# Patient Record
Sex: FEMALE | Race: WHITE | Employment: OTHER | URBAN - NONMETROPOLITAN AREA
[De-identification: names, ages, dates, MRNs, and addresses within clinical notes are randomized per-mention and may not be internally consistent; named-entity substitution may affect disease eponyms.]

---

## 2021-09-24 ENCOUNTER — OFFICE VISIT (OUTPATIENT)
Dept: FAMILY MEDICINE CLINIC | Age: 86
End: 2021-09-24
Payer: COMMERCIAL

## 2021-09-24 VITALS
HEIGHT: 63 IN | WEIGHT: 162 LBS | TEMPERATURE: 97.4 F | OXYGEN SATURATION: 97 % | HEART RATE: 83 BPM | RESPIRATION RATE: 18 BRPM | DIASTOLIC BLOOD PRESSURE: 68 MMHG | SYSTOLIC BLOOD PRESSURE: 138 MMHG | BODY MASS INDEX: 28.7 KG/M2

## 2021-09-24 DIAGNOSIS — H35.30 MACULAR DEGENERATION OF BOTH EYES, UNSPECIFIED TYPE: Primary | ICD-10-CM

## 2021-09-24 DIAGNOSIS — I25.10 CORONARY ARTERY DISEASE INVOLVING NATIVE CORONARY ARTERY OF NATIVE HEART WITHOUT ANGINA PECTORIS: ICD-10-CM

## 2021-09-24 DIAGNOSIS — Z76.89 ENCOUNTER TO ESTABLISH CARE: ICD-10-CM

## 2021-09-24 DIAGNOSIS — M81.0 AGE-RELATED OSTEOPOROSIS WITHOUT CURRENT PATHOLOGICAL FRACTURE: ICD-10-CM

## 2021-09-24 DIAGNOSIS — Z01.818 PREOPERATIVE CLEARANCE: ICD-10-CM

## 2021-09-24 DIAGNOSIS — H35.63: ICD-10-CM

## 2021-09-24 DIAGNOSIS — Z13.1 SCREENING FOR DIABETES MELLITUS: ICD-10-CM

## 2021-09-24 DIAGNOSIS — I10 ESSENTIAL HYPERTENSION: ICD-10-CM

## 2021-09-24 DIAGNOSIS — M54.50 CHRONIC BILATERAL LOW BACK PAIN WITHOUT SCIATICA: ICD-10-CM

## 2021-09-24 DIAGNOSIS — G89.29 CHRONIC BILATERAL LOW BACK PAIN WITHOUT SCIATICA: ICD-10-CM

## 2021-09-24 DIAGNOSIS — C44.41 BASAL CELL CARCINOMA (BCC) OF SCALP: ICD-10-CM

## 2021-09-24 DIAGNOSIS — M51.36 DDD (DEGENERATIVE DISC DISEASE), LUMBAR: ICD-10-CM

## 2021-09-24 DIAGNOSIS — E78.2 MIXED HYPERLIPIDEMIA: ICD-10-CM

## 2021-09-24 LAB
APPEARANCE FLUID: CLEAR
BILIRUBIN, POC: NORMAL
BLOOD URINE, POC: NORMAL
CLARITY, POC: CLEAR
COLOR, POC: YELLOW
GLUCOSE URINE, POC: NORMAL
KETONES, POC: NORMAL
LEUKOCYTE EST, POC: NORMAL
NITRITE, POC: NORMAL
PH, POC: 7.5
PROTEIN, POC: NORMAL
SPECIFIC GRAVITY, POC: 1.02
UROBILINOGEN, POC: 0.2

## 2021-09-24 PROCEDURE — 90471 IMMUNIZATION ADMIN: CPT | Performed by: NURSE PRACTITIONER

## 2021-09-24 PROCEDURE — 99204 OFFICE O/P NEW MOD 45 MIN: CPT | Performed by: NURSE PRACTITIONER

## 2021-09-24 PROCEDURE — 81002 URINALYSIS NONAUTO W/O SCOPE: CPT | Performed by: NURSE PRACTITIONER

## 2021-09-24 PROCEDURE — 90694 VACC AIIV4 NO PRSRV 0.5ML IM: CPT | Performed by: NURSE PRACTITIONER

## 2021-09-24 RX ORDER — HYDROCHLOROTHIAZIDE 25 MG/1
25 TABLET ORAL DAILY
COMMUNITY
End: 2021-10-05 | Stop reason: SDUPTHER

## 2021-09-24 RX ORDER — ATORVASTATIN CALCIUM 20 MG/1
20 TABLET, FILM COATED ORAL DAILY
COMMUNITY
End: 2021-11-22 | Stop reason: SDUPTHER

## 2021-09-24 RX ORDER — NITROGLYCERIN 0.4 MG/1
0.4 TABLET SUBLINGUAL EVERY 5 MIN PRN
Qty: 25 TABLET | Refills: 3 | Status: SHIPPED | OUTPATIENT
Start: 2021-09-24

## 2021-09-24 RX ORDER — ASPIRIN 81 MG/1
81 TABLET, CHEWABLE ORAL DAILY
Status: ON HOLD | COMMUNITY
End: 2021-10-15 | Stop reason: HOSPADM

## 2021-09-24 RX ORDER — GABAPENTIN 100 MG/1
100 CAPSULE ORAL 2 TIMES DAILY
COMMUNITY
End: 2021-10-26 | Stop reason: SDUPTHER

## 2021-09-24 RX ORDER — PHENOL 1.4 %
1 AEROSOL, SPRAY (ML) MUCOUS MEMBRANE DAILY
COMMUNITY

## 2021-09-24 RX ORDER — AMLODIPINE BESYLATE 10 MG/1
10 TABLET ORAL DAILY
COMMUNITY
End: 2021-11-04 | Stop reason: SDUPTHER

## 2021-09-24 RX ORDER — ISOSORBIDE MONONITRATE 30 MG/1
30 TABLET, EXTENDED RELEASE ORAL DAILY
COMMUNITY
End: 2021-10-28 | Stop reason: SDUPTHER

## 2021-09-24 RX ORDER — DENOSUMAB 60 MG/ML
60 INJECTION SUBCUTANEOUS
COMMUNITY
End: 2021-12-27 | Stop reason: SDUPTHER

## 2021-09-24 RX ORDER — POTASSIUM CHLORIDE 750 MG/1
10 TABLET, FILM COATED, EXTENDED RELEASE ORAL DAILY
COMMUNITY
End: 2021-10-28 | Stop reason: SDUPTHER

## 2021-09-24 ASSESSMENT — ENCOUNTER SYMPTOMS
ABDOMINAL PAIN: 0
NAUSEA: 0
BACK PAIN: 1
SHORTNESS OF BREATH: 0
SORE THROAT: 0
CHEST TIGHTNESS: 0
DIARRHEA: 0
WHEEZING: 0
COUGH: 0

## 2021-09-24 ASSESSMENT — PATIENT HEALTH QUESTIONNAIRE - PHQ9
SUM OF ALL RESPONSES TO PHQ QUESTIONS 1-9: 0
SUM OF ALL RESPONSES TO PHQ QUESTIONS 1-9: 0
2. FEELING DOWN, DEPRESSED OR HOPELESS: 0
SUM OF ALL RESPONSES TO PHQ9 QUESTIONS 1 & 2: 0
SUM OF ALL RESPONSES TO PHQ QUESTIONS 1-9: 0
1. LITTLE INTEREST OR PLEASURE IN DOING THINGS: 0

## 2021-09-24 NOTE — PROGRESS NOTES
Immunizations Administered     Name Date Dose Route    Influenza, Quadv, adjuvanted, 65 yrs +, IM, PF (Fluad) 9/24/2021 0.5 mL Intramuscular    Site: Deltoid- Right    Lot: [id number]    [facility]: 55896-044-09

## 2021-09-24 NOTE — PROGRESS NOTES
pathological fracture     Basal cell carcinoma (BCC) of scalp     Coronary artery disease involving native coronary artery of native heart without angina pectoris     DDD (degenerative disc disease), lumbar     Essential hypertension     Mixed hyperlipidemia        Current Outpatient Medications   Medication Sig Dispense Refill    potassium chloride (KLOR-CON) 10 MEQ extended release tablet Take 10 mEq by mouth daily      gabapentin (NEURONTIN) 100 MG capsule Take 100 mg by mouth 2 times daily.  hydroCHLOROthiazide (HYDRODIURIL) 25 MG tablet Take 25 mg by mouth daily      isosorbide mononitrate (IMDUR) 30 MG extended release tablet Take 30 mg by mouth daily      amLODIPine (NORVASC) 10 MG tablet Take 10 mg by mouth daily      atorvastatin (LIPITOR) 20 MG tablet Take 20 mg by mouth daily      vitamin D (CHOLECALCIFEROL) 25 MCG (1000 UT) TABS tablet Take 1,000 Units by mouth daily      calcium carbonate 600 MG TABS tablet Take 1 tablet by mouth daily      aspirin 81 MG chewable tablet Take 81 mg by mouth daily      Multiple Vitamins-Minerals (OCUVITE PO) Take by mouth      denosumab (PROLIA) 60 MG/ML SOSY SC injection Inject 60 mg into the skin once      nitroGLYCERIN (NITROSTAT) 0.4 MG SL tablet Place 1 tablet under the tongue every 5 minutes as needed for Chest pain up to max of 3 total doses. If no relief after 1 dose, call 911. 25 tablet 3     No current facility-administered medications for this visit.        Allergies   Allergen Reactions    Seasonal        Past Surgical History:   Procedure Laterality Date    CARDIAC CATHETERIZATION      2020    EYE SURGERY      h/o eye bleed    FOOT SURGERY      deep calluses    HYSTERECTOMY      TOTAL HIP ARTHROPLASTY Left 2019    VARICOSE VEIN SURGERY         Social History     Tobacco Use    Smoking status: Former Smoker     Types: Cigarettes     Quit date:      Years since quittin.7    Smokeless tobacco: Never Used   Vaping Use  Vaping Use: Never used   Substance Use Topics    Alcohol use: Never    Drug use: Never       History reviewed. No pertinent family history. /68   Pulse 83   Temp 97.4 °F (36.3 °C) (Temporal)   Resp 18   Ht 5' 3\" (1.6 m)   Wt 162 lb (73.5 kg)   SpO2 97%   BMI 28.70 kg/m²     Physical Exam  Vitals reviewed. Constitutional:       General: She is not in acute distress. Appearance: Normal appearance. She is well-developed. HENT:      Head: Normocephalic. Mouth/Throat:      Pharynx: No posterior oropharyngeal erythema. Eyes:      Conjunctiva/sclera: Conjunctivae normal.      Pupils: Pupils are equal, round, and reactive to light. Neck:      Thyroid: No thyromegaly. Vascular: No carotid bruit or JVD. Trachea: No tracheal deviation. Cardiovascular:      Rate and Rhythm: Normal rate and regular rhythm. Heart sounds: Normal heart sounds. No murmur heard. Pulmonary:      Effort: Pulmonary effort is normal. No respiratory distress. Breath sounds: Normal breath sounds. No wheezing or rhonchi. Musculoskeletal:         General: Normal range of motion. Cervical back: Normal range of motion and neck supple. Lymphadenopathy:      Cervical: No cervical adenopathy. Skin:     General: Skin is warm and dry. Findings: No rash. Neurological:      Mental Status: She is alert. Psychiatric:         Mood and Affect: Mood normal.         Behavior: Behavior normal.         Thought Content: Thought content normal.         ASSESSMENT/PLAN:  1. Essential hypertension  -Stable, controlled. Continue current medication.  - CBC Auto Differential; Future    2. Mixed hyperlipidemia  -Check fasting CMP, lipid  -Continue statin at current dose  - Comprehensive Metabolic Panel; Future  - Lipid Panel; Future    3. Coronary artery disease involving native coronary artery of native heart without angina pectoris  -Referred to cardiology to establish care.   No recent cardiac symptoms. She has completed Plavix for her last stent in 2020. - Stephanie Stevenson MD, Cardiology, [place]    4. Age-related osteoporosis without current pathological fracture  -We will request records from previous PCP to see when last Prolia injection was given. -Needs DEXA  - DEXA BONE DENSITY AXIAL SKELETON; Future    5. DDD (degenerative disc disease), lumbar  -Refer to pain management  -Continue Tylenol, gabapentin  - [address], [facility] Griffin APRN, Pain Medicine, [place]    6. Chronic bilateral low back pain without sciatica  -As above  - [address], [facility] Griffin APRN, Pain Medicine, [place]    7. Macular degeneration of both eyes, unspecified type  -Refer to ophthalmology  - External Referral To Ophthalmology    8. Deep retinal hemorrhage of both eyes  -Refer to ophthalmology  - External Referral To Ophthalmology    9. Basal cell carcinoma (BCC) of scalp  -Refer to dermatology  - External Referral To Dermatology    10. Preoperative clearance    - XR CHEST (2 VW); Future  - POCT Urinalysis no Micro    11. Screening for diabetes mellitus    - Hemoglobin A1C; Future    12. Encounter to establish care           Return in about 3 months (around 12/24/2021) for follow up, 30 minute visit. Kourtney No was seen today for new patient. Diagnoses and all orders for this visit:    Macular degeneration of both eyes, unspecified type  -     External Referral To Ophthalmology    Essential hypertension  -     CBC Auto Differential; Future    Mixed hyperlipidemia  -     Comprehensive Metabolic Panel; Future  -     Lipid Panel;  Future    Coronary artery disease involving native coronary artery of native heart without angina pectoris  -     Stephanie Stevenson MD, Cardiology, [place]    Age-related osteoporosis without current pathological fracture  -     DEXA BONE DENSITY AXIAL SKELETON; Future    DDD (degenerative disc disease), lumbar  -     Simi - BRAYDON Fay, Pain Medicine, [place]    Chronic bilateral low back pain without sciatica  -     BRAYDON Moran, Pain Medicine, [place]    Deep retinal hemorrhage of both eyes  -     External Referral To Ophthalmology    Basal cell carcinoma (BCC) of scalp  -     External Referral To Dermatology    Preoperative clearance  -     Cancel: Urinalysis Reflex to Culture; Future  -     XR CHEST (2 VW); Future  -     POCT Urinalysis no Micro    Screening for diabetes mellitus  -     Hemoglobin A1C; Future    Encounter to establish care    Other orders  -     INFLUENZA, QUADV, ADJUVANTED, 65 YRS =, IM, PF, PREFILL SYR, 0.5ML (FLUAD)  -     nitroGLYCERIN (NITROSTAT) 0.4 MG SL tablet; Place 1 tablet under the tongue every 5 minutes as needed for Chest pain up to max of 3 total doses. If no relief after 1 dose, call 911. There are no discontinued medications. There are no Patient Instructions on file for this visit. Patient voicesunderstanding and agrees to plans along with risks and benefits of plan. Counseling:  Maira Camacho case, medications and options were discussed in detail. Patient was instructed to call the office if she questionsregarding her treatment. Should her conditions worsen, she should return to office to be reassessed by BRAYDON Nuñez. she Should to go the closest Emergency Department for any emergency. They verbalizedunderstanding the above instructions. Return in about 3 months (around 12/24/2021) for follow up, 30 minute visit.

## 2021-09-27 RX ORDER — NITROFURANTOIN 25; 75 MG/1; MG/1
100 CAPSULE ORAL 2 TIMES DAILY
Qty: 14 CAPSULE | Refills: 0 | Status: SHIPPED | OUTPATIENT
Start: 2021-09-27 | End: 2021-10-04

## 2021-09-29 ENCOUNTER — TELEPHONE (OUTPATIENT)
Dept: CARDIOLOGY CLINIC | Age: 86
End: 2021-09-29

## 2021-09-29 NOTE — TELEPHONE ENCOUNTER
Called patient to inquire about previous cardiologist and records. I have lvm with [facility] in [address] [facility] records office to return my call regarding patient so that I can get all her cardiac records faxed to us. Patients previous cardiologist is dr. Chuck Louise.

## 2021-09-30 ENCOUNTER — HOSPITAL ENCOUNTER (OUTPATIENT)
Dept: PREADMISSION TESTING | Age: 86
Discharge: HOME | End: 2021-10-04
Payer: MEDICARE

## 2021-09-30 VITALS — HEIGHT: 63 IN | BODY MASS INDEX: 28.35 KG/M2 | WEIGHT: 160 LBS

## 2021-09-30 LAB
APTT: 31.1 SEC (ref 26–36.2)
INR BLD: 0.99 (ref 0.88–1.18)
PROTHROMBIN TIME: 13.3 SEC (ref 12–14.6)

## 2021-09-30 PROCEDURE — 85730 THROMBOPLASTIN TIME PARTIAL: CPT

## 2021-09-30 PROCEDURE — 93005 ELECTROCARDIOGRAM TRACING: CPT | Performed by: ORTHOPAEDIC SURGERY

## 2021-09-30 PROCEDURE — 87081 CULTURE SCREEN ONLY: CPT

## 2021-09-30 PROCEDURE — 85610 PROTHROMBIN TIME: CPT

## 2021-10-01 LAB
EKG P AXIS: 64 DEGREES
EKG P-R INTERVAL: 186 MS
EKG Q-T INTERVAL: 452 MS
EKG QRS DURATION: 152 MS
EKG QTC CALCULATION (BAZETT): 451 MS
EKG T AXIS: -2 DEGREES
MRSA CULTURE ONLY: NORMAL

## 2021-10-01 PROCEDURE — 93010 ELECTROCARDIOGRAM REPORT: CPT | Performed by: INTERNAL MEDICINE

## 2021-10-05 ENCOUNTER — OFFICE VISIT (OUTPATIENT)
Dept: CARDIOLOGY CLINIC | Age: 86
End: 2021-10-05
Payer: MEDICARE

## 2021-10-05 VITALS
OXYGEN SATURATION: 95 % | HEIGHT: 63 IN | WEIGHT: 162 LBS | BODY MASS INDEX: 28.7 KG/M2 | DIASTOLIC BLOOD PRESSURE: 64 MMHG | HEART RATE: 72 BPM | SYSTOLIC BLOOD PRESSURE: 128 MMHG

## 2021-10-05 DIAGNOSIS — I25.2 HISTORY OF MI (MYOCARDIAL INFARCTION): ICD-10-CM

## 2021-10-05 DIAGNOSIS — Z01.810 PREOP CARDIOVASCULAR EXAM: Primary | ICD-10-CM

## 2021-10-05 DIAGNOSIS — I35.0 NONRHEUMATIC AORTIC VALVE STENOSIS: ICD-10-CM

## 2021-10-05 DIAGNOSIS — I10 ESSENTIAL HYPERTENSION: ICD-10-CM

## 2021-10-05 DIAGNOSIS — E78.2 MIXED HYPERLIPIDEMIA: ICD-10-CM

## 2021-10-05 DIAGNOSIS — I25.10 CORONARY ARTERY DISEASE INVOLVING NATIVE CORONARY ARTERY OF NATIVE HEART WITHOUT ANGINA PECTORIS: ICD-10-CM

## 2021-10-05 PROCEDURE — 1123F ACP DISCUSS/DSCN MKR DOCD: CPT | Performed by: CLINICAL NURSE SPECIALIST

## 2021-10-05 PROCEDURE — 4040F PNEUMOC VAC/ADMIN/RCVD: CPT | Performed by: CLINICAL NURSE SPECIALIST

## 2021-10-05 PROCEDURE — G8417 CALC BMI ABV UP PARAM F/U: HCPCS | Performed by: CLINICAL NURSE SPECIALIST

## 2021-10-05 PROCEDURE — 1090F PRES/ABSN URINE INCON ASSESS: CPT | Performed by: CLINICAL NURSE SPECIALIST

## 2021-10-05 PROCEDURE — G8400 PT W/DXA NO RESULTS DOC: HCPCS | Performed by: CLINICAL NURSE SPECIALIST

## 2021-10-05 PROCEDURE — 99204 OFFICE O/P NEW MOD 45 MIN: CPT | Performed by: CLINICAL NURSE SPECIALIST

## 2021-10-05 PROCEDURE — G8427 DOCREV CUR MEDS BY ELIG CLIN: HCPCS | Performed by: CLINICAL NURSE SPECIALIST

## 2021-10-05 PROCEDURE — 1036F TOBACCO NON-USER: CPT | Performed by: CLINICAL NURSE SPECIALIST

## 2021-10-05 PROCEDURE — G8484 FLU IMMUNIZE NO ADMIN: HCPCS | Performed by: CLINICAL NURSE SPECIALIST

## 2021-10-05 RX ORDER — HYDROCHLOROTHIAZIDE 25 MG/1
25 TABLET ORAL DAILY
Qty: 90 TABLET | Refills: 3 | Status: SHIPPED | OUTPATIENT
Start: 2021-10-05 | End: 2022-07-27

## 2021-10-05 ASSESSMENT — ENCOUNTER SYMPTOMS
VOMITING: 0
EYE REDNESS: 0
NAUSEA: 0
COUGH: 0
CHEST TIGHTNESS: 0
SHORTNESS OF BREATH: 0
ABDOMINAL PAIN: 0
WHEEZING: 0
FACIAL SWELLING: 0

## 2021-10-05 NOTE — PATIENT INSTRUCTIONS
[place] at the [facility] and [address] [facility] located on the first floor of [place] [address] through hospital main entrance and turn immediately to your left. Date/Time:     Patient's contact number:  [phone] (home)     Echocardiogram -  No prep. Takes approximately 30 min. An echocardiogram uses sound waves to produce images of your heart. This commonly used test allows your doctor to see how your heart is beating and pumping blood. Your doctor can use the images from an echocardiogram to identify various abnormalities in the heart muscle and valves. This test has 2 parts:   Ø You will be asked to disrobe from the waist up and given a gown to wear. The technologist will then hook up an EKG monitor to you for the entire exam.   Ø You will then have an ultrasound of your heart (echocardiogram) to assess the heart muscle, heart valves and heart function. You may eat and take any medicines before the exam.     If you need to change your appointment, please call outpatient scheduling at [phone].

## 2021-10-05 NOTE — PROGRESS NOTES
of native heart without angina pectoris     DDD (degenerative disc disease), lumbar     Basal cell carcinoma (BCC) of scalp      Past Medical History:   Diagnosis Date    Age-related osteoporosis without current pathological fracture     Basal cell carcinoma (BCC) of scalp     skin    Coronary artery disease involving native coronary artery of native heart without angina pectoris     [place]; due to see [facility] cardiology    DDD (degenerative disc disease), lumbar     Essential hypertension     Knee pain     Mixed hyperlipidemia      Past Surgical History:   Procedure Laterality Date    CARDIAC CATHETERIZATION      stents 2020    EYE SURGERY      h/o eye bleed    FOOT SURGERY      deep calluses    HYSTERECTOMY      JOINT REPLACEMENT      TOTAL HIP ARTHROPLASTY Left 2019    VARICOSE VEIN SURGERY       Family History   Problem Relation Age of Onset    Heart Disease Mother      Social History     Tobacco Use    Smoking status: Former Smoker     Types: Cigarettes     Quit date:      Years since quittin.7    Smokeless tobacco: Never Used   Substance Use Topics    Alcohol use: Never      Current Outpatient Medications   Medication Sig Dispense Refill    hydroCHLOROthiazide (HYDRODIURIL) 25 MG tablet Take 1 tablet by mouth daily 90 tablet 3    potassium chloride (KLOR-CON) 10 MEQ extended release tablet Take 10 mEq by mouth daily      gabapentin (NEURONTIN) 100 MG capsule Take 100 mg by mouth 2 times daily.       isosorbide mononitrate (IMDUR) 30 MG extended release tablet Take 30 mg by mouth daily      amLODIPine (NORVASC) 10 MG tablet Take 10 mg by mouth daily      atorvastatin (LIPITOR) 20 MG tablet Take 20 mg by mouth daily      vitamin D (CHOLECALCIFEROL) 25 MCG (1000 UT) TABS tablet Take 1,000 Units by mouth daily      calcium carbonate 600 MG TABS tablet Take 1 tablet by mouth daily      aspirin 81 MG chewable tablet Take 81 mg by mouth daily      Multiple discharge. Left eye: No discharge. Pupils: Pupils are equal, round, and reactive to light. Neck:      Thyroid: No thyromegaly. Vascular: No carotid bruit or JVD. Trachea: No tracheal deviation. Cardiovascular:      Rate and Rhythm: Normal rate and regular rhythm. Heart sounds: Murmur (2/6 systolic) heard. No friction rub. No gallop. Pulmonary:      Effort: Pulmonary effort is normal. No respiratory distress. Breath sounds: Normal breath sounds. No wheezing or rales. Abdominal:      Palpations: Abdomen is soft. Tenderness: There is no abdominal tenderness. Musculoskeletal:         General: No swelling or deformity. Cervical back: Neck supple. No muscular tenderness. Right lower leg: No edema. Left lower leg: No edema. Skin:     General: Skin is warm and dry. Findings: No rash. Neurological:      General: No focal deficit present. Mental Status: She is alert and oriented to person, place, and time. Cranial Nerves: No cranial nerve deficit. Psychiatric:         Mood and Affect: Mood normal.         Behavior: Behavior normal.         Judgment: Judgment normal.         Data:  Lab Results   Component Value Date    WBC 6.6 09/27/2021    RBC 4.76 09/27/2021    HGB 13.7 09/27/2021    HCT 42.9 09/27/2021     09/27/2021      Lab Results   Component Value Date    CHOL 149 09/27/2021    TRIG 125 09/27/2021    HDL 54 09/27/2021    LDLCALC 70 09/27/2021     Lab Results   Component Value Date     09/27/2021    K 4.5 09/27/2021    CL 96 09/27/2021    CO2 28 09/27/2021    GLUCOSE 114 09/27/2021    BUN 13 09/27/2021    CREATININE 0.6 09/27/2021    CALCIUM 10.1 09/27/2021    ALT 26 09/27/2021    AST 25 09/27/2021       Echo 10/20/2020-normal LVEF, mild LV H, moderate aortic stenosis    Reviewed EKG dated 9/30/2021 with sinus bradycardia and right bundle branch block    Assessment:     Diagnosis Orders   1.  Preop cardiovascular exam 2. Nonrheumatic aortic valve stenosis  ECHO Complete 2D W Doppler W Color   3. Coronary artery disease involving native coronary artery of native heart without angina pectoris     4. History of MI (myocardial infarction)     5. Essential hypertension     6. Mixed hyperlipidemia         Preoperative cardiovascular exam for upcoming knee surgery  Patient is able to do 4 METS of activity. Currently no angina symptoms and recent heart cath in March 2020 in [place]  Repeat 2D echocardiogram to reassess aortic stenosis before surgical clearance    CAD-with history of MI and stents in 2019 and most recently in March 2020. I do not have her heart cath reports, but will request.  She denies any symptoms of angina. She is well controlled with aspirin, amlodipine, isosorbide    Hypertension-stable on current regimen with amlodipine, HCTZ    Hyperlipidemia-on statin therapy with atorvastatin. Reviewed recent lipids from 9/27/2021 that showed LDL of 70. Continue present treatment      Stable cardiovascular status. No evidence of overt heart failure,angina or dysrhythmia. Plan    Orders Placed This Encounter   Procedures    ECHO Complete 2D W Doppler W Color     Standing Status:   Future     Standing Expiration Date:   10/5/2022     Order Specific Question:   Reason for exam:     Answer:   aortic stenosis     Return in about 6 months (around 4/5/2022) for Dr Jason Capone. Echocardiogram-letter to Dr. Selvin Messina once results received    Call with any questionsor concerns  Follow up with BRAYDON Nina for non cardiac problems  Report any new problems  Cardiovascular Fitness-Exercise as tolerated. Strive for 15 minutes of exercise most days of the week. Cardiac / HealthyDiet  Continue current medications as directed  Continue plan of treatment  It is always recommended that you bring your medicationsbottles with you to each visit - this is for your safety!        BRAYDON Young

## 2021-10-08 ENCOUNTER — HOSPITAL ENCOUNTER (OUTPATIENT)
Dept: NON INVASIVE DIAGNOSTICS | Age: 86
Discharge: HOME | End: 2021-10-08
Payer: MEDICARE

## 2021-10-08 DIAGNOSIS — I35.0 NONRHEUMATIC AORTIC VALVE STENOSIS: ICD-10-CM

## 2021-10-08 LAB
LV EF: 63 %
LVEF MODALITY: NORMAL

## 2021-10-08 PROCEDURE — C8929 TTE W OR WO FOL WCON,DOPPLER: HCPCS

## 2021-10-08 PROCEDURE — 6360000004 HC RX CONTRAST MEDICATION: Performed by: INTERNAL MEDICINE

## 2021-10-08 PROCEDURE — 93356 MYOCRD STRAIN IMG SPCKL TRCK: CPT

## 2021-10-08 RX ADMIN — PERFLUTREN 1.65 MG: 6.52 INJECTION, SUSPENSION INTRAVENOUS at 10:37

## 2021-10-11 ENCOUNTER — TELEPHONE (OUTPATIENT)
Dept: CARDIOLOGY CLINIC | Age: 86
End: 2021-10-11

## 2021-10-11 NOTE — TELEPHONE ENCOUNTER
Date: 10-14-21    Cardiologist: Dr. Sean Ríos    Procedure: Right total knee    Surgeon: Dr. Jan Mc Office Visit: 10-5-21    Reason for office visit and medical concerns addressed at this office visit: CAD, non-stemi 2019, HTN, hyperlipidemia    Testing Performed and Date of Service:  [facility] 10-8-21 normal    RCRI = 1 pt, low, 0.9%   METs 4    Current Medications: hydrodiuril, K+, neurontin, norvasc, lipitor, vit D, calcium carbonate, ASA, prolia, nitro    Is the patient currently taking an anticoagulant? If so, what is the diagnosis the patient has been given to warrant the need for the anticoagulant?  ASA    Additional Notes: Cardiac Risk Request   You said ok on echo report can you just put in on this one too pls

## 2021-10-11 NOTE — TELEPHONE ENCOUNTER
Yes I believe I just sent you a message on this one.   Okay to send letter to Dr. Keaton Avelar for cardiac risk stratification

## 2021-10-12 ENCOUNTER — HOSPITAL ENCOUNTER (OUTPATIENT)
Dept: PREADMISSION TESTING | Age: 86
Discharge: HOME | DRG: 470 | End: 2021-10-16
Payer: MEDICARE

## 2021-10-12 LAB — SARS-COV-2, PCR: NOT DETECTED

## 2021-10-12 PROCEDURE — U0005 INFEC AGEN DETEC AMPLI PROBE: HCPCS

## 2021-10-12 PROCEDURE — U0003 INFECTIOUS AGENT DETECTION BY NUCLEIC ACID (DNA OR RNA); SEVERE ACUTE RESPIRATORY SYNDROME CORONAVIRUS 2 (SARS-COV-2) (CORONAVIRUS DISEASE [COVID-19]), AMPLIFIED PROBE TECHNIQUE, MAKING USE OF HIGH THROUGHPUT TECHNOLOGIES AS DESCRIBED BY CMS-2020-01-R: HCPCS

## 2021-10-13 ENCOUNTER — ANESTHESIA EVENT (OUTPATIENT)
Dept: OPERATING ROOM | Age: 86
DRG: 470 | End: 2021-10-13
Payer: MEDICARE

## 2021-10-14 ENCOUNTER — ANESTHESIA (OUTPATIENT)
Dept: OPERATING ROOM | Age: 86
DRG: 470 | End: 2021-10-14
Payer: MEDICARE

## 2021-10-14 ENCOUNTER — HOSPITAL ENCOUNTER (INPATIENT)
Age: 86
LOS: 1 days | Discharge: HOME HEALTH | DRG: 470 | End: 2021-10-15
Attending: ORTHOPAEDIC SURGERY | Admitting: ORTHOPAEDIC SURGERY
Payer: MEDICARE

## 2021-10-14 ENCOUNTER — APPOINTMENT (OUTPATIENT)
Dept: GENERAL RADIOLOGY | Age: 86
DRG: 470 | End: 2021-10-14
Attending: ORTHOPAEDIC SURGERY
Payer: MEDICARE

## 2021-10-14 VITALS — SYSTOLIC BLOOD PRESSURE: 135 MMHG | DIASTOLIC BLOOD PRESSURE: 67 MMHG | TEMPERATURE: 96.8 F | OXYGEN SATURATION: 97 %

## 2021-10-14 DIAGNOSIS — M17.11 PRIMARY OSTEOARTHRITIS OF RIGHT KNEE: Primary | ICD-10-CM

## 2021-10-14 PROCEDURE — 3600000005 HC SURGERY LEVEL 5 BASE: Performed by: ORTHOPAEDIC SURGERY

## 2021-10-14 PROCEDURE — 2500000003 HC RX 250 WO HCPCS: Performed by: NURSE ANESTHETIST, CERTIFIED REGISTERED

## 2021-10-14 PROCEDURE — 6360000002 HC RX W HCPCS: Performed by: ORTHOPAEDIC SURGERY

## 2021-10-14 PROCEDURE — 7100000000 HC PACU RECOVERY - FIRST 15 MIN: Performed by: ORTHOPAEDIC SURGERY

## 2021-10-14 PROCEDURE — C1776 JOINT DEVICE (IMPLANTABLE): HCPCS | Performed by: ORTHOPAEDIC SURGERY

## 2021-10-14 PROCEDURE — 2709999900 HC NON-CHARGEABLE SUPPLY: Performed by: ORTHOPAEDIC SURGERY

## 2021-10-14 PROCEDURE — 6370000000 HC RX 637 (ALT 250 FOR IP): Performed by: ORTHOPAEDIC SURGERY

## 2021-10-14 PROCEDURE — 6360000002 HC RX W HCPCS: Performed by: ANESTHESIOLOGY

## 2021-10-14 PROCEDURE — 3700000001 HC ADD 15 MINUTES (ANESTHESIA): Performed by: ORTHOPAEDIC SURGERY

## 2021-10-14 PROCEDURE — 2500000003 HC RX 250 WO HCPCS: Performed by: ORTHOPAEDIC SURGERY

## 2021-10-14 PROCEDURE — C1713 ANCHOR/SCREW BN/BN,TIS/BN: HCPCS | Performed by: ORTHOPAEDIC SURGERY

## 2021-10-14 PROCEDURE — 6360000002 HC RX W HCPCS: Performed by: NURSE ANESTHETIST, CERTIFIED REGISTERED

## 2021-10-14 PROCEDURE — 64447 NJX AA&/STRD FEMORAL NRV IMG: CPT | Performed by: NURSE ANESTHETIST, CERTIFIED REGISTERED

## 2021-10-14 PROCEDURE — 1210000000 HC MED SURG R&B

## 2021-10-14 PROCEDURE — 73560 X-RAY EXAM OF KNEE 1 OR 2: CPT

## 2021-10-14 PROCEDURE — 3E0T3BZ INTRODUCTION OF ANESTHETIC AGENT INTO PERIPHERAL NERVES AND PLEXI, PERCUTANEOUS APPROACH: ICD-10-PCS | Performed by: ANESTHESIOLOGY

## 2021-10-14 PROCEDURE — 2580000003 HC RX 258: Performed by: ORTHOPAEDIC SURGERY

## 2021-10-14 PROCEDURE — 3600000015 HC SURGERY LEVEL 5 ADDTL 15MIN: Performed by: ORTHOPAEDIC SURGERY

## 2021-10-14 PROCEDURE — 7100000001 HC PACU RECOVERY - ADDTL 15 MIN: Performed by: ORTHOPAEDIC SURGERY

## 2021-10-14 PROCEDURE — 2500000003 HC RX 250 WO HCPCS: Performed by: ANESTHESIOLOGY

## 2021-10-14 PROCEDURE — 0SRC0J9 REPLACEMENT OF RIGHT KNEE JOINT WITH SYNTHETIC SUBSTITUTE, CEMENTED, OPEN APPROACH: ICD-10-PCS | Performed by: ORTHOPAEDIC SURGERY

## 2021-10-14 PROCEDURE — 3700000000 HC ANESTHESIA ATTENDED CARE: Performed by: ORTHOPAEDIC SURGERY

## 2021-10-14 DEVICE — COMPONENT FEM CEM 5 STD RT KNEE REV COCR PERSONA: Type: IMPLANTABLE DEVICE | Site: FEMUR | Status: FUNCTIONAL

## 2021-10-14 DEVICE — PSN REV STRAIGHT SPLINE STEM EXT 14X135MM: Type: IMPLANTABLE DEVICE | Site: KNEE | Status: FUNCTIONAL

## 2021-10-14 DEVICE — PSN TIB STM 5 DEG SZ D R: Type: IMPLANTABLE DEVICE | Site: TIBIA | Status: FUNCTIONAL

## 2021-10-14 DEVICE — CEMENT BNE 40GM HI VISC RADPQ FOR REV SURG: Type: IMPLANTABLE DEVICE | Site: KNEE | Status: FUNCTIONAL

## 2021-10-14 DEVICE — COMPONENT PAT DIA29MM THK8MM KNEE POLY CEM CONVENTIONAL: Type: IMPLANTABLE DEVICE | Site: PATELLA | Status: FUNCTIONAL

## 2021-10-14 DEVICE — EXTENSION STEM L30MM DIA14MM KNEE TAPR CEM PERSONA: Type: IMPLANTABLE DEVICE | Site: KNEE | Status: FUNCTIONAL

## 2021-10-14 DEVICE — IMPLANTABLE DEVICE: Type: IMPLANTABLE DEVICE | Site: KNEE | Status: FUNCTIONAL

## 2021-10-14 RX ORDER — OXYCODONE HCL 10 MG/1
10 TABLET, FILM COATED, EXTENDED RELEASE ORAL
Status: COMPLETED | OUTPATIENT
Start: 2021-10-14 | End: 2021-10-14

## 2021-10-14 RX ORDER — LIDOCAINE HYDROCHLORIDE 10 MG/ML
INJECTION, SOLUTION INFILTRATION; PERINEURAL PRN
Status: DISCONTINUED | OUTPATIENT
Start: 2021-10-14 | End: 2021-10-14 | Stop reason: SDUPTHER

## 2021-10-14 RX ORDER — SODIUM CHLORIDE 0.9 % (FLUSH) 0.9 %
5-40 SYRINGE (ML) INJECTION PRN
Status: DISCONTINUED | OUTPATIENT
Start: 2021-10-14 | End: 2021-10-14 | Stop reason: HOSPADM

## 2021-10-14 RX ORDER — SODIUM CHLORIDE 0.9 % (FLUSH) 0.9 %
10 SYRINGE (ML) INJECTION EVERY 12 HOURS SCHEDULED
Status: DISCONTINUED | OUTPATIENT
Start: 2021-10-14 | End: 2021-10-15 | Stop reason: HOSPADM

## 2021-10-14 RX ORDER — HYDROMORPHONE HYDROCHLORIDE 1 MG/ML
0.25 INJECTION, SOLUTION INTRAMUSCULAR; INTRAVENOUS; SUBCUTANEOUS EVERY 5 MIN PRN
Status: DISCONTINUED | OUTPATIENT
Start: 2021-10-14 | End: 2021-10-14 | Stop reason: HOSPADM

## 2021-10-14 RX ORDER — MORPHINE SULFATE 2 MG/ML
2 INJECTION, SOLUTION INTRAMUSCULAR; INTRAVENOUS
Status: DISCONTINUED | OUTPATIENT
Start: 2021-10-14 | End: 2021-10-15 | Stop reason: HOSPADM

## 2021-10-14 RX ORDER — MORPHINE SULFATE 2 MG/ML
2 INJECTION, SOLUTION INTRAMUSCULAR; INTRAVENOUS EVERY 5 MIN PRN
Status: DISCONTINUED | OUTPATIENT
Start: 2021-10-14 | End: 2021-10-14 | Stop reason: HOSPADM

## 2021-10-14 RX ORDER — LABETALOL HYDROCHLORIDE 5 MG/ML
5 INJECTION, SOLUTION INTRAVENOUS EVERY 10 MIN PRN
Status: DISCONTINUED | OUTPATIENT
Start: 2021-10-14 | End: 2021-10-14 | Stop reason: HOSPADM

## 2021-10-14 RX ORDER — HYDROCHLOROTHIAZIDE 25 MG/1
25 TABLET ORAL DAILY
Status: DISCONTINUED | OUTPATIENT
Start: 2021-10-14 | End: 2021-10-15 | Stop reason: HOSPADM

## 2021-10-14 RX ORDER — ATORVASTATIN CALCIUM 20 MG/1
20 TABLET, FILM COATED ORAL NIGHTLY
Status: DISCONTINUED | OUTPATIENT
Start: 2021-10-14 | End: 2021-10-15 | Stop reason: HOSPADM

## 2021-10-14 RX ORDER — HYDROMORPHONE HYDROCHLORIDE 1 MG/ML
0.5 INJECTION, SOLUTION INTRAMUSCULAR; INTRAVENOUS; SUBCUTANEOUS EVERY 5 MIN PRN
Status: DISCONTINUED | OUTPATIENT
Start: 2021-10-14 | End: 2021-10-14 | Stop reason: HOSPADM

## 2021-10-14 RX ORDER — MORPHINE SULFATE 4 MG/ML
4 INJECTION, SOLUTION INTRAMUSCULAR; INTRAVENOUS
Status: DISCONTINUED | OUTPATIENT
Start: 2021-10-14 | End: 2021-10-15 | Stop reason: HOSPADM

## 2021-10-14 RX ORDER — SODIUM CHLORIDE, SODIUM LACTATE, POTASSIUM CHLORIDE, CALCIUM CHLORIDE 600; 310; 30; 20 MG/100ML; MG/100ML; MG/100ML; MG/100ML
INJECTION, SOLUTION INTRAVENOUS CONTINUOUS
Status: DISCONTINUED | OUTPATIENT
Start: 2021-10-14 | End: 2021-10-14

## 2021-10-14 RX ORDER — VITAMIN B COMPLEX
1000 TABLET ORAL DAILY
Status: DISCONTINUED | OUTPATIENT
Start: 2021-10-14 | End: 2021-10-15 | Stop reason: HOSPADM

## 2021-10-14 RX ORDER — DIPHENHYDRAMINE HYDROCHLORIDE 50 MG/ML
12.5 INJECTION INTRAMUSCULAR; INTRAVENOUS
Status: DISCONTINUED | OUTPATIENT
Start: 2021-10-14 | End: 2021-10-14 | Stop reason: HOSPADM

## 2021-10-14 RX ORDER — POTASSIUM CHLORIDE 750 MG/1
10 TABLET, EXTENDED RELEASE ORAL DAILY
Status: DISCONTINUED | OUTPATIENT
Start: 2021-10-14 | End: 2021-10-15 | Stop reason: HOSPADM

## 2021-10-14 RX ORDER — SODIUM CHLORIDE 0.9 % (FLUSH) 0.9 %
10 SYRINGE (ML) INJECTION PRN
Status: DISCONTINUED | OUTPATIENT
Start: 2021-10-14 | End: 2021-10-15 | Stop reason: HOSPADM

## 2021-10-14 RX ORDER — MORPHINE SULFATE 4 MG/ML
4 INJECTION, SOLUTION INTRAMUSCULAR; INTRAVENOUS EVERY 5 MIN PRN
Status: DISCONTINUED | OUTPATIENT
Start: 2021-10-14 | End: 2021-10-14 | Stop reason: HOSPADM

## 2021-10-14 RX ORDER — ACETAMINOPHEN 325 MG/1
650 TABLET ORAL EVERY 6 HOURS
Status: DISCONTINUED | OUTPATIENT
Start: 2021-10-14 | End: 2021-10-15 | Stop reason: HOSPADM

## 2021-10-14 RX ORDER — DEXAMETHASONE SODIUM PHOSPHATE 10 MG/ML
INJECTION, SOLUTION INTRAMUSCULAR; INTRAVENOUS PRN
Status: DISCONTINUED | OUTPATIENT
Start: 2021-10-14 | End: 2021-10-14 | Stop reason: SDUPTHER

## 2021-10-14 RX ORDER — SODIUM CHLORIDE 0.9 % (FLUSH) 0.9 %
5-40 SYRINGE (ML) INJECTION EVERY 12 HOURS SCHEDULED
Status: DISCONTINUED | OUTPATIENT
Start: 2021-10-14 | End: 2021-10-14 | Stop reason: HOSPADM

## 2021-10-14 RX ORDER — MORPHINE SULFATE 4 MG/ML
4 INJECTION, SOLUTION INTRAMUSCULAR; INTRAVENOUS
Status: DISCONTINUED | OUTPATIENT
Start: 2021-10-14 | End: 2021-10-14 | Stop reason: HOSPADM

## 2021-10-14 RX ORDER — PROPOFOL 10 MG/ML
INJECTION, EMULSION INTRAVENOUS PRN
Status: DISCONTINUED | OUTPATIENT
Start: 2021-10-14 | End: 2021-10-14 | Stop reason: SDUPTHER

## 2021-10-14 RX ORDER — TRANEXAMIC ACID 650 1/1
1950 TABLET ORAL
Status: COMPLETED | OUTPATIENT
Start: 2021-10-14 | End: 2021-10-14

## 2021-10-14 RX ORDER — OXYCODONE HYDROCHLORIDE 5 MG/1
10 TABLET ORAL EVERY 4 HOURS PRN
Status: DISCONTINUED | OUTPATIENT
Start: 2021-10-14 | End: 2021-10-15 | Stop reason: HOSPADM

## 2021-10-14 RX ORDER — METOCLOPRAMIDE HYDROCHLORIDE 5 MG/ML
10 INJECTION INTRAMUSCULAR; INTRAVENOUS
Status: DISCONTINUED | OUTPATIENT
Start: 2021-10-14 | End: 2021-10-14 | Stop reason: HOSPADM

## 2021-10-14 RX ORDER — FENTANYL CITRATE 50 UG/ML
INJECTION, SOLUTION INTRAMUSCULAR; INTRAVENOUS PRN
Status: DISCONTINUED | OUTPATIENT
Start: 2021-10-14 | End: 2021-10-14 | Stop reason: SDUPTHER

## 2021-10-14 RX ORDER — MEPERIDINE HYDROCHLORIDE 25 MG/ML
12.5 INJECTION INTRAMUSCULAR; INTRAVENOUS; SUBCUTANEOUS EVERY 5 MIN PRN
Status: DISCONTINUED | OUTPATIENT
Start: 2021-10-14 | End: 2021-10-14 | Stop reason: HOSPADM

## 2021-10-14 RX ORDER — ROPIVACAINE HYDROCHLORIDE 5 MG/ML
INJECTION, SOLUTION EPIDURAL; INFILTRATION; PERINEURAL
Status: COMPLETED
Start: 2021-10-14 | End: 2021-10-14

## 2021-10-14 RX ORDER — SODIUM CHLORIDE 9 MG/ML
25 INJECTION, SOLUTION INTRAVENOUS PRN
Status: DISCONTINUED | OUTPATIENT
Start: 2021-10-14 | End: 2021-10-14 | Stop reason: HOSPADM

## 2021-10-14 RX ORDER — CELECOXIB 100 MG/1
100 CAPSULE ORAL ONCE
Status: COMPLETED | OUTPATIENT
Start: 2021-10-14 | End: 2021-10-14

## 2021-10-14 RX ORDER — ACETAMINOPHEN 500 MG
1000 TABLET ORAL ONCE
Status: COMPLETED | OUTPATIENT
Start: 2021-10-14 | End: 2021-10-14

## 2021-10-14 RX ORDER — NITROGLYCERIN 0.4 MG/1
0.4 TABLET SUBLINGUAL EVERY 5 MIN PRN
Status: DISCONTINUED | OUTPATIENT
Start: 2021-10-14 | End: 2021-10-15 | Stop reason: HOSPADM

## 2021-10-14 RX ORDER — MIDAZOLAM HYDROCHLORIDE 1 MG/ML
2 INJECTION INTRAMUSCULAR; INTRAVENOUS
Status: COMPLETED | OUTPATIENT
Start: 2021-10-14 | End: 2021-10-14

## 2021-10-14 RX ORDER — LIDOCAINE HYDROCHLORIDE 10 MG/ML
1 INJECTION, SOLUTION EPIDURAL; INFILTRATION; INTRACAUDAL; PERINEURAL
Status: DISCONTINUED | OUTPATIENT
Start: 2021-10-14 | End: 2021-10-14 | Stop reason: HOSPADM

## 2021-10-14 RX ORDER — AMLODIPINE BESYLATE 10 MG/1
10 TABLET ORAL DAILY
Status: DISCONTINUED | OUTPATIENT
Start: 2021-10-15 | End: 2021-10-15 | Stop reason: HOSPADM

## 2021-10-14 RX ORDER — FENTANYL CITRATE 50 UG/ML
50 INJECTION, SOLUTION INTRAMUSCULAR; INTRAVENOUS
Status: DISCONTINUED | OUTPATIENT
Start: 2021-10-14 | End: 2021-10-14 | Stop reason: HOSPADM

## 2021-10-14 RX ORDER — PROMETHAZINE HYDROCHLORIDE 25 MG/ML
6.25 INJECTION, SOLUTION INTRAMUSCULAR; INTRAVENOUS
Status: DISCONTINUED | OUTPATIENT
Start: 2021-10-14 | End: 2021-10-14 | Stop reason: HOSPADM

## 2021-10-14 RX ORDER — ENALAPRILAT 2.5 MG/2ML
1.25 INJECTION INTRAVENOUS
Status: DISCONTINUED | OUTPATIENT
Start: 2021-10-14 | End: 2021-10-14 | Stop reason: HOSPADM

## 2021-10-14 RX ORDER — SODIUM CHLORIDE 9 MG/ML
25 INJECTION, SOLUTION INTRAVENOUS PRN
Status: DISCONTINUED | OUTPATIENT
Start: 2021-10-14 | End: 2021-10-15 | Stop reason: HOSPADM

## 2021-10-14 RX ORDER — ROPIVACAINE HYDROCHLORIDE 2 MG/ML
INJECTION, SOLUTION EPIDURAL; INFILTRATION; PERINEURAL PRN
Status: DISCONTINUED | OUTPATIENT
Start: 2021-10-14 | End: 2021-10-14 | Stop reason: ALTCHOICE

## 2021-10-14 RX ORDER — HYDRALAZINE HYDROCHLORIDE 20 MG/ML
5 INJECTION INTRAMUSCULAR; INTRAVENOUS EVERY 10 MIN PRN
Status: DISCONTINUED | OUTPATIENT
Start: 2021-10-14 | End: 2021-10-14 | Stop reason: HOSPADM

## 2021-10-14 RX ORDER — LIDOCAINE HYDROCHLORIDE 10 MG/ML
INJECTION, SOLUTION INFILTRATION; PERINEURAL
Status: COMPLETED | OUTPATIENT
Start: 2021-10-14 | End: 2021-10-14

## 2021-10-14 RX ORDER — ONDANSETRON 2 MG/ML
INJECTION INTRAMUSCULAR; INTRAVENOUS PRN
Status: DISCONTINUED | OUTPATIENT
Start: 2021-10-14 | End: 2021-10-14 | Stop reason: SDUPTHER

## 2021-10-14 RX ORDER — SENNA AND DOCUSATE SODIUM 50; 8.6 MG/1; MG/1
1 TABLET, FILM COATED ORAL 2 TIMES DAILY
Status: DISCONTINUED | OUTPATIENT
Start: 2021-10-14 | End: 2021-10-15 | Stop reason: HOSPADM

## 2021-10-14 RX ORDER — SCOLOPAMINE TRANSDERMAL SYSTEM 1 MG/1
1 PATCH, EXTENDED RELEASE TRANSDERMAL ONCE
Status: DISCONTINUED | OUTPATIENT
Start: 2021-10-14 | End: 2021-10-14 | Stop reason: HOSPADM

## 2021-10-14 RX ORDER — OXYCODONE HYDROCHLORIDE 5 MG/1
5 TABLET ORAL EVERY 4 HOURS PRN
Status: DISCONTINUED | OUTPATIENT
Start: 2021-10-14 | End: 2021-10-15 | Stop reason: HOSPADM

## 2021-10-14 RX ORDER — ROPIVACAINE HYDROCHLORIDE 5 MG/ML
INJECTION, SOLUTION EPIDURAL; INFILTRATION; PERINEURAL
Status: COMPLETED | OUTPATIENT
Start: 2021-10-14 | End: 2021-10-14

## 2021-10-14 RX ORDER — ROCURONIUM BROMIDE 10 MG/ML
INJECTION, SOLUTION INTRAVENOUS PRN
Status: DISCONTINUED | OUTPATIENT
Start: 2021-10-14 | End: 2021-10-14 | Stop reason: SDUPTHER

## 2021-10-14 RX ORDER — CALCIUM CARBONATE 500(1250)
1 TABLET ORAL DAILY
Status: DISCONTINUED | OUTPATIENT
Start: 2021-10-14 | End: 2021-10-15 | Stop reason: HOSPADM

## 2021-10-14 RX ORDER — GABAPENTIN 100 MG/1
100 CAPSULE ORAL 2 TIMES DAILY
Status: DISCONTINUED | OUTPATIENT
Start: 2021-10-14 | End: 2021-10-15 | Stop reason: HOSPADM

## 2021-10-14 RX ADMIN — PHENYLEPHRINE HYDROCHLORIDE 50 MCG: 10 INJECTION INTRAVENOUS at 12:15

## 2021-10-14 RX ADMIN — Medication 2000 MG: at 20:52

## 2021-10-14 RX ADMIN — HYDROMORPHONE HYDROCHLORIDE 0.5 MG: 1 INJECTION, SOLUTION INTRAMUSCULAR; INTRAVENOUS; SUBCUTANEOUS at 14:26

## 2021-10-14 RX ADMIN — ROPIVACAINE HYDROCHLORIDE 20 ML: 5 INJECTION, SOLUTION EPIDURAL; INFILTRATION; PERINEURAL at 11:48

## 2021-10-14 RX ADMIN — Medication 1000 UNITS: at 17:09

## 2021-10-14 RX ADMIN — ROCURONIUM BROMIDE 40 MG: 10 INJECTION, SOLUTION INTRAVENOUS at 12:17

## 2021-10-14 RX ADMIN — ASPIRIN 325 MG: 325 TABLET, DELAYED RELEASE ORAL at 20:51

## 2021-10-14 RX ADMIN — DEXAMETHASONE SODIUM PHOSPHATE 4 MG: 10 INJECTION, SOLUTION INTRAMUSCULAR; INTRAVENOUS at 12:30

## 2021-10-14 RX ADMIN — FENTANYL CITRATE 25 MCG: 50 INJECTION, SOLUTION INTRAMUSCULAR; INTRAVENOUS at 12:51

## 2021-10-14 RX ADMIN — FENTANYL CITRATE 25 MCG: 50 INJECTION, SOLUTION INTRAMUSCULAR; INTRAVENOUS at 13:59

## 2021-10-14 RX ADMIN — ACETAMINOPHEN 650 MG: 325 TABLET ORAL at 17:08

## 2021-10-14 RX ADMIN — FENTANYL CITRATE 50 MCG: 50 INJECTION, SOLUTION INTRAMUSCULAR; INTRAVENOUS at 12:15

## 2021-10-14 RX ADMIN — POTASSIUM CHLORIDE 10 MEQ: 10 TABLET, EXTENDED RELEASE ORAL at 17:09

## 2021-10-14 RX ADMIN — TRANEXAMIC ACID 1950 MG: 650 TABLET ORAL at 10:45

## 2021-10-14 RX ADMIN — HYDROMORPHONE HYDROCHLORIDE 0.5 MG: 1 INJECTION, SOLUTION INTRAMUSCULAR; INTRAVENOUS; SUBCUTANEOUS at 14:35

## 2021-10-14 RX ADMIN — PROPOFOL 120 MG: 10 INJECTION, EMULSION INTRAVENOUS at 12:16

## 2021-10-14 RX ADMIN — SODIUM CHLORIDE, SODIUM LACTATE, POTASSIUM CHLORIDE, AND CALCIUM CHLORIDE: 600; 310; 30; 20 INJECTION, SOLUTION INTRAVENOUS at 13:23

## 2021-10-14 RX ADMIN — CELECOXIB 100 MG: 100 CAPSULE ORAL at 10:46

## 2021-10-14 RX ADMIN — Medication 2000 MG: at 12:22

## 2021-10-14 RX ADMIN — HYDROMORPHONE HYDROCHLORIDE 0.5 MG: 1 INJECTION, SOLUTION INTRAMUSCULAR; INTRAVENOUS; SUBCUTANEOUS at 14:47

## 2021-10-14 RX ADMIN — DOCUSATE SODIUM AND SENNOSIDES 1 TABLET: 8.6; 5 TABLET, FILM COATED ORAL at 20:51

## 2021-10-14 RX ADMIN — FENTANYL CITRATE 50 MCG: 50 INJECTION, SOLUTION INTRAMUSCULAR; INTRAVENOUS at 12:47

## 2021-10-14 RX ADMIN — MIDAZOLAM 1 MG: 1 INJECTION INTRAMUSCULAR; INTRAVENOUS at 11:38

## 2021-10-14 RX ADMIN — LIDOCAINE HYDROCHLORIDE 1 ML: 10 INJECTION, SOLUTION INFILTRATION; PERINEURAL at 11:48

## 2021-10-14 RX ADMIN — OXYCODONE HYDROCHLORIDE 10 MG: 10 TABLET, FILM COATED, EXTENDED RELEASE ORAL at 10:45

## 2021-10-14 RX ADMIN — GABAPENTIN 100 MG: 100 CAPSULE ORAL at 20:00

## 2021-10-14 RX ADMIN — SUGAMMADEX 150 MG: 100 INJECTION, SOLUTION INTRAVENOUS at 13:58

## 2021-10-14 RX ADMIN — ONDANSETRON HYDROCHLORIDE 4 MG: 2 INJECTION, SOLUTION INTRAMUSCULAR; INTRAVENOUS at 13:37

## 2021-10-14 RX ADMIN — ACETAMINOPHEN 1000 MG: 500 TABLET ORAL at 10:46

## 2021-10-14 RX ADMIN — OXYCODONE 10 MG: 5 TABLET ORAL at 17:09

## 2021-10-14 RX ADMIN — LIDOCAINE HYDROCHLORIDE 50 MG: 10 INJECTION, SOLUTION INFILTRATION; PERINEURAL at 12:16

## 2021-10-14 RX ADMIN — ATORVASTATIN CALCIUM 20 MG: 20 TABLET, FILM COATED ORAL at 20:52

## 2021-10-14 RX ADMIN — SODIUM CHLORIDE, SODIUM LACTATE, POTASSIUM CHLORIDE, AND CALCIUM CHLORIDE: 600; 310; 30; 20 INJECTION, SOLUTION INTRAVENOUS at 12:11

## 2021-10-14 RX ADMIN — ACETAMINOPHEN 650 MG: 325 TABLET ORAL at 20:51

## 2021-10-14 ASSESSMENT — PAIN DESCRIPTION - ORIENTATION
ORIENTATION: RIGHT

## 2021-10-14 ASSESSMENT — PAIN DESCRIPTION - LOCATION
LOCATION: KNEE

## 2021-10-14 ASSESSMENT — PAIN DESCRIPTION - DESCRIPTORS
DESCRIPTORS: ACHING

## 2021-10-14 ASSESSMENT — PAIN DESCRIPTION - PAIN TYPE
TYPE: SURGICAL PAIN
TYPE: SURGICAL PAIN

## 2021-10-14 ASSESSMENT — PAIN SCALES - GENERAL
PAINLEVEL_OUTOF10: 10
PAINLEVEL_OUTOF10: 4
PAINLEVEL_OUTOF10: 8
PAINLEVEL_OUTOF10: 0
PAINLEVEL_OUTOF10: 9
PAINLEVEL_OUTOF10: 7

## 2021-10-14 ASSESSMENT — PAIN DESCRIPTION - PROGRESSION
CLINICAL_PROGRESSION: GRADUALLY IMPROVING

## 2021-10-14 ASSESSMENT — LIFESTYLE VARIABLES: SMOKING_STATUS: 0

## 2021-10-14 NOTE — ANESTHESIA PRE PROCEDURE
oxyCODONE (OXYCONTIN) extended release tablet 10 mg  10 mg Oral 60 Min Pre-Op Gabby Morgan MD        acetaminophen (TYLENOL) tablet 1,000 mg  1,000 mg Oral Once Gabby Morgan MD        celecoxib (CELEBREX) capsule 100 mg  100 mg Oral Once Gabby Morgan MD        tranexamic acid (LYSTEDA) tablet 1,950 mg  1,950 mg Oral On Call to OR Gabby Morgan MD        lactated ringers infusion   IntraVENous Continuous Gabby Morgan MD        ceFAZolin (ANCEF) 2000 mg in 0.9% sodium chloride 50 mL IVPB  2,000 mg IntraVENous On Call to [address], [place]           Allergies:  No Known Allergies    Problem List:    Patient Active Problem List   Diagnosis Code    Essential hypertension I10    Mixed hyperlipidemia E78.2    Age-related osteoporosis without current pathological fracture M81.0    Coronary artery disease involving native coronary artery of native heart without angina pectoris I25.10    DDD (degenerative disc disease), lumbar M51.36    Basal cell carcinoma (BCC) of scalp C44.41    Chronic bilateral low back pain without sciatica M54.50, G89.29    Macular degeneration of both eyes H35.30       Past Medical History:        Diagnosis Date    Age-related osteoporosis without current pathological fracture     Basal cell carcinoma (BCC) of scalp     skin    Coronary artery disease involving native coronary artery of native heart without angina pectoris     stents done in [place]; due to see [facility] cardiology    DDD (degenerative disc disease), lumbar     Essential hypertension     Knee pain     Mixed hyperlipidemia        Past Surgical History:        Procedure Laterality Date    CARDIAC CATHETERIZATION      stents 2019, 2020    EYE SURGERY      h/o eye bleed    FOOT SURGERY      deep calluses    HYSTERECTOMY      JOINT REPLACEMENT      TOTAL HIP ARTHROPLASTY Left 2019    VARICOSE VEIN SURGERY         Social History:    Social History Tobacco Use    Smoking status: Former Smoker     Types: Cigarettes     Quit date:      Years since quittin.7    Smokeless tobacco: Never Used   Substance Use Topics    Alcohol use: Never                                Counseling given: Not Answered      Vital Signs (Current):   Vitals:    10/14/21 1039   BP: (!) 149/67   Pulse: 80   Resp: 14   Temp: 98.5 °F (36.9 °C)   TempSrc: Tympanic   SpO2: 96%   Weight: 160 lb (72.6 kg)   Height: 5' 3\" (1.6 m)                                              BP Readings from Last 3 Encounters:   10/14/21 (!) 149/67   10/05/21 128/64   21 138/68       NPO Status:                                                                                 BMI:   Wt Readings from Last 3 Encounters:   10/14/21 160 lb (72.6 kg)   10/05/21 162 lb (73.5 kg)   21 160 lb (72.6 kg)     Body mass index is 28.34 kg/m². CBC:   Lab Results   Component Value Date    WBC 6.6 2021    RBC 4.76 2021    HGB 13.7 2021    HCT 42.9 2021    MCV 90.1 2021    RDW 13.5 2021     2021       CMP:   Lab Results   Component Value Date     2021    K 4.5 2021    CL 96 2021    CO2 28 2021    BUN 13 2021    CREATININE 0.6 2021    GFRAA >59 2021    LABGLOM >60 2021    GLUCOSE 114 2021    PROT 7.3 2021    CALCIUM 10.1 2021    BILITOT 0.5 2021    ALKPHOS 51 2021    AST 25 2021    ALT 26 2021       POC Tests: No results for input(s): POCGLU, POCNA, POCK, POCCL, POCBUN, POCHEMO, POCHCT in the last 72 hours.     Coags:   Lab Results   Component Value Date    PROTIME 13.3 2021    INR 0.99 2021    APTT 31.1 2021       HCG (If Applicable): No results found for: PREGTESTUR, PREGSERUM, HCG, HCGQUANT     ABGs: No results found for: PHART, PO2ART, MUD5DRE, VQJ1PZO, BEART, N4BNEFPI     Type & Screen (If Applicable):  No results found for: LABABO, [address]    Drug/Infectious Status (If Applicable):  No results found for: HIV, HEPCAB    COVID-19 Screening (If Applicable):   Lab Results   Component Value Date    COVID19 Not Detected 10/12/2021           Anesthesia Evaluation  Patient summary reviewed and Nursing notes reviewed no history of anesthetic complications:   Airway: Mallampati: II  TM distance: >3 FB     Mouth opening: > = 3 FB Dental:    (+) upper dentures and lower dentures      Pulmonary:Negative Pulmonary ROS and normal exam        (-) not a current smoker                           Cardiovascular:    (+) hypertension:, valvular problems/murmurs: AS, CAD:, CABG/stent:, murmur, hyperlipidemia      ECG reviewed               Beta Blocker:  Not on Beta Blocker         Neuro/Psych:      (-) seizures and CVA           GI/Hepatic/Renal:        (-) GERD, liver disease and no renal disease       Endo/Other:    (+) : arthritis:., .    (-) diabetes mellitus               Abdominal:             Vascular: negative vascular ROS. Other Findings:             Anesthesia Plan      general and regional     ASA 3     (Adductor canal block)  Induction: intravenous. MIPS: Postoperative opioids intended and Prophylactic antiemetics administered. Anesthetic plan and risks discussed with patient. Plan discussed with CRNA.                   Kathy Borja MD   10/14/2021

## 2021-10-14 NOTE — ANESTHESIA PROCEDURE NOTES
Peripheral Block    Patient location during procedure: holding area  Staffing  Performed: anesthesiologist   Anesthesiologist: Nessa Almaguer MD  Preanesthetic Checklist  Completed: patient identified, IV checked, site marked, risks and benefits discussed, surgical consent, monitors and equipment checked, pre-op evaluation, timeout performed, anesthesia consent given, oxygen available and patient being monitored  Peripheral Block  Patient position: supine  Prep: ChloraPrep  Patient monitoring: cardiac monitor, continuous pulse ox, frequent blood pressure checks and IV access  Block type: Femoral  Laterality: right  Injection technique: single-shot  Guidance: ultrasound guided  Local infiltration: ropivacaine  Infiltration strength: 0.5 %  Dose: 20 mL  Adductor canal  Provider prep: mask and sterile gloves  Local infiltration: ropivacaine  Needle  Needle type: combined needle/nerve stimulator   Needle gauge: 22 G  Needle length: 10 cm  Needle localization: ultrasound guidance  Assessment  Injection assessment: negative aspiration for heme, no paresthesia on injection and local visualized surrounding nerve on ultrasound  Paresthesia pain: none  Slow fractionated injection: yes  Hemodynamics: stable  Additional Notes  Under ultrasound (\"US\") guidance, a 22 gauge needle was inserted and placed in close proximity to the femoral nerve. Ultrasound was also used to visualize the spread of the anesthetic in close proximity to the nerve being blocked. The nerve appeared anatomically normal, and there were no abnormal pathological findings. A permanent image was recorded.      20 mL 0.5% Ropivacaine injected  Medications Administered  Lidocaine injection 1%, 1 mL  ropivacaine (NAROPIN) injection 0.5%, 20 mL  Reason for block: post-op pain management

## 2021-10-14 NOTE — OP NOTE
TOTAL KNEE ARTHROPLASTY OPERATIVE NOTE    NAME OF SURGEON / : Paulino Braxton MD  PATIENT:   Rosina Bach  Date: 10/14/2021        Time: 1:55 PM   Referring Physician: ________________________    PREOP DIAGNOSIS:  right Knee  Primary osteoarthritis   POSTOP DIAGNOSIS:  Same     PROCEDURE:    Right  Cemented Posterior Stabilized Knee arthroplasty    IMPLANTS:   Implant Name Type Inv. Item Serial No.  Lot No. LRB No. Used Action   CEMENT BNE 40GM HI VISC RADPQ FOR REV SURG  CEMENT BNE 40GM HI VISC RADPQ FOR REV SURG  [employer]- [id number] Right 1 Implanted   CEMENT BNE 40GM HI VISC RADPQ FOR REV SURG  CEMENT BNE 40GM HI VISC RADPQ FOR REV SURG  [employer]- [id number] Right 1 Implanted   COMPONENT FEM GAVINO 5 STD RT KNEE REV COCR PERSONA  COMPONENT FEM GAVINO 5 STD RT KNEE REV COCR PERSONA  [employer]- [id number] Right 1 Implanted   PSN REV STRAIGHT SPLINE STEM EXT [id number] Knee PSN REV STRAIGHT SPLINE STEM EXT [id number]  [employer]- [id number] Right 1 Implanted   PSN TIB STM 5 DEG SZ D R  PSN TIB STM 5 DEG SZ D R  [employer]- [id number] Right 1 Implanted   EXTENSION STEM L30MM [id number] KNEE TAPR GAVINO PERSONA  EXTENSION STEM L30MM [id number] KNEE TAPR GAVINO PERSONA  [employer] [id number] Right 1 Implanted   COMPONENT PAT [id number] THK8MM KNEE POLY GAVINO CONVENTIONAL  COMPONENT PAT ZFM28ME THK8MM KNEE POLY GAVINO CONVENTIONAL  [employer]- [id number] Right 1 Implanted   COMPONENT ARTC SURF UCONG 3-5 CD 10 MM RT TIB POLYETH  COMPONENT ARTC SURF UCONG 3-5 CD 10 MM RT TIB POLYETH  [employer] [id number] Right 1 Implanted       FINDINGS:  Preop ROM:  Full except for   - 5 degrees  extension  Alignment:    valgus  Bone quality:  very soft  Cartilage wear:  Medial - mild  Lateral - severe  Pat-fem -severe  ACL -Absent    ASSISTANT: Cristina Brambila, certified first assistant.   Helped with draping, exposure, retraction, essential steps of the procedure, and with wound closure. ANESTHESIA:  General  EBL:  500 mL  TOURNIQUET:  none  FLUIDS: See anesthesia record  BLOOD PRODUCTS:  None  COMPLICATIONS:  None  SPECIMEN:  None            INDICATIONS:  Patient presents for the above procedure having failed conservative treatment. Patient consents to the procedure above understanding the risks of bleeding, infection, anesthesia, nerve injury, stiffness, and blood clots. PROCEDURE:  The patient was brought to the operating room and placed in a supine position on the operating table. Anesthesia as specified above was placed. An antiobiotic was given IV. The unoperated extremities were well padded. A tourniquet was placed around the proximal thigh. The lower extremity was prepped with chlorhexidene/alcohol and draped sterilely using ioband barriers. A time out was performed. An anterior knee incision was made and fasciocutaneous flaps were developed. A mini midvastus approach was made and the patella subluxed. The prepatellar fat pad, ACL, and the anterior horns of the menisci were excised. A starter drill was placed down the femoral shaft 1 cm anterior to the PCL origin. An alignment alondra was placed down the femoral shaft. The distal femoral cutting guide, set at 5 degrees of valgus was then placed over the alignment alondra, and pinned perpendicular to the AP axis. The cutting block was then set to remove an extra 1 mm of distal femur and the distal cut made. The medial bone cut thickness was 11 mm. Hohmans and a PCL retractor were placed around the tibia. The external alignment guide set to cut 10 mm off the intact side at 7° degrees posterior slope was pinned in place. I stepped back and verified that the guide followed the anterior cortex of the tibia to the ankle. The tibial cut was made. Its thickness was 10 mm. The tibial fragment and osteophytes were removed. Posterior meniscal horns were resected. The extension gap was satisfactory.      The epicondylar and AP femoral axes were marked with electrocautery. Femoral trials were laid on the distal femur to estimate the appropriate size. The femoral sizer, with posterior paddles set at 3 degrees of external rotation, and an anterior stylus was placed. The sizer reading matched the size predicted by the trial.   The pinholes were drilled for the 4 in 1 femoral cutting block. This block was impacted on the distal femur and sat flush with the proper rotation relative to the AP and epicondylar axes. A drill was advanced through the anterior slot to check for notching. The femoral block was fixed with medial and lateral screws and the remaining femoral cuts were made. Femoral osteophytes were removed with a rongeur. The flexion gap was satisfactory. Posterior femoral osteophytes were removed with a 3/4 inch curved osteotome and rongeur. The appropriate tibial post punch guide covered the tibia without overhang and sat flush. It was lined up with the medial third of the tibial tubercle. The tibia was reamed, then the keel punched. The femoral trial was impacted onto the femur. The box cut was made first with a recip saw and finished with regular saw. The insert for the box was placed. A 10 mm thick, posterior stabilized tibial liner was snapped in place and ROM and stability were checked. When I push the knee gently into extension there was a pop more than usual.  Remove the trials and saw that we had an incomplete distal femur fracture involving the medial femoral condyle was still pretty stable fracture. Went ahead and prepared for a revision on the femur placed the 5 femoral trial on the femur and reamed up to 14 mm to get a good chatter at 135 mm. Then reamed for the boss to the appropriate depth. Assembled the trial impacted on the distal femur had to take a little wider box cut for the Carol persona revision femur. Place the trial poly and checked stability.      The knee had full ROM and symmetric varus/valgus stability in flexion and extension after    *release of  NO RELEASES  * no extra resection was needed   *the appropriate size tibial liner was placed (see above)    Note: Stability to varus/valgus stress(mm):  Extension ( 2  ,  1 ) Mid Flexion (  1  ,  1  ) Flexion (  1  ,  2  )      The maximum patella thickness was 21 mm. The patella as resected with an oscillating saw and consistent thickness verified with caliper. The trial patella was placed and the overall thickness was restored to 22 mm. A femoral  bone plug placed, the femoral lug holes drilled and the trials were removed. The surfaces were rinsed with pulse lavage and dried. Two batches of cement  were mixed. Appropriate sized implants were cemented in place, a trial tibial liner placed, and the knee held in full extension until cement hardened. The capsule was injected with Ropivacaine. Excess cement was removed, and the actual tibial liner was snapped in place. No thumbs patella tracking was checked. No release was needed. Hemostasis was achieved with electrocautery. The wound was copiously irrigated with antibiotic irrigation. The capsule was closed with interrupted #2 vicryl. Subcutaneous tissue was closed with running 2-0 vicryl. Skin was closed with 3-0 vicryl and Prineo. A sterile dressing was applied. The patient was awakened, extubated and transferred to the recovery room in stable condition. Although he did have a distal femur fracture is a pretty stable pattern and the implant provided great stability. She is a smaller lady I think should be okay to weight-bear as tolerated.           PLAN:  Full weight bearing, ROM, dvt prophylaxis      Electronically signed by Kady Limon MD on 10/14/2021 at 1:55 PM

## 2021-10-14 NOTE — ANESTHESIA POSTPROCEDURE EVALUATION
Department of Anesthesiology  Postprocedure Note    Patient: Shelly Higginbotham  MRN: [id number]  YOB: 1935  Date of evaluation: 10/14/2021  Time:  2:13 PM     Procedure Summary     Date: 10/14/21 Room / Location: [address]    Anesthesia Start: 1211 Anesthesia Stop:     Procedure: RIGHT KNEE TOTAL ARTHROPLASTY (Right Knee) Diagnosis: (m17.11)    Surgeons: Sherita Palacios MD Responsible Provider: BRAYDON Broussard CRNA    Anesthesia Type: general, regional ASA Status: 3          Anesthesia Type: No value filed. Gay Phase I: Gay Score: 10    Gay Phase II:      Last vitals: Reviewed and per EMR flowsheets. Anesthesia Post Evaluation    Patient location during evaluation: PACU  Patient participation: waiting for patient participation  Level of consciousness: awake and lethargic  Pain score: 0  Airway patency: patent  Nausea & Vomiting: no nausea and no vomiting  Complications: no  Cardiovascular status: blood pressure returned to baseline  Respiratory status: acceptable  Hydration status: euvolemic  Comments: Pt transported with oxygen.   Report to RN

## 2021-10-15 VITALS
TEMPERATURE: 97.9 F | WEIGHT: 160 LBS | SYSTOLIC BLOOD PRESSURE: 147 MMHG | BODY MASS INDEX: 28.35 KG/M2 | HEART RATE: 77 BPM | RESPIRATION RATE: 18 BRPM | DIASTOLIC BLOOD PRESSURE: 59 MMHG | HEIGHT: 63 IN | OXYGEN SATURATION: 96 %

## 2021-10-15 LAB
ANION GAP SERPL CALCULATED.3IONS-SCNC: 12 MMOL/L (ref 7–19)
BUN BLDV-MCNC: 12 MG/DL (ref 8–23)
CALCIUM SERPL-MCNC: 8.9 MG/DL (ref 8.8–10.2)
CHLORIDE BLD-SCNC: 99 MMOL/L (ref 98–111)
CO2: 23 MMOL/L (ref 22–29)
CREAT SERPL-MCNC: 0.7 MG/DL (ref 0.5–0.9)
GFR AFRICAN AMERICAN: >59
GFR NON-AFRICAN AMERICAN: >60
GLUCOSE BLD-MCNC: 193 MG/DL (ref 74–109)
HCT VFR BLD CALC: 33.5 % (ref 37–47)
HEMOGLOBIN: 10.7 G/DL (ref 12–16)
POTASSIUM REFLEX MAGNESIUM: 4.2 MMOL/L (ref 3.5–5)
SODIUM BLD-SCNC: 134 MMOL/L (ref 136–145)

## 2021-10-15 PROCEDURE — 6360000002 HC RX W HCPCS: Performed by: ORTHOPAEDIC SURGERY

## 2021-10-15 PROCEDURE — 85014 HEMATOCRIT: CPT

## 2021-10-15 PROCEDURE — 97530 THERAPEUTIC ACTIVITIES: CPT

## 2021-10-15 PROCEDURE — 97161 PT EVAL LOW COMPLEX 20 MIN: CPT

## 2021-10-15 PROCEDURE — 80048 BASIC METABOLIC PNL TOTAL CA: CPT

## 2021-10-15 PROCEDURE — 2580000003 HC RX 258: Performed by: ORTHOPAEDIC SURGERY

## 2021-10-15 PROCEDURE — 85018 HEMOGLOBIN: CPT

## 2021-10-15 PROCEDURE — 97116 GAIT TRAINING THERAPY: CPT

## 2021-10-15 PROCEDURE — 6370000000 HC RX 637 (ALT 250 FOR IP): Performed by: ORTHOPAEDIC SURGERY

## 2021-10-15 RX ORDER — OXYCODONE HYDROCHLORIDE 5 MG/1
5 TABLET ORAL EVERY 4 HOURS PRN
Qty: 30 TABLET | Refills: 0 | Status: SHIPPED | OUTPATIENT
Start: 2021-10-15 | End: 2021-10-18

## 2021-10-15 RX ORDER — ASPIRIN 81 MG/1
81 TABLET ORAL 2 TIMES DAILY
Qty: 60 TABLET | Refills: 0 | Status: SHIPPED | OUTPATIENT
Start: 2021-10-15

## 2021-10-15 RX ADMIN — ACETAMINOPHEN 650 MG: 325 TABLET ORAL at 11:51

## 2021-10-15 RX ADMIN — OXYCODONE 5 MG: 5 TABLET ORAL at 02:28

## 2021-10-15 RX ADMIN — Medication 2000 MG: at 04:40

## 2021-10-15 RX ADMIN — GABAPENTIN 100 MG: 100 CAPSULE ORAL at 09:50

## 2021-10-15 RX ADMIN — POTASSIUM CHLORIDE 10 MEQ: 10 TABLET, EXTENDED RELEASE ORAL at 09:51

## 2021-10-15 RX ADMIN — CALCIUM 500 MG: 500 TABLET ORAL at 09:50

## 2021-10-15 RX ADMIN — Medication 1000 UNITS: at 09:51

## 2021-10-15 RX ADMIN — DOCUSATE SODIUM AND SENNOSIDES 1 TABLET: 8.6; 5 TABLET, FILM COATED ORAL at 09:51

## 2021-10-15 RX ADMIN — HYDROCHLOROTHIAZIDE 25 MG: 25 TABLET ORAL at 09:51

## 2021-10-15 RX ADMIN — OXYCODONE 5 MG: 5 TABLET ORAL at 06:21

## 2021-10-15 RX ADMIN — OXYCODONE 10 MG: 5 TABLET ORAL at 09:45

## 2021-10-15 RX ADMIN — Medication 10 ML: at 09:51

## 2021-10-15 RX ADMIN — ASPIRIN 325 MG: 325 TABLET, DELAYED RELEASE ORAL at 09:50

## 2021-10-15 RX ADMIN — AMLODIPINE BESYLATE 10 MG: 10 TABLET ORAL at 09:50

## 2021-10-15 ASSESSMENT — PAIN DESCRIPTION - ORIENTATION
ORIENTATION: RIGHT
ORIENTATION: RIGHT

## 2021-10-15 ASSESSMENT — PAIN SCALES - GENERAL
PAINLEVEL_OUTOF10: 4
PAINLEVEL_OUTOF10: 0
PAINLEVEL_OUTOF10: 9
PAINLEVEL_OUTOF10: 5
PAINLEVEL_OUTOF10: 6
PAINLEVEL_OUTOF10: 4
PAINLEVEL_OUTOF10: 10
PAINLEVEL_OUTOF10: 2

## 2021-10-15 ASSESSMENT — PAIN DESCRIPTION - PAIN TYPE
TYPE: ACUTE PAIN;SURGICAL PAIN
TYPE: ACUTE PAIN;SURGICAL PAIN

## 2021-10-15 ASSESSMENT — PAIN DESCRIPTION - DESCRIPTORS
DESCRIPTORS: ACHING
DESCRIPTORS: ACHING

## 2021-10-15 ASSESSMENT — PAIN DESCRIPTION - LOCATION
LOCATION: KNEE
LOCATION: KNEE

## 2021-10-15 ASSESSMENT — PAIN - FUNCTIONAL ASSESSMENT
PAIN_FUNCTIONAL_ASSESSMENT: ACTIVITIES ARE NOT PREVENTED
PAIN_FUNCTIONAL_ASSESSMENT: ACTIVITIES ARE NOT PREVENTED

## 2021-10-15 ASSESSMENT — PAIN DESCRIPTION - ONSET
ONSET: ON-GOING
ONSET: ON-GOING

## 2021-10-15 ASSESSMENT — PAIN DESCRIPTION - PROGRESSION: CLINICAL_PROGRESSION: GRADUALLY IMPROVING

## 2021-10-15 NOTE — CARE COORDINATION
referral received. Per Julia Houston Navigator, patient agreeable and has chosen [facility]. Referral Faxed. [address] [place] [address] [phone]. [phone]. Please notify [facility] when patient discharges and fax DC Summary,  DC med list and any new [place] orders. The Patient and/or patient representative was provided with a choice of provider and agrees   with the discharge plan. [x] Yes [] No    Freedom of choice list was provided with basic dialogue that supports the patient's individualized plan of care/goals, treatment preferences and shares the quality data associated with the providers.  [x] Yes [] No  Electronically signed by Chandu Pierce on 10/15/2021 at 9:01 AM

## 2021-10-15 NOTE — PROGRESS NOTES
Patient ready to discharge, dc instructions gone over with patient and written copies given as well, dc appointments  Went over, iv removed from hand no complications catheter intact. Patient discharging in stable condition. Transport to  patient. Patient's  with patient at discharge.

## 2021-10-15 NOTE — DISCHARGE SUMMARY
intact pulses distally. Patients calf remained soft and showed no evidence of DVT. The patient was able to move their leg and ankle/foot without any problems post-operatively. Physical therapy and occupational therapy were consulted and began working with the patient post-operatively. The patient progressed with PT/OT as would be expected and continued to improve through their stay. The patients pain was initially controlled with IV medications but we were able to transition to oral pain medications soon after arrival to the floor and their pain remained under good control through their hospital stay. From a medical standpoint the patient remained stable and continued to have the medicine team follow throughout their stay. Acute postoperative blood loss anemia after joint replacement being monitored with daily hemoglobin/hematocrit. The patients dressing was changed/incison was checked on day of d/c. The patient will be discharged at this time to  Home   with their current diet restrictions and will continue to follow the total knee precautions outlined to them by us and PT/OT. Condition on Discharge: Stable    Plan  Followup at scheduled appointment time (1 month post-op). Patient was instructed on the use of pain medications, the signs and symptoms of infection, and was given our number to call should they have any questions or concerns following discharge.

## 2021-10-15 NOTE — PROGRESS NOTES
Physical Therapy    Facility/Department: [facility] SURG SERVICES  Initial Assessment    NAME: Shelly Higginbotham  : 1935  MRN: [id number]    Date of Service: 10/15/2021    Discharge Recommendations:  Continue to assess pending progress, Home with Home health PT, 24 hour supervision or assist        Assessment   Body structures, Functions, Activity limitations: Decreased functional mobility ; Decreased ADL status; Decreased ROM; Decreased strength;Decreased balance; Increased pain  Assessment: Pt ABLE TO STAND AND STEP TO RECLINER USING RW. WILL BENEFIT FROM FURTHER THERAPY AT HOME WITH FAMILY. PT Education: PT Role;Plan of Care  REQUIRES PT FOLLOW UP: Yes  Activity Tolerance  Activity Tolerance: Patient Tolerated treatment well       Patient Diagnosis(es): The encounter diagnosis was Primary osteoarthritis of right knee. has a past medical history of Age-related osteoporosis without current pathological fracture, Basal cell carcinoma (BCC) of scalp, Coronary artery disease involving native coronary artery of native heart without angina pectoris, DDD (degenerative disc disease), lumbar, Essential hypertension, Knee pain, and Mixed hyperlipidemia. has a past surgical history that includes Eye surgery; Hysterectomy; Varicose vein surgery; Foot surgery; Total hip arthroplasty (Left, 2019); joint replacement; and Cardiac catheterization.     Restrictions  Restrictions/Precautions  Restrictions/Precautions: Weight Bearing, Fall Risk  Lower Extremity Weight Bearing Restrictions  Right Lower Extremity Weight Bearing: Weight Bearing As Tolerated  Vision/Hearing  Vision: Within Functional Limits  Hearing: Within functional limits     Subjective  General  Diagnosis: R TKR  Subjective  Subjective: Pt WILLING TO GET OOB  Pain Screening  Patient Currently in Pain: Yes  Pain Assessment  Pain Assessment: 0-10  Pain Level: 9  Pain Type: Acute pain;Surgical pain  Pain Location: Knee  Pain Orientation: Right  Pain Descriptors: SUPERVISION       Therapy Time   Individual Concurrent Group Co-treatment   Time In           Time Out           Minutes                   Karla Presley, PT

## 2021-10-15 NOTE — PROGRESS NOTES
Physical Therapy  Mai Tamayo  [id number]     10/15/21 7931   Subjective   Subjective Agrees to work with therapy. Transfers   Sit to Stand Stand by assistance   Stand to sit Stand by assistance   Ambulation   Ambulation? Yes   Ambulation 1   Surface level tile   Device Rolling Walker   Assistance Stand by assistance   Quality of Gait slow, steady, no LOB noted   Distance 48'   Other Activities   Comment Assisted patient with dressing for home and ambulation. Patient returned to sitting EOB after treatment and ready to go home. All needs in reach and family present.    Short term goals   Time Frame for Short term goals 14 DAYS   Short term goal 1 BED MOB MOD IND   Short term goal 2 TRANSFERS SUPERVISION   Short term goal 3 ' RW SUPERVISION   Activity Tolerance   Activity Tolerance Patient Tolerated treatment well   Safety Devices   Type of devices Call light within reach   Electronically signed by Eliana Fraga PTA on 10/15/2021 at 2:00 PM

## 2021-10-15 NOTE — CARE COORDINATION
Date / Time of Evaluation:   10/15/2021    12:28 PM  Assessment Completed by:   Steven Mast RN, BSN      Patient Name:   Gerald Sanford  MRN:   [id number]  [place]:   1935    Patient Admission Status:   Inpatient [101]    Patient Contact Information:    [address]  [phone] (home)   Telephone Information:   Mobile [phone]     Above information verified? [x]   Yes  []   No    (Best Practice:   Have patient/caregiver verify above address and phone number by stating out loud their current address and reachable phone number. Initial Assessment Completed at bedside with:      [x]   Patient  []   Family/Caregiver/Guardian   []   Other:      Current PCP:    BRAYDON Mcgregor    PCP verified? [x]   Yes  []   No    Emergency Contacts:    Extended Emergency Contact Information  Primary Emergency Contact: Kemar Franco  [place] Phone: [phone]  Relation: Child  Preferred language: English  Secondary Emergency Contact: Jaky Connolly  Phone: [phone]  Relation: Child    Advance Directives:    Does Ms. Benjamin Leslie have an advance directive in her electronic medical record? []   Yes  [x]   No    Code Status:   Full Code      Have you been vaccinated for COVID-19 (SARS-CoV-2)?     [x]   Yes  []   No                   If so, when?   02/09/2021, 03/03/2021  Which :         [x]   Pf[employer]  []   Libertad Divine  []   Belford Rota  []   Other:       Do you have any of the following unmet social needs that would keep you from returning home safely:    []   Yes  [x]   No                    Unmet Social Needs:           []   Living Situation/Housing  []   Food  []   Stroke Education   []   Utilities  []   Personal Safety  []   Financial Strain  []   Employment  []   Mental Health  []   Substance Abuse  []   Transportation Barriers    Additional Unmet Social Needs Notes:    NA    Financial:    Payor: Vinny Aleman / Plan: Jason Massey / Product Type: *No Product type* /     Pre-Cert required for SNF:     [x]   Yes  []   No    Have Long Term Care Insurance:      []   Yes  [x]   No      Pharmacy:    [facility] [address] [place] [id number]  Phone: [phone] Fax: [phone]    Potential assistance purchasing medications? []   Yes  [x]   No      ADLS:       Support System:   Family/friends    Current Home Environment:       Steps:       []   Yes  [x]   No    If yes, how many? Plans to RETURN to current housing:     []   Yes  [x]   No    Barriers to RETURNING to current housing:   NA      Currently ACTIVE with Home Health CARE:      []   Yes  [x]   No    Home Health Care Agency:  Will be utilizing [facility]    DME Provider:   VILLA    Transition Plan:  Home    Transportation PLAN for Discharge:  family    Patient Deficits:    []   Yes   [x]   No    If yes:    []   Confusion/Memory  []   Visual  []   Motor/Sensory         []   Right arm         []   Right leg         []   Left arm         []   Left leg  []   Language/Speech         []   Aphasia         []   Dysarthria         []   Swallow    Shaina Coma Scale  Eye Opening: Spontaneous  Best Verbal Response: Oriented  Best Motor Response: Obeys commands  Shaina Coma Scale Score: 15    Patient Deficit Notes:     NA    Additional CM/SW Notes:   I spoke with patient. She states that she will be going home and will have [facility]. She states that she has all the DME at home. No needs identified. Will continue to follow.     Basim Sims and/or her family were provided with choice of provider:    [x]   Yes   []   No      Felipa Iqbal RN, BSN  [address]  Phone:   [phone]    Fax:  [phone]    Electronically signed by Felipa Iqbal RN, BSN on 10/15/2021 at 12:34 PM

## 2021-10-15 NOTE — CONSULTS
Referring Provider: Dr. LOUISS [facility]  Reason for Consultation: Medical management    Patient Care Team:  BRAYDON Rodriguez as PCP - General (Family Nurse Practitioner)  BRAYDON Rodriguez as PCP - [facility] Empaneled Provider    Chief complaint right knee pain    Subjective . History of present illness: The patient presents to the orthopedic service for TKA. They have failed outpatient conservative treatment of NSAIDS, muscle relaxer, physical therapy and opioid pain meds. The pain is affecting their activities of daily living and they have chosen to undergo surgical correction. We have been asked to provide medical consultation by the attending physician since their primary care provider does not attend here at [address] [place] [address] in their healthcare during the perioperative phase was discussed with the patient. All questions were encouraged and answered to the best of our ability. The postoperative pain is as expected. There are no other participating or relieving factors noted.       REVIEW OF SYSTEMS:    CONSTITUTIONAL:  Negative for anorexia, chills, fevers, night sweats and weight loss  EYES:  negative for eye dryness, icterus and redness  HEENT:   negative for dental problems, epistaxis, facial trauma and thrush  RESPIRATORY:  negative for chest tightness, cough, dyspnea on exertion, pneumonia and sputum  CARDIOVASCULAR: negative for chest pain, dyspnea, exertional chest pressure/discomfort, irregular heart beat, palpitations, paroxysmal nocturnal dyspnea and syncope  GASTROINTESTINAL:  negative for abdominal pain, hematemesis, jaundice, melena and rectal bleeding  MUSCULOSKELETAL:  negative for muscle weakness, myalgias and neck pain, chronic joint pain noted  NEUROLOGICAL:   negative for dizziness, headaches, seizures, speech problems, tremors and vertigo  INTEGUMENT: negative for pruritus, rash, skin color change and skin lesion(s)   A Full 14 point review of systems is negative outside those listed above and in the HPI      History    Past Medical History:   Diagnosis Date    Age-related osteoporosis without current pathological fracture     Basal cell carcinoma (BCC) of scalp     skin    Coronary artery disease involving native coronary artery of native heart without angina pectoris     stents done in [place]; due to see [facility] cardiology    DDD (degenerative disc disease), lumbar     Essential hypertension     Knee pain     Mixed hyperlipidemia      Past Surgical History:   Procedure Laterality Date    CARDIAC CATHETERIZATION      stents ,     EYE SURGERY      h/o eye bleed    FOOT SURGERY      deep calluses    HYSTERECTOMY      JOINT REPLACEMENT      TOTAL HIP ARTHROPLASTY Left 2019    VARICOSE VEIN SURGERY       Family History   Problem Relation Age of Onset    Heart Disease Mother      Social History     Tobacco Use    Smoking status: Former Smoker     Types: Cigarettes     Quit date:      Years since quittin.7    Smokeless tobacco: Never Used   Vaping Use    Vaping Use: Never used   Substance Use Topics    Alcohol use: Never    Drug use: Never     Medications Prior to Admission: hydroCHLOROthiazide (HYDRODIURIL) 25 MG tablet, Take 1 tablet by mouth daily  potassium chloride (KLOR-CON) 10 MEQ extended release tablet, Take 10 mEq by mouth daily  gabapentin (NEURONTIN) 100 MG capsule, Take 100 mg by mouth 2 times daily.   isosorbide mononitrate (IMDUR) 30 MG extended release tablet, Take 30 mg by mouth daily  amLODIPine (NORVASC) 10 MG tablet, Take 10 mg by mouth daily  atorvastatin (LIPITOR) 20 MG tablet, Take 20 mg by mouth daily  vitamin D (CHOLECALCIFEROL) 25 MCG (1000 UT) TABS tablet, Take 1,000 Units by mouth daily  calcium carbonate 600 MG TABS tablet, Take 1 tablet by mouth daily  aspirin 81 MG chewable tablet, Take 81 mg by mouth daily  Multiple Vitamins-Minerals (OCUVITE PO), Take 1 tablet by mouth 2 times daily   denosumab (PROLIA) 60 MG/ML sugars noted. Explained to patient the need for better control to optimize the healing process. Reviewed home medication regimen, IV fluids, and dietary intake over the last 24 hours to help determine the etiology of the hyperglycemia. Adjustments made and discussed with staff, see orders for further details. Constipation-start with Miralax 1 capful BID until BM, then decrease to 1 x a day,then can step up to prokinetic to aid in opiod induced constipation,and ultimately end up with Relistor 12 mcg sub Q q 48 hours to block the effect of narcotics on the gut. Explained to the patient the negative effects of anesthesia and narcotic pain medication on the normal peristalsis of the gut.     Medically stable postop day #1  Encourage incentive spirometry every hour while awake  Early mobilization, maximize efforts with therapy  Medically cleared for discharge later today if continues to improve and okay with Ortho  I discussed the patients findings and my recommendations with patient/family, staff and the Orthopaedic team.  Desi Rudolph PA-C  10/15/21  7:41 AM

## 2021-10-15 NOTE — PROGRESS NOTES
Subjective:     Post-Operative Day: 1 No complaints    Objective:     Patient Vitals for the past 24 hrs:   BP Temp Temp src Pulse Resp SpO2 Height Weight   10/15/21 0441 (!) 142/56 97 °F (36.1 °C) Temporal 78 18 97 % -- --   10/14/21 2156 (!) 139/58 96.8 °F (36 °C) -- 72 18 96 % -- --   10/14/21 1821 (!) 130/52 97.2 °F (36.2 °C) Temporal 75 -- 97 % -- --   10/14/21 1733 (!) 141/57 98.1 °F (36.7 °C) Temporal 77 -- 96 % -- --   10/14/21 1707 (!) 139/49 97.2 °F (36.2 °C) Temporal 74 -- 98 % -- --   10/14/21 1647 (!) 131/57 -- Temporal 72 -- 98 % -- --   10/14/21 1628 136/60 96.9 °F (36.1 °C) Temporal 69 18 98 % -- --   10/14/21 1510 -- -- -- -- -- 94 % -- --   10/14/21 1505 (!) 145/64 -- -- 72 17 96 % -- --   10/14/21 1500 (!) 148/60 -- -- 75 14 96 % -- --   10/14/21 1455 (!) 140/63 -- -- 71 17 94 % -- --   10/14/21 1450 (!) 146/62 97.4 °F (36.3 °C) Temporal 71 9 94 % -- --   10/14/21 1445 (!) 137/58 -- -- 70 13 96 % -- --   10/14/21 1440 (!) 136/55 -- -- 68 15 92 % -- --   10/14/21 1435 128/64 -- -- 67 20 95 % -- --   10/14/21 1430 101/82 -- -- 69 12 95 % -- --   10/14/21 1425 (!) 128/56 -- -- 73 12 93 % -- --   10/14/21 1420 (!) 135/56 -- -- 73 11 91 % -- --   10/14/21 1415 -- -- -- 75 10 93 % -- --   10/14/21 1413 (!) 127/58 97.6 °F (36.4 °C) Temporal 80 14 91 % -- --   10/14/21 1039 (!) 149/67 98.5 °F (36.9 °C) Tympanic 80 14 96 % 5' 3\" (1.6 m) 160 lb (72.6 kg)       General: Alert cooperative   Wound: Clean dry intact. Moderate swelling   Neurovascular: Exam normal   DVT Exam: No evidence of DVT         Data Review:  Recent Labs     10/15/21  0333   HGB 10.7*     Recent Labs     10/15/21  0333   *   K 4.2   CREATININE 0.7   GLUCOSE 193*     No results for input(s): POCGLU in the last 72 hours. XR KNEE RIGHT (1-2 VIEWS)   Final Result   Status post right total knee arthroplasty without apparent   complication. Signed by Dr Ziggy Barhaona          Assessment:     Status Post right Total Knee Arthroplasty. Bone very soft with distal femur fracture intraop. Used stem on femoral component to account for this. Doing well postop without complication.    Plan:   wbat  Pain control  Tight blood glucose control  PT/OT  DVT prophylaxis  Ice and elevate  Discharge Home today

## 2021-10-15 NOTE — PROGRESS NOTES
CLINICAL PHARMACY NOTE: MEDS TO BEDS    Total # of Prescriptions Filled: 1   The following medications were delivered to the patient:  · Oxycodone 5mg    Additional Documentation:    Delivered to patient and daughter in room, paid cash

## 2021-10-18 ENCOUNTER — TELEPHONE (OUTPATIENT)
Dept: INPATIENT UNIT | Age: 86
End: 2021-10-18

## 2021-10-18 ENCOUNTER — TELEPHONE (OUTPATIENT)
Dept: INTERNAL MEDICINE | Age: 86
End: 2021-10-18

## 2021-10-18 NOTE — TELEPHONE ENCOUNTER
[facility] Transitions Initial Follow Up Call    Outreach made within 2 business days of discharge: Yes    Patient: Gerald Sanford   Patient : 1935  MRN: [id number]   Reason for Admission:  Admited 10/14/2021 for right knee replacement   Discharge Date: 10/15/21    Discharge Diagnosis:  Right knee replacement      Spoke with: patient     Discharge department/facility: [facility]    TCM Interactive Patient Contact:  Was patient able to fill all prescriptions: Yes  Was patient instructed to bring all medications to the follow-up visit: Yes  Is patient taking all medications as directed in the discharge summary? Yes  Does patient understand their discharge instructions: Yes  Does patient have questions or concerns that need addressed prior to 7-14 day follow up office visit: no    I spoke with Mrs. Benjamin Leslie. She states everything went well the her surgery. She is home and recovering. She states her pain has been well managed with the pain medication provided. She is taking it as directed. Her daughter is there to help her. She states she is ambulating with assist of a walker. Home health was there today for therapy. She states she feels like she is doing very well with her progress. She states she had a bowel movement this morning. Her bladder is moving as usual. Her appetite is good. She denies any needs at this time. I attempted to schedule her a hospital follow up. She states her daughter will have to bring her in and she does not have her work scheduled for next week yet. She states she will call back later in the week to scheduled her hospital follow up.      Follow Up  Future Appointments   Date Time Provider Lior Guthrie   2021 11:00 AM BRAYDON Kong - CNP [facility]   2021 11:00 AM BRAYDON Mcgregor [facility]   2022  8:00 AM Taryn Martinez MD [facility] [address], [place]

## 2021-10-20 NOTE — PROGRESS NOTES
Physician Progress Note      Oscar Bowen  [facility] #:                  [id number]  :                       1935  ADMIT DATE:       10/14/2021 10:22 AM  Peng Munoz DATE:        10/15/2021 1:37 PM  RESPONDING  PROVIDER #:        Yves Francis MD          QUERY TEXT:    Pt admitted for R TKA. Pt noted to have fx femur. If possible, please document   in progress notes and discharge summary if you are evaluating and/or treating   any of the following: The medical record reflects the following:  Risk Factors: Osteoporosis hx  Clinical Indicators: Femur fx while pushing knee into extension, per progress   note on 10/15 says bone soft  Treatment: Vit D, Oscal, Prolia SQ  Thanks BRAULIO Whalen  Options provided:  -- Osteoporotic Femur Fracture  -- Accidental Femur fracture  -- Other - I will add my own diagnosis  -- Disagree - Not applicable / Not valid  -- Disagree - Clinically unable to determine / Unknown  -- Refer to Clinical Documentation Reviewer    PROVIDER RESPONSE TEXT:    This patient has an osteoporotic fracture of Femur.     Query created by: Kelby Painting on 10/19/2021 3:53 PM      Electronically signed by:  Yves Francis MD 10/20/2021 8:29 AM

## 2021-10-26 ENCOUNTER — OFFICE VISIT (OUTPATIENT)
Dept: FAMILY MEDICINE CLINIC | Age: 86
End: 2021-10-26
Payer: MEDICARE

## 2021-10-26 ENCOUNTER — TELEPHONE (OUTPATIENT)
Dept: FAMILY MEDICINE CLINIC | Age: 86
End: 2021-10-26

## 2021-10-26 VITALS
BODY MASS INDEX: 28.53 KG/M2 | DIASTOLIC BLOOD PRESSURE: 72 MMHG | TEMPERATURE: 97.6 F | WEIGHT: 161 LBS | OXYGEN SATURATION: 98 % | SYSTOLIC BLOOD PRESSURE: 136 MMHG | HEART RATE: 78 BPM | HEIGHT: 63 IN | RESPIRATION RATE: 18 BRPM

## 2021-10-26 DIAGNOSIS — I10 ESSENTIAL HYPERTENSION: Primary | ICD-10-CM

## 2021-10-26 DIAGNOSIS — M17.11 PRIMARY OSTEOARTHRITIS OF RIGHT KNEE: ICD-10-CM

## 2021-10-26 DIAGNOSIS — M81.0 AGE-RELATED OSTEOPOROSIS WITHOUT CURRENT PATHOLOGICAL FRACTURE: ICD-10-CM

## 2021-10-26 DIAGNOSIS — M54.50 CHRONIC BILATERAL LOW BACK PAIN WITHOUT SCIATICA: ICD-10-CM

## 2021-10-26 DIAGNOSIS — G89.29 CHRONIC BILATERAL LOW BACK PAIN WITHOUT SCIATICA: ICD-10-CM

## 2021-10-26 DIAGNOSIS — Z96.651 S/P TKR (TOTAL KNEE REPLACEMENT), RIGHT: ICD-10-CM

## 2021-10-26 DIAGNOSIS — D62 POSTOPERATIVE ANEMIA DUE TO ACUTE BLOOD LOSS: ICD-10-CM

## 2021-10-26 PROCEDURE — 1111F DSCHRG MED/CURRENT MED MERGE: CPT | Performed by: NURSE PRACTITIONER

## 2021-10-26 PROCEDURE — 99495 TRANSJ CARE MGMT MOD F2F 14D: CPT | Performed by: NURSE PRACTITIONER

## 2021-10-26 RX ORDER — OXYCODONE HYDROCHLORIDE 5 MG/1
TABLET ORAL
COMMUNITY
Start: 2021-10-20 | End: 2021-11-30 | Stop reason: SDUPTHER

## 2021-10-26 RX ORDER — GABAPENTIN 100 MG/1
100 CAPSULE ORAL 2 TIMES DAILY
Qty: 60 CAPSULE | Refills: 2 | Status: SHIPPED | OUTPATIENT
Start: 2021-10-26 | End: 2021-11-30 | Stop reason: SDUPTHER

## 2021-10-26 ASSESSMENT — ENCOUNTER SYMPTOMS
SORE THROAT: 0
WHEEZING: 0
CHEST TIGHTNESS: 0
NAUSEA: 0
SHORTNESS OF BREATH: 0
COUGH: 0
ABDOMINAL PAIN: 0
DIARRHEA: 0

## 2021-10-26 NOTE — PROGRESS NOTES
Post-Discharge Transitional Care Management Services or Hospital Follow Up      Albin Martinez   YOB: 1935    Date of Office Visit:  10/26/2021  Date of Hospital Admission: 10/14/21  Date of Hospital Discharge: 10/15/21  Readmission Risk Score(high >=14%. Medium >=10%):Readmission Risk Score: 8      Care management risk score Rising risk (score 2-5) and Complex Care (Scores >=6): 0     Non face to face  following discharge, date last encounter closed (first attempt may have been earlier): 10/18/2021 11:07 AM 10/18/2021 11:07 AM    Call initiated 2 business days of discharge: Yes     Patient Active Problem List   Diagnosis    Essential hypertension    Mixed hyperlipidemia    Age-related osteoporosis without current pathological fracture    Coronary artery disease involving native coronary artery of native heart without angina pectoris    DDD (degenerative disc disease), lumbar    Basal cell carcinoma (BCC) of scalp    Chronic bilateral low back pain without sciatica    Macular degeneration of both eyes    Primary osteoarthritis of right knee       No Known Allergies    Medications listed as ordered at the time of discharge from hospital   [facility]   Home Medication Instructions SUZY:    Printed on:10/26/21 0958   Medication Information                      amLODIPine (NORVASC) 10 MG tablet  Take 10 mg by mouth daily             aspirin EC 81 MG EC tablet  Take 1 tablet by mouth 2 times daily             atorvastatin (LIPITOR) 20 MG tablet  Take 20 mg by mouth daily             calcium carbonate 600 MG TABS tablet  Take 1 tablet by mouth daily             denosumab (PROLIA) 60 MG/ML SOSY SC injection  Inject 60 mg into the skin every 6 months              gabapentin (NEURONTIN) 100 MG capsule  Take 100 mg by mouth 2 times daily.              hydroCHLOROthiazide (HYDRODIURIL) 25 MG tablet  Take 1 tablet by mouth daily             isosorbide mononitrate (IMDUR) 30 MG extended release tablet  Take 30 mg by mouth daily             Multiple Vitamins-Minerals (OCUVITE PO)  Take 1 tablet by mouth 2 times daily              nitroGLYCERIN (NITROSTAT) 0.4 MG SL tablet  Place 1 tablet under the tongue every 5 minutes as needed for Chest pain up to max of 3 total doses. If no relief after 1 dose, call 911. oxyCODONE (ROXICODONE) 5 MG immediate release tablet               potassium chloride (KLOR-CON) 10 MEQ extended release tablet  Take 10 mEq by mouth daily             vitamin D (CHOLECALCIFEROL) 25 MCG (1000 UT) TABS tablet  Take 1,000 Units by mouth daily                   Medications marked \"taking\" at this time  Outpatient Medications Marked as Taking for the 10/26/21 encounter (Office Visit) with BRAYDON Briceno   Medication Sig Dispense Refill    oxyCODONE (ROXICODONE) 5 MG immediate release tablet       aspirin EC 81 MG EC tablet Take 1 tablet by mouth 2 times daily 60 tablet 0    hydroCHLOROthiazide (HYDRODIURIL) 25 MG tablet Take 1 tablet by mouth daily 90 tablet 3    potassium chloride (KLOR-CON) 10 MEQ extended release tablet Take 10 mEq by mouth daily      gabapentin (NEURONTIN) 100 MG capsule Take 100 mg by mouth 2 times daily.  isosorbide mononitrate (IMDUR) 30 MG extended release tablet Take 30 mg by mouth daily      amLODIPine (NORVASC) 10 MG tablet Take 10 mg by mouth daily      atorvastatin (LIPITOR) 20 MG tablet Take 20 mg by mouth daily      vitamin D (CHOLECALCIFEROL) 25 MCG (1000 UT) TABS tablet Take 1,000 Units by mouth daily      calcium carbonate 600 MG TABS tablet Take 1 tablet by mouth daily      Multiple Vitamins-Minerals (OCUVITE PO) Take 1 tablet by mouth 2 times daily       denosumab (PROLIA) 60 MG/ML SOSY SC injection Inject 60 mg into the skin every 6 months       nitroGLYCERIN (NITROSTAT) 0.4 MG SL tablet Place 1 tablet under the tongue every 5 minutes as needed for Chest pain up to max of 3 total doses.  If no relief after 1 dose, call 911. 21 tablet 3        Medications patient taking as of now reconciled against medications ordered at time of hospital discharge: Yes    Chief Complaint   Patient presents with   [address] from Hospital       HPI    Inpatient course: Discharge summary reviewed- see chart. Interval history/Current status:     She had R TKA per Dr. Keaton Avelar on 25/33/82 without complication. She is taking percocet every 6 hours for pain which has been effective. She takes tylenol occasionally if needed. She is on ASA 81 mg 2 tabs daily x 1 month for DVT prophylaxis. She has had no leg swelling, redness, or warmth. She has a f/u with ortho on 11/12. [facility] is following, SN, PT. She has postop anemia due to acute blood loss. Hgb at d/c was 10.7. Preop 13.7. BP has been stable, controlled. She is taking prolia for osteoporosis. She thinks her last dose was in March but not certain. Has been taking for < 2 years. She is not sure about date of last DEXA. She is taking gabapentin twice daily chronically for low back pain, lumbar DDD. Tolerates well. Current dose is effective. She has never had a cscope or mammogram.      Review of Systems   Constitutional: Negative for chills and fever. HENT: Negative for congestion, ear pain and sore throat. Respiratory: Negative for cough, chest tightness, shortness of breath and wheezing. Cardiovascular: Negative for chest pain and leg swelling. Gastrointestinal: Negative for abdominal pain, diarrhea and nausea. Musculoskeletal: Positive for arthralgias (R hip, postop pain controlled). Negative for myalgias. Skin: Negative for rash. Neurological: Negative for numbness. Vitals:    10/26/21 0825   BP: 136/72   Pulse: 78   Resp: 18   Temp: 97.6 °F (36.4 °C)   TempSrc: Temporal   SpO2: 98%   Weight: 161 lb (73 kg)   Height: 5' 3\" (1.6 m)     Body mass index is 28.52 kg/m².    Wt Readings from Last 3 Encounters:   10/26/21 161 lb (73 kg) per prior PCP. We will request records.  -Also need to confirm last DEXA    6. Chronic bilateral low back pain without sciatica  -Refill gabapentin. She has taken this chronically and tolerates well.         Medical Decision Making: high complexity

## 2021-10-28 RX ORDER — ISOSORBIDE MONONITRATE 30 MG/1
30 TABLET, EXTENDED RELEASE ORAL DAILY
Qty: 90 TABLET | Refills: 3 | Status: SHIPPED | OUTPATIENT
Start: 2021-10-28 | End: 2022-08-17

## 2021-10-28 RX ORDER — POTASSIUM CHLORIDE 750 MG/1
10 TABLET, FILM COATED, EXTENDED RELEASE ORAL DAILY
Qty: 60 TABLET | Refills: 3 | Status: SHIPPED | OUTPATIENT
Start: 2021-10-28 | End: 2022-03-23

## 2021-11-03 LAB
ALBUMIN SERPL-MCNC: 4.2 G/DL (ref 3.5–5.2)
ALP BLD-CCNC: 69 U/L (ref 35–104)
ALT SERPL-CCNC: 12 U/L (ref 5–33)
ANION GAP SERPL CALCULATED.3IONS-SCNC: 11 MMOL/L (ref 7–19)
AST SERPL-CCNC: 16 U/L (ref 5–32)
BASOPHILS ABSOLUTE: 0 K/UL (ref 0–0.2)
BASOPHILS RELATIVE PERCENT: 0.7 % (ref 0–1)
BILIRUB SERPL-MCNC: 0.5 MG/DL (ref 0.2–1.2)
BUN BLDV-MCNC: 9 MG/DL (ref 8–23)
CALCIUM SERPL-MCNC: 9.5 MG/DL (ref 8.8–10.2)
CHLORIDE BLD-SCNC: 91 MMOL/L (ref 98–111)
CO2: 27 MMOL/L (ref 22–29)
CREAT SERPL-MCNC: 0.5 MG/DL (ref 0.5–0.9)
EOSINOPHILS ABSOLUTE: 0.1 K/UL (ref 0–0.6)
EOSINOPHILS RELATIVE PERCENT: 1.8 % (ref 0–5)
GFR AFRICAN AMERICAN: >59
GFR NON-AFRICAN AMERICAN: >60
GLUCOSE BLD-MCNC: 101 MG/DL (ref 74–109)
HCT VFR BLD CALC: 32.5 % (ref 37–47)
HEMOGLOBIN: 10.2 G/DL (ref 12–16)
IMMATURE GRANULOCYTES #: 0 K/UL
LYMPHOCYTES ABSOLUTE: 1.4 K/UL (ref 1.1–4.5)
LYMPHOCYTES RELATIVE PERCENT: 25.4 % (ref 20–40)
MCH RBC QN AUTO: 28.7 PG (ref 27–31)
MCHC RBC AUTO-ENTMCNC: 31.4 G/DL (ref 33–37)
MCV RBC AUTO: 91.5 FL (ref 81–99)
MONOCYTES ABSOLUTE: 0.5 K/UL (ref 0–0.9)
MONOCYTES RELATIVE PERCENT: 8.5 % (ref 0–10)
NEUTROPHILS ABSOLUTE: 3.6 K/UL (ref 1.5–7.5)
NEUTROPHILS RELATIVE PERCENT: 63.2 % (ref 50–65)
PDW BLD-RTO: 15 % (ref 11.5–14.5)
PLATELET # BLD: 464 K/UL (ref 130–400)
PMV BLD AUTO: 9.4 FL (ref 9.4–12.3)
POTASSIUM SERPL-SCNC: 3.7 MMOL/L (ref 3.5–5)
RBC # BLD: 3.55 M/UL (ref 4.2–5.4)
SODIUM BLD-SCNC: 129 MMOL/L (ref 136–145)
TOTAL PROTEIN: 7.1 G/DL (ref 6.6–8.7)
WBC # BLD: 5.6 K/UL (ref 4.8–10.8)

## 2021-11-04 RX ORDER — AMLODIPINE BESYLATE 10 MG/1
10 TABLET ORAL DAILY
Qty: 90 TABLET | Refills: 3 | Status: SHIPPED | OUTPATIENT
Start: 2021-11-04 | End: 2022-08-24

## 2021-11-22 RX ORDER — ATORVASTATIN CALCIUM 20 MG/1
20 TABLET, FILM COATED ORAL DAILY
Qty: 90 TABLET | Refills: 1 | Status: SHIPPED | OUTPATIENT
Start: 2021-11-22 | End: 2022-04-18

## 2021-11-30 ENCOUNTER — HOSPITAL ENCOUNTER (OUTPATIENT)
Dept: PAIN MANAGEMENT | Age: 86
Discharge: HOME | End: 2021-11-30
Payer: MEDICARE

## 2021-11-30 ENCOUNTER — HOSPITAL ENCOUNTER (OUTPATIENT)
Dept: GENERAL RADIOLOGY | Age: 86
Discharge: HOME | End: 2021-11-30
Payer: MEDICARE

## 2021-11-30 VITALS
TEMPERATURE: 97.5 F | DIASTOLIC BLOOD PRESSURE: 65 MMHG | RESPIRATION RATE: 16 BRPM | SYSTOLIC BLOOD PRESSURE: 147 MMHG | WEIGHT: 155 LBS | HEIGHT: 63 IN | HEART RATE: 77 BPM | OXYGEN SATURATION: 93 % | BODY MASS INDEX: 27.46 KG/M2

## 2021-11-30 DIAGNOSIS — M48.062 SPINAL STENOSIS OF LUMBAR REGION WITH NEUROGENIC CLAUDICATION: ICD-10-CM

## 2021-11-30 DIAGNOSIS — M54.50 CHRONIC BILATERAL LOW BACK PAIN WITHOUT SCIATICA: ICD-10-CM

## 2021-11-30 DIAGNOSIS — M48.062 SPINAL STENOSIS OF LUMBAR REGION WITH NEUROGENIC CLAUDICATION: Primary | ICD-10-CM

## 2021-11-30 DIAGNOSIS — G89.29 CHRONIC BILATERAL LOW BACK PAIN WITHOUT SCIATICA: ICD-10-CM

## 2021-11-30 PROCEDURE — 99215 OFFICE O/P EST HI 40 MIN: CPT

## 2021-11-30 PROCEDURE — 99214 OFFICE O/P EST MOD 30 MIN: CPT | Performed by: NURSE PRACTITIONER

## 2021-11-30 PROCEDURE — 72114 X-RAY EXAM L-S SPINE BENDING: CPT

## 2021-11-30 RX ORDER — GABAPENTIN 100 MG/1
100 CAPSULE ORAL 2 TIMES DAILY
Qty: 60 CAPSULE | Refills: 0 | Status: SHIPPED | OUTPATIENT
Start: 2021-11-30 | End: 2022-01-10 | Stop reason: SDUPTHER

## 2021-11-30 RX ORDER — OXYCODONE HYDROCHLORIDE 5 MG/1
5 TABLET ORAL EVERY 6 HOURS PRN
Qty: 90 TABLET | Refills: 0 | Status: SHIPPED | OUTPATIENT
Start: 2021-11-30 | End: 2021-12-30

## 2021-11-30 ASSESSMENT — ENCOUNTER SYMPTOMS
BACK PAIN: 1
CONSTIPATION: 0

## 2021-11-30 ASSESSMENT — PAIN DESCRIPTION - LOCATION: LOCATION: BACK

## 2021-11-30 ASSESSMENT — PAIN DESCRIPTION - PAIN TYPE: TYPE: CHRONIC PAIN

## 2021-11-30 ASSESSMENT — PAIN SCALES - GENERAL: PAINLEVEL_OUTOF10: 8

## 2021-11-30 ASSESSMENT — PAIN DESCRIPTION - ORIENTATION: ORIENTATION: LOWER

## 2021-11-30 NOTE — H&P
[facility] Physical & Pain Medicine    History and Physical    Patient Name: Muriel Gracia    MR #: [id number]    Account [de-identified]    : 1935    Age: 81 y.o. Sex: female    Date: 2021    PCP: BRAYDON Huffman    Referring Provider: Juancarlos Slater. BRAYDON Regalado    Chief Complaint:   Chief Complaint   Patient presents with    Lower Back Pain       History of Present Illness: The patient is a 81 y.o. female who was referrred with primary complaints of low back pain. Patient was lifting a case of bottle water. Patient put the water in her wagon and put the water in her car. Patient had immediate low back pain. Pain was going across her back. Patient had CT scan and she was given medication and her back stopped hurting. Patient states that when she tries to walk too much her back hurts into her thighs along with her knees. Patient has more pain getting up from a seated position. Patient will lean on shopping cart to help relieve the pain. She does not always do this. Patient's back is very stiff after sitting. Standing in one place to long causes increased pain. Patient has not had PT due to low back pain, patient has not seen neurosurgery for her low back pain, years ago patient had injections and this helped. Patient does take gabapentin and norco. Patient stopped plavix's 6 months ago. Back Pain  This is a chronic problem. The current episode started more than 1 year ago. The problem occurs constantly. The problem has been gradually worsening since onset. The pain is present in the lumbar spine and sacro-iliac. Quality: \"gets tired\" The symptoms are aggravated by standing and bending. Associated symptoms include bladder incontinence (at night ). Pertinent negatives include no numbness, tingling or weakness.        Screening Tools:     PE.3    ORT: 0    PHQ-9: 6    Current Pain Assessment  Pain Assessment  Pain Assessment: 0-10  Pain Level: 8  Pain Type: Chronic pain  Pain Location: Back  Pain Orientation: Lower  POSS Score (Patient Ctrl Analgesia): 1      Past Medical History  Past Medical History:   Diagnosis Date    Age-related osteoporosis without current pathological fracture     Basal cell carcinoma (BCC) of scalp     skin    Coronary artery disease involving native coronary artery of native heart without angina pectoris     stents done in [place]; due to see [facility] cardiology    DDD (degenerative disc disease), lumbar     Essential hypertension     Knee pain     Mixed hyperlipidemia        Allergies  Patient has no known allergies. Current Medications  Current Outpatient Medications   Medication Sig Dispense Refill    gabapentin (NEURONTIN) 100 MG capsule Take 1 capsule by mouth 2 times daily for 90 days. 60 capsule 0    oxyCODONE (ROXICODONE) 5 MG immediate release tablet Take 1 tablet by mouth every 6 hours as needed for Pain for up to 30 days.  90 tablet 0    atorvastatin (LIPITOR) 20 MG tablet Take 1 tablet by mouth daily 90 tablet 1    amLODIPine (NORVASC) 10 MG tablet Take 1 tablet by mouth daily 90 tablet 3    potassium chloride (KLOR-CON) 10 MEQ extended release tablet Take 1 tablet by mouth daily 60 tablet 3    isosorbide mononitrate (IMDUR) 30 MG extended release tablet Take 1 tablet by mouth daily 90 tablet 3    aspirin EC 81 MG EC tablet Take 1 tablet by mouth 2 times daily 60 tablet 0    hydroCHLOROthiazide (HYDRODIURIL) 25 MG tablet Take 1 tablet by mouth daily 90 tablet 3    vitamin D (CHOLECALCIFEROL) 25 MCG (1000 UT) TABS tablet Take 1,000 Units by mouth daily      calcium carbonate 600 MG TABS tablet Take 1 tablet by mouth daily      Multiple Vitamins-Minerals (OCUVITE PO) Take 1 tablet by mouth 2 times daily       denosumab (PROLIA) 60 MG/ML SOSY SC injection Inject 60 mg into the skin every 6 months       nitroGLYCERIN (NITROSTAT) 0.4 MG SL tablet Place 1 tablet under the tongue every 5 minutes as needed for Chest pain up to max of 3 total doses. If no relief after 1 dose, call [phone]. 07 tablet 3     No current facility-administered medications for this encounter. Social History    Social History     Socioeconomic History    Marital status:      Spouse name: None    Number of children: None    Years of education: None    Highest education level: None   Occupational History    None   Tobacco Use    Smoking status: Former Smoker     Types: Cigarettes     Quit date:      Years since quittin.9    Smokeless tobacco: Never Used   Vaping Use    Vaping Use: Never used   Substance and Sexual Activity    Alcohol use: Never    Drug use: Never    Sexual activity: None   Other Topics Concern    None   Social History Narrative    None     Social Determinants of Health     Financial Resource Strain:     Difficulty of Paying Living Expenses: Not on file   Food Insecurity:     Worried About Running Out of Food in the Last Year: Not on file    [place] of Food in the Last Year: Not on file   Transportation Needs:     Lack of Transportation (Medical): Not on file    Lack of Transportation (Non-Medical):  Not on file   Physical Activity:     Days of Exercise per Week: Not on file    Minutes of Exercise per Session: Not on file   Stress:     Feeling of Stress : Not on file   Social Connections:     Frequency of Communication with Friends and Family: Not on file    Frequency of Social Gatherings with Friends and Family: Not on file    Attends Voodoo Services: Not on file    Active Member of Clubs or Organizations: Not on file    Attends Club or Organization Meetings: Not on file    Marital Status: Not on file   Intimate Partner Violence:     Fear of Current or Ex-Partner: Not on file    Emotionally Abused: Not on file    Physically Abused: Not on file    Sexually Abused: Not on file   Housing Stability:     Unable to Pay for Housing in the Last Year: Not on file    Number of [place] in the Last Year: Not on file  Unstable Housing in the Last Year: Not on file         Family History  family history includes Heart Disease in her mother. Review of Systems:  Review of Systems   Constitutional: Positive for activity change. Gastrointestinal: Negative for constipation. Genitourinary: Positive for bladder incontinence (at night ). Musculoskeletal: Positive for arthralgias, back pain and myalgias. Negative for neck pain. Neurological: Negative for tingling, weakness and numbness. Psychiatric/Behavioral: Negative for agitation, self-injury and suicidal ideas. The patient is not nervous/anxious. 14 point ROS negative besides that noted in HPI    Physical exam:     Vitals:    11/30/21 1025   BP: (!) 147/65   Pulse: 77   Resp: 16   Temp: 97.5 °F (36.4 °C)   TempSrc: Temporal   SpO2: 93%   Weight: 155 lb (70.3 kg)   Height: 5' 3\" (1.6 m)       Body mass index is 27.46 kg/m². Physical Exam  Vitals and nursing note reviewed. Constitutional:       General: She is not in acute distress. Appearance: She is well-developed. HENT:      Head: Normocephalic. Right Ear: External ear normal.      Left Ear: External ear normal.      Nose: Nose normal.   Eyes:      Conjunctiva/sclera: Conjunctivae normal.      Pupils: Pupils are equal, round, and reactive to light. Neck:      Vascular: No JVD. Trachea: No tracheal deviation. Cardiovascular:      Rate and Rhythm: Normal rate. Pulmonary:      Effort: Pulmonary effort is normal.   Abdominal:      General: There is no distension. Tenderness: There is no abdominal tenderness. Musculoskeletal:      Lumbar back: Tenderness present. Positive left straight leg raise test. Negative right straight leg raise test.      Comments: Piriformis tender to palpation. SI joint non tender. Troch bursa non-tender   Skin:     General: Skin is warm and dry. Neurological:      Mental Status: She is alert and oriented to person, place, and time.       Cranial Nerves: No cranial nerve deficit. Psychiatric:         Behavior: Behavior normal.         Thought Content: Thought content normal.         Judgment: Judgment normal.         Labs:     Lab Results   Component Value Date     11/03/2021    K 3.7 11/03/2021    K 4.2 10/15/2021    CL 91 11/03/2021    CO2 27 11/03/2021    BUN 9 11/03/2021    CREATININE 0.5 11/03/2021    GLUCOSE 101 11/03/2021    CALCIUM 9.5 11/03/2021        Lab Results   Component Value Date    WBC 5.6 11/03/2021    HGB 10.2 (L) 11/03/2021    HCT 32.5 (L) 11/03/2021    MCV 91.5 11/03/2021     (H) 11/03/2021       Assessment:                                                                                                                                        Active Problems:    Chronic bilateral low back pain without sciatica    Spinal stenosis of lumbar region with neurogenic claudication  Resolved Problems:    * No resolved hospital problems. *      PLAN:    1. Spinal stenosis of lumbar region with neurogenic claudication  - XR LUMBAR SPINE (MIN 6 VIEWS); Future    Obtain image since patient has not had any images on file. Image for proceduralist to review prior to procedure. Continue gabapentin 100 mg twice daily # 60     Schedule LESI of L2/L3 under fluoroscopy with medical director. As per MRI under medial patient has stenosis in this area and follow the dermatone pattern. Obtain clearance from cardiology to hold asa. Continue Roxicodone 5 mg every 6 hours as needed # 90. Patient only takes when she needs medication. Hopefully she can take less after injection. Pain medication agreement     [x] Follow up    [] 4 weeks   [x] 6-8 weeks   [] 10-12 weeks   [] 3 months  [x] Post procedure to evaluate effectiveness of treatment  [] To evaluate medications changes made at office visit. [] To review diagnostics ordered at last visit.    [] To evaluate response to therapy    [] For management of controlled substance  [x] Random UDS as indicated by ORT score or if indicated by abberent behaviors    Discussion: Discussed exam findings and plan of care with patient. Patient agreed with POC and questions were asked and answered. Activity: discussed exercise as beneficial to pain reduction, encouraged stretching exercise with a focus on torso strengthening. Goal:  Pain Management Goals of Therapy:   [] Resolution in pain  [x] Decrease in pain level  [x] Improvement in ADL's  [x] Increase in activities with less pain  [] Decrease in medication     Controlled substance monitoring:    [x] Discussed medication side effects, risk of tolerance and/or dependence, and/or alternative treatment  [] Discussed the detrimental effects of long term narcotic use in younger patients especially women of childbearing years. [x] No signs and symptoms of potential drug abuse or diversion were identified  [] Signs of potential drug abuse or diversion were identified   [] ORT Score   [] UDS non-compliant   [] See Note  [] Random urine drug screen sent today  [x] Random urine drug screen not completed today   [x] Deferred New Patient  [] Compliant   [] Not Compliant see note  [x] Medication agreement with provider signed today  [] Medication agreement with provider on file under media   [] Medication regimen effective with no c/o side effects and continued   [x] New patient continuing current medication while developing POC   [] On going assessment and evaluation of medication regimen  [] Medication regimen not effective see plan for changes  [x] Ernestine Ch reviewed & on file under media     CC:  BRAYDON Slaughter APRN - CNP, 12/5/2021 at 5:45 PM    EMR dragon/transcription disclaimer: Much of this encounter note is electronic transcription/translation of spoken language to printed tach. Electronic translation of spoken language may be erroneous, or at times, nonsensical words or phrases may be inadvertently transcribed.  Although, I have reviewed the note for such errors, some may still exist.

## 2021-11-30 NOTE — PROGRESS NOTES
Clinic Documentation      Education Provided:  [x] Review of Cole Luna  [x] Agreement Review  [x] PEG Score Calculated [x] PHQ Score Calculated [x] ORT Score Calculated    [] Compliance Issues Discussed [] Cognitive Behavior Needs [x] Exercise [] Review of Test [] Financial Issues  [x] Tobacco/Alcohol Use Reviewed [x] Teaching [x] New Patient [] Picture Obtained    Physician Plan:  [] Outgoing Referral  [] Pharmacy Consult  [] Test Ordered [x] Prescription Ordered/Changed   [] Obtained Test Results / Consult Notes        Complete if patient is withholding blood thinner for procedure     Blood Thinner Patient is currently taking:      [] Plavix (Hold for 7 days)  [x] Aspirin (Hold for 5 days)     [] Pletal (Hold for 2 days)  [] Pradaxa (Hold for 3 days)    [] Effient (Hold for 7 days)  [] Xarelto (Hold for 2 days)    [] Eliquis (Hold for 2 days)  [] Brilinta (Hold for 7 days)    [] Coumadin (Hold for 5 days) - (INR needs to be drawn the day prior to procedure- INR < 2.0)    [] Aggrenox (Hold for 7 days)        [] Patient will stop medication on their own. [x] Blood Thinner Form Faxed for approval to hold.    Provider form faxed to: [facility]    Assessment Completed by:  Electronically signed by Jennifer Polk RN on 11/30/2021 at 10:06 AM

## 2021-12-01 ENCOUNTER — TELEPHONE (OUTPATIENT)
Dept: CARDIOLOGY CLINIC | Age: 86
End: 2021-12-01

## 2021-12-01 NOTE — TELEPHONE ENCOUNTER
Called and left a voicemail for patient to reschedule her appointment on 4/5/2022 with Dr. Romero Griffin.  If patient calls back please reschedule her for a later date with Dr. Romero Griffin

## 2021-12-07 ENCOUNTER — TELEPHONE (OUTPATIENT)
Dept: PAIN MANAGEMENT | Age: 86
End: 2021-12-07

## 2021-12-07 NOTE — TELEPHONE ENCOUNTER
Patient called nurse line regarding holding her blood thinner before her procedure. I spoke with her and let her know that we have received cardiac clearance for her to hold her aspirin. I instructed her to take a dose tomorrow, and then she will hold starting five days prior to procedure on the 14th. Patient verbalized understanding. I also let her know the results of her lumbar spine xray and told her that [facility] would like to know if she is aware of any previous compression fracture. Patient stated that she is not aware of any compression fracture. I told her that I will make [facility] aware and see if there is any further instruction.

## 2021-12-14 ENCOUNTER — HOSPITAL ENCOUNTER (OUTPATIENT)
Dept: PAIN MANAGEMENT | Age: 86
Discharge: HOME | End: 2021-12-14
Payer: MEDICARE

## 2021-12-14 ENCOUNTER — TELEPHONE (OUTPATIENT)
Dept: PAIN MANAGEMENT | Age: 86
End: 2021-12-14

## 2021-12-14 VITALS
OXYGEN SATURATION: 97 % | SYSTOLIC BLOOD PRESSURE: 161 MMHG | TEMPERATURE: 97.6 F | RESPIRATION RATE: 18 BRPM | HEART RATE: 80 BPM | DIASTOLIC BLOOD PRESSURE: 72 MMHG

## 2021-12-14 DIAGNOSIS — R52 PAIN MANAGEMENT: ICD-10-CM

## 2021-12-14 PROCEDURE — 62323 NJX INTERLAMINAR LMBR/SAC: CPT

## 2021-12-14 PROCEDURE — 3209999900 FLUORO FOR SURGICAL PROCEDURES

## 2021-12-14 PROCEDURE — 2500000003 HC RX 250 WO HCPCS

## 2021-12-14 PROCEDURE — 2580000003 HC RX 258

## 2021-12-14 PROCEDURE — 6360000002 HC RX W HCPCS

## 2021-12-14 RX ORDER — METHYLPREDNISOLONE ACETATE 80 MG/ML
80 INJECTION, SUSPENSION INTRA-ARTICULAR; INTRALESIONAL; INTRAMUSCULAR; SOFT TISSUE ONCE
Status: DISCONTINUED | OUTPATIENT
Start: 2021-12-14 | End: 2021-12-16 | Stop reason: HOSPADM

## 2021-12-14 RX ORDER — SODIUM CHLORIDE 9 MG/ML
5 INJECTION INTRAVENOUS ONCE
Status: DISCONTINUED | OUTPATIENT
Start: 2021-12-14 | End: 2021-12-16 | Stop reason: HOSPADM

## 2021-12-14 RX ORDER — LIDOCAINE HYDROCHLORIDE 10 MG/ML
5 INJECTION, SOLUTION EPIDURAL; INFILTRATION; INTRACAUDAL; PERINEURAL ONCE
Status: DISCONTINUED | OUTPATIENT
Start: 2021-12-14 | End: 2021-12-16 | Stop reason: HOSPADM

## 2021-12-14 ASSESSMENT — PAIN SCALES - GENERAL: PAINLEVEL_OUTOF10: 5

## 2021-12-14 ASSESSMENT — PAIN DESCRIPTION - LOCATION: LOCATION: BACK

## 2021-12-14 ASSESSMENT — PAIN - FUNCTIONAL ASSESSMENT
PAIN_FUNCTIONAL_ASSESSMENT: PREVENTS OR INTERFERES SOME ACTIVE ACTIVITIES AND ADLS
PAIN_FUNCTIONAL_ASSESSMENT: 0-10

## 2021-12-14 ASSESSMENT — PAIN DESCRIPTION - PAIN TYPE: TYPE: CHRONIC PAIN

## 2021-12-14 ASSESSMENT — PAIN DESCRIPTION - FREQUENCY: FREQUENCY: INTERMITTENT

## 2021-12-14 ASSESSMENT — PAIN DESCRIPTION - ORIENTATION: ORIENTATION: LOWER

## 2021-12-14 NOTE — TELEPHONE ENCOUNTER
Patient called this afternoon following her procedure. She had lumbar epidural today. She stated that she had a slight headache and had been laying down and went to turn over and got a pain in the left side of her head. She is asking if this is normal.  I let her know that occasionally following the epidural she can develop a spinal headache. I referred her back to her aftercare instructions on making sure she takes plenty of fluids, and let her know that sometimes, a beverage with caffeine helps. She said that at this time the symptoms are almost gone. She was just wanting to make sure this was not something to be concerned about.

## 2021-12-14 NOTE — INTERVAL H&P NOTE
Update History & Physical    The patient's History and Physical  was reviewed with the patient and I examined the patient. There was NO CHANGE:[id number]}. The surgical site was confirmed by the patient and me. Plan: The risks, benefits, expected outcome, and alternative to the recommended procedure have been discussed with the patient. Patient understands and wants to proceed with the procedure.      Electronically signed by Gisele Torres MD on 12/14/2021 at 10:58 AM

## 2021-12-27 ENCOUNTER — TELEPHONE (OUTPATIENT)
Dept: FAMILY MEDICINE CLINIC | Age: 86
End: 2021-12-27

## 2021-12-27 ENCOUNTER — TELEPHONE (OUTPATIENT)
Dept: PAIN MANAGEMENT | Age: 86
End: 2021-12-27

## 2021-12-27 ENCOUNTER — OFFICE VISIT (OUTPATIENT)
Dept: FAMILY MEDICINE CLINIC | Age: 86
End: 2021-12-27
Payer: MEDICARE

## 2021-12-27 VITALS
HEART RATE: 76 BPM | TEMPERATURE: 98.4 F | RESPIRATION RATE: 18 BRPM | SYSTOLIC BLOOD PRESSURE: 126 MMHG | HEIGHT: 63 IN | OXYGEN SATURATION: 98 % | WEIGHT: 154 LBS | BODY MASS INDEX: 27.29 KG/M2 | DIASTOLIC BLOOD PRESSURE: 68 MMHG

## 2021-12-27 DIAGNOSIS — R73.9 HYPERGLYCEMIA: ICD-10-CM

## 2021-12-27 DIAGNOSIS — E78.2 MIXED HYPERLIPIDEMIA: ICD-10-CM

## 2021-12-27 DIAGNOSIS — N39.0 RECURRENT UTI: ICD-10-CM

## 2021-12-27 DIAGNOSIS — I10 ESSENTIAL HYPERTENSION: ICD-10-CM

## 2021-12-27 DIAGNOSIS — M81.0 AGE-RELATED OSTEOPOROSIS WITHOUT CURRENT PATHOLOGICAL FRACTURE: ICD-10-CM

## 2021-12-27 DIAGNOSIS — I35.0 AORTIC VALVE STENOSIS, ETIOLOGY OF CARDIAC VALVE DISEASE UNSPECIFIED: ICD-10-CM

## 2021-12-27 DIAGNOSIS — K43.9 VENTRAL HERNIA WITHOUT OBSTRUCTION OR GANGRENE: ICD-10-CM

## 2021-12-27 DIAGNOSIS — M51.36 DDD (DEGENERATIVE DISC DISEASE), LUMBAR: ICD-10-CM

## 2021-12-27 DIAGNOSIS — R35.0 INCREASED URINARY FREQUENCY: ICD-10-CM

## 2021-12-27 DIAGNOSIS — Z00.00 ROUTINE GENERAL MEDICAL EXAMINATION AT A HEALTH CARE FACILITY: Primary | ICD-10-CM

## 2021-12-27 LAB
APPEARANCE FLUID: CLEAR
BILIRUBIN, POC: NORMAL
BLOOD URINE, POC: NORMAL
CLARITY, POC: NORMAL
COLOR, POC: YELLOW
GLUCOSE URINE, POC: NORMAL
KETONES, POC: NORMAL
LEUKOCYTE EST, POC: NORMAL
NITRITE, POC: NORMAL
PH, POC: 7
PROTEIN, POC: NORMAL
SPECIFIC GRAVITY, POC: 1.02
UROBILINOGEN, POC: 0.2

## 2021-12-27 PROCEDURE — G8427 DOCREV CUR MEDS BY ELIG CLIN: HCPCS | Performed by: NURSE PRACTITIONER

## 2021-12-27 PROCEDURE — 1090F PRES/ABSN URINE INCON ASSESS: CPT | Performed by: NURSE PRACTITIONER

## 2021-12-27 PROCEDURE — 1036F TOBACCO NON-USER: CPT | Performed by: NURSE PRACTITIONER

## 2021-12-27 PROCEDURE — 4040F PNEUMOC VAC/ADMIN/RCVD: CPT | Performed by: NURSE PRACTITIONER

## 2021-12-27 PROCEDURE — G0402 INITIAL PREVENTIVE EXAM: HCPCS | Performed by: NURSE PRACTITIONER

## 2021-12-27 PROCEDURE — 99214 OFFICE O/P EST MOD 30 MIN: CPT | Performed by: NURSE PRACTITIONER

## 2021-12-27 PROCEDURE — G8400 PT W/DXA NO RESULTS DOC: HCPCS | Performed by: NURSE PRACTITIONER

## 2021-12-27 PROCEDURE — 81002 URINALYSIS NONAUTO W/O SCOPE: CPT | Performed by: NURSE PRACTITIONER

## 2021-12-27 PROCEDURE — 1123F ACP DISCUSS/DSCN MKR DOCD: CPT | Performed by: NURSE PRACTITIONER

## 2021-12-27 PROCEDURE — G8484 FLU IMMUNIZE NO ADMIN: HCPCS | Performed by: NURSE PRACTITIONER

## 2021-12-27 PROCEDURE — G8417 CALC BMI ABV UP PARAM F/U: HCPCS | Performed by: NURSE PRACTITIONER

## 2021-12-27 RX ORDER — DENOSUMAB 60 MG/ML
60 INJECTION SUBCUTANEOUS
Qty: 1 ML | Refills: 3 | Status: SHIPPED | OUTPATIENT
Start: 2021-12-27

## 2021-12-27 ASSESSMENT — PATIENT HEALTH QUESTIONNAIRE - PHQ9
1. LITTLE INTEREST OR PLEASURE IN DOING THINGS: 0
SUM OF ALL RESPONSES TO PHQ QUESTIONS 1-9: 0
SUM OF ALL RESPONSES TO PHQ9 QUESTIONS 1 & 2: 0
2. FEELING DOWN, DEPRESSED OR HOPELESS: 0
SUM OF ALL RESPONSES TO PHQ QUESTIONS 1-9: 0
SUM OF ALL RESPONSES TO PHQ QUESTIONS 1-9: 0

## 2021-12-27 ASSESSMENT — ENCOUNTER SYMPTOMS
CHEST TIGHTNESS: 0
SHORTNESS OF BREATH: 0
ABDOMINAL PAIN: 0
COUGH: 0
NAUSEA: 0
SORE THROAT: 0
DIARRHEA: 0
WHEEZING: 0

## 2021-12-27 ASSESSMENT — LIFESTYLE VARIABLES: HOW OFTEN DO YOU HAVE A DRINK CONTAINING ALCOHOL: 0

## 2021-12-27 NOTE — PATIENT INSTRUCTIONS
Personalized Preventive Plan for Rubio Mars - 12/27/2021  Medicare offers a range of preventive health benefits. Some of the tests and screenings are paid in full while other may be subject to a deductible, co-insurance, and/or copay. Some of these benefits include a comprehensive review of your medical history including lifestyle, illnesses that may run in your family, and various assessments and screenings as appropriate. After reviewing your medical record and screening and assessments performed today your provider may have ordered immunizations, labs, imaging, and/or referrals for you. A list of these orders (if applicable) as well as your Preventive Care list are included within your After Visit Summary for your review. Other Preventive Recommendations:    · A preventive eye exam performed by an eye specialist is recommended every 1-2 years to screen for glaucoma; cataracts, macular degeneration, and other eye disorders. · A preventive dental visit is recommended every 6 months. · Try to get at least 150 minutes of exercise per week or 10,000 steps per day on a pedometer . · Order or download the FREE \"Exercise & Physical Activity: Your Everyday Guide\" from The [employer]. Call 2-577.479.4560 or search The [employer] online. · You need 1605-9988 mg of calcium and 5157-9311 IU of vitamin D per day. It is possible to meet your calcium requirement with diet alone, but a vitamin D supplement is usually necessary to meet this goal.  · When exposed to the sun, use a sunscreen that protects against both UVA and UVB radiation with an SPF of 30 or greater. Reapply every 2 to 3 hours or after sweating, drying off with a towel, or swimming. · Always wear a seat belt when traveling in a car. Always wear a helmet when riding a bicycle or motorcycle.

## 2021-12-27 NOTE — TELEPHONE ENCOUNTER
Patient called to cancel all future appointments and stated she would not be coming back to the office. No explanation when questioned.  Cancelled appts and wished her luck and that we are here if anything arises or changes

## 2021-12-27 NOTE — PROGRESS NOTES
Medicare Annual Wellness Visit  Name: Tej Caputo Date: 2021   MRN: [id number] Sex: Female   Age: 81 y.o. Ethnicity: Non- / Non    : 1935 Race: White (non-)      Sandeep Victoria is here for Medicare AWV    Screenings for behavioral, psychosocial and functional/safety risks, and cognitive dysfunction are all negative except as indicated below. These results, as well as other patient data from the [address] form, are documented in Flowsheets linked to this Encounter. No Known Allergies    Prior to Visit Medications    Medication Sig Taking? Authorizing Provider   gabapentin (NEURONTIN) 100 MG capsule Take 1 capsule by mouth 2 times daily for 90 days.  Yes BRAYDON Hanson - CNP   atorvastatin (LIPITOR) 20 MG tablet Take 1 tablet by mouth daily Yes BRAYDON Leach   amLODIPine (NORVASC) 10 MG tablet Take 1 tablet by mouth daily Yes BRAYDON Floyd   potassium chloride (KLOR-CON) 10 MEQ extended release tablet Take 1 tablet by mouth daily Yes BRAYDON Jensen   isosorbide mononitrate (IMDUR) 30 MG extended release tablet Take 1 tablet by mouth daily Yes BRAYDON Jensen   aspirin EC 81 MG EC tablet Take 1 tablet by mouth 2 times daily Yes Jake Obrien MD   hydroCHLOROthiazide (HYDRODIURIL) 25 MG tablet Take 1 tablet by mouth daily Yes BRAYDON Tam   vitamin D (CHOLECALCIFEROL) 25 MCG (1000 UT) TABS tablet Take 1,000 Units by mouth daily Yes Historical Provider, MD   calcium carbonate 600 MG TABS tablet Take 1 tablet by mouth daily Yes Historical Provider, MD   Multiple Vitamins-Minerals (OCUVITE PO) Take 1 tablet by mouth 2 times daily  Yes Historical Provider, MD   denosumab (PROLIA) 60 MG/ML SOSY SC injection Inject 60 mg into the skin every 6 months  Yes Historical Provider, MD   nitroGLYCERIN (NITROSTAT) 0.4 MG SL tablet Place 1 tablet under the tongue every 5 minutes as needed for Chest pain up to max of 3 total doses. If no relief after 1 dose, call 911. Yes BRAYDON Boland   oxyCODONE (ROXICODONE) 5 MG immediate release tablet Take 1 tablet by mouth every 6 hours as needed for Pain for up to 30 days. Patient not taking: Reported on 12/27/2021  Claudio Alejo MD       Past Medical History:   Diagnosis Date    Age-related osteoporosis without current pathological fracture     Basal cell carcinoma (BCC) of scalp     skin    Coronary artery disease involving native coronary artery of native heart without angina pectoris     stents done in [place]; due to see [facility] cardiology    DDD (degenerative disc disease), lumbar     Essential hypertension     Knee pain     Mixed hyperlipidemia        Past Surgical History:   Procedure Laterality Date    CARDIAC CATHETERIZATION      stents 2019, 2020    EYE SURGERY      h/o eye bleed    FOOT SURGERY      deep calluses    HYSTERECTOMY      JOINT REPLACEMENT      TOTAL HIP ARTHROPLASTY Left 2019    TOTAL KNEE ARTHROPLASTY Right 10/14/2021    RIGHT KNEE TOTAL ARTHROPLASTY performed by Adelia Siemens, MD at [address] [place] [address]         Family History   Problem Relation Age of Onset    Heart Disease Mother        CareTeam (Including outside providers/suppliers regularly involved in providing care):   Patient Care Team:  BRAYDON Boland as PCP - General (Family Nurse Practitioner)  BRAYDON Boland as PCP - [facility] Empaneled Provider    Wt Readings from Last 3 Encounters:   12/27/21 154 lb (69.9 kg)   11/30/21 155 lb (70.3 kg)   10/26/21 161 lb (73 kg)     Vitals:    12/27/21 1107   BP: 126/68   Pulse: 76   Resp: 18   Temp: 98.4 °F (36.9 °C)   TempSrc: Temporal   SpO2: 98%   Weight: 154 lb (69.9 kg)   Height: 5' 3\" (1.6 m)     Body mass index is 27.28 kg/m². Based upon direct observation of the patient, evaluation of cognition reveals recent and remote memory intact.         Patient's complete Health Risk Assessment and screening values have been reviewed and are found in Flowsheets. The following problems were reviewed today and where indicated follow up appointments were made and/or referrals ordered. Positive Risk Factor Screenings with Interventions:            General Health and ACP:  General  In general, how would you say your health is?: Good  In the past 7 days, have you experienced any of the following? New or Increased Pain, New or Increased Fatigue, Loneliness, Social Isolation, Stress or Anger?: None of These  Do you get the social and emotional support that you need?: Yes  Do you have a Living Will?: Yes  Advance Directives     Power of [address] Will ACP-Advance Directive ACP-Power of     Not on File Not on File Not on File Not on File      General Health Risk Interventions:  · No Living Will: ACP documents already completed- patient asked to provide copy to the office and we will scan to chart. Personalized Preventive Plan   Current Health Maintenance Status  Immunization History   Administered Date(s) Administered    COVID-19, Su Peter, PF, 30mcg/0.3mL 02/09/2021, 03/03/2021    Influenza, Quadv, adjuvanted, 65 yrs +, IM, PF (Fluad) 09/24/2021        Health Maintenance   Topic Date Due    DTaP/Tdap/Td vaccine (1 - Tdap) Never done    Shingles Vaccine (1 of 2) Never done    DEXA (modify frequency per FRAX score)  Never done    Pneumococcal 65+ years Vaccine (1 of 1 - PPSV23) Never done    COVID-19 Vaccine (3 - Booster for Su Peter series) 09/03/2021    Annual Wellness Visit (AWV)  Never done    Lipid screen  09/27/2022    Potassium monitoring  11/03/2022    Creatinine monitoring  11/03/2022    Flu vaccine  Completed    Hepatitis A vaccine  Aged Out    Hepatitis B vaccine  Aged Out    Hib vaccine  Aged Out    Meningococcal (ACWY) vaccine  Aged Out     Recommendations for [employer] Due: see orders and patient instructions/AVS.  .   Recommended screening schedule for the next 5-10 years is provided to the patient in written form: see Patient Instructions/AVS.    There are no diagnoses linked to this encounter.

## 2021-12-27 NOTE — PROGRESS NOTES
Rene Dumont is a 81 y.o. female who presents today for  Chief Complaint   Patient presents with    Medicare AWV       HPI:  MAW completed, see additional note. IUTD - needs covid booster (getting this week). She states she is UTD on pneumococcal but we do not have record of this. DEXA 8/2020, the report. Declines mammogram due to age    Hypertension  Compliant with medications. No adverse effects from medication. No lightheadedness, palpitations, or chest pain. Hyperlipidemia  Tolerating current cholesterol medication without side effects. No body aches. Attempting to reduce processed sugar and cholesterol from diet. Hx of CAD, stable. No recent chest pain. Remains on ASA, statin. Has hx aortic stenosis, stable. Chronic lower back pain, lumbar DDD stable. Currently followed by pain management. Had injection 12/14 but does not feel it was effective. She states she had significant HA after injection when lying down, improved when sitting up. Now better. She is taking gabapentin 100 mg bid. Feels it may need to be increased. She is not taking oxycodone. Takes Tylenol or ibuprofen. Had DEXA 8/2020 showing osteoporosis but we do not have the report. She had her first dose of Prolia 10/2020 per previous PCP, 2nd dose in March or April. Would like UA. Has history of recurrent UTI previously followed by urology before moving here. No dysuria. Has had increased urinary frequency. Has ventral hernia above umbilicus, stable. No pain. Reducible. Does not like that you can see it through her clothes at times. She continues to do well following R TKA in October. Review of Systems   Constitutional: Negative for chills and fever. HENT: Negative for congestion, ear pain and sore throat. Respiratory: Negative for cough, chest tightness, shortness of breath and wheezing. Cardiovascular: Negative for chest pain.    Gastrointestinal: Negative for abdominal pain, diarrhea and nausea. Genitourinary: Positive for frequency. Musculoskeletal: Negative for arthralgias and myalgias. Skin: Negative for rash. Neurological: Negative for dizziness and light-headedness. Past Medical History:   Diagnosis Date    Age-related osteoporosis without current pathological fracture     Basal cell carcinoma (BCC) of scalp     skin    Coronary artery disease involving native coronary artery of native heart without angina pectoris     stents done in [place]; due to see [facility] cardiology    DDD (degenerative disc disease), lumbar     Essential hypertension     Knee pain     Mixed hyperlipidemia        Current Outpatient Medications   Medication Sig Dispense Refill    denosumab (PROLIA) 60 MG/ML SOSY SC injection Inject 1 mL into the skin every 6 months 1 mL 3    gabapentin (NEURONTIN) 100 MG capsule Take 1 capsule by mouth 2 times daily for 90 days. 60 capsule 0    atorvastatin (LIPITOR) 20 MG tablet Take 1 tablet by mouth daily 90 tablet 1    amLODIPine (NORVASC) 10 MG tablet Take 1 tablet by mouth daily 90 tablet 3    potassium chloride (KLOR-CON) 10 MEQ extended release tablet Take 1 tablet by mouth daily 60 tablet 3    isosorbide mononitrate (IMDUR) 30 MG extended release tablet Take 1 tablet by mouth daily 90 tablet 3    aspirin EC 81 MG EC tablet Take 1 tablet by mouth 2 times daily 60 tablet 0    hydroCHLOROthiazide (HYDRODIURIL) 25 MG tablet Take 1 tablet by mouth daily 90 tablet 3    vitamin D (CHOLECALCIFEROL) 25 MCG (1000 UT) TABS tablet Take 1,000 Units by mouth daily      calcium carbonate 600 MG TABS tablet Take 1 tablet by mouth daily      Multiple Vitamins-Minerals (OCUVITE PO) Take 1 tablet by mouth 2 times daily       nitroGLYCERIN (NITROSTAT) 0.4 MG SL tablet Place 1 tablet under the tongue every 5 minutes as needed for Chest pain up to max of 3 total doses.  If no relief after 1 dose, call 911. 25 tablet 3    oxyCODONE (ROXICODONE) 5 MG immediate release tablet Take 1 tablet by mouth every 6 hours as needed for Pain for up to 30 days. (Patient not taking: Reported on 2021) 90 tablet 0     No current facility-administered medications for this visit. No Known Allergies    Past Surgical History:   Procedure Laterality Date    CARDIAC CATHETERIZATION      stents ,     EYE SURGERY      h/o eye bleed    FOOT SURGERY      deep calluses    HYSTERECTOMY      JOINT REPLACEMENT      TOTAL HIP ARTHROPLASTY Left 2019    TOTAL KNEE ARTHROPLASTY Right 10/14/2021    RIGHT KNEE TOTAL ARTHROPLASTY performed by Taran Marie MD at [address] [place] [address]         Social History     Tobacco Use    Smoking status: Former Smoker     Types: Cigarettes     Quit date:      Years since quittin.9    Smokeless tobacco: Never Used   Vaping Use    Vaping Use: Never used   Substance Use Topics    Alcohol use: Never    Drug use: Never       Family History   Problem Relation Age of Onset    Heart Disease Mother        /68   Pulse 76   Temp 98.4 °F (36.9 °C) (Temporal)   Resp 18   Ht 5' 3\" (1.6 m)   Wt 154 lb (69.9 kg)   SpO2 98%   BMI 27.28 kg/m²     Physical Exam  Vitals reviewed. Constitutional:       General: She is not in acute distress. Appearance: Normal appearance. She is well-developed. HENT:      Head: Normocephalic. Eyes:      Conjunctiva/sclera: Conjunctivae normal.      Pupils: Pupils are equal, round, and reactive to light. Neck:      Thyroid: No thyromegaly. Vascular: No carotid bruit or JVD. Trachea: No tracheal deviation. Cardiovascular:      Rate and Rhythm: Normal rate and regular rhythm. Heart sounds: Murmur (2/5 LSB) heard. Pulmonary:      Effort: Pulmonary effort is normal. No respiratory distress. Breath sounds: Normal breath sounds. No wheezing or rhonchi. Abdominal:      General: Abdomen is flat. Bowel sounds are normal. There is no distension.       Palpations: Abdomen is soft. There is no mass. Tenderness: There is no abdominal tenderness. There is no guarding or rebound. Hernia: A hernia is present. Comments: Hernia, reducible   Musculoskeletal:         General: Normal range of motion. Cervical back: Normal range of motion and neck supple. Lymphadenopathy:      Cervical: No cervical adenopathy. Skin:     General: Skin is warm and dry. Findings: No rash. Neurological:      Mental Status: She is alert. Psychiatric:         Mood and Affect: Mood normal.         Behavior: Behavior normal.         Thought Content: Thought content normal.         ASSESSMENT/PLAN:  1. Routine general medical examination at a health care facility  -MAW completed, see additional note  -Screenings reviewed in HPI    2. Essential hypertension  -Stable, controlled. Continue current medication.  - CBC Auto Differential; Future    3. Mixed hyperlipidemia  -Stable, controlled. Continue atorvastatin.  -Fasting CMP, lipid in 3 months  - Comprehensive Metabolic Panel; Future  - Lipid Panel; Future    4. Age-related osteoporosis without current pathological fracture  -Due for next Prolia injection  -DEXA due in 8/2022. We will request last DEXA report from previous PCP. -Continue calcium 1200 mg and vitamin D 1000 units daily  -Vitamin D with next lab  - Vitamin D 25 Hydroxy; Future    5. DDD (degenerative disc disease), lumbar  -Stable, currently followed by pain management. She feels gabapentin needs to be increased. She will discuss further with them. She may not be able to afford continuing to see them. If unable to continue with pain management, I can take over her gabapentin. Discussed potentially increasing to 300 mg twice daily if tolerated. Advised to discuss further with pain management if she continues to see them. 6. Hyperglycemia  -Check A1c, fasting CMP with next lab  -Continue healthy diet, exercise. - Hemoglobin A1C; Future    7.  Increased urinary frequency  -Check UA today  - POCT Urinalysis no Micro    8. Recurrent UTI  -UA as above  -Can refer back to urology if needed due to history of recurrent UTI. 9. Aortic valve stenosis, etiology of cardiac valve disease unspecified  -Stable, asymptomatic. Continue follow-up with cardiology. 10. Ventral hernia without obstruction or gangrene  -Stable, asymptomatic. Discussed abdominal binder since it is showing through in her clothes at times. Briefly discussed general surgery referral if needed. Return in 3 months (on 3/27/2022) for follow up, 30 minute visit. Kandis Judge was seen today for medicare awv. Diagnoses and all orders for this visit:    Routine general medical examination at a health care facility    Essential hypertension  -     CBC Auto Differential; Future    Mixed hyperlipidemia  -     Comprehensive Metabolic Panel; Future  -     Lipid Panel; Future    Age-related osteoporosis without current pathological fracture  -     Vitamin D 25 Hydroxy; Future    DDD (degenerative disc disease), lumbar    Hyperglycemia  -     Hemoglobin A1C; Future    Increased urinary frequency  -     POCT Urinalysis no Micro    Recurrent UTI    Aortic valve stenosis, etiology of cardiac valve disease unspecified    Ventral hernia without obstruction or gangrene    Other orders  -     denosumab (PROLIA) 60 MG/ML SOSY SC injection; Inject 1 mL into the skin every 6 months      Medications Discontinued During This Encounter   Medication Reason    denosumab (PROLIA) 60 MG/ML SOSY SC injection REORDER     Patient Instructions     Personalized Preventive Plan for Sophia Carmen - 12/27/2021  Medicare offers a range of preventive health benefits. Some of the tests and screenings are paid in full while other may be subject to a deductible, co-insurance, and/or copay.     Some of these benefits include a comprehensive review of your medical history including lifestyle, illnesses that may run in your family, and various assessments and screenings as appropriate. After reviewing your medical record and screening and assessments performed today your provider may have ordered immunizations, labs, imaging, and/or referrals for you. A list of these orders (if applicable) as well as your Preventive Care list are included within your After Visit Summary for your review. Other Preventive Recommendations:    · A preventive eye exam performed by an eye specialist is recommended every 1-2 years to screen for glaucoma; cataracts, macular degeneration, and other eye disorders. · A preventive dental visit is recommended every 6 months. · Try to get at least 150 minutes of exercise per week or 10,000 steps per day on a pedometer . · Order or download the FREE \"Exercise & Physical Activity: Your Everyday Guide\" from The [employer]. Call 6-742.511.3324 or search The [employer] online. · You need 7354-9899 mg of calcium and 2120-7089 IU of vitamin D per day. It is possible to meet your calcium requirement with diet alone, but a vitamin D supplement is usually necessary to meet this goal.  · When exposed to the sun, use a sunscreen that protects against both UVA and UVB radiation with an SPF of 30 or greater. Reapply every 2 to 3 hours or after sweating, drying off with a towel, or swimming. · Always wear a seat belt when traveling in a car. Always wear a helmet when riding a bicycle or motorcycle. Patient voicesunderstanding and agrees to plans along with risks and benefits of plan. Counseling:  Aleksandar Love's case, medications and options were discussed in detail. Patient was instructed to call the office if she questionsregarding her treatment. Should her conditions worsen, she should return to office to be reassessed by BRAYDON Bonilla. she Should to go the closest Emergency Department for any emergency. They verbalizedunderstanding the above instructions.    Return in 3 months (on 3/27/2022) for follow up, 30 minute visit.

## 2022-01-06 DIAGNOSIS — G89.29 CHRONIC BILATERAL LOW BACK PAIN WITHOUT SCIATICA: ICD-10-CM

## 2022-01-06 DIAGNOSIS — M54.50 CHRONIC BILATERAL LOW BACK PAIN WITHOUT SCIATICA: ICD-10-CM

## 2022-01-10 RX ORDER — GABAPENTIN 100 MG/1
100 CAPSULE ORAL 2 TIMES DAILY
Qty: 60 CAPSULE | Refills: 0 | Status: SHIPPED | OUTPATIENT
Start: 2022-01-10 | End: 2022-01-24 | Stop reason: SDUPTHER

## 2022-01-14 ENCOUNTER — TELEPHONE (OUTPATIENT)
Dept: FAMILY MEDICINE CLINIC | Age: 87
End: 2022-01-14

## 2022-01-14 NOTE — TELEPHONE ENCOUNTER
Called and spoke with [facility] pharmacy and Prolia will be delivered on 1/18/2022 to the  Office.  Patient aware

## 2022-01-19 ENCOUNTER — NURSE ONLY (OUTPATIENT)
Dept: FAMILY MEDICINE CLINIC | Age: 87
End: 2022-01-19
Payer: MEDICARE

## 2022-01-19 DIAGNOSIS — M81.0 AGE-RELATED OSTEOPOROSIS WITHOUT CURRENT PATHOLOGICAL FRACTURE: Primary | ICD-10-CM

## 2022-01-24 DIAGNOSIS — M54.50 CHRONIC BILATERAL LOW BACK PAIN WITHOUT SCIATICA: ICD-10-CM

## 2022-01-24 DIAGNOSIS — G89.29 CHRONIC BILATERAL LOW BACK PAIN WITHOUT SCIATICA: ICD-10-CM

## 2022-01-26 ENCOUNTER — TELEPHONE (OUTPATIENT)
Dept: PAIN MANAGEMENT | Age: 87
End: 2022-01-26

## 2022-01-26 RX ORDER — GABAPENTIN 100 MG/1
100 CAPSULE ORAL 2 TIMES DAILY
Qty: 60 CAPSULE | Refills: 0 | Status: SHIPPED | OUTPATIENT
Start: 2022-01-26 | End: 2022-01-27 | Stop reason: SDUPTHER

## 2022-01-27 DIAGNOSIS — G89.29 CHRONIC BILATERAL LOW BACK PAIN WITHOUT SCIATICA: ICD-10-CM

## 2022-01-27 DIAGNOSIS — M54.50 CHRONIC BILATERAL LOW BACK PAIN WITHOUT SCIATICA: ICD-10-CM

## 2022-01-27 RX ORDER — GABAPENTIN 100 MG/1
100 CAPSULE ORAL 2 TIMES DAILY
Qty: 60 CAPSULE | Refills: 0 | Status: SHIPPED | OUTPATIENT
Start: 2022-02-15 | End: 2022-03-18 | Stop reason: SDUPTHER

## 2022-01-28 PROCEDURE — 96372 THER/PROPH/DIAG INJ SC/IM: CPT | Performed by: NURSE PRACTITIONER

## 2022-01-28 NOTE — PROGRESS NOTES
Administrations This Visit     denosumab (PROLIA) SC injection 60 mg     Admin Date  01/28/2022  14:45 Action  Given Dose  60 mg Route  SubCUTAneous Site  Arm Left Administered By  Alexis Hernandez LPN    Ordering Provider: BRAYDON Borden    NDC: 66800-001-04    Lot#: [id number]    : AMGEN    Patient Supplied?: No    Comments: Given on 1/19/2022

## 2022-02-14 ENCOUNTER — TELEPHONE (OUTPATIENT)
Dept: PAIN MANAGEMENT | Age: 87
End: 2022-02-14

## 2022-03-10 ENCOUNTER — TELEPHONE (OUTPATIENT)
Dept: PAIN MANAGEMENT | Age: 87
End: 2022-03-10

## 2022-03-10 DIAGNOSIS — I10 ESSENTIAL HYPERTENSION: ICD-10-CM

## 2022-03-10 DIAGNOSIS — R73.9 HYPERGLYCEMIA: ICD-10-CM

## 2022-03-10 DIAGNOSIS — M81.0 AGE-RELATED OSTEOPOROSIS WITHOUT CURRENT PATHOLOGICAL FRACTURE: ICD-10-CM

## 2022-03-10 DIAGNOSIS — E78.2 MIXED HYPERLIPIDEMIA: ICD-10-CM

## 2022-03-10 LAB
ALBUMIN SERPL-MCNC: 4.5 G/DL (ref 3.5–5.2)
ALP BLD-CCNC: 59 U/L (ref 35–104)
ALT SERPL-CCNC: 15 U/L (ref 5–33)
ANION GAP SERPL CALCULATED.3IONS-SCNC: 12 MMOL/L (ref 7–19)
AST SERPL-CCNC: 18 U/L (ref 5–32)
BASOPHILS ABSOLUTE: 0.1 K/UL (ref 0–0.2)
BASOPHILS RELATIVE PERCENT: 0.8 % (ref 0–1)
BILIRUB SERPL-MCNC: 0.5 MG/DL (ref 0.2–1.2)
BUN BLDV-MCNC: 15 MG/DL (ref 8–23)
CALCIUM SERPL-MCNC: 9.8 MG/DL (ref 8.8–10.2)
CHLORIDE BLD-SCNC: 98 MMOL/L (ref 98–111)
CHOLESTEROL, TOTAL: 151 MG/DL (ref 160–199)
CO2: 26 MMOL/L (ref 22–29)
CREAT SERPL-MCNC: 0.7 MG/DL (ref 0.5–0.9)
EOSINOPHILS ABSOLUTE: 0.1 K/UL (ref 0–0.6)
EOSINOPHILS RELATIVE PERCENT: 1.4 % (ref 0–5)
GFR AFRICAN AMERICAN: >59
GFR NON-AFRICAN AMERICAN: >60
GLUCOSE BLD-MCNC: 93 MG/DL (ref 74–109)
HBA1C MFR BLD: 5.8 % (ref 4–6)
HCT VFR BLD CALC: 41.4 % (ref 37–47)
HDLC SERPL-MCNC: 58 MG/DL (ref 65–121)
HEMOGLOBIN: 12.9 G/DL (ref 12–16)
IMMATURE GRANULOCYTES #: 0 K/UL
LDL CHOLESTEROL CALCULATED: 73 MG/DL
LYMPHOCYTES ABSOLUTE: 2.3 K/UL (ref 1.1–4.5)
LYMPHOCYTES RELATIVE PERCENT: 31.6 % (ref 20–40)
MCH RBC QN AUTO: 26.7 PG (ref 27–31)
MCHC RBC AUTO-ENTMCNC: 31.2 G/DL (ref 33–37)
MCV RBC AUTO: 85.5 FL (ref 81–99)
MONOCYTES ABSOLUTE: 0.4 K/UL (ref 0–0.9)
MONOCYTES RELATIVE PERCENT: 5.8 % (ref 0–10)
NEUTROPHILS ABSOLUTE: 4.4 K/UL (ref 1.5–7.5)
NEUTROPHILS RELATIVE PERCENT: 60.1 % (ref 50–65)
PDW BLD-RTO: 16 % (ref 11.5–14.5)
PLATELET # BLD: 344 K/UL (ref 130–400)
PMV BLD AUTO: 9.5 FL (ref 9.4–12.3)
POTASSIUM SERPL-SCNC: 4.4 MMOL/L (ref 3.5–5)
RBC # BLD: 4.84 M/UL (ref 4.2–5.4)
SODIUM BLD-SCNC: 136 MMOL/L (ref 136–145)
TOTAL PROTEIN: 7.6 G/DL (ref 6.6–8.7)
TRIGL SERPL-MCNC: 102 MG/DL (ref 0–149)
VITAMIN D 25-HYDROXY: 34.4 NG/ML
WBC # BLD: 7.3 K/UL (ref 4.8–10.8)

## 2022-03-18 DIAGNOSIS — G89.29 CHRONIC BILATERAL LOW BACK PAIN WITHOUT SCIATICA: ICD-10-CM

## 2022-03-18 DIAGNOSIS — M54.50 CHRONIC BILATERAL LOW BACK PAIN WITHOUT SCIATICA: ICD-10-CM

## 2022-03-18 RX ORDER — GABAPENTIN 100 MG/1
100 CAPSULE ORAL 2 TIMES DAILY
Qty: 60 CAPSULE | Refills: 0 | Status: SHIPPED | OUTPATIENT
Start: 2022-03-18 | End: 2022-05-16 | Stop reason: SDUPTHER

## 2022-03-18 NOTE — TELEPHONE ENCOUNTER
----- Message from Marlene Salcido sent at 3/18/2022  9:19 AM CDT -----  Subject: Refill Request    QUESTIONS  Name of Medication? gabapentin (NEURONTIN) 100 MG capsule  Patient-reported dosage and instructions? 1 capsule 2x a day  How many days do you have left? 1  Preferred Pharmacy? [facility] #[id number]  Pharmacy phone number (if available)? [phone]  Additional Information for Provider? Pt was seeing Jonah Morris at [facility]. But pt decided to no longer go back to [facility] because she has a   bad experience with an injection and doesnt want to see them any longer.   (copay is also very high) She doesnt think she needs to have it anymore. The gabapentin is working for her, she just needs it refilled ASAP  ---------------------------------------------------------------------------  --------------  [address]  What is the best way for the office to contact you? OK to leave message on   voicemail  Preferred Call Back Phone Number?  [phone]

## 2022-03-23 RX ORDER — POTASSIUM CHLORIDE 750 MG/1
TABLET, FILM COATED, EXTENDED RELEASE ORAL
Qty: 90 TABLET | Refills: 3 | Status: SHIPPED | OUTPATIENT
Start: 2022-03-23

## 2022-03-28 ENCOUNTER — OFFICE VISIT (OUTPATIENT)
Dept: FAMILY MEDICINE CLINIC | Age: 87
End: 2022-03-28
Payer: MEDICARE

## 2022-03-28 VITALS
HEIGHT: 63 IN | RESPIRATION RATE: 16 BRPM | WEIGHT: 155 LBS | TEMPERATURE: 96 F | SYSTOLIC BLOOD PRESSURE: 136 MMHG | HEART RATE: 74 BPM | OXYGEN SATURATION: 99 % | BODY MASS INDEX: 27.46 KG/M2 | DIASTOLIC BLOOD PRESSURE: 72 MMHG

## 2022-03-28 DIAGNOSIS — R73.03 PREDIABETES: ICD-10-CM

## 2022-03-28 DIAGNOSIS — M48.062 SPINAL STENOSIS OF LUMBAR REGION WITH NEUROGENIC CLAUDICATION: ICD-10-CM

## 2022-03-28 DIAGNOSIS — I25.10 CORONARY ARTERY DISEASE INVOLVING NATIVE CORONARY ARTERY OF NATIVE HEART WITHOUT ANGINA PECTORIS: ICD-10-CM

## 2022-03-28 DIAGNOSIS — N39.0 ACUTE UTI: Primary | ICD-10-CM

## 2022-03-28 DIAGNOSIS — I10 ESSENTIAL HYPERTENSION: Primary | ICD-10-CM

## 2022-03-28 DIAGNOSIS — M81.0 AGE-RELATED OSTEOPOROSIS WITHOUT CURRENT PATHOLOGICAL FRACTURE: ICD-10-CM

## 2022-03-28 DIAGNOSIS — E78.2 MIXED HYPERLIPIDEMIA: ICD-10-CM

## 2022-03-28 DIAGNOSIS — N39.0 ACUTE UTI: ICD-10-CM

## 2022-03-28 DIAGNOSIS — N39.0 RECURRENT UTI: ICD-10-CM

## 2022-03-28 DIAGNOSIS — I35.0 AORTIC VALVE STENOSIS, ETIOLOGY OF CARDIAC VALVE DISEASE UNSPECIFIED: ICD-10-CM

## 2022-03-28 LAB
APPEARANCE FLUID: NORMAL
BILIRUBIN, POC: NORMAL
BLOOD URINE, POC: NORMAL
CLARITY, POC: NORMAL
COLOR, POC: YELLOW
GLUCOSE URINE, POC: NORMAL
KETONES, POC: NORMAL
LEUKOCYTE EST, POC: NORMAL
NITRITE, POC: NORMAL
PH, POC: 7
PROTEIN, POC: NORMAL
SPECIFIC GRAVITY, POC: 1.02
UROBILINOGEN, POC: 0.2

## 2022-03-28 PROCEDURE — 4040F PNEUMOC VAC/ADMIN/RCVD: CPT | Performed by: NURSE PRACTITIONER

## 2022-03-28 PROCEDURE — G8417 CALC BMI ABV UP PARAM F/U: HCPCS | Performed by: NURSE PRACTITIONER

## 2022-03-28 PROCEDURE — 1036F TOBACCO NON-USER: CPT | Performed by: NURSE PRACTITIONER

## 2022-03-28 PROCEDURE — G8427 DOCREV CUR MEDS BY ELIG CLIN: HCPCS | Performed by: NURSE PRACTITIONER

## 2022-03-28 PROCEDURE — G8484 FLU IMMUNIZE NO ADMIN: HCPCS | Performed by: NURSE PRACTITIONER

## 2022-03-28 PROCEDURE — 1090F PRES/ABSN URINE INCON ASSESS: CPT | Performed by: NURSE PRACTITIONER

## 2022-03-28 PROCEDURE — 81002 URINALYSIS NONAUTO W/O SCOPE: CPT | Performed by: NURSE PRACTITIONER

## 2022-03-28 PROCEDURE — 99214 OFFICE O/P EST MOD 30 MIN: CPT | Performed by: NURSE PRACTITIONER

## 2022-03-28 PROCEDURE — 1123F ACP DISCUSS/DSCN MKR DOCD: CPT | Performed by: NURSE PRACTITIONER

## 2022-03-28 RX ORDER — NITROFURANTOIN 25; 75 MG/1; MG/1
100 CAPSULE ORAL 2 TIMES DAILY
Qty: 14 CAPSULE | Refills: 0 | Status: SHIPPED | OUTPATIENT
Start: 2022-03-28 | End: 2022-04-04

## 2022-03-28 ASSESSMENT — ENCOUNTER SYMPTOMS
SHORTNESS OF BREATH: 0
CHEST TIGHTNESS: 0
SORE THROAT: 0
WHEEZING: 0
BACK PAIN: 1
DIARRHEA: 0
ABDOMINAL PAIN: 0
COUGH: 0
NAUSEA: 0

## 2022-03-28 NOTE — PROGRESS NOTES
Dulce Maria Nation is a 81 y.o. female who presents today for  Chief Complaint   Patient presents with    Follow-up       HPI:    She has had issues with recurrent UTI and would like a UA today. No significant urinary symptoms at this time. No other acute concerns today. Hypertension  Compliant with medications. No adverse effects from medication. No lightheadedness, palpitations, or chest pain. Hyperlipidemia  Tolerating current cholesterol medication without side effects. No body aches. Attempting to reduce processed sugar and cholesterol from diet. CAD is stable. No recent chest pain. Remote hx of stent x 2. Aortic stenosis is stable, no recent shortness of breath or dizziness. Followed by cardiology. Chronic lower back pain, spinal stenosis with neurogenic claudication, stable. She tolerates exercise with Silver sneakers. Pain is only exacerbated by prolonged walking or standing. She is taking gabapentin 100 mg twice daily and tolerates well. Dose is effective. Previously followed by pain management but had reported reaction from injection, severe headache. Has not recurred since injection. Does not wish to return. Not taking any opioids. Prediabetes, improved. A1c down to 5.8. FBG 93. She is working on [employer], continued exercise. Osteoporosis, tolerating Prolia. Last injection was in January. She is taking calcium and vitamin D as well. Labs reviewed with patient. Lipids and blood sugar controlled. Anemia has improved.   Vitamin D normal.  Lab Results   Component Value Date     03/10/2022    K 4.4 03/10/2022    CL 98 03/10/2022    CO2 26 03/10/2022    BUN 15 03/10/2022    CREATININE 0.7 03/10/2022    GLUCOSE 93 03/10/2022    CALCIUM 9.8 03/10/2022    PROT 7.6 03/10/2022    LABALBU 4.5 03/10/2022    BILITOT 0.5 03/10/2022    ALKPHOS 59 03/10/2022    AST 18 03/10/2022    ALT 15 03/10/2022    LABGLOM >60 03/10/2022    GFRAA >59 03/10/2022       Lab Results Component Value Date    CHOL 151 (L) 03/10/2022    CHOL 149 (L) 09/27/2021     Lab Results   Component Value Date    TRIG 102 03/10/2022    TRIG 125 09/27/2021     Lab Results   Component Value Date    HDL 58 (L) 03/10/2022    HDL 54 (L) 09/27/2021     Lab Results   Component Value Date    LDLCALC 73 03/10/2022    [address] 09/27/2021     No results found for: LABVLDL, VLDL  No results found for: CHOLHDLRATIO  Lab Results   Component Value Date    WBC 7.3 03/10/2022    HGB 12.9 03/10/2022    HCT 41.4 03/10/2022    MCV 85.5 03/10/2022     03/10/2022     Lab Results   Component Value Date    LABA1C 5.8 03/10/2022     No results found for: EAG  Lab Results   Component Value Date    VITD25 34.4 03/10/2022         Review of Systems   Constitutional: Negative for chills and fever. HENT: Negative for congestion, ear pain and sore throat. Respiratory: Negative for cough, chest tightness, shortness of breath and wheezing. Cardiovascular: Negative for chest pain. Gastrointestinal: Negative for abdominal pain, diarrhea and nausea. Genitourinary: Negative for dysuria and frequency. Musculoskeletal: Positive for back pain (chronic, stable). Negative for arthralgias and myalgias. Skin: Negative for rash. Neurological: Negative for dizziness and light-headedness.        Past Medical History:   Diagnosis Date    Age-related osteoporosis without current pathological fracture     Basal cell carcinoma (BCC) of scalp     skin    Coronary artery disease involving native coronary artery of native heart without angina pectoris     stents done in [place]; due to see [facility] cardiology    DDD (degenerative disc disease), lumbar     Essential hypertension     Knee pain     Mixed hyperlipidemia        Current Outpatient Medications   Medication Sig Dispense Refill    potassium chloride (KLOR-CON) 10 MEQ extended release tablet TAKE 1 TABLET EVERY DAY 90 tablet 3    gabapentin (NEURONTIN) 100 MG capsule Take 1 capsule by mouth 2 times daily for 30 days. (may fill 2/15/22) 60 capsule 0    denosumab (PROLIA) 60 MG/ML SOSY SC injection Inject 1 mL into the skin every 6 months 1 mL 3    atorvastatin (LIPITOR) 20 MG tablet Take 1 tablet by mouth daily 90 tablet 1    amLODIPine (NORVASC) 10 MG tablet Take 1 tablet by mouth daily 90 tablet 3    isosorbide mononitrate (IMDUR) 30 MG extended release tablet Take 1 tablet by mouth daily 90 tablet 3    aspirin EC 81 MG EC tablet Take 1 tablet by mouth 2 times daily 60 tablet 0    hydroCHLOROthiazide (HYDRODIURIL) 25 MG tablet Take 1 tablet by mouth daily 90 tablet 3    vitamin D (CHOLECALCIFEROL) 25 MCG (1000 UT) TABS tablet Take 1,000 Units by mouth daily      calcium carbonate 600 MG TABS tablet Take 1 tablet by mouth daily      Multiple Vitamins-Minerals (OCUVITE PO) Take 1 tablet by mouth 2 times daily       nitroGLYCERIN (NITROSTAT) 0.4 MG SL tablet Place 1 tablet under the tongue every 5 minutes as needed for Chest pain up to max of 3 total doses. If no relief after 1 dose, call 911. 25 tablet 3     No current facility-administered medications for this visit.        No Known Allergies    Past Surgical History:   Procedure Laterality Date    CARDIAC CATHETERIZATION      stents 2020    EYE SURGERY      h/o eye bleed    FOOT SURGERY      deep calluses    HYSTERECTOMY      JOINT REPLACEMENT      TOTAL HIP ARTHROPLASTY Left 2019    TOTAL KNEE ARTHROPLASTY Right 10/14/2021    RIGHT KNEE TOTAL ARTHROPLASTY performed by Sarahy Nick MD at [address] [place] [address]         Social History     Tobacco Use    Smoking status: Former Smoker     Types: Cigarettes     Quit date:      Years since quittin.2    Smokeless tobacco: Never Used   Vaping Use    Vaping Use: Never used   Substance Use Topics    Alcohol use: Never    Drug use: Never       Family History   Problem Relation Age of Onset    Heart Disease Mother        /73 Blood sugars controlled. Continue healthy diet, exercise. -A1c in 4 months.  - Hemoglobin A1C; Future    6. Spinal stenosis of lumbar region with neurogenic claudication  -Stable, controlled with gabapentin at current dose. Tolerates well. Continue current dose. 7. Recurrent UTI  -No significant urinary symptoms at this time but has had UTI in the past without symptoms and would like UA today. - POCT Urinalysis no Micro    8. Age-related osteoporosis without current pathological fracture  -Stable, continue Prolia every 6 months.  -Continue OTC vitamin D 1000 units, calcium 1200 mg daily. - Vitamin D 25 Hydroxy; Future         Return in about 4 months (around 7/28/2022) for follow up, 30 minute visit. Denisse Wade was seen today for follow-up. Diagnoses and all orders for this visit:    Essential hypertension  -     CBC with Auto Differential; Future    Coronary artery disease involving native coronary artery of native heart without angina pectoris  -     Comprehensive Metabolic Panel; Future  -     Lipid Panel; Future    Mixed hyperlipidemia    Aortic valve stenosis, etiology of cardiac valve disease unspecified    Prediabetes  -     Hemoglobin A1C; Future    Spinal stenosis of lumbar region with neurogenic claudication    Recurrent UTI  -     POCT Urinalysis no Micro    Age-related osteoporosis without current pathological fracture  -     Vitamin D 25 Hydroxy; Future      There are no discontinued medications. There are no Patient Instructions on file for this visit. Patient voicesunderstanding and agrees to plans along with risks and benefits of plan. Counseling:  Pamella Love's case, medications and options were discussed in detail. Patient was instructed to call the office if she questionsregarding her treatment. Should her conditions worsen, she should return to office to be reassessed by BRAYDON Gottlieb. she Should to go the closest Emergency Department for any emergency.  They verbalizedunderstanding the above instructions. Return in about 4 months (around 7/28/2022) for follow up, 30 minute visit.

## 2022-03-31 LAB
ORGANISM: ABNORMAL
URINE CULTURE, ROUTINE: ABNORMAL

## 2022-03-31 RX ORDER — SULFAMETHOXAZOLE AND TRIMETHOPRIM 800; 160 MG/1; MG/1
1 TABLET ORAL 2 TIMES DAILY
Qty: 10 TABLET | Refills: 0 | Status: SHIPPED | OUTPATIENT
Start: 2022-03-31 | End: 2022-04-05

## 2022-04-08 ENCOUNTER — TELEPHONE (OUTPATIENT)
Dept: CARDIOLOGY CLINIC | Age: 87
End: 2022-04-08

## 2022-04-11 ENCOUNTER — OFFICE VISIT (OUTPATIENT)
Dept: CARDIOLOGY CLINIC | Age: 87
End: 2022-04-11
Payer: MEDICARE

## 2022-04-11 VITALS
HEART RATE: 67 BPM | OXYGEN SATURATION: 97 % | HEIGHT: 63 IN | DIASTOLIC BLOOD PRESSURE: 60 MMHG | WEIGHT: 156 LBS | SYSTOLIC BLOOD PRESSURE: 130 MMHG | BODY MASS INDEX: 27.64 KG/M2

## 2022-04-11 DIAGNOSIS — I35.0 NONRHEUMATIC AORTIC VALVE STENOSIS: Primary | ICD-10-CM

## 2022-04-11 PROCEDURE — 4040F PNEUMOC VAC/ADMIN/RCVD: CPT | Performed by: INTERNAL MEDICINE

## 2022-04-11 PROCEDURE — 1123F ACP DISCUSS/DSCN MKR DOCD: CPT | Performed by: INTERNAL MEDICINE

## 2022-04-11 PROCEDURE — 1036F TOBACCO NON-USER: CPT | Performed by: INTERNAL MEDICINE

## 2022-04-11 PROCEDURE — G8417 CALC BMI ABV UP PARAM F/U: HCPCS | Performed by: INTERNAL MEDICINE

## 2022-04-11 PROCEDURE — G8427 DOCREV CUR MEDS BY ELIG CLIN: HCPCS | Performed by: INTERNAL MEDICINE

## 2022-04-11 PROCEDURE — 1090F PRES/ABSN URINE INCON ASSESS: CPT | Performed by: INTERNAL MEDICINE

## 2022-04-11 PROCEDURE — 93000 ELECTROCARDIOGRAM COMPLETE: CPT | Performed by: INTERNAL MEDICINE

## 2022-04-11 PROCEDURE — 99204 OFFICE O/P NEW MOD 45 MIN: CPT | Performed by: INTERNAL MEDICINE

## 2022-04-11 ASSESSMENT — ENCOUNTER SYMPTOMS
EYE DISCHARGE: 0
CHEST TIGHTNESS: 0
FACIAL SWELLING: 0
SORE THROAT: 0
NAUSEA: 0
WHEEZING: 0
CONSTIPATION: 0
EYE PAIN: 0
COUGH: 0
DIARRHEA: 0
SHORTNESS OF BREATH: 0
APNEA: 0
ABDOMINAL PAIN: 0
BLOOD IN STOOL: 0
VOMITING: 0
ABDOMINAL DISTENTION: 0
EYE REDNESS: 0

## 2022-04-11 NOTE — PROGRESS NOTES
Cardiology Office Visit Note  [address]  [id number]  Phone: [phone]  Fax: [phone]                            Date:  4/11/2022  Patient: Lyubov Ng  Age:  81 y. o., 1935    Referral: No ref. provider found    REASON FOR VISIT:  6 Month Follow-Up  Coronary artery disease  Hypertension  Dyslipidemia      PROBLEM LIST:    Patient Active Problem List    Diagnosis Date Noted    Recurrent UTI 12/27/2021     Priority: Low    Aortic stenosis 12/27/2021     Priority: Low    Ventral hernia without obstruction or gangrene 12/27/2021     Priority: Low    Spinal stenosis of lumbar region with neurogenic claudication 11/30/2021     Priority: Low    Primary osteoarthritis of right knee 10/14/2021     Priority: Low    Chronic bilateral low back pain without sciatica 09/24/2021     Priority: Low    Macular degeneration of both eyes 09/24/2021     Priority: Low    Essential hypertension      Priority: Low    Mixed hyperlipidemia      Priority: Low    Age-related osteoporosis without current pathological fracture      Priority: Low    Coronary artery disease involving native coronary artery of native heart without angina pectoris      Priority: Low    DDD (degenerative disc disease), lumbar      Priority: Low    Basal cell carcinoma (BCC) of scalp      Priority: Low         PRESENTATION: Lyubov Ng is a 81y.o. year old female is seen in cardiology consultation today to establish care in our office. She moved to the [facility] area last summer. Her medical history is notable for coronary artery disease with multiple stents, last stent proximately 2 years ago. Her history is otherwise notable for dyslipidemia, hypertension and degenerative joint disease. She is unsure of if she is ever had heart attack. No reports of congestive heart failure. She reports that she is doing well without any chest pain on exertion or otherwise.   No reports of shortness of breath, dyspnea on exertion, lightheadedness, dizziness, palpitations or syncope. She is compliant with her medications. She has not needed to use nitroglycerin sublingually. She is reasonably physically active and has no limiting symptoms with climbing a flight of stairs. REVIEW OF SYSTEMS:  Review of Systems   Constitutional: Negative for chills, fatigue and fever. HENT: Negative for congestion, facial swelling, hearing loss and sore throat. Eyes: Negative for pain, discharge, redness and visual disturbance. Respiratory: Negative for apnea, cough, chest tightness, shortness of breath and wheezing. Cardiovascular: Negative for chest pain, palpitations and leg swelling. Gastrointestinal: Negative for abdominal distention, abdominal pain, blood in stool, constipation, diarrhea, nausea and vomiting. Endocrine: Negative for polydipsia, polyphagia and polyuria. Genitourinary: Negative for dysuria, flank pain, frequency and hematuria. Musculoskeletal: Negative for joint swelling, myalgias and neck pain. Skin: Negative for pallor and rash. Neurological: Negative for dizziness, syncope, speech difficulty, light-headedness, numbness and headaches. Psychiatric/Behavioral: Negative for confusion, hallucinations and sleep disturbance.        Past Medical History:      Diagnosis Date    Age-related osteoporosis without current pathological fracture     Basal cell carcinoma (BCC) of scalp     skin    Coronary artery disease involving native coronary artery of native heart without angina pectoris     stents done in [place]; due to see [facility] cardiology    DDD (degenerative disc disease), lumbar     Essential hypertension     Knee pain     Mixed hyperlipidemia        Past Surgical History:      Procedure Laterality Date    CARDIAC CATHETERIZATION      stents 2019, 2020    EYE SURGERY      h/o eye bleed    FOOT SURGERY      deep calluses    HYSTERECTOMY      JOINT REPLACEMENT      TOTAL HIP ARTHROPLASTY Left 2019    TOTAL KNEE ARTHROPLASTY Right 10/14/2021    RIGHT KNEE TOTAL ARTHROPLASTY performed by Vicky Hernandez MD at [address] [facility]         Medications:  Current Outpatient Medications   Medication Sig Dispense Refill    potassium chloride (KLOR-CON) 10 MEQ extended release tablet TAKE 1 TABLET EVERY DAY 90 tablet 3    gabapentin (NEURONTIN) 100 MG capsule Take 1 capsule by mouth 2 times daily for 30 days. (may fill 2/15/22) 60 capsule 0    denosumab (PROLIA) 60 MG/ML SOSY SC injection Inject 1 mL into the skin every 6 months 1 mL 3    atorvastatin (LIPITOR) 20 MG tablet Take 1 tablet by mouth daily 90 tablet 1    amLODIPine (NORVASC) 10 MG tablet Take 1 tablet by mouth daily 90 tablet 3    isosorbide mononitrate (IMDUR) 30 MG extended release tablet Take 1 tablet by mouth daily 90 tablet 3    aspirin EC 81 MG EC tablet Take 1 tablet by mouth 2 times daily 60 tablet 0    hydroCHLOROthiazide (HYDRODIURIL) 25 MG tablet Take 1 tablet by mouth daily 90 tablet 3    vitamin D (CHOLECALCIFEROL) 25 MCG (1000 UT) TABS tablet Take 1,000 Units by mouth daily      calcium carbonate 600 MG TABS tablet Take 1 tablet by mouth daily      Multiple Vitamins-Minerals (OCUVITE PO) Take 1 tablet by mouth 2 times daily       nitroGLYCERIN (NITROSTAT) 0.4 MG SL tablet Place 1 tablet under the tongue every 5 minutes as needed for Chest pain up to max of 3 total doses. If no relief after 1 dose, call 911. 25 tablet 3     No current facility-administered medications for this visit. Allergies:  Patient has no known allergies.     Social History:  Social History     Occupational History    Not on file   Tobacco Use    Smoking status: Former Smoker     Types: Cigarettes     Quit date:      Years since quittin.2    Smokeless tobacco: Never Used   Vaping Use    Vaping Use: Never used   Substance and Sexual Activity    Alcohol use: Never    Drug use: Never    Sexual activity: Not on file         Family History:       Problem Relation Age of Onset    Heart Disease Mother          Physical Examination:  /60 (Site: Left Upper Arm, Position: Sitting)   Pulse 67   Ht 5' 3\" (1.6 m)   Wt 156 lb (70.8 kg)   SpO2 97%   BMI 27.63 kg/m²   Physical Exam  Vitals reviewed. Constitutional:       General: She is not in acute distress. Appearance: Normal appearance. She is not ill-appearing, toxic-appearing or diaphoretic. HENT:      Head: Normocephalic and atraumatic. Eyes:      General: No scleral icterus. Right eye: No discharge. Left eye: No discharge. Conjunctiva/sclera: Conjunctivae normal.   Neck:      Vascular: No carotid bruit. Cardiovascular:      Rate and Rhythm: Normal rate and regular rhythm. No extrasystoles are present. Chest Wall: PMI is not displaced. No thrill. Heart sounds: S1 normal and S2 normal. No murmur heard. No friction rub. No gallop. Pulmonary:      Effort: Pulmonary effort is normal. No tachypnea or respiratory distress. Breath sounds: Normal breath sounds. No stridor. No wheezing, rhonchi or rales. Chest:      Chest wall: No tenderness. Abdominal:      General: Bowel sounds are normal. There is no distension. Palpations: Abdomen is soft. There is no mass. Tenderness: There is no abdominal tenderness. There is no guarding. Musculoskeletal:         General: No swelling. Cervical back: Normal range of motion and neck supple. No rigidity. Right lower leg: No edema. Left lower leg: No edema. Skin:     General: Skin is warm and dry. Coloration: Skin is not jaundiced. Findings: No erythema or rash. Neurological:      General: No focal deficit present. Mental Status: She is alert and oriented to person, place, and time. Mental status is at baseline.    Psychiatric:         Mood and Affect: Mood normal.         Behavior: Behavior normal.         Thought Content: Thought content normal.           Labs:   CBC: No results found for: CBC   BMP: No results found for: BMP    BNP: No results found for: BNPINT   PT/INR:   Protime   Date Value Ref Range Status   09/30/2021 13.3 12.0 - 14.6 sec Final     INR   Date Value Ref Range Status   09/30/2021 0.99 0.88 - 1.18 Final     Comment:     INR  < or = 1.3  Normal  INR = 2.0 - 3.0  Therapeutic  INR = 2.5 - 3.5  Therapeutic for patients with mechanical  prosthetic heart valve & MI prophylaxis  INR  > or = 3.5  Abnormal/Elevated  INR  > or = 5.0  Critical (requires immediate physician  notification)         APTT:    aPTT   Date Value Ref Range Status   09/30/2021 31.1 26.0 - 36.2 sec Final      CARDIAC ENZYMES: No results found for: CKTOTAL, CKMB, CKMBINDEX, TROPONINI   LIPID PANEL: No results found for: [facility]  LIVER PROFILE:   AST   Date Value Ref Range Status   03/10/2022 18 5 - 32 U/L Final     ALT   Date Value Ref Range Status   03/10/2022 15 5 - 33 U/L Final     Albumin   Date Value Ref Range Status   03/10/2022 4.5 3.5 - 5.2 g/dL Final              Imaging:    ECHO Summary 10/8/2021  Normal left ventricular size with preserved LV function and an estimated ejection fraction of approximately 60-65%. No gross regional  wall motion abnormalities. Moderate septal hypertrophy. Normal diastolic filling pattern for age. Normal right ventricular size with globally reduced RV systolic function (TAPSE 90.7 mm). Moderate aortic stenosis is noted (peak velocity 3.43 m/sec, mean gradient 26 mmHg, DI 0.32, SVI 41.5, PAIGE 1.01 cm2). Aortic root and ascending aorta are within normal limits. No evidence of significant pericardial effusion is noted. The rhythm is sinus with BBB. ABNORMAL study. No previous studies available for comparison. - EKG : EKG from 2020 as well as stress test from 2020 notable for sinus rhythm with right bundle branch block.         ASSESSMENT and PLAN:    Chronic coronary artery disease with history of multiple stents, last stent approximately 2 years ago. Chest pain-free and hemodynamically stable. Ejection fraction 65%. Moderate aortic valve stenosis, asymptomatic  Peak velocity 3.43 m/sec, mean gradient 26 mmHg, DI 0.32, SVI 41.5, PAIGE 1.01 cm2    Essential hypertension, goal blood pressure less than 130/80    Dyslipidemia, goal LDL less than 70    Abnormal EKG--chronic right bundle branch block    Vidya Mack is stable from a cardiovascular standpoint. She is asymptomatic and hemodynamically stable. Will plan to continue conservative medical management including aspirin indefinitely. No changes will be made to her medication regimen. 2D echocardiogram with Doppler for further evaluation of aortic valve stenosis, evaluate speed of progression. Further recommendations will follow. Return in about 6 months (around 10/11/2022). Electronically signed by Bryon Yoon MD on 4/11/2022 at 11:43 AM    Bryon Yoon MD, DARIA, [facility]  Noninvasive Cardiology Consultant    This dictation was generated by voice recognition computer software. Although all attempts are made to edit the dictation for accuracy, there may be errors in the transcription that are not intended.

## 2022-04-18 RX ORDER — ATORVASTATIN CALCIUM 20 MG/1
TABLET, FILM COATED ORAL
Qty: 90 TABLET | Refills: 1 | Status: SHIPPED | OUTPATIENT
Start: 2022-04-18 | End: 2022-09-12 | Stop reason: SDUPTHER

## 2022-05-13 ENCOUNTER — HOSPITAL ENCOUNTER (OUTPATIENT)
Dept: NON INVASIVE DIAGNOSTICS | Age: 87
Discharge: HOME | End: 2022-05-13
Payer: MEDICARE

## 2022-05-13 DIAGNOSIS — I35.0 NONRHEUMATIC AORTIC VALVE STENOSIS: ICD-10-CM

## 2022-05-13 LAB
LV EF: 78 %
LVEF MODALITY: NORMAL

## 2022-05-13 PROCEDURE — 93306 TTE W/DOPPLER COMPLETE: CPT

## 2022-05-16 ENCOUNTER — TELEPHONE (OUTPATIENT)
Dept: FAMILY MEDICINE CLINIC | Age: 87
End: 2022-05-16

## 2022-05-16 DIAGNOSIS — M54.50 CHRONIC BILATERAL LOW BACK PAIN WITHOUT SCIATICA: ICD-10-CM

## 2022-05-16 DIAGNOSIS — G89.29 CHRONIC BILATERAL LOW BACK PAIN WITHOUT SCIATICA: ICD-10-CM

## 2022-05-16 RX ORDER — GABAPENTIN 100 MG/1
100 CAPSULE ORAL 2 TIMES DAILY
Qty: 60 CAPSULE | Refills: 2 | Status: SHIPPED | OUTPATIENT
Start: 2022-05-16 | End: 2022-10-10 | Stop reason: SDUPTHER

## 2022-05-16 NOTE — TELEPHONE ENCOUNTER
Mily Carlos called to request a refill on her medication. Last office visit : 3/28/2022   Next office visit : 7/29/2022     Last UDS: No results found for: Nader Dianna, LABBENZ, BUPRENUR, COCAIMETSCRU, GABAPENTIN, MDMA, METAMPU, OPIATESCREENURINE, OXTCOSU, PHENCYCLIDINESCREENURINE, PROPOXYPHENE, THCSCREENUR, TRICYUR    Last Matti: 4/18/2022  Medication Contract:    Last Fill: 3/18/2022    Requested Prescriptions     Pending Prescriptions Disp Refills    gabapentin (NEURONTIN) 100 MG capsule 60 capsule 0     Sig: Take 1 capsule by mouth 2 times daily for 30 days. (may fill 2/15/22)                 Please approve or refuse this medication.    Angelo Harris MA

## 2022-05-16 NOTE — TELEPHONE ENCOUNTER
----- Message from Elenor Sensor sent at 5/16/2022  9:36 AM CDT -----  Subject: Refill Request    QUESTIONS  Name of Medication? gabapentin (NEURONTIN) 100 MG capsule  Patient-reported dosage and instructions? Take 1 capsule by mouth 2 times   daily for 30 days. How many days do you have left? 1  Preferred Pharmacy? [id number]  Pharmacy phone number (if available)? [phone]  Additional Information for Provider? Salbador Ramos called on 05/16 @ 10:35   am to request a refill. Please send enough to last until her next   appointment with Dr. Michelle Burr on 07/29  ---------------------------------------------------------------------------  --------------  [address]  What is the best way for the office to contact you? OK to leave message on   voicemail  Preferred Call Back Phone Number? [phone]  ---------------------------------------------------------------------------  --------------  SCRIPT ANSWERS  Relationship to Patient?  Self

## 2022-05-19 NOTE — RESULT ENCOUNTER NOTE
Minimal hemodynamic progression of aortic stenosis from last echocardiogram approximately 6 months ago. (Borderline stroke-volume index noted, calculated PAIGE by continuity noted, however, mean gradient and peak velocities are discordant). Patient asymptomatic. Repeat echocardiogram in 6 months or earlier should her clinical condition change. Follow-up planned at that time.

## 2022-05-23 ENCOUNTER — TELEPHONE (OUTPATIENT)
Dept: CARDIOLOGY CLINIC | Age: 87
End: 2022-05-23

## 2022-05-23 DIAGNOSIS — I35.0 NONRHEUMATIC AORTIC (VALVE) STENOSIS: Primary | ICD-10-CM

## 2022-05-23 NOTE — TELEPHONE ENCOUNTER
----- Message from Juli Collier MD sent at 5/19/2022  1:31 PM CDT -----  Minimal hemodynamic progression of aortic stenosis from last echocardiogram approximately 6 months ago. (Borderline stroke-volume index noted, calculated PAIGE by continuity noted, however, mean gradient and peak velocities are discordant). Patient asymptomatic. Repeat echocardiogram in 6 months or earlier should her clinical condition change. Follow-up planned at that time.

## 2022-05-23 NOTE — TELEPHONE ENCOUNTER
Results discussed with patient and scheduled echo for 11/25/22 with an arrival time of 0930 for a 1000 per Dr. Reji Zavala instructions. Patient voiced understanding.

## 2022-07-21 DIAGNOSIS — I25.10 CORONARY ARTERY DISEASE INVOLVING NATIVE CORONARY ARTERY OF NATIVE HEART WITHOUT ANGINA PECTORIS: ICD-10-CM

## 2022-07-21 DIAGNOSIS — I10 ESSENTIAL HYPERTENSION: ICD-10-CM

## 2022-07-21 DIAGNOSIS — R73.03 PREDIABETES: ICD-10-CM

## 2022-07-21 DIAGNOSIS — M81.0 AGE-RELATED OSTEOPOROSIS WITHOUT CURRENT PATHOLOGICAL FRACTURE: ICD-10-CM

## 2022-07-21 LAB
ALBUMIN SERPL-MCNC: 4.5 G/DL (ref 3.5–5.2)
ALP BLD-CCNC: 63 U/L (ref 35–104)
ALT SERPL-CCNC: 21 U/L (ref 5–33)
ANION GAP SERPL CALCULATED.3IONS-SCNC: 9 MMOL/L (ref 7–19)
AST SERPL-CCNC: 22 U/L (ref 5–32)
BASOPHILS ABSOLUTE: 0.1 K/UL (ref 0–0.2)
BASOPHILS RELATIVE PERCENT: 0.9 % (ref 0–1)
BILIRUB SERPL-MCNC: 0.4 MG/DL (ref 0.2–1.2)
BUN BLDV-MCNC: 9 MG/DL (ref 8–23)
CALCIUM SERPL-MCNC: 9.8 MG/DL (ref 8.8–10.2)
CHLORIDE BLD-SCNC: 100 MMOL/L (ref 98–111)
CHOLESTEROL, TOTAL: 144 MG/DL (ref 160–199)
CO2: 30 MMOL/L (ref 22–29)
CREAT SERPL-MCNC: 0.6 MG/DL (ref 0.5–0.9)
EOSINOPHILS ABSOLUTE: 0.1 K/UL (ref 0–0.6)
EOSINOPHILS RELATIVE PERCENT: 1.5 % (ref 0–5)
GFR AFRICAN AMERICAN: >59
GFR NON-AFRICAN AMERICAN: >60
GLUCOSE BLD-MCNC: 105 MG/DL (ref 74–109)
HBA1C MFR BLD: 6 % (ref 4–6)
HCT VFR BLD CALC: 42.9 % (ref 37–47)
HDLC SERPL-MCNC: 48 MG/DL (ref 65–121)
HEMOGLOBIN: 13.8 G/DL (ref 12–16)
IMMATURE GRANULOCYTES #: 0 K/UL
LDL CHOLESTEROL CALCULATED: 69 MG/DL
LYMPHOCYTES ABSOLUTE: 2.4 K/UL (ref 1.1–4.5)
LYMPHOCYTES RELATIVE PERCENT: 35.4 % (ref 20–40)
MCH RBC QN AUTO: 28.3 PG (ref 27–31)
MCHC RBC AUTO-ENTMCNC: 32.2 G/DL (ref 33–37)
MCV RBC AUTO: 88.1 FL (ref 81–99)
MONOCYTES ABSOLUTE: 0.5 K/UL (ref 0–0.9)
MONOCYTES RELATIVE PERCENT: 6.8 % (ref 0–10)
NEUTROPHILS ABSOLUTE: 3.8 K/UL (ref 1.5–7.5)
NEUTROPHILS RELATIVE PERCENT: 55 % (ref 50–65)
PDW BLD-RTO: 13.6 % (ref 11.5–14.5)
PLATELET # BLD: 302 K/UL (ref 130–400)
PMV BLD AUTO: 9.8 FL (ref 9.4–12.3)
POTASSIUM SERPL-SCNC: 4 MMOL/L (ref 3.5–5)
RBC # BLD: 4.87 M/UL (ref 4.2–5.4)
SODIUM BLD-SCNC: 139 MMOL/L (ref 136–145)
TOTAL PROTEIN: 7.3 G/DL (ref 6.6–8.7)
TRIGL SERPL-MCNC: 135 MG/DL (ref 0–149)
VITAMIN D 25-HYDROXY: 47.2 NG/ML
WBC # BLD: 6.8 K/UL (ref 4.8–10.8)

## 2022-07-27 RX ORDER — HYDROCHLOROTHIAZIDE 25 MG/1
TABLET ORAL
Qty: 90 TABLET | Refills: 1 | Status: SHIPPED | OUTPATIENT
Start: 2022-07-27

## 2022-07-29 ENCOUNTER — OFFICE VISIT (OUTPATIENT)
Dept: FAMILY MEDICINE CLINIC | Age: 87
End: 2022-07-29
Payer: MEDICARE

## 2022-07-29 VITALS
SYSTOLIC BLOOD PRESSURE: 128 MMHG | OXYGEN SATURATION: 98 % | DIASTOLIC BLOOD PRESSURE: 78 MMHG | BODY MASS INDEX: 28.35 KG/M2 | HEART RATE: 69 BPM | WEIGHT: 160 LBS | HEIGHT: 63 IN

## 2022-07-29 DIAGNOSIS — N39.0 RECURRENT UTI: ICD-10-CM

## 2022-07-29 DIAGNOSIS — Z51.81 THERAPEUTIC DRUG MONITORING: ICD-10-CM

## 2022-07-29 DIAGNOSIS — I35.0 AORTIC VALVE STENOSIS, ETIOLOGY OF CARDIAC VALVE DISEASE UNSPECIFIED: ICD-10-CM

## 2022-07-29 DIAGNOSIS — I10 ESSENTIAL HYPERTENSION: ICD-10-CM

## 2022-07-29 DIAGNOSIS — I25.10 CORONARY ARTERY DISEASE INVOLVING NATIVE CORONARY ARTERY OF NATIVE HEART WITHOUT ANGINA PECTORIS: ICD-10-CM

## 2022-07-29 DIAGNOSIS — R73.03 PREDIABETES: ICD-10-CM

## 2022-07-29 DIAGNOSIS — M51.36 DDD (DEGENERATIVE DISC DISEASE), LUMBAR: ICD-10-CM

## 2022-07-29 DIAGNOSIS — E78.2 MIXED HYPERLIPIDEMIA: ICD-10-CM

## 2022-07-29 DIAGNOSIS — M48.062 SPINAL STENOSIS OF LUMBAR REGION WITH NEUROGENIC CLAUDICATION: ICD-10-CM

## 2022-07-29 DIAGNOSIS — H35.30 MACULAR DEGENERATION OF BOTH EYES, UNSPECIFIED TYPE: ICD-10-CM

## 2022-07-29 DIAGNOSIS — M81.0 AGE-RELATED OSTEOPOROSIS WITHOUT CURRENT PATHOLOGICAL FRACTURE: ICD-10-CM

## 2022-07-29 DIAGNOSIS — K43.9 VENTRAL HERNIA WITHOUT OBSTRUCTION OR GANGRENE: ICD-10-CM

## 2022-07-29 PROCEDURE — 1123F ACP DISCUSS/DSCN MKR DOCD: CPT | Performed by: NURSE PRACTITIONER

## 2022-07-29 PROCEDURE — 1036F TOBACCO NON-USER: CPT | Performed by: NURSE PRACTITIONER

## 2022-07-29 PROCEDURE — 80305 DRUG TEST PRSMV DIR OPT OBS: CPT | Performed by: NURSE PRACTITIONER

## 2022-07-29 PROCEDURE — 99214 OFFICE O/P EST MOD 30 MIN: CPT | Performed by: NURSE PRACTITIONER

## 2022-07-29 PROCEDURE — 1090F PRES/ABSN URINE INCON ASSESS: CPT | Performed by: NURSE PRACTITIONER

## 2022-07-29 PROCEDURE — G8417 CALC BMI ABV UP PARAM F/U: HCPCS | Performed by: NURSE PRACTITIONER

## 2022-07-29 PROCEDURE — 81001 URINALYSIS AUTO W/SCOPE: CPT | Performed by: NURSE PRACTITIONER

## 2022-07-29 PROCEDURE — G8427 DOCREV CUR MEDS BY ELIG CLIN: HCPCS | Performed by: NURSE PRACTITIONER

## 2022-07-29 PROCEDURE — 96372 THER/PROPH/DIAG INJ SC/IM: CPT | Performed by: NURSE PRACTITIONER

## 2022-07-29 ASSESSMENT — ENCOUNTER SYMPTOMS
SHORTNESS OF BREATH: 0
BACK PAIN: 1
DIARRHEA: 0
COUGH: 0
WHEEZING: 0
CHEST TIGHTNESS: 0
ABDOMINAL PAIN: 0
SORE THROAT: 0
NAUSEA: 0

## 2022-07-29 NOTE — PROGRESS NOTES
El Vasquez is a 81 y.o. female who presents today for  Chief Complaint   Patient presents with    Follow-up    Hernia       HPI:  She was seen 2 months ago by cardiology for continued follow-up of CAD, AS. No recent chest pain or worsening shortness of breath. Echocardiogram showed minimal progression of AS. A follow-up echo scheduled for October. Remote hx of stent x 2. She has macular degeneration, bilateral.  She is followed by ophthalmology in [place] but would like referral to the new retinal specialist     Chronic lower back pain, spinal stenosis with neurogenic claudication, stable. She tolerates exercise with Silver sneakers. Pain is only exacerbated by prolonged walking or standing. She is taking gabapentin 100 mg twice daily and tolerates well. She has taken this for years prior to establishing with me. She feels dose is effective but she would like to try weaning off to minimize medications. She has a ventral hernia, chronic, worsening. She feels it is getting bigger. She has had occasional discomfort. It is reducible. She is interested in surgical repair. Hypertension  Compliant with medications. No adverse effects from medication. No lightheadedness, palpitations, or chest pain. Hyperlipidemia  Tolerating current cholesterol medication without side effects. . Attempting to reduce processed sugar and cholesterol from diet. Osteoporosis, stable on Prolia. Prolia initiated in 10/2020 per previous PCP prior to establishing with me. Medical records reviewed, PCP notes documented DEXA in 8/2020 showing osteoporosis but we do not have an actual DEXA report despite requesting this multiple times. Last injection - January. Due today. Remains on calcium six 1 mg daily, vitamin D 1000u daily. Labs reviewed with pt. Lipids at goal, LDL 69. . A1C 6.0.   Ca and vit D normal.  Lab Results   Component Value Date    WBC 6.8 07/21/2022    HGB 13.8 07/21/2022    HCT 42.9 07/21/2022    MCV 88.1 07/21/2022     07/21/2022     Lab Results   Component Value Date     07/21/2022    K 4.0 07/21/2022     07/21/2022    CO2 30 (H) 07/21/2022    BUN 9 07/21/2022    CREATININE 0.6 07/21/2022    GLUCOSE 105 07/21/2022    CALCIUM 9.8 07/21/2022    PROT 7.3 07/21/2022    LABALBU 4.5 07/21/2022    BILITOT 0.4 07/21/2022    ALKPHOS 63 07/21/2022    AST 22 07/21/2022    ALT 21 07/21/2022    LABGLOM >60 07/21/2022    GFRAA >59 07/21/2022       Lab Results   Component Value Date    CHOL 144 (L) 07/21/2022    CHOL 151 (L) 03/10/2022    CHOL 149 (L) 09/27/2021     Lab Results   Component Value Date    TRIG 135 07/21/2022    TRIG 102 03/10/2022    TRIG 125 09/27/2021     Lab Results   Component Value Date    HDL 48 (L) 07/21/2022    HDL 58 (L) 03/10/2022    HDL 54 (L) 09/27/2021     Lab Results   Component Value Date    LDLCALC 69 07/21/2022    LDLCALC 73 03/10/2022    LDLCALC 70 09/27/2021     No results found for: LABVLDL, VLDL  No results found for: [facility]  Lab Results   Component Value Date    LABA1C 6.0 07/21/2022     No results found for: EAG  Lab Results   Component Value Date    VITD25 47.2 07/21/2022       Review of Systems   Constitutional:  Negative for chills and fever. HENT:  Negative for congestion, ear pain and sore throat. Respiratory:  Negative for cough, chest tightness, shortness of breath and wheezing. Cardiovascular:  Negative for chest pain. Gastrointestinal:  Negative for abdominal pain, diarrhea and nausea. Hernia   Genitourinary:  Positive for dysuria. Musculoskeletal:  Positive for back pain (chronic, stable). Negative for arthralgias and myalgias. Skin:  Negative for rash. Neurological:  Negative for dizziness and light-headedness.      Past Medical History:   Diagnosis Date    Age-related osteoporosis without current pathological fracture     Basal cell carcinoma (BCC) of scalp     skin    Coronary artery disease involving native coronary artery of native heart without angina pectoris     stents done in [place]; due to see [facility] cardiology    DDD (degenerative disc disease), lumbar     Essential hypertension     Knee pain     Mixed hyperlipidemia        Current Outpatient Medications   Medication Sig Dispense Refill    hydroCHLOROthiazide (HYDRODIURIL) 25 MG tablet TAKE 1 TABLET EVERY DAY 90 tablet 1    gabapentin (NEURONTIN) 100 MG capsule Take 1 capsule by mouth 2 times daily for 30 days. (may fill 2/15/22) 60 capsule 2    atorvastatin (LIPITOR) 20 MG tablet TAKE 1 TABLET EVERY DAY 90 tablet 1    potassium chloride (KLOR-CON) 10 MEQ extended release tablet TAKE 1 TABLET EVERY DAY 90 tablet 3    denosumab (PROLIA) 60 MG/ML SOSY SC injection Inject 1 mL into the skin every 6 months 1 mL 3    amLODIPine (NORVASC) 10 MG tablet Take 1 tablet by mouth daily 90 tablet 3    isosorbide mononitrate (IMDUR) 30 MG extended release tablet Take 1 tablet by mouth daily 90 tablet 3    aspirin EC 81 MG EC tablet Take 1 tablet by mouth 2 times daily 60 tablet 0    vitamin D (CHOLECALCIFEROL) 25 MCG (1000 UT) TABS tablet Take 1,000 Units by mouth daily      calcium carbonate 600 MG TABS tablet Take 1 tablet by mouth daily      Multiple Vitamins-Minerals (OCUVITE PO) Take 1 tablet by mouth 2 times daily       nitroGLYCERIN (NITROSTAT) 0.4 MG SL tablet Place 1 tablet under the tongue every 5 minutes as needed for Chest pain up to max of 3 total doses. If no relief after 1 dose, call 911. 25 tablet 3     No current facility-administered medications for this visit.        No Known Allergies    Past Surgical History:   Procedure Laterality Date    CARDIAC CATHETERIZATION      stents 2019, 2020    EYE SURGERY      h/o eye bleed    FOOT SURGERY      deep calluses    HYSTERECTOMY (CERVIX STATUS UNKNOWN)      JOINT REPLACEMENT      TOTAL HIP ARTHROPLASTY Left 2019    TOTAL KNEE ARTHROPLASTY Right 10/14/2021    RIGHT KNEE TOTAL ARTHROPLASTY performed by Johnnie Da Silva Warden Galileo MD at [facility] 70         Social History     Tobacco Use    Smoking status: Former     Types: Cigarettes     Quit date:      Years since quittin.5    Smokeless tobacco: Never   Vaping Use    Vaping Use: Never used   Substance Use Topics    Alcohol use: Never    Drug use: Never       Family History   Problem Relation Age of Onset    Heart Disease Mother        /78 (Site: Left Upper Arm, Position: Sitting, Cuff Size: Large Adult)   Pulse 69   Ht 5' 3\" (1.6 m)   Wt 160 lb (72.6 kg)   SpO2 98%   BMI 28.34 kg/m²     Physical Exam  Vitals reviewed. Constitutional:       General: She is not in acute distress. Appearance: Normal appearance. She is well-developed. HENT:      Head: Normocephalic. Eyes:      Conjunctiva/sclera: Conjunctivae normal.      Pupils: Pupils are equal, round, and reactive to light. Neck:      Thyroid: No thyromegaly. Vascular: No carotid bruit or JVD. Trachea: No tracheal deviation. Cardiovascular:      Rate and Rhythm: Normal rate and regular rhythm. Heart sounds: Normal heart sounds. No murmur heard. Pulmonary:      Effort: Pulmonary effort is normal. No respiratory distress. Breath sounds: Normal breath sounds. No wheezing or rhonchi. Abdominal:      General: Abdomen is flat. Bowel sounds are normal. There is no distension. Palpations: Abdomen is soft. There is no mass. Tenderness: There is no abdominal tenderness. There is no guarding or rebound. Hernia: A hernia is present. Comments: Ventral hernia, reducible, mild discomfort. Musculoskeletal:         General: Normal range of motion. Cervical back: Normal range of motion and neck supple. Lymphadenopathy:      Cervical: No cervical adenopathy. Skin:     General: Skin is warm and dry. Findings: No rash. Neurological:      Mental Status: She is alert. ASSESSMENT/PLAN:  1.  Coronary artery disease involving native coronary artery of native heart without angina pectoris  -Stable, continue atorvastatin, ASA at current doses. Continue management per cardiology. 2. Aortic valve stenosis, etiology of cardiac valve disease unspecified  -Stable, asymptomatic. Cardiology following. 3. Mixed hyperlipidemia  -Stable, controlled. Continue atorvastatin at current dose. -Fasting CMP, lipid in 4 months. - Comprehensive Metabolic Panel; Future  - Lipid Panel; Future    4. Essential hypertension  -Stable, controlled. Continue current medication. 5. Prediabetes  -Stable, continue to work on exercise, dietary changes, limit sugars. -A1c in 4 months.  - Hemoglobin A1C; Future    6. DDD (degenerative disc disease), lumbar  -Chronic, stable. She would like to try to wean off gabapentin. Discussed decreasing to 100 mg daily x7 days then every other day x7 days then stop. If any worsening pain, she can resume taking. We did update UDS, controlled Rx contract, Lajoyce Pickup today if she continues to take. 7. Spinal stenosis of lumbar region with neurogenic claudication  -As above    8. Age-related osteoporosis without current pathological fracture  -Prolia due today  -Vitamin D, CMP in 4 months. -Due for DEXA soon, discuss at next visit. - Vitamin D 25 Hydroxy; Future  - denosumab (PROLIA) SC injection 60 mg    9. Ventral hernia without obstruction or gangrene  -Refer to general surgery for evaluation, recommendations. We did discuss due to age and underlying medical issues, she may not be a surgical candidate. Overall she is in pretty good health for her age and medical conditions. She did have TKA a last year with no complications, tolerated surgery well. - [facility] [address], Robert Dang, , General Surgery, [place]    10. Recurrent UTI  -She has had some dysuria, check UA, urine culture  - POCT Urinalysis Dipstick w/ Micro (Auto)  - Culture, Urine; Future    11.  Macular degeneration of both eyes, unspecified type  -Refer to retinal specialist for continued care. - External Referral To Ophthalmology    12. Therapeutic drug monitoring    - POCT Rapid Drug Screen       Return in about 4 months (around 11/29/2022) for 30 minute visit, medicare annual wellness. Sarah Coppola was seen today for follow-up and hernia. Diagnoses and all orders for this visit:    Coronary artery disease involving native coronary artery of native heart without angina pectoris    Aortic valve stenosis, etiology of cardiac valve disease unspecified    Mixed hyperlipidemia  -     Comprehensive Metabolic Panel; Future  -     Lipid Panel; Future    Essential hypertension    Prediabetes  -     Hemoglobin A1C; Future    DDD (degenerative disc disease), lumbar    Spinal stenosis of lumbar region with neurogenic claudication    Age-related osteoporosis without current pathological fracture  -     Vitamin D 25 Hydroxy; Future  -     denosumab (PROLIA) SC injection 60 mg    Ventral hernia without obstruction or gangrene  -     [facility] - Aspen Santos DO, General Surgery, [place]    Recurrent UTI  -     POCT Urinalysis Dipstick w/ Micro (Auto)  -     Culture, Urine; Future    Macular degeneration of both eyes, unspecified type  -     External Referral To Ophthalmology    Therapeutic drug monitoring  -     POCT Rapid Drug Screen    There are no discontinued medications. There are no Patient Instructions on file for this visit. Patient voicesunderstanding and agrees to plans along with risks and benefits of plan. Counseling:  Mis Jimenezdeborah Ivan's case, medications and options were discussed in detail. Patient was instructed to call the office if she questionsregarding her treatment. Should her conditions worsen, she should return to office to be reassessed by BRAYDON Lr. she Should to go the closest Emergency Department for any emergency. They verbalizedunderstanding the above instructions.    Return in about 4 months (around 11/29/2022) for 30 minute visit,

## 2022-08-01 ENCOUNTER — TELEPHONE (OUTPATIENT)
Dept: SURGERY | Age: 87
End: 2022-08-01

## 2022-08-01 LAB — URINE CULTURE, ROUTINE: NORMAL

## 2022-08-01 NOTE — TELEPHONE ENCOUNTER
Gricelda Alvarez requests a call back please, she has been advised that her Insurance is not accepted by the office and will not cover her visit. Thank you.

## 2022-08-02 NOTE — TELEPHONE ENCOUNTER
8/2/2022 Called and spoke with patient. She is going to check with her insurance.   I explained if she comes to visit and they don't cover that we can do self pay and explained the cost.      elier

## 2022-08-04 ENCOUNTER — OFFICE VISIT (OUTPATIENT)
Dept: SURGERY | Age: 87
End: 2022-08-04
Payer: MEDICARE

## 2022-08-04 VITALS
DIASTOLIC BLOOD PRESSURE: 74 MMHG | BODY MASS INDEX: 28.31 KG/M2 | SYSTOLIC BLOOD PRESSURE: 132 MMHG | TEMPERATURE: 98.6 F | WEIGHT: 159.8 LBS | HEIGHT: 63 IN

## 2022-08-04 DIAGNOSIS — K43.9 VENTRAL HERNIA WITHOUT OBSTRUCTION OR GANGRENE: Primary | ICD-10-CM

## 2022-08-04 PROCEDURE — 1036F TOBACCO NON-USER: CPT | Performed by: SURGERY

## 2022-08-04 PROCEDURE — G8427 DOCREV CUR MEDS BY ELIG CLIN: HCPCS | Performed by: SURGERY

## 2022-08-04 PROCEDURE — 1090F PRES/ABSN URINE INCON ASSESS: CPT | Performed by: SURGERY

## 2022-08-04 PROCEDURE — G8417 CALC BMI ABV UP PARAM F/U: HCPCS | Performed by: SURGERY

## 2022-08-04 PROCEDURE — 99204 OFFICE O/P NEW MOD 45 MIN: CPT | Performed by: SURGERY

## 2022-08-04 PROCEDURE — 1123F ACP DISCUSS/DSCN MKR DOCD: CPT | Performed by: SURGERY

## 2022-08-04 RX ORDER — SODIUM CHLORIDE 0.9 % (FLUSH) 0.9 %
5-40 SYRINGE (ML) INJECTION PRN
Status: CANCELLED | OUTPATIENT
Start: 2022-08-04

## 2022-08-04 RX ORDER — SODIUM CHLORIDE, SODIUM LACTATE, POTASSIUM CHLORIDE, CALCIUM CHLORIDE 600; 310; 30; 20 MG/100ML; MG/100ML; MG/100ML; MG/100ML
INJECTION, SOLUTION INTRAVENOUS CONTINUOUS
Status: CANCELLED | OUTPATIENT
Start: 2022-08-04

## 2022-08-04 RX ORDER — CELECOXIB 100 MG/1
100 CAPSULE ORAL ONCE
Status: CANCELLED | OUTPATIENT
Start: 2022-08-04 | End: 2022-08-04

## 2022-08-04 RX ORDER — SODIUM CHLORIDE 9 MG/ML
INJECTION, SOLUTION INTRAVENOUS PRN
Status: CANCELLED | OUTPATIENT
Start: 2022-08-04

## 2022-08-04 RX ORDER — ACETAMINOPHEN 325 MG/1
1000 TABLET ORAL ONCE
Status: CANCELLED | OUTPATIENT
Start: 2022-08-04 | End: 2022-08-04

## 2022-08-04 RX ORDER — SODIUM CHLORIDE 0.9 % (FLUSH) 0.9 %
5-40 SYRINGE (ML) INJECTION EVERY 12 HOURS SCHEDULED
Status: CANCELLED | OUTPATIENT
Start: 2022-08-04

## 2022-08-04 ASSESSMENT — ENCOUNTER SYMPTOMS
CONSTIPATION: 0
SORE THROAT: 0
EYE PAIN: 0
SHORTNESS OF BREATH: 0
COLOR CHANGE: 0
COUGH: 0
DIARRHEA: 0
ABDOMINAL DISTENTION: 0
VOMITING: 0
NAUSEA: 0
ABDOMINAL PAIN: 0
EYE REDNESS: 0
BACK PAIN: 1
CHEST TIGHTNESS: 0

## 2022-08-04 NOTE — LETTER
SCHEDULING INSTRUCTIONS:     Patient: Azael Le  : 1935    Hospital: Hospital    Admitting Physician:  Dr. Linda Yuan    Diagnosis: Ventral Hernia    Procedure: Robotic assisted Laparoscopic Ventral Hernia Repair with Mesh    ALLOW ADDITIONAL 30 MINS FOR TURNOVER EXCEPT FOR PHYSICIAN'S BOUNCE DAY    Anesthesia: GEN    Admission:Outpatient     Date: 2022                 NOT TO BE USED OUTSIDE OF THE OFFICE

## 2022-08-04 NOTE — PROGRESS NOTES
SUBJECTIVE:  Ms. Bethany Mcmillan is a 81 y.o. female who presents today with a ventral hernia that has been present for several years. She states it is getting bigger and she does not like the way it stuck out from her clothes. She states it makes her self conscious. She doesn't have much pain there but it was sore once a while back. Denies obstructive symptoms. Past Medical History:   Diagnosis Date    Age-related osteoporosis without current pathological fracture     Basal cell carcinoma (BCC) of scalp     skin    Coronary artery disease involving native coronary artery of native heart without angina pectoris     stents done in [place]; due to see [facility] cardiology    DDD (degenerative disc disease), lumbar     Essential hypertension     Knee pain     Mixed hyperlipidemia      Past Surgical History:   Procedure Laterality Date    CARDIAC CATHETERIZATION      stents 2019, 2020    EYE SURGERY      h/o eye bleed    FOOT SURGERY      deep calluses    HYSTERECTOMY (CERVIX STATUS UNKNOWN)      total-abdominal    TONSILLECTOMY      TOTAL HIP ARTHROPLASTY Left 2019    TOTAL KNEE ARTHROPLASTY Right 10/14/2021    RIGHT KNEE TOTAL ARTHROPLASTY performed by Adelia Siemens, MD at [facility]       Current Outpatient Medications   Medication Sig Dispense Refill    hydroCHLOROthiazide (HYDRODIURIL) 25 MG tablet TAKE 1 TABLET EVERY DAY 90 tablet 1    gabapentin (NEURONTIN) 100 MG capsule Take 1 capsule by mouth 2 times daily for 30 days.  (may fill 2/15/22) 60 capsule 2    atorvastatin (LIPITOR) 20 MG tablet TAKE 1 TABLET EVERY DAY 90 tablet 1    potassium chloride (KLOR-CON) 10 MEQ extended release tablet TAKE 1 TABLET EVERY DAY 90 tablet 3    denosumab (PROLIA) 60 MG/ML SOSY SC injection Inject 1 mL into the skin every 6 months 1 mL 3    amLODIPine (NORVASC) 10 MG tablet Take 1 tablet by mouth daily 90 tablet 3    isosorbide mononitrate (IMDUR) 30 MG extended release tablet Take 1 tablet by mouth daily 90 tablet 3    aspirin EC 81 MG EC tablet Take 1 tablet by mouth 2 times daily 60 tablet 0    vitamin D (CHOLECALCIFEROL) 25 MCG (1000 UT) TABS tablet Take 1,000 Units by mouth daily      calcium carbonate 600 MG TABS tablet Take 1 tablet by mouth daily      Multiple Vitamins-Minerals (OCUVITE PO) Take 1 tablet by mouth 2 times daily       nitroGLYCERIN (NITROSTAT) 0.4 MG SL tablet Place 1 tablet under the tongue every 5 minutes as needed for Chest pain up to max of 3 total doses. If no relief after 1 dose, call 911. 25 tablet 3     No current facility-administered medications for this visit. Allergies: Patient has no known allergies. Family History   Problem Relation Age of Onset    Heart Disease Mother     Cancer Sister         lung    Heart Disease Sister     Heart Attack Brother        Social History     Tobacco Use    Smoking status: Former     Types: Cigarettes     Quit date:      Years since quittin.5    Smokeless tobacco: Never   Substance Use Topics    Alcohol use: Never       Review of Systems   Constitutional:  Negative for fatigue, fever and unexpected weight change. HENT:  Negative for hearing loss, nosebleeds and sore throat. Eyes:  Negative for pain, redness and visual disturbance. Respiratory:  Negative for cough, chest tightness and shortness of breath. Cardiovascular:  Negative for chest pain, palpitations and leg swelling. Gastrointestinal:  Negative for abdominal distention, abdominal pain, constipation, diarrhea, nausea and vomiting. Endocrine: Negative for cold intolerance, heat intolerance and polydipsia. Genitourinary:  Negative for difficulty urinating, frequency and urgency. Musculoskeletal:  Positive for back pain. Negative for joint swelling and neck pain. Skin:  Negative for color change, rash and wound. Allergic/Immunologic: Negative for environmental allergies and food allergies. Neurological:  Negative for seizures, light-headedness and headaches. Hematological:  Negative for adenopathy. Does not bruise/bleed easily. Psychiatric/Behavioral:  Negative for confusion, sleep disturbance and suicidal ideas. OBJECTIVE:  /74 (Site: Right Upper Arm, Position: Sitting, Cuff Size: Medium Adult)   Temp 98.6 °F (37 °C) (Temporal)   Ht 5' 3\" (1.6 m)   Wt 159 lb 12.8 oz (72.5 kg)   BMI 28.31 kg/m²   Physical Exam  Vitals reviewed. Constitutional:       Appearance: She is well-developed. HENT:      Head: Normocephalic and atraumatic. Eyes:      Pupils: Pupils are equal, round, and reactive to light. Cardiovascular:      Rate and Rhythm: Normal rate and regular rhythm. Heart sounds: Normal heart sounds. Pulmonary:      Effort: Pulmonary effort is normal.      Breath sounds: Normal breath sounds. No wheezing or rales. Abdominal:      General: Bowel sounds are normal. There is no distension. Palpations: Abdomen is soft. Tenderness: There is no abdominal tenderness. There is no guarding or rebound. Hernia: A hernia is present. Hernia is present in the ventral area. Comments: Reducible ventral hernia, ~ 3 cm defect. Musculoskeletal:         General: Normal range of motion. Cervical back: Normal range of motion. Lymphadenopathy:      Cervical: No cervical adenopathy. Skin:     General: Skin is warm and dry. Neurological:      Mental Status: She is alert and oriented to person, place, and time. Deep Tendon Reflexes: Reflexes are normal and symmetric. Psychiatric:         Behavior: Behavior normal.         Thought Content: Thought content normal.         Judgment: Judgment normal.       ASSESSMENT:   Diagnosis Orders   1. Ventral hernia without obstruction or gangrene            PLAN:  No orders of the defined types were placed in this encounter. No orders of the defined types were placed in this encounter. The risks, benefits, and alternatives were discussed with the pt.  She is willing to accept the risks and proceed with a robotic assisted ventral hernia repair with mesh. The surgical risks included but not limited to bleeding, infection, perforation, recurrence, risk of needing further surgery, etc. The anesthetic risks included heart attacks, strokes, pneumonia, phlebitis, etc.  She is willing to accept all risks and proceed with surgery. No guarantees have been given. Return for Post-operative Visit.     DO Leobardo 5/5/1110 9:38 AM

## 2022-08-04 NOTE — H&P (VIEW-ONLY)
SUBJECTIVE:  Ms. Candace Holman is a 81 y.o. female who presents today with a ventral hernia that has been present for several years. She states it is getting bigger and she does not like the way it stuck out from her clothes. She states it makes her self conscious. She doesn't have much pain there but it was sore once a while back. Denies obstructive symptoms. Past Medical History:   Diagnosis Date    Age-related osteoporosis without current pathological fracture     Basal cell carcinoma (BCC) of scalp     skin    Coronary artery disease involving native coronary artery of native heart without angina pectoris     stents done in [place]; due to see [facility] cardiology    DDD (degenerative disc disease), lumbar     Essential hypertension     Knee pain     Mixed hyperlipidemia      Past Surgical History:   Procedure Laterality Date    CARDIAC CATHETERIZATION      stents 2019, 2020    EYE SURGERY      h/o eye bleed    FOOT SURGERY      deep calluses    HYSTERECTOMY (CERVIX STATUS UNKNOWN)      total-abdominal    TONSILLECTOMY      TOTAL HIP ARTHROPLASTY Left 2019    TOTAL KNEE ARTHROPLASTY Right 10/14/2021    RIGHT KNEE TOTAL ARTHROPLASTY performed by Derek Sherwood MD at [facility]       Current Outpatient Medications   Medication Sig Dispense Refill    hydroCHLOROthiazide (HYDRODIURIL) 25 MG tablet TAKE 1 TABLET EVERY DAY 90 tablet 1    gabapentin (NEURONTIN) 100 MG capsule Take 1 capsule by mouth 2 times daily for 30 days.  (may fill 2/15/22) 60 capsule 2    atorvastatin (LIPITOR) 20 MG tablet TAKE 1 TABLET EVERY DAY 90 tablet 1    potassium chloride (KLOR-CON) 10 MEQ extended release tablet TAKE 1 TABLET EVERY DAY 90 tablet 3    denosumab (PROLIA) 60 MG/ML SOSY SC injection Inject 1 mL into the skin every 6 months 1 mL 3    amLODIPine (NORVASC) 10 MG tablet Take 1 tablet by mouth daily 90 tablet 3    isosorbide mononitrate (IMDUR) 30 MG extended release tablet Take 1 tablet by mouth daily 90 tablet 3    aspirin EC 81 MG EC tablet Take 1 tablet by mouth 2 times daily 60 tablet 0    vitamin D (CHOLECALCIFEROL) 25 MCG (1000 UT) TABS tablet Take 1,000 Units by mouth daily      calcium carbonate 600 MG TABS tablet Take 1 tablet by mouth daily      Multiple Vitamins-Minerals (OCUVITE PO) Take 1 tablet by mouth 2 times daily       nitroGLYCERIN (NITROSTAT) 0.4 MG SL tablet Place 1 tablet under the tongue every 5 minutes as needed for Chest pain up to max of 3 total doses. If no relief after 1 dose, call 911. 25 tablet 3     No current facility-administered medications for this visit. Allergies: Patient has no known allergies. Family History   Problem Relation Age of Onset    Heart Disease Mother     Cancer Sister         lung    Heart Disease Sister     Heart Attack Brother        Social History     Tobacco Use    Smoking status: Former     Types: Cigarettes     Quit date:      Years since quittin.5    Smokeless tobacco: Never   Substance Use Topics    Alcohol use: Never       Review of Systems   Constitutional:  Negative for fatigue, fever and unexpected weight change. HENT:  Negative for hearing loss, nosebleeds and sore throat. Eyes:  Negative for pain, redness and visual disturbance. Respiratory:  Negative for cough, chest tightness and shortness of breath. Cardiovascular:  Negative for chest pain, palpitations and leg swelling. Gastrointestinal:  Negative for abdominal distention, abdominal pain, constipation, diarrhea, nausea and vomiting. Endocrine: Negative for cold intolerance, heat intolerance and polydipsia. Genitourinary:  Negative for difficulty urinating, frequency and urgency. Musculoskeletal:  Positive for back pain. Negative for joint swelling and neck pain. Skin:  Negative for color change, rash and wound. Allergic/Immunologic: Negative for environmental allergies and food allergies. Neurological:  Negative for seizures, light-headedness and headaches. Hematological:  Negative for adenopathy. Does not bruise/bleed easily. Psychiatric/Behavioral:  Negative for confusion, sleep disturbance and suicidal ideas. OBJECTIVE:  /74 (Site: Right Upper Arm, Position: Sitting, Cuff Size: Medium Adult)   Temp 98.6 °F (37 °C) (Temporal)   Ht 5' 3\" (1.6 m)   Wt 159 lb 12.8 oz (72.5 kg)   BMI 28.31 kg/m²   Physical Exam  Vitals reviewed. Constitutional:       Appearance: She is well-developed. HENT:      Head: Normocephalic and atraumatic. Eyes:      Pupils: Pupils are equal, round, and reactive to light. Cardiovascular:      Rate and Rhythm: Normal rate and regular rhythm. Heart sounds: Normal heart sounds. Pulmonary:      Effort: Pulmonary effort is normal.      Breath sounds: Normal breath sounds. No wheezing or rales. Abdominal:      General: Bowel sounds are normal. There is no distension. Palpations: Abdomen is soft. Tenderness: There is no abdominal tenderness. There is no guarding or rebound. Hernia: A hernia is present. Hernia is present in the ventral area. Comments: Reducible ventral hernia, ~ 3 cm defect. Musculoskeletal:         General: Normal range of motion. Cervical back: Normal range of motion. Lymphadenopathy:      Cervical: No cervical adenopathy. Skin:     General: Skin is warm and dry. Neurological:      Mental Status: She is alert and oriented to person, place, and time. Deep Tendon Reflexes: Reflexes are normal and symmetric. Psychiatric:         Behavior: Behavior normal.         Thought Content: Thought content normal.         Judgment: Judgment normal.       ASSESSMENT:   Diagnosis Orders   1. Ventral hernia without obstruction or gangrene            PLAN:  No orders of the defined types were placed in this encounter. No orders of the defined types were placed in this encounter. The risks, benefits, and alternatives were discussed with the pt.  She is willing to accept the risks and proceed with a robotic assisted ventral hernia repair with mesh. The surgical risks included but not limited to bleeding, infection, perforation, recurrence, risk of needing further surgery, etc. The anesthetic risks included heart attacks, strokes, pneumonia, phlebitis, etc.  She is willing to accept all risks and proceed with surgery. No guarantees have been given. Return for Post-operative Visit.     Lit Goldberg DO 9/4/5858 9:38 AM

## 2022-08-09 ENCOUNTER — HOSPITAL ENCOUNTER (OUTPATIENT)
Dept: PREADMISSION TESTING | Age: 87
Discharge: HOME | End: 2022-08-13
Payer: MEDICARE

## 2022-08-09 VITALS — HEIGHT: 63 IN | WEIGHT: 158 LBS | BODY MASS INDEX: 28 KG/M2

## 2022-08-09 LAB
EKG P AXIS: 39 DEGREES
EKG P-R INTERVAL: 202 MS
EKG Q-T INTERVAL: 438 MS
EKG QRS DURATION: 154 MS
EKG QTC CALCULATION (BAZETT): 438 MS
EKG T AXIS: 2 DEGREES
MRSA SCREEN RT-PCR: NOT DETECTED

## 2022-08-09 PROCEDURE — 93010 ELECTROCARDIOGRAM REPORT: CPT | Performed by: INTERNAL MEDICINE

## 2022-08-09 PROCEDURE — 87641 MR-STAPH DNA AMP PROBE: CPT

## 2022-08-09 PROCEDURE — 93005 ELECTROCARDIOGRAM TRACING: CPT | Performed by: ANESTHESIOLOGY

## 2022-08-09 NOTE — DISCHARGE INSTRUCTIONS
PREOPERATIVE GUIDELINES WHEN RECEIVING ANESTHESIA    Do not eat or drink anything after midnight, the night before your surgery. This is extremely important for your safety. Take a bath (or shower) the night before your surgery and you may brush your teeth the morning of your surgery. You will be scheduled to arrive at the hospital 2 hours before your surgery, or follow your surgeon's instructions. Dress comfortably. Wear loose clothing that will be easy to remove and comfortable for your trip home. You may wear eyeglasses or contacts but bring your cases with you as they must be remove before your surgery. Hearing aids and dentures will need to be removed before your surgery. Do not wear any jewelry, including body jewelry. All jewelry will need to be removed prior to your surgery. Do not wear fingernail polish or make-up. It is best not to bring any valuables with you. If you are to stay in the hospital overnight, bring your robe, slippers and personal toiletries that you may need. POSTOPERATIVE GUIDELINES AFTER RECEIVING ANESTHESIA    If you are to go home after your surgery, you will need a responsible adult to drive you home. You will not be able to take public transportation after your discharge from the Operative Care Unit unless you are accompanied by a        responsible adult. On returning home, be sure to follow your physician's orders regarding diet, activity and medications. Remember, surgery with general anesthesia or sedation may leave you sleepy, very tired and with a decreased appetite for 12 to 24 hours. If you develop any post-surgical complications or problems, call your surgeon or [facility] ([phone]). The day before your surgery, you will receive a phone call from the surgery nurse, to let you know what time to arrive on the day of surgery.   This call will usually be between 2-4 PM.  If you do not receive a phone call by 4 PM the day before your surgery, please call [phone] and let them know you have not received an arrival time. If your surgery is on Monday, your call will be on the Friday before your Monday surgery. MEDICATION INSTRUCTIONS PRIOR TO YOUR SURGERY      The morning of surgery: You can take all your usual prescribed medications with a small sip of water. DO NOT TAKE ANY DIABETIC MEDICATIONS the morning of your surgery. DO NOT TAKE ANY SUPPLEMENTS or over the counter medications the morning of  surgery. CHLORHEXIDINE GLUCONATE 4% SHOWERING    Patient should shower with this soap a minimum of 2 consecutive showers (the night before surgery and the morning of surgery) washing from the neck down (avoiding contact with genitalia). DO NOT [address] YOUR HAIR OR FACE WITH THIS SOAP. When washing with this soap, apply enough to suds up the body thoroughly, turn the water away from your body and allow the soap suds to remain on the body for 2 full minutes, then rinse body completely. After using this soap on the body, please do not apply powders or lotions to your body. After the shower the night before surgery, please dry off with a new towel, sleep in new freshly laundered pj's, and change your bed linen before going to sleep.

## 2022-08-11 ENCOUNTER — ANESTHESIA EVENT (OUTPATIENT)
Dept: OPERATING ROOM | Age: 87
End: 2022-08-11
Payer: MEDICARE

## 2022-08-12 ENCOUNTER — HOSPITAL ENCOUNTER (OUTPATIENT)
Age: 87
Setting detail: OUTPATIENT SURGERY
Discharge: HOME | End: 2022-08-12
Attending: SURGERY | Admitting: SURGERY
Payer: MEDICARE

## 2022-08-12 ENCOUNTER — ANESTHESIA (OUTPATIENT)
Dept: OPERATING ROOM | Age: 87
End: 2022-08-12
Payer: MEDICARE

## 2022-08-12 VITALS
WEIGHT: 158 LBS | TEMPERATURE: 97.2 F | RESPIRATION RATE: 16 BRPM | SYSTOLIC BLOOD PRESSURE: 111 MMHG | HEART RATE: 70 BPM | HEIGHT: 63 IN | OXYGEN SATURATION: 92 % | DIASTOLIC BLOOD PRESSURE: 83 MMHG | BODY MASS INDEX: 28 KG/M2

## 2022-08-12 DIAGNOSIS — K43.9 VENTRAL HERNIA WITHOUT OBSTRUCTION OR GANGRENE: Primary | ICD-10-CM

## 2022-08-12 PROCEDURE — 6360000002 HC RX W HCPCS

## 2022-08-12 PROCEDURE — 2500000003 HC RX 250 WO HCPCS: Performed by: ANESTHESIOLOGY

## 2022-08-12 PROCEDURE — C1781 MESH (IMPLANTABLE): HCPCS | Performed by: SURGERY

## 2022-08-12 PROCEDURE — 7100000010 HC PHASE II RECOVERY - FIRST 15 MIN: Performed by: SURGERY

## 2022-08-12 PROCEDURE — 2709999900 HC NON-CHARGEABLE SUPPLY: Performed by: SURGERY

## 2022-08-12 PROCEDURE — 2500000003 HC RX 250 WO HCPCS

## 2022-08-12 PROCEDURE — 2580000003 HC RX 258: Performed by: SURGERY

## 2022-08-12 PROCEDURE — 7100000001 HC PACU RECOVERY - ADDTL 15 MIN: Performed by: SURGERY

## 2022-08-12 PROCEDURE — A4216 STERILE WATER/SALINE, 10 ML: HCPCS | Performed by: ANESTHESIOLOGY

## 2022-08-12 PROCEDURE — 3600000009 HC SURGERY ROBOT BASE: Performed by: SURGERY

## 2022-08-12 PROCEDURE — S2900 ROBOTIC SURGICAL SYSTEM: HCPCS | Performed by: SURGERY

## 2022-08-12 PROCEDURE — 7100000011 HC PHASE II RECOVERY - ADDTL 15 MIN: Performed by: SURGERY

## 2022-08-12 PROCEDURE — 2580000003 HC RX 258: Performed by: ANESTHESIOLOGY

## 2022-08-12 PROCEDURE — 49653 PR LAP, VENTRAL HERNIA REPAIR,INCARCERATED: CPT | Performed by: SURGERY

## 2022-08-12 PROCEDURE — 3700000000 HC ANESTHESIA ATTENDED CARE: Performed by: SURGERY

## 2022-08-12 PROCEDURE — C9290 INJ, BUPIVACAINE LIPOSOME: HCPCS | Performed by: SURGERY

## 2022-08-12 PROCEDURE — 6360000002 HC RX W HCPCS: Performed by: SURGERY

## 2022-08-12 PROCEDURE — 6360000002 HC RX W HCPCS: Performed by: ANESTHESIOLOGY

## 2022-08-12 PROCEDURE — 3700000001 HC ADD 15 MINUTES (ANESTHESIA): Performed by: SURGERY

## 2022-08-12 PROCEDURE — 7100000000 HC PACU RECOVERY - FIRST 15 MIN: Performed by: SURGERY

## 2022-08-12 PROCEDURE — 2500000003 HC RX 250 WO HCPCS: Performed by: SURGERY

## 2022-08-12 PROCEDURE — 3600000019 HC SURGERY ROBOT ADDTL 15MIN: Performed by: SURGERY

## 2022-08-12 PROCEDURE — 6370000000 HC RX 637 (ALT 250 FOR IP): Performed by: SURGERY

## 2022-08-12 DEVICE — MESH SURG DIA4.5IN CIR W/ ECHO 2 POS SYS VENTRALIGHT: Type: IMPLANTABLE DEVICE | Site: ABDOMEN | Status: FUNCTIONAL

## 2022-08-12 RX ORDER — SODIUM CHLORIDE 0.9 % (FLUSH) 0.9 %
5-40 SYRINGE (ML) INJECTION PRN
Status: DISCONTINUED | OUTPATIENT
Start: 2022-08-12 | End: 2022-08-12 | Stop reason: HOSPADM

## 2022-08-12 RX ORDER — LIDOCAINE HYDROCHLORIDE 10 MG/ML
INJECTION, SOLUTION EPIDURAL; INFILTRATION; INTRACAUDAL; PERINEURAL PRN
Status: DISCONTINUED | OUTPATIENT
Start: 2022-08-12 | End: 2022-08-12 | Stop reason: SDUPTHER

## 2022-08-12 RX ORDER — DEXAMETHASONE SODIUM PHOSPHATE 10 MG/ML
8 INJECTION, SOLUTION INTRAMUSCULAR; INTRAVENOUS ONCE
Status: DISCONTINUED | OUTPATIENT
Start: 2022-08-12 | End: 2022-08-12 | Stop reason: HOSPADM

## 2022-08-12 RX ORDER — ONDANSETRON 2 MG/ML
INJECTION INTRAMUSCULAR; INTRAVENOUS PRN
Status: DISCONTINUED | OUTPATIENT
Start: 2022-08-12 | End: 2022-08-12 | Stop reason: SDUPTHER

## 2022-08-12 RX ORDER — SODIUM CHLORIDE 0.9 % (FLUSH) 0.9 %
5-40 SYRINGE (ML) INJECTION EVERY 12 HOURS SCHEDULED
Status: DISCONTINUED | OUTPATIENT
Start: 2022-08-12 | End: 2022-08-12 | Stop reason: HOSPADM

## 2022-08-12 RX ORDER — HYDROMORPHONE HYDROCHLORIDE 1 MG/ML
0.25 INJECTION, SOLUTION INTRAMUSCULAR; INTRAVENOUS; SUBCUTANEOUS EVERY 5 MIN PRN
Status: DISCONTINUED | OUTPATIENT
Start: 2022-08-12 | End: 2022-08-12 | Stop reason: HOSPADM

## 2022-08-12 RX ORDER — ONDANSETRON 2 MG/ML
4 INJECTION INTRAMUSCULAR; INTRAVENOUS
Status: DISCONTINUED | OUTPATIENT
Start: 2022-08-12 | End: 2022-08-12 | Stop reason: HOSPADM

## 2022-08-12 RX ORDER — ROCURONIUM BROMIDE 10 MG/ML
INJECTION, SOLUTION INTRAVENOUS PRN
Status: DISCONTINUED | OUTPATIENT
Start: 2022-08-12 | End: 2022-08-12 | Stop reason: SDUPTHER

## 2022-08-12 RX ORDER — ACETAMINOPHEN 500 MG
1000 TABLET ORAL ONCE
Status: COMPLETED | OUTPATIENT
Start: 2022-08-12 | End: 2022-08-12

## 2022-08-12 RX ORDER — OXYCODONE HYDROCHLORIDE AND ACETAMINOPHEN 5; 325 MG/1; MG/1
1 TABLET ORAL EVERY 6 HOURS PRN
Qty: 12 TABLET | Refills: 0 | Status: SHIPPED | OUTPATIENT
Start: 2022-08-12 | End: 2022-08-15

## 2022-08-12 RX ORDER — SODIUM CHLORIDE, SODIUM LACTATE, POTASSIUM CHLORIDE, CALCIUM CHLORIDE 600; 310; 30; 20 MG/100ML; MG/100ML; MG/100ML; MG/100ML
INJECTION, SOLUTION INTRAVENOUS CONTINUOUS
Status: DISCONTINUED | OUTPATIENT
Start: 2022-08-12 | End: 2022-08-12 | Stop reason: HOSPADM

## 2022-08-12 RX ORDER — CELECOXIB 100 MG/1
100 CAPSULE ORAL ONCE
Status: COMPLETED | OUTPATIENT
Start: 2022-08-12 | End: 2022-08-12

## 2022-08-12 RX ORDER — SODIUM CHLORIDE 9 MG/ML
INJECTION, SOLUTION INTRAVENOUS PRN
Status: DISCONTINUED | OUTPATIENT
Start: 2022-08-12 | End: 2022-08-12 | Stop reason: HOSPADM

## 2022-08-12 RX ORDER — HYDROMORPHONE HYDROCHLORIDE 1 MG/ML
0.5 INJECTION, SOLUTION INTRAMUSCULAR; INTRAVENOUS; SUBCUTANEOUS EVERY 5 MIN PRN
Status: DISCONTINUED | OUTPATIENT
Start: 2022-08-12 | End: 2022-08-12 | Stop reason: HOSPADM

## 2022-08-12 RX ORDER — FENTANYL CITRATE 50 UG/ML
INJECTION, SOLUTION INTRAMUSCULAR; INTRAVENOUS PRN
Status: DISCONTINUED | OUTPATIENT
Start: 2022-08-12 | End: 2022-08-12 | Stop reason: SDUPTHER

## 2022-08-12 RX ORDER — PROPOFOL 10 MG/ML
INJECTION, EMULSION INTRAVENOUS PRN
Status: DISCONTINUED | OUTPATIENT
Start: 2022-08-12 | End: 2022-08-12 | Stop reason: SDUPTHER

## 2022-08-12 RX ORDER — DEXAMETHASONE SODIUM PHOSPHATE 10 MG/ML
INJECTION, SOLUTION INTRAMUSCULAR; INTRAVENOUS PRN
Status: DISCONTINUED | OUTPATIENT
Start: 2022-08-12 | End: 2022-08-12 | Stop reason: SDUPTHER

## 2022-08-12 RX ORDER — BUPIVACAINE HYDROCHLORIDE 2.5 MG/ML
INJECTION, SOLUTION INFILTRATION; PERINEURAL PRN
Status: DISCONTINUED | OUTPATIENT
Start: 2022-08-12 | End: 2022-08-12 | Stop reason: ALTCHOICE

## 2022-08-12 RX ADMIN — ROCURONIUM BROMIDE 50 MG: 10 INJECTION, SOLUTION INTRAVENOUS at 11:22

## 2022-08-12 RX ADMIN — CEFAZOLIN 2000 MG: 2 INJECTION, POWDER, FOR SOLUTION INTRAMUSCULAR; INTRAVENOUS at 11:32

## 2022-08-12 RX ADMIN — FENTANYL CITRATE 25 MCG: 50 INJECTION, SOLUTION INTRAMUSCULAR; INTRAVENOUS at 11:22

## 2022-08-12 RX ADMIN — ONDANSETRON 4 MG: 2 INJECTION INTRAMUSCULAR; INTRAVENOUS at 12:25

## 2022-08-12 RX ADMIN — FENTANYL CITRATE 25 MCG: 50 INJECTION, SOLUTION INTRAMUSCULAR; INTRAVENOUS at 12:10

## 2022-08-12 RX ADMIN — SUGAMMADEX 200 MG: 100 INJECTION, SOLUTION INTRAVENOUS at 12:27

## 2022-08-12 RX ADMIN — DEXAMETHASONE SODIUM PHOSPHATE 4 MG: 10 INJECTION, SOLUTION INTRAMUSCULAR; INTRAVENOUS at 11:38

## 2022-08-12 RX ADMIN — HYDROMORPHONE HYDROCHLORIDE 0.5 MG: 1 INJECTION, SOLUTION INTRAMUSCULAR; INTRAVENOUS; SUBCUTANEOUS at 12:53

## 2022-08-12 RX ADMIN — CELECOXIB 100 MG: 100 CAPSULE ORAL at 10:01

## 2022-08-12 RX ADMIN — FENTANYL CITRATE 25 MCG: 50 INJECTION, SOLUTION INTRAMUSCULAR; INTRAVENOUS at 12:06

## 2022-08-12 RX ADMIN — FAMOTIDINE 20 MG: 10 INJECTION INTRAVENOUS at 10:02

## 2022-08-12 RX ADMIN — SODIUM CHLORIDE, SODIUM LACTATE, POTASSIUM CHLORIDE, AND CALCIUM CHLORIDE: 600; 310; 30; 20 INJECTION, SOLUTION INTRAVENOUS at 09:33

## 2022-08-12 RX ADMIN — ACETAMINOPHEN 1000 MG: 500 TABLET ORAL at 10:01

## 2022-08-12 RX ADMIN — PROPOFOL 50 MG: 10 INJECTION, EMULSION INTRAVENOUS at 11:22

## 2022-08-12 RX ADMIN — LIDOCAINE HYDROCHLORIDE 5 ML: 10 INJECTION, SOLUTION EPIDURAL; INFILTRATION; INTRACAUDAL; PERINEURAL at 11:22

## 2022-08-12 RX ADMIN — PROPOFOL 30 MG: 10 INJECTION, EMULSION INTRAVENOUS at 11:24

## 2022-08-12 RX ADMIN — FENTANYL CITRATE 25 MCG: 50 INJECTION, SOLUTION INTRAMUSCULAR; INTRAVENOUS at 11:30

## 2022-08-12 RX ADMIN — SODIUM CHLORIDE, SODIUM LACTATE, POTASSIUM CHLORIDE, AND CALCIUM CHLORIDE: 600; 310; 30; 20 INJECTION, SOLUTION INTRAVENOUS at 12:02

## 2022-08-12 ASSESSMENT — PAIN - FUNCTIONAL ASSESSMENT: PAIN_FUNCTIONAL_ASSESSMENT: NONE - DENIES PAIN

## 2022-08-12 ASSESSMENT — PAIN SCALES - GENERAL: PAINLEVEL_OUTOF10: 7

## 2022-08-12 ASSESSMENT — PAIN DESCRIPTION - LOCATION: LOCATION: ABDOMEN

## 2022-08-12 ASSESSMENT — LIFESTYLE VARIABLES: SMOKING_STATUS: 0

## 2022-08-12 NOTE — ANESTHESIA POSTPROCEDURE EVALUATION
Department of Anesthesiology  Postprocedure Note    Patient: Mary Jeff  MRN: [id number]  YOB: 1935  Date of evaluation: 8/12/2022      Procedure Summary     Date: 08/12/22 Room / Location: [address]    Anesthesia Start: 1117 Anesthesia Stop: 1245    Procedure: ROBOT ASSISTED LAPAROSCOPIC INCARCERATED VENTRAL HERNIA  REPAIR  WITH MESH Diagnosis:       Ventral hernia without obstruction or gangrene      (Ventral hernia without obstruction or gangrene [G35.3])    Surgeons: Dorohty Colón DO Responsible Provider: BRAYDON Collins CRNA    Anesthesia Type: general ASA Status: 3          Anesthesia Type: No value filed.     Gay Phase I: Gay Score: 10    Gay Phase II:        Anesthesia Post Evaluation    Patient location during evaluation: PACU  Patient participation: waiting for patient participation  Level of consciousness: sleepy but conscious  Pain score: 0  Airway patency: patent  Nausea & Vomiting: no nausea and no vomiting  Complications: no  Cardiovascular status: blood pressure returned to baseline  Respiratory status: acceptable and nasal cannula  Hydration status: euvolemic

## 2022-08-12 NOTE — ANESTHESIA PRE PROCEDURE
Department of Anesthesiology  Preprocedure Note       Name:  Lb Cruz   Age:  81 y.o.  :  1935                                          MRN:  [id number]         Date:  2022      Surgeon: Shaniqua Carrasquillo):  Dewitt Cowden, DO    Procedure: Procedure(s):  ROBOT ASSISTED LAPAROSCOPIC VENTRAL HERNIA  REPAIR  WITH MESH    Medications prior to admission:   Prior to Admission medications    Medication Sig Start Date End Date Taking? Authorizing Provider   hydroCHLOROthiazide (HYDRODIURIL) 25 MG tablet TAKE 1 TABLET EVERY DAY  Patient taking differently: Take 25 mg by mouth in the morning. 22   Su RosarioBRAYDON narvaez   gabapentin (NEURONTIN) 100 MG capsule Take 1 capsule by mouth 2 times daily for 30 days. (may fill 2/15/22)  Patient taking differently: Take 100 mg by mouth in the morning. (may fill 2/15/22). 22  BRAYDON Cerrato   atorvastatin (LIPITOR) 20 MG tablet TAKE 1 TABLET EVERY DAY  Patient taking differently: Take 20 mg by mouth nightly 22   BRAYDON Cerrato   potassium chloride (KLOR-CON) 10 MEQ extended release tablet TAKE 1 TABLET EVERY DAY  Patient taking differently: Take 10 mEq by mouth in the morning. 3/23/22   BRAYDON You   denosumab (PROLIA) 60 MG/ML SOSY SC injection Inject 1 mL into the skin every 6 months 21   BRAYDON Cerrato   amLODIPine (NORVASC) 10 MG tablet Take 1 tablet by mouth daily 21   BRAYDON Cerrato   isosorbide mononitrate (IMDUR) 30 MG extended release tablet Take 1 tablet by mouth daily 10/28/21   BRAYDON Portillo   aspirin EC 81 MG EC tablet Take 1 tablet by mouth 2 times daily  Patient taking differently: Take 81 mg by mouth in the morning.  10/15/21   Obdulio Diez MD   vitamin D (CHOLECALCIFEROL) 25 MCG (1000 UT) TABS tablet Take 1,000 Units by mouth daily    Historical Provider, MD   calcium carbonate 600 MG TABS tablet Take 1 tablet by mouth daily    Historical Provider, MD Multiple Vitamins-Minerals (OCUVITE PO) Take 1 tablet by mouth 2 times daily     Historical Provider, MD   nitroGLYCERIN (NITROSTAT) 0.4 MG SL tablet Place 1 tablet under the tongue every 5 minutes as needed for Chest pain up to max of 3 total doses.  If no relief after 1 dose, call 911. 9/24/21   BRAYDON Rodriguez       Current medications:    Current Facility-Administered Medications   Medication Dose Route Frequency Provider Last Rate Last Admin    acetaminophen (TYLENOL) tablet 1,000 mg  5,956 mg Oral Once Debby Savage, DO        celecoxib (CELEBREX) capsule 100 mg  933 mg Oral Once Debby Savage, DO        dexamethasone (PF) (DECADRON) injection 8 mg  8 mg IntraVENous Once Debbychika Leungdon, DO        lactated ringers infusion   IntraVENous Continuous Debby Leungdon, DO        sodium chloride flush 0.9 % injection 5-40 mL  5-40 mL IntraVENous 2 times per day Debby Savage, DO        sodium chloride flush 0.9 % injection 5-40 mL  5-40 mL IntraVENous PRN Debby Savage, DO        0.9 % sodium chloride infusion   IntraVENous PRN Debby Savage, DO        lactated ringers infusion   IntraVENous Continuous [facility],  mL/hr at 08/12/22 0933 New Bag at 08/12/22 0933    ceFAZolin (ANCEF) 2,000 mg in sterile water 20 mL IV syringe  2,000 mg IntraVENous On Call to [address], DO           Allergies:  No Known Allergies    Problem List:    Patient Active Problem List   Diagnosis Code    Essential hypertension I10    Mixed hyperlipidemia E78.2    Age-related osteoporosis without current pathological fracture M81.0    Coronary artery disease involving native coronary artery of native heart without angina pectoris I25.10    DDD (degenerative disc disease), lumbar M51.36    Basal cell carcinoma (BCC) of scalp C44.41    Chronic bilateral low back pain without sciatica M54.50, G89.29    Macular degeneration of both eyes H35.30    Primary osteoarthritis of right knee M17.11    consumption: 08/11/22    BMI:   Wt Readings from Last 3 Encounters:   08/12/22 158 lb (71.7 kg)   08/09/22 158 lb (71.7 kg)   08/04/22 159 lb 12.8 oz (72.5 kg)     Body mass index is 27.99 kg/m². CBC:   Lab Results   Component Value Date/Time    WBC 6.8 07/21/2022 08:31 AM    RBC 4.87 07/21/2022 08:31 AM    HGB 13.8 07/21/2022 08:31 AM    HCT 42.9 07/21/2022 08:31 AM    MCV 88.1 07/21/2022 08:31 AM    RDW 13.6 07/21/2022 08:31 AM     07/21/2022 08:31 AM       CMP:   Lab Results   Component Value Date/Time     07/21/2022 08:31 AM    K 4.0 07/21/2022 08:31 AM    K 4.2 10/15/2021 03:33 AM     07/21/2022 08:31 AM    CO2 30 07/21/2022 08:31 AM    BUN 9 07/21/2022 08:31 AM    CREATININE 0.6 07/21/2022 08:31 AM    GFRAA >59 07/21/2022 08:31 AM    LABGLOM >60 07/21/2022 08:31 AM    GLUCOSE 105 07/21/2022 08:31 AM    PROT 7.3 07/21/2022 08:31 AM    CALCIUM 9.8 07/21/2022 08:31 AM    BILITOT 0.4 07/21/2022 08:31 AM    ALKPHOS 63 07/21/2022 08:31 AM    AST 22 07/21/2022 08:31 AM    ALT 21 07/21/2022 08:31 AM       POC Tests: No results for input(s): POCGLU, POCNA, POCK, POCCL, POCBUN, POCHEMO, POCHCT in the last 72 hours.     Coags:   Lab Results   Component Value Date/Time    PROTIME 13.3 09/30/2021 11:15 AM    INR 0.99 09/30/2021 11:15 AM    APTT 31.1 09/30/2021 11:15 AM       HCG (If Applicable): No results found for: PREGTESTUR, PREGSERUM, HCG, HCGQUANT     ABGs: No results found for: PHART, PO2ART, NZP2LXJ, FCK4MMK, BEART, B7HMLSNO     Type & Screen (If Applicable):  No results found for: LABABO, LABRH    Drug/Infectious Status (If Applicable):  No results found for: HIV, HEPCAB    COVID-19 Screening (If Applicable):   Lab Results   Component Value Date/Time    COVID19 Not Detected 10/12/2021 01:00 PM           Anesthesia Evaluation  Patient summary reviewed no history of anesthetic complications:   Airway: Mallampati: II  TM distance: >3 FB   Neck ROM: full  Mouth opening: > = 3 FB   Dental:    (+) upper dentures, lower dentures and edentulous      Pulmonary:normal exam  breath sounds clear to auscultation      (-) asthma, recent URI, sleep apnea and not a current smoker          Patient did not smoke on day of surgery. Cardiovascular:  Exercise tolerance: good (>4 METS),   (+) hypertension:, valvular problems/murmurs (Mild per TTE): AS, CAD:, CABG/stent (Stents 2 yrs ago):,     (-) pacemaker, past MI and  angina    ECG reviewed  Rhythm: regular  Rate: normal  Echocardiogram reviewed         Beta Blocker:  Not on Beta Blocker         Neuro/Psych:      (-) seizures, TIA and CVA           GI/Hepatic/Renal:        (-) GERD, liver disease and no renal disease       Endo/Other:        (-) diabetes mellitus, hypothyroidism, hyperthyroidism               Abdominal:             Vascular: Other Findings:           Anesthesia Plan      general     ASA 3     (Preop famotidine, dexamethasone)  Induction: intravenous. MIPS: Postoperative opioids intended and Prophylactic antiemetics administered. Anesthetic plan and risks discussed with patient and child/children. Use of blood products discussed with patient and child/children whom consented to blood products.                      Kayla August MD   8/12/2022

## 2022-08-12 NOTE — OP NOTE
Operative Note      Patient: Johan Tolentino  YOB: 1935  MRN: [id number]    Date of Procedure: 8/12/2022    Pre-Op Diagnosis: Ventral hernia without obstruction or gangrene [K43.9]    Post-Op Diagnosis:  Incarcerated ventral hernia        Procedure(s):  ROBOT ASSISTED LAPAROSCOPIC INCARCERATED VENTRAL HERNIA  REPAIR  WITH MESH    Surgeon(s):  Mike Lozada DO    Assistant:   * No surgical staff found *    Anesthesia: General    Estimated Blood Loss (mL): Minimal    Complications: None    Specimens:   * No specimens in log *    Implants:  Implant Name Type Inv. Item Serial No.  Lot No. LRB No. Used Action   MESH SURG DIA4. 5IN CIR W/ ECHO 2 POS SYS VENTRALIGHT - [id number]  MESH SURG DIA4. 5IN CIR W/ ECHO 2 POS SYS VENTRALIGHT  BARD DAVOL-WD [id number] N/A 1 Implanted         Drains: * No LDAs found *    Findings:  3 x 2.5 cm ventral defect with incarcerated omentum. Detailed Description of Procedure:   Ms. Bethany Mcmillan was taken to the main operating room and placed on the operating  table supine with the left side raised slightly on a bump and slightly jackknifed. After the induction of adequate general endotracheal anesthesia, the abdomen was prepped and draped to a sterile field with I-O band. A time-out was undertaken. A small incision was made two fingerbreadths below the costal margin on the left side after instilling local anesthesic, and through this an optiview trocar was inserted, the abdomen was entered, and a pneumoperitoneum created. A laparoscope was advanced and inspection undertaken. No evidence of trauma from the trocar site insertion was appreciated. The hernia was seen along the midline with overlying fat pads adherent in the area with incarcerated omentum present. Two additional 8 mm ports were placed along the left anterior axillary line and just superior to the ASIS.   The camera was moved to the middle port, and the subcostal incision was up sized to a 12mm airseal port.  The Just[employer]otted[employer] X system was brought into the field and trocars were secured. The camera was inserted and working instruments were advanced. Traction was placed on the fat pads of the abdominal wall and a plane was created to place the mesh just beneath the peritoneum using cautery timur. Once the abdominal fat pad was completely down, the hernia defects were apparent. The exposed a defects measured 3 x 2.5 cm. Thus, the selected mesh was Bard ECHO max 11 cm round. The defect were approximated with 0-Stratifix suture in a running fashion, taking care to grasp the hernia sac but not the skin. The mesh was then inserted and pulled up through the center of the suture line. The mesh was circumferentially secured with Stratifix 00 six inch suture circumferentially. The retaining device was removed from the mesh and it laid smoothly against the abdominal wall. A GIGI block was then performed via direct laparoscopic visualization bilaterally with Exparel. The operative site was then checked and hemostasis assured. The 12 mm port site fascial defect was closed with an 0-Vicryl suture. The remaining working  ports were removed with no bleeding noted. The pneumoperitoneum was released and the camera port removed. The port locations were closed with interrupted 4-0 monocryl subcuticular  suture followed by Dermabond. Sponge, needle, instrument counts correct on 2 occasions. Estimated intraoperative blood loss minimal.     Ms. Keara Montez tolerated her surgery well, and she was taken to PACU   in satisfactory condition.     ________________________________  Katherine Cosme DO     Electronically signed by Katherine Cosme DO on 0/50/4102 at 12:34 PM

## 2022-08-12 NOTE — DISCHARGE INSTR - ACTIVITY
No heavy lifting. No lifting more than 15 pounds for 6 weeks. No work for 2 weeks. Wear Abdominal Binder/Scrotal Support at all times when up and about. May shower tomorrow. Do not soak in tub. No swimming pools, oceans, lakes, etc for 2 weeks. Do not drive while on pain medications. Avoid constipation. Take colace nightly (up to 300 mg) and miralax daily (up to three doses). Drink 64 ounces of water and take a powdered fiber supplement daily (ie-Metamucil). If you have signs of infection, call you doctor.  These include:   -Increased pain, swelling, warmth, or redness  -Red streaks leading from the incision  -Pus draining from the incision  -A fever > 100.4

## 2022-08-12 NOTE — PROGRESS NOTES
CLINICAL PHARMACY NOTE: MEDS TO BEDS    Total # of Prescriptions Filled: 1   The following medications were delivered to the patient:  Oxycodone-APAP 5-325 mg      Additional Documentation:  Delivered Rx to op-care waiting room. Gave Rx to patients caretaker Audra Lopez. Audra Lopez paid $1.39 copay with [facility].

## 2022-08-17 RX ORDER — ISOSORBIDE MONONITRATE 30 MG/1
TABLET, EXTENDED RELEASE ORAL
Qty: 90 TABLET | Refills: 3 | Status: SHIPPED | OUTPATIENT
Start: 2022-08-17

## 2022-08-22 ENCOUNTER — TELEPHONE (OUTPATIENT)
Dept: CARDIOLOGY CLINIC | Age: 87
End: 2022-08-22

## 2022-08-22 NOTE — TELEPHONE ENCOUNTER
Called and left VM to reschedule 10/14 appt due to Gwynda Ar out, BW 8/22 Movement of extremities grossly intact.

## 2022-08-24 RX ORDER — AMLODIPINE BESYLATE 10 MG/1
TABLET ORAL
Qty: 90 TABLET | Refills: 3 | Status: SHIPPED | OUTPATIENT
Start: 2022-08-24

## 2022-08-24 NOTE — TELEPHONE ENCOUNTER
Daron Singh called requesting a refill of the below medication which has been pended for you:     Requested Prescriptions     Pending Prescriptions Disp Refills    amLODIPine (NORVASC) 10 MG tablet [Pharmacy Med Name: AMLODIPINE BESYLATE 10 MG Tablet] 90 tablet 3     Sig: TAKE 1 TABLET EVERY DAY       Last Appointment Date: 7/29/2022  Next Appointment Date: 1/6/2023    No Known Allergies

## 2022-08-31 ENCOUNTER — OFFICE VISIT (OUTPATIENT)
Dept: SURGERY | Age: 87
End: 2022-08-31

## 2022-08-31 VITALS
HEART RATE: 75 BPM | TEMPERATURE: 97.6 F | BODY MASS INDEX: 28.14 KG/M2 | OXYGEN SATURATION: 98 % | WEIGHT: 158.8 LBS | HEIGHT: 63 IN

## 2022-08-31 DIAGNOSIS — Z09 POSTOPERATIVE EXAMINATION: Primary | ICD-10-CM

## 2022-08-31 PROCEDURE — 99024 POSTOP FOLLOW-UP VISIT: CPT | Performed by: SURGERY

## 2022-09-12 RX ORDER — ATORVASTATIN CALCIUM 20 MG/1
20 TABLET, FILM COATED ORAL NIGHTLY
Qty: 90 TABLET | Refills: 2 | Status: SHIPPED | OUTPATIENT
Start: 2022-09-12

## 2022-09-12 NOTE — TELEPHONE ENCOUNTER
Windy Amaya called requesting a refill of the below medication which has been pended for you:     Requested Prescriptions     Pending Prescriptions Disp Refills    atorvastatin (LIPITOR) 20 MG tablet 90 tablet 1     Sig: Take 1 tablet by mouth nightly       Last Appointment Date: 7/29/2022  Next Appointment Date: 1/6/2023    No Known Allergies

## 2022-10-10 DIAGNOSIS — G89.29 CHRONIC BILATERAL LOW BACK PAIN WITHOUT SCIATICA: ICD-10-CM

## 2022-10-10 DIAGNOSIS — M54.50 CHRONIC BILATERAL LOW BACK PAIN WITHOUT SCIATICA: ICD-10-CM

## 2022-10-10 RX ORDER — GABAPENTIN 100 MG/1
100 CAPSULE ORAL 2 TIMES DAILY
Qty: 60 CAPSULE | Refills: 2 | Status: SHIPPED | OUTPATIENT
Start: 2022-10-10 | End: 2022-11-09

## 2022-10-10 NOTE — TELEPHONE ENCOUNTER
Casey Lianna called to request a refill on her medication. Last office visit : 7/29/2022   Next office visit : 1/6/2023     Last UDS: No results found for: Philis Diones, LABBENZ, BUPRENUR, COCAIMETSCRU, GABAPENTIN, MDMA, METAMPU, OPIATESCREENURINE, OXTCOSU, PHENCYCLIDINESCREENURINE, PROPOXYPHENE, THCSCREENUR, TRICYUR    Last Matti: 10/10/22  Medication Contract: 7/29/22   Last Fill: 5/16/22      Requested Prescriptions     Pending Prescriptions Disp Refills    gabapentin (NEURONTIN) 100 MG capsule 60 capsule 2     Sig: Take 1 capsule by mouth 2 times daily for 30 days. (may fill 2/15/22)         Please approve or refuse this medication.    Eli Lewis

## 2022-10-17 ENCOUNTER — OFFICE VISIT (OUTPATIENT)
Dept: CARDIOLOGY CLINIC | Age: 87
End: 2022-10-17
Payer: MEDICARE

## 2022-10-17 VITALS
BODY MASS INDEX: 28.17 KG/M2 | OXYGEN SATURATION: 97 % | WEIGHT: 159 LBS | DIASTOLIC BLOOD PRESSURE: 70 MMHG | HEIGHT: 63 IN | HEART RATE: 64 BPM | SYSTOLIC BLOOD PRESSURE: 138 MMHG

## 2022-10-17 DIAGNOSIS — I35.0 NONRHEUMATIC AORTIC VALVE STENOSIS: Primary | ICD-10-CM

## 2022-10-17 PROCEDURE — G8427 DOCREV CUR MEDS BY ELIG CLIN: HCPCS | Performed by: INTERNAL MEDICINE

## 2022-10-17 PROCEDURE — G8484 FLU IMMUNIZE NO ADMIN: HCPCS | Performed by: INTERNAL MEDICINE

## 2022-10-17 PROCEDURE — G8417 CALC BMI ABV UP PARAM F/U: HCPCS | Performed by: INTERNAL MEDICINE

## 2022-10-17 PROCEDURE — 1123F ACP DISCUSS/DSCN MKR DOCD: CPT | Performed by: INTERNAL MEDICINE

## 2022-10-17 PROCEDURE — 99214 OFFICE O/P EST MOD 30 MIN: CPT | Performed by: INTERNAL MEDICINE

## 2022-10-17 PROCEDURE — 1090F PRES/ABSN URINE INCON ASSESS: CPT | Performed by: INTERNAL MEDICINE

## 2022-10-17 PROCEDURE — 1036F TOBACCO NON-USER: CPT | Performed by: INTERNAL MEDICINE

## 2022-10-17 RX ORDER — ANTIOX #8/OM3/DHA/EPA/LUT/ZEAX 250-2.5 MG
CAPSULE ORAL 2 TIMES DAILY
COMMUNITY

## 2022-10-17 ASSESSMENT — ENCOUNTER SYMPTOMS
SHORTNESS OF BREATH: 0
EYE REDNESS: 0
FACIAL SWELLING: 0
BLOOD IN STOOL: 0
NAUSEA: 0
COUGH: 0
ABDOMINAL DISTENTION: 0
VOMITING: 0
APNEA: 0
EYE PAIN: 0
DIARRHEA: 0
ABDOMINAL PAIN: 0
EYE DISCHARGE: 0
CONSTIPATION: 0
WHEEZING: 0
CHEST TIGHTNESS: 0
SORE THROAT: 0

## 2022-12-12 ENCOUNTER — HOSPITAL ENCOUNTER (OUTPATIENT)
Dept: NON INVASIVE DIAGNOSTICS | Age: 87
Discharge: HOME | End: 2022-12-12
Payer: MEDICARE

## 2022-12-12 DIAGNOSIS — I35.0 NONRHEUMATIC AORTIC (VALVE) STENOSIS: ICD-10-CM

## 2022-12-12 PROCEDURE — 93306 TTE W/DOPPLER COMPLETE: CPT

## 2022-12-22 ENCOUNTER — OFFICE VISIT (OUTPATIENT)
Age: 87
End: 2022-12-22

## 2022-12-22 VITALS
WEIGHT: 158 LBS | OXYGEN SATURATION: 98 % | TEMPERATURE: 98.4 F | HEART RATE: 72 BPM | BODY MASS INDEX: 28 KG/M2 | SYSTOLIC BLOOD PRESSURE: 122 MMHG | DIASTOLIC BLOOD PRESSURE: 70 MMHG | HEIGHT: 63 IN

## 2022-12-22 DIAGNOSIS — R35.0 URINE FREQUENCY: ICD-10-CM

## 2022-12-22 DIAGNOSIS — N30.01 ACUTE CYSTITIS WITH HEMATURIA: Primary | ICD-10-CM

## 2022-12-22 LAB
APPEARANCE FLUID: CLEAR
BILIRUBIN, POC: NEGATIVE
BLOOD URINE, POC: ABNORMAL
CLARITY, POC: CLEAR
COLOR, POC: YELLOW
GLUCOSE URINE, POC: NEGATIVE
KETONES, POC: NEGATIVE
LEUKOCYTE EST, POC: ABNORMAL
NITRITE, POC: POSITIVE
PH, POC: 6.5
PROTEIN, POC: ABNORMAL
SPECIFIC GRAVITY, POC: 1.01
UROBILINOGEN, POC: ABNORMAL

## 2022-12-22 RX ORDER — CEFDINIR 300 MG/1
300 CAPSULE ORAL 2 TIMES DAILY
Qty: 20 CAPSULE | Refills: 0 | Status: SHIPPED | OUTPATIENT
Start: 2022-12-22 | End: 2023-01-01

## 2022-12-22 ASSESSMENT — ENCOUNTER SYMPTOMS
EYE PAIN: 0
CHEST TIGHTNESS: 0
STRIDOR: 0
EYE DISCHARGE: 0
ABDOMINAL DISTENTION: 0
ABDOMINAL PAIN: 0
WHEEZING: 0
SORE THROAT: 0
SINUS PRESSURE: 0
SHORTNESS OF BREATH: 0
COUGH: 0
TROUBLE SWALLOWING: 0
COLOR CHANGE: 0

## 2022-12-22 NOTE — PROGRESS NOTES
[address]  [address] [facility] [address] [id number]  Dept: [phone]  Dept Fax: [phone]  Loc: [phone]    Phil Calles is a 81 y.o. female who presents today for her medical conditions/complaints as noted below. Phil Calles is complaining of Urinary Frequency, Urinary Pain (Started last night ), and Dysuria        HPI:   Urinary Frequency   Associated symptoms include frequency. Pertinent negatives include no chills or hematuria. Urinary Pain   Associated symptoms include frequency. Pertinent negatives include no chills or hematuria. Dysuria   Associated symptoms include frequency. Pertinent negatives include no chills or hematuria. Adelfo Ruiz presents to the office complaining of dysuria, urinary frequency, and decreased urine amount. Symptoms started last night. Patient denies any fever or back pain. She states she has been drinking a lot of water.   Past Medical History:   Diagnosis Date    Age-related osteoporosis without current pathological fracture     Basal cell carcinoma (BCC) of scalp     skin    Coronary artery disease involving native coronary artery of native heart without angina pectoris     stents done in [place]; sees [facility] cardiology    DDD (degenerative disc disease), lumbar     Essential hypertension     Knee pain     Mixed hyperlipidemia     Ventral hernia        Past Surgical History:   Procedure Laterality Date    CARDIAC CATHETERIZATION      stents 2019, 2020    EYE SURGERY      h/o eye bleed    FOOT SURGERY      deep calluses    HERNIA REPAIR N/A 8/12/2022    ROBOT ASSISTED LAPAROSCOPIC INCARCERATED VENTRAL HERNIA  REPAIR  WITH MESH performed by Solis Sahni DO at [facility] ([address] [place] [address])      [address] [facility] Left 2019    TOTAL KNEE ARTHROPLASTY Right 10/14/2021    RIGHT KNEE TOTAL ARTHROPLASTY performed by Ayla Duval MD at [facility] OR    VARICOSE VEIN SURGERY         Family History   Problem Relation Age of Onset    Heart Disease Mother     Cancer Sister         lung    Heart Disease Sister     Heart Attack Brother        Social History     Tobacco Use    Smoking status: Former     Types: Cigarettes     Quit date:      Years since quittin.9    Smokeless tobacco: Never   Substance Use Topics    Alcohol use: Never        Current Outpatient Medications   Medication Sig Dispense Refill    cefdinir (OMNICEF) 300 MG capsule Take 1 capsule by mouth 2 times daily for 10 days 20 capsule 0    Multiple Vitamins-Minerals (PRESERVISION AREDS 2) CAPS Take by mouth in the morning and at bedtime      gabapentin (NEURONTIN) 100 MG capsule Take 1 capsule by mouth 2 times daily for 30 days.  (may fill 2/15/22) 60 capsule 2    atorvastatin (LIPITOR) 20 MG tablet Take 1 tablet by mouth nightly 90 tablet 2    amLODIPine (NORVASC) 10 MG tablet TAKE 1 TABLET EVERY DAY 90 tablet 3    isosorbide mononitrate (IMDUR) 30 MG extended release tablet TAKE 1 TABLET EVERY DAY 90 tablet 3    hydroCHLOROthiazide (HYDRODIURIL) 25 MG tablet TAKE 1 TABLET EVERY DAY (Patient taking differently: Take 25 mg by mouth daily) 90 tablet 1    potassium chloride (KLOR-CON) 10 MEQ extended release tablet TAKE 1 TABLET EVERY DAY (Patient taking differently: Take 10 mEq by mouth daily) 90 tablet 3    denosumab (PROLIA) 60 MG/ML SOSY SC injection Inject 1 mL into the skin every 6 months 1 mL 3    aspirin EC 81 MG EC tablet Take 1 tablet by mouth 2 times daily (Patient taking differently: Take 81 mg by mouth daily) 60 tablet 0    vitamin D (CHOLECALCIFEROL) 25 MCG (1000 UT) TABS tablet Take 1,000 Units by mouth daily      calcium carbonate 600 MG TABS tablet Take 1 tablet by mouth daily      Multiple Vitamins-Minerals (OCUVITE PO) Take 1 tablet by mouth 2 times daily  (Patient not taking: Reported on 10/17/2022)      nitroGLYCERIN (NITROSTAT) 0.4 MG SL tablet Place 1 tablet under the tongue every 5 minutes as needed for Chest pain up to max of 3 total doses. If no relief after 1 dose, call 301. 50 tablet 3     No current facility-administered medications for this visit. No Known Allergies    Health Maintenance   Topic Date Due    Pneumococcal 65+ years Vaccine (1 - PCV) Never done    DTaP/Tdap/Td vaccine (1 - Tdap) Never done    Shingles vaccine (1 of 2) Never done    Depression Screen  12/27/2022    Annual Wellness Visit (AWV)  12/28/2022    Lipids  07/21/2023    Flu vaccine  Completed    COVID-19 Vaccine  Completed    Hepatitis A vaccine  Aged Out    Hib vaccine  Aged Out    Meningococcal (ACWY) vaccine  Aged Out       Subjective:   Review of Systems   Constitutional:  Negative for chills, fatigue and fever. HENT:  Negative for congestion, sinus pressure, sore throat and trouble swallowing. Eyes:  Negative for pain and discharge. Respiratory:  Negative for cough, chest tightness, shortness of breath, wheezing and stridor. Cardiovascular:  Negative for chest pain and palpitations. Gastrointestinal:  Negative for abdominal distention and abdominal pain. Genitourinary:  Positive for dysuria and frequency. Negative for difficulty urinating and hematuria. Musculoskeletal:  Negative for arthralgias, neck pain and neck stiffness. Skin:  Negative for color change and rash. Neurological:  Negative for dizziness, syncope, speech difficulty, weakness and numbness. Psychiatric/Behavioral:  Negative for confusion and suicidal ideas. Objective    Physical Exam  Vitals and nursing note reviewed. Constitutional:       General: She is not in acute distress. Appearance: Normal appearance. HENT:      Head: Normocephalic. Right Ear: External ear normal.      Left Ear: External ear normal.      Nose: Nose normal. No congestion. Mouth/Throat:      Mouth: Mucous membranes are moist.      Pharynx: Oropharynx is clear. No posterior oropharyngeal erythema.    Eyes: Conjunctiva/sclera: Conjunctivae normal.      Pupils: Pupils are equal, round, and reactive to light. Cardiovascular:      Rate and Rhythm: Normal rate and regular rhythm. Pulses: Normal pulses. Heart sounds: Normal heart sounds. No murmur heard. Pulmonary:      Effort: Pulmonary effort is normal. No respiratory distress. Breath sounds: Normal breath sounds. No stridor. No wheezing. Abdominal:      General: Abdomen is flat. Bowel sounds are normal. There is no distension. Tenderness: There is abdominal tenderness (over bladder). There is no right CVA tenderness or left CVA tenderness. Musculoskeletal:         General: No swelling or deformity. Normal range of motion. Cervical back: Normal range of motion. No rigidity or tenderness. Skin:     General: Skin is warm and dry. Findings: No rash. Neurological:      General: No focal deficit present. Mental Status: She is alert and oriented to person, place, and time. Sensory: No sensory deficit. /70 (Site: Left Upper Arm)   Pulse 72   Temp 98.4 °F (36.9 °C) (Temporal)   Ht 5' 3\" (1.6 m)   Wt 158 lb (71.7 kg)   SpO2 98%   BMI 27.99 kg/m²     Assessment         Diagnosis Orders   1. Acute cystitis with hematuria  cefdinir (OMNICEF) 300 MG capsule      2. Urine frequency  POCT Urinalysis no Micro          Plan   Antibiotic sent to pharmacy  Culture pending  Continue drinking increased fluids. 4.  If high fever, body aches, vomiting, or severe back pain occur go to ER immediately    Patient verbalized understanding and agrees to treatment plan. Orders Placed This Encounter   Procedures    POCT Urinalysis no Micro       No results found for this visit on 12/22/22.     Orders Placed This Encounter   Medications    cefdinir (OMNICEF) 300 MG capsule     Sig: Take 1 capsule by mouth 2 times daily for 10 days     Dispense:  20 capsule     Refill:  0      New Prescriptions    CEFDINIR (OMNICEF) 300 MG CAPSULE Take 1 capsule by mouth 2 times daily for 10 days        No follow-ups on file. Discussed use, benefits, and side effects of any prescribed medications. All patient questions were answered. Patient voiced understanding of care plan. Patient was given educational materials - see patient instructions below. Patient Instructions   Antibiotic sent to pharmacy  Culture pending  Continue drinking increased fluids. 4.  If high fever, body aches, vomiting, or severe back pain occur go to ER immediately    Patient verbalized understanding and agrees to treatment plan.       Electronically signed by BRAYDON Murdock CNP on 12/22/2022 at 10:33 AM

## 2022-12-22 NOTE — PATIENT INSTRUCTIONS
Antibiotic sent to pharmacy  Culture pending  Continue drinking increased fluids. 4.  If high fever, body aches, vomiting, or severe back pain occur go to ER immediately    Patient verbalized understanding and agrees to treatment plan.

## 2022-12-24 LAB
ORGANISM: ABNORMAL
URINE CULTURE, ROUTINE: ABNORMAL
URINE CULTURE, ROUTINE: ABNORMAL

## 2022-12-30 DIAGNOSIS — M81.0 AGE-RELATED OSTEOPOROSIS WITHOUT CURRENT PATHOLOGICAL FRACTURE: ICD-10-CM

## 2022-12-30 DIAGNOSIS — E78.2 MIXED HYPERLIPIDEMIA: ICD-10-CM

## 2022-12-30 DIAGNOSIS — R73.03 PREDIABETES: ICD-10-CM

## 2022-12-30 LAB
ALBUMIN SERPL-MCNC: 4.8 G/DL (ref 3.5–5.2)
ALP BLD-CCNC: 62 U/L (ref 35–104)
ALT SERPL-CCNC: 22 U/L (ref 5–33)
ANION GAP SERPL CALCULATED.3IONS-SCNC: 12 MMOL/L (ref 7–19)
AST SERPL-CCNC: 21 U/L (ref 5–32)
BILIRUB SERPL-MCNC: 0.4 MG/DL (ref 0.2–1.2)
BUN BLDV-MCNC: 16 MG/DL (ref 8–23)
CALCIUM SERPL-MCNC: 10 MG/DL (ref 8.8–10.2)
CHLORIDE BLD-SCNC: 101 MMOL/L (ref 98–111)
CHOLESTEROL, TOTAL: 159 MG/DL (ref 160–199)
CO2: 28 MMOL/L (ref 22–29)
CREAT SERPL-MCNC: 0.7 MG/DL (ref 0.5–0.9)
GFR SERPL CREATININE-BSD FRML MDRD: >60 ML/MIN/{1.73_M2}
GLUCOSE BLD-MCNC: 95 MG/DL (ref 74–109)
HBA1C MFR BLD: 5.8 % (ref 4–6)
HDLC SERPL-MCNC: 54 MG/DL (ref 65–121)
LDL CHOLESTEROL CALCULATED: 82 MG/DL
POTASSIUM SERPL-SCNC: 4.3 MMOL/L (ref 3.5–5)
SODIUM BLD-SCNC: 141 MMOL/L (ref 136–145)
TOTAL PROTEIN: 7.1 G/DL (ref 6.6–8.7)
TRIGL SERPL-MCNC: 114 MG/DL (ref 0–149)
VITAMIN D 25-HYDROXY: 50.2 NG/ML

## 2023-01-04 RX ORDER — DENOSUMAB 60 MG/ML
60 INJECTION SUBCUTANEOUS
Qty: 1 ML | Refills: 3 | Status: SHIPPED | OUTPATIENT
Start: 2023-01-04

## 2023-01-05 RX ORDER — POTASSIUM CHLORIDE 750 MG/1
TABLET, FILM COATED, EXTENDED RELEASE ORAL
Qty: 90 TABLET | Refills: 3 | Status: SHIPPED | OUTPATIENT
Start: 2023-01-05

## 2023-01-06 ENCOUNTER — TELEPHONE (OUTPATIENT)
Dept: PRIMARY CARE CLINIC | Age: 88
End: 2023-01-06

## 2023-01-06 ENCOUNTER — OFFICE VISIT (OUTPATIENT)
Dept: PRIMARY CARE CLINIC | Age: 88
End: 2023-01-06

## 2023-01-06 VITALS
HEART RATE: 76 BPM | TEMPERATURE: 97.2 F | OXYGEN SATURATION: 98 % | HEIGHT: 63 IN | WEIGHT: 159 LBS | BODY MASS INDEX: 28.17 KG/M2 | SYSTOLIC BLOOD PRESSURE: 122 MMHG | DIASTOLIC BLOOD PRESSURE: 62 MMHG

## 2023-01-06 DIAGNOSIS — G89.29 CHRONIC BILATERAL LOW BACK PAIN WITHOUT SCIATICA: ICD-10-CM

## 2023-01-06 DIAGNOSIS — I35.0 NONRHEUMATIC AORTIC VALVE STENOSIS: ICD-10-CM

## 2023-01-06 DIAGNOSIS — I10 ESSENTIAL HYPERTENSION: ICD-10-CM

## 2023-01-06 DIAGNOSIS — Z00.00 ENCOUNTER FOR SUBSEQUENT ANNUAL WELLNESS VISIT IN MEDICARE PATIENT: ICD-10-CM

## 2023-01-06 DIAGNOSIS — E78.2 MIXED HYPERLIPIDEMIA: ICD-10-CM

## 2023-01-06 DIAGNOSIS — M54.50 CHRONIC BILATERAL LOW BACK PAIN WITHOUT SCIATICA: ICD-10-CM

## 2023-01-06 DIAGNOSIS — R31.29 MICROSCOPIC HEMATURIA: ICD-10-CM

## 2023-01-06 DIAGNOSIS — K43.9 VENTRAL HERNIA WITHOUT OBSTRUCTION OR GANGRENE: ICD-10-CM

## 2023-01-06 DIAGNOSIS — R73.03 PREDIABETES: ICD-10-CM

## 2023-01-06 DIAGNOSIS — M81.0 AGE-RELATED OSTEOPOROSIS WITHOUT CURRENT PATHOLOGICAL FRACTURE: ICD-10-CM

## 2023-01-06 DIAGNOSIS — M48.062 SPINAL STENOSIS OF LUMBAR REGION WITH NEUROGENIC CLAUDICATION: ICD-10-CM

## 2023-01-06 DIAGNOSIS — Z51.81 THERAPEUTIC DRUG MONITORING: ICD-10-CM

## 2023-01-06 LAB
ALCOHOL URINE: NORMAL
AMPHETAMINE SCREEN, URINE: NORMAL
APPEARANCE FLUID: ABNORMAL
BARBITURATE SCREEN, URINE: NORMAL
BENZODIAZEPINE SCREEN, URINE: NORMAL
BILIRUBIN, POC: ABNORMAL
BLOOD URINE, POC: ABNORMAL
BUPRENORPHINE URINE: NORMAL
CLARITY, POC: ABNORMAL
COCAINE METABOLITE SCREEN URINE: NORMAL
COLOR, POC: YELLOW
FENTANYL SCREEN, URINE: NORMAL
GABAPENTIN SCREEN, URINE: NORMAL
GLUCOSE URINE, POC: ABNORMAL
KETONES, POC: ABNORMAL
LEUKOCYTE EST, POC: ABNORMAL
MDMA URINE: NORMAL
METHADONE SCREEN, URINE: NORMAL
METHAMPHETAMINE, URINE: NORMAL
NITRITE, POC: ABNORMAL
OPIATE SCREEN URINE: NORMAL
OXYCODONE SCREEN URINE: NORMAL
PH, POC: 7
PHENCYCLIDINE SCREEN URINE: NORMAL
PROPOXYPHENE SCREEN, URINE: NORMAL
PROTEIN, POC: ABNORMAL
SPECIFIC GRAVITY, POC: 1.02
SYNTHETIC CANNABINOIDS(K2) SCREEN, URINE: NORMAL
THC SCREEN, URINE: NORMAL
TRAMADOL SCREEN URINE: NORMAL
TRICYCLIC ANTIDEPRESSANTS, UR: NORMAL
UROBILINOGEN, POC: 0.2

## 2023-01-06 RX ORDER — GABAPENTIN 100 MG/1
100 CAPSULE ORAL 2 TIMES DAILY
Qty: 180 CAPSULE | Refills: 1 | Status: SHIPPED | OUTPATIENT
Start: 2023-01-06 | End: 2023-04-06

## 2023-01-06 ASSESSMENT — PATIENT HEALTH QUESTIONNAIRE - PHQ9
1. LITTLE INTEREST OR PLEASURE IN DOING THINGS: 0
SUM OF ALL RESPONSES TO PHQ QUESTIONS 1-9: 0
SUM OF ALL RESPONSES TO PHQ9 QUESTIONS 1 & 2: 0
2. FEELING DOWN, DEPRESSED OR HOPELESS: 0
SUM OF ALL RESPONSES TO PHQ QUESTIONS 1-9: 0

## 2023-01-06 ASSESSMENT — LIFESTYLE VARIABLES
HOW OFTEN DO YOU HAVE A DRINK CONTAINING ALCOHOL: MONTHLY OR LESS
HOW MANY STANDARD DRINKS CONTAINING ALCOHOL DO YOU HAVE ON A TYPICAL DAY: 1 OR 2

## 2023-01-06 NOTE — PROGRESS NOTES
Medicare Annual Wellness Visit    Love Cotton is here for Medicare AWV    Assessment & Plan   Encounter for subsequent annual wellness visit in Medicare patient  Nonrheumatic aortic valve stenosis  Spinal stenosis of lumbar region with neurogenic claudication  Prediabetes  -     Hemoglobin A1C; Future  Essential hypertension  -     CBC with Auto Differential; Future  Mixed hyperlipidemia  -     Lipid Panel; Future  -     Comprehensive Metabolic Panel; Future  Age-related osteoporosis without current pathological fracture  -     DEXA BONE DENSITY 2 SITES; Future  -     Vitamin D 25 Hydroxy; Future  Microscopic hematuria  -     POCT Urinalysis no Micro  Ventral hernia without obstruction or gangrene  Therapeutic drug monitoring  -     POCT Rapid Drug Screen  Chronic bilateral low back pain without sciatica  -     gabapentin (NEURONTIN) 100 MG capsule; Take 1 capsule by mouth 2 times daily for 90 days. Max Daily Amount: 200 mg, Disp-180 capsule, R-1Normal    Recommendations for Preventive Services Due: see orders and patient instructions/AVS.  Recommended screening schedule for the next 5-10 years is provided to the patient in written form: see Patient Instructions/AVS.     Return in 6 months (on 7/6/2023) for follow up. Subjective       Patient's complete Health Risk Assessment and screening values have been reviewed and are found in Flowsheets. The following problems were reviewed today and where indicated follow up appointments were made and/or referrals ordered. Positive Risk Factor Screenings with Interventions:                    Vision Screen:  Do you have difficulty driving, watching TV, or doing any of your daily activities because of your eyesight?: (!) Yes  Have you had an eye exam within the past year?: Yes  No results found.     Interventions:    Macular degeneration, followed by retinal specialist, had recent appt                      Objective   Vitals:    01/06/23 0953   BP: 122/62   Pulse: 76 Temp: 97.2 °F (36.2 °C)   SpO2: 98%   Weight: 159 lb (72.1 kg)   Height: 5' 3\" (1.6 m)      Body mass index is 28.17 kg/m². No Known Allergies  Prior to Visit Medications    Medication Sig Taking? Authorizing Provider   gabapentin (NEURONTIN) 100 MG capsule Take 1 capsule by mouth 2 times daily for 90 days. Max Daily Amount: 200 mg Yes BRAYDON El   potassium chloride (KLOR-CON) 10 MEQ extended release tablet TAKE 1 TABLET EVERY DAY Yes BRAYDON Conner   denosumab (PROLIA) 60 MG/ML SOSY SC injection Inject 1 mL into the skin every 6 months Yes BRAYDON Rodriguez   Multiple Vitamins-Minerals (PRESERVISION AREDS 2) CAPS Take by mouth in the morning and at bedtime Yes Historical Provider, MD   atorvastatin (LIPITOR) 20 MG tablet Take 1 tablet by mouth nightly Yes BRAYDON Rodriguez   amLODIPine (NORVASC) 10 MG tablet TAKE 1 TABLET EVERY DAY Yes BRAYDON Rodriguez   isosorbide mononitrate (IMDUR) 30 MG extended release tablet TAKE 1 TABLET EVERY DAY Yes BRAYDON Diaz NP   hydroCHLOROthiazide (HYDRODIURIL) 25 MG tablet TAKE 1 TABLET EVERY DAY  Patient taking differently: Take 25 mg by mouth daily Yes BRAYDON Kimble   aspirin EC 81 MG EC tablet Take 1 tablet by mouth 2 times daily  Patient taking differently: Take 81 mg by mouth daily Yes Mee Larios MD   vitamin D (CHOLECALCIFEROL) 25 MCG (1000 UT) TABS tablet Take 1,000 Units by mouth daily Yes Historical Provider, MD   calcium carbonate 600 MG TABS tablet Take 1 tablet by mouth daily Yes Historical Provider, MD   Multiple Vitamins-Minerals (OCUVITE PO) Take 1 tablet by mouth 2 times daily Yes Historical Provider, MD   nitroGLYCERIN (NITROSTAT) 0.4 MG SL tablet Place 1 tablet under the tongue every 5 minutes as needed for Chest pain up to max of 3 total doses. If no relief after 1 dose, call 911.  Yes BRAYDON Rodriguez       CareTeam (Including outside providers/suppliers regularly involved in providing care):   Patient Care Team:  BRAYDON Cisneros as PCP - General (Family Nurse Practitioner)  BRAYDON Cisneros as PCP - [facility] Empaneled Provider  Martin Alejandra MD as Consulting Physician (Cardiology)     Reviewed and updated this visit:  Tobacco  Allergies  Meds  Problems  Med Hx  Surg Hx  Soc Hx  Fam Hx

## 2023-01-06 NOTE — PATIENT INSTRUCTIONS
Learning About Vision Tests  What are vision tests? The four most common vision tests are visual acuity tests, refraction, visual field tests, and color vision tests. Visual acuity (sharpness) tests  These tests are used: To see if you need glasses or contact lenses. To monitor an eye problem. To check an eye injury. Visual acuity tests are done as part of routine exams. You may also have this test when you get your 's license or apply for some types of jobs. Visual field tests  These tests are used: To check for vision loss in any area of your range of vision. To screen for certain eye diseases. To look for nerve damage after a stroke, head injury, or other problem that could reduce blood flow to the brain. Refraction and color tests  A refraction test is done to find the right prescription for glasses and contact lenses. A color vision test is done to check for color blindness. Color vision is often tested as part of a routine exam. You may also have this test when you apply for a job where recognizing different colors is important, such as , electronics, or the [employer]. How are vision tests done? Visual acuity test   You cover one eye at a time. You read aloud from a wall chart across the room. You read aloud from a small card that you hold in your hand. Refraction   You look into a special device. The device puts lenses of different strengths in front of each eye to see how strong your glasses or contact lenses need to be. Visual field tests   Your doctor may have you look through special machines. Or your doctor may simply have you stare straight ahead while they move a finger into and out of your field of vision. Color vision test   You look at pieces of printed test patterns in various colors. You say what number or symbol you see. Your doctor may have you trace the number or symbol using a pointer. How do these tests feel?   There is very little chance of having a problem from this test. If dilating drops are used for a vision test, they may make the eyes sting and cause a medicine taste in the mouth. Follow-up care is a key part of your treatment and safety. Be sure to make and go to all appointments, and call your doctor if you are having problems. It's also a good idea to know your test results and keep a list of the medicines you take. Where can you learn more? Go to http://www.tejada.com/ and enter G551 to learn more about \"Learning About Vision Tests. \"  Current as of: October 12, 2022               Content Version: 13.5  © 5707-9222 [employer]. Care instructions adapted under license by [facility] ([facility]). If you have questions about a medical condition or this instruction, always ask your healthcare professional. [address] any warranty or liability for your use of this information. Advance Directives: Care Instructions  Overview  An advance directive is a legal way to state your wishes at the end of your life. It tells your family and your doctor what to do if you can't say what you want. There are two main types of advance directives. You can change them any time your wishes change. Living will. This form tells your family and your doctor your wishes about life support and other treatment. The form is also called a declaration. Medical power of . This form lets you name a person to make treatment decisions for you when you can't speak for yourself. This person is called a health care agent (health care proxy, health care surrogate). The form is also called a durable power of  for health care. If you do not have an advance directive, decisions about your medical care may be made by a family member, or by a doctor or a  who doesn't know you. It may help to think of an advance directive as a gift to the people who care for you.  If you have one, they won't have to make tough decisions by themselves. For more information, including forms for your state, see the [address] website (www.caringinfo.org/planning/advance-directives/). Follow-up care is a key part of your treatment and safety. Be sure to make and go to all appointments, and call your doctor if you are having problems. It's also a good idea to know your test results and keep a list of the medicines you take. What should you include in an advance directive? Many states have a unique advance directive form. (It may ask you to address specific issues.) Or you might use a universal form that's approved by many states. If your form doesn't tell you what to address, it may be hard to know what to include in your advance directive. Use the questions below to help you get started. Who do you want to make decisions about your medical care if you are not able to? What life-support measures do you want if you have a serious illness that gets worse over time or can't be cured? What are you most afraid of that might happen? (Maybe you're afraid of having pain, losing your independence, or being kept alive by machines.)  Where would you prefer to die? (Your home? A hospital? A nursing home?)  Do you want to donate your organs when you die? Do you want certain [employer] practices performed before you die? When should you call for help? Be sure to contact your doctor if you have any questions. Where can you learn more? Go to http://www.tejada.com/ and enter R264 to learn more about \"Advance Directives: Care Instructions. \"  Current as of: June 16, 2022               Content Version: 13.5  © 1165-3526 [employer]. Care instructions adapted under license by [facility] ([facility]). If you have questions about a medical condition or this instruction, always ask your healthcare professional. [address] any warranty or liability for your use of this information.       Personalized Preventive Plan for Taye Dawson - 1/6/2023  Medicare offers a range of preventive health benefits. Some of the tests and screenings are paid in full while other may be subject to a deductible, co-insurance, and/or copay. Some of these benefits include a comprehensive review of your medical history including lifestyle, illnesses that may run in your family, and various assessments and screenings as appropriate. After reviewing your medical record and screening and assessments performed today your provider may have ordered immunizations, labs, imaging, and/or referrals for you. A list of these orders (if applicable) as well as your Preventive Care list are included within your After Visit Summary for your review. Other Preventive Recommendations:    A preventive eye exam performed by an eye specialist is recommended every 1-2 years to screen for glaucoma; cataracts, macular degeneration, and other eye disorders. A preventive dental visit is recommended every 6 months. Try to get at least 150 minutes of exercise per week or 10,000 steps per day on a pedometer . Order or download the FREE \"Exercise & Physical Activity: Your Everyday Guide\" from [employer]. Call 3-604.777.7872 or search [employer] online. You need 7638-9506 mg of calcium and 5419-0082 IU of vitamin D per day. It is possible to meet your calcium requirement with diet alone, but a vitamin D supplement is usually necessary to meet this goal.  When exposed to the sun, use a sunscreen that protects against both UVA and UVB radiation with an SPF of 30 or greater. Reapply every 2 to 3 hours or after sweating, drying off with a towel, or swimming. Always wear a seat belt when traveling in a car. Always wear a helmet when riding a bicycle or motorcycle.

## 2023-01-06 NOTE — PROGRESS NOTES
Chaparro Farfan is a 81 y.o. female who presents today for  Chief Complaint   Patient presents with    Medicare AWV       HPI:  MAW completed, see additional note. IUTD, pneumococcal per prior to moving here. DEXA due, last in 2018 per pt  Pt defers further mammogram and colonoscopy due to age. She was seen in urgent care on 12/22 and treated for E. coli UTI. Symptoms have resolved. Her UA showed microscopic hematuria. She would like urine to ensure infection has resolved. Aortic valve stenosis, stable. Followed by cardiology. She has had no increased shortness of breath or fatigue. She had a repeat echocardiogram in December but has not gotten results. EF 55 (78 in May), RVSP 28, stable; mild to mod AS    CAD with hx of stent x 2, stable. No recent chest pain. LDL 82, total 159 on current lab. Remains on atorvastatin, ASA. Chronic lower back pain, spinal stenosis with neurogenic claudication, stable. She tolerates exercise with Silver sneakers. Pain is only exacerbated by prolonged walking or standing. She is taking gabapentin 100 mg twice daily and tolerates well. Dose is effective. Previously followed by pain management but had reported reaction from injection, severe headache. Has not recurred since injection. Does not wish to return. Not taking any opioids. Prediabetes, diet controlled. A1c 5.8. She underwent ventral hernia repair in August per Dr. Leonor Goodman. She has recovered well. Osteoporosis, on Prolia. Last injection was in July. Her last DEXA was in [place] in 2018 per her report. I had ordered a DEXA but was not completed or scheduled apparently. Macular degeneration, stable. She is followed by the retinal specialist at Dr. Vesna Colbert' office. Vision stable. Labs reviewed.   Lab Results   Component Value Date    WBC 6.8 07/21/2022    HGB 13.8 07/21/2022    HCT 42.9 07/21/2022    MCV 88.1 07/21/2022     07/21/2022     Lab Results   Component Value Date  12/30/2022    K 4.3 12/30/2022     12/30/2022    CO2 28 12/30/2022    BUN 16 12/30/2022    CREATININE 0.7 12/30/2022    GLUCOSE 95 12/30/2022    CALCIUM 10.0 12/30/2022    PROT 7.1 12/30/2022    LABALBU 4.8 12/30/2022    BILITOT 0.4 12/30/2022    ALKPHOS 62 12/30/2022    AST 21 12/30/2022    ALT 22 12/30/2022    LABGLOM >60 12/30/2022    GFRAA >59 07/21/2022       Hemoglobin A1C   Date Value Ref Range Status   12/30/2022 5.8 4.0 - 6.0 % Final     Comment:     HbA1c levels >6% are an indication of hyperglycemia during the preceding 2  to 3 months or longer. HbA1c levels may reach 20% or higher in poorly controlled diabetes. Therapeutic action is suggested at levels above 8%. Diabetes patients with HbA1c levels below 7% meet the goal of the American  Diabetes Association. HbA1c levels below the established reference range may indicate recent  episodes of hypoglycemia, the presence of Hb variants, or shortened lifetime  of erythrocytes. Lab Results   Component Value Date    CHOL 159 (L) 12/30/2022    CHOL 144 (L) 07/21/2022    CHOL 151 (L) 03/10/2022     Lab Results   Component Value Date    TRIG 114 12/30/2022    TRIG 135 07/21/2022    TRIG 102 03/10/2022     Lab Results   Component Value Date    HDL 54 (L) 12/30/2022    HDL 48 (L) 07/21/2022    HDL 58 (L) 03/10/2022     Lab Results   Component Value Date    LDLCALC 82 12/30/2022    LDLCALC 69 07/21/2022    [address] 03/10/2022     No results found for: LABVLDL, VLDL  No results found for: [facility]  Lab Results   Component Value Date    VITD25 50.2 12/30/2022         Review of Systems   Constitutional:  Negative for chills and fever. HENT:  Negative for congestion, ear pain and sore throat. Respiratory:  Negative for cough, chest tightness, shortness of breath and wheezing. Cardiovascular:  Negative for chest pain. Gastrointestinal:  Negative for abdominal pain, diarrhea and nausea.    Musculoskeletal:  Positive for back pain (chronic, stable). Negative for arthralgias and myalgias. Skin:  Negative for rash. Neurological:  Negative for dizziness and light-headedness. Past Medical History:   Diagnosis Date    Age-related osteoporosis without current pathological fracture     Basal cell carcinoma (BCC) of scalp     skin    Coronary artery disease involving native coronary artery of native heart without angina pectoris     stents done in [place]; sees [facility] cardiology    DDD (degenerative disc disease), lumbar     Essential hypertension     Knee pain     Mixed hyperlipidemia     Ventral hernia        Current Outpatient Medications   Medication Sig Dispense Refill    gabapentin (NEURONTIN) 100 MG capsule Take 1 capsule by mouth 2 times daily for 90 days.  Max Daily Amount: 200 mg 180 capsule 1    potassium chloride (KLOR-CON) 10 MEQ extended release tablet TAKE 1 TABLET EVERY DAY 90 tablet 3    denosumab (PROLIA) 60 MG/ML SOSY SC injection Inject 1 mL into the skin every 6 months 1 mL 3    Multiple Vitamins-Minerals (PRESERVISION AREDS 2) CAPS Take by mouth in the morning and at bedtime      atorvastatin (LIPITOR) 20 MG tablet Take 1 tablet by mouth nightly 90 tablet 2    amLODIPine (NORVASC) 10 MG tablet TAKE 1 TABLET EVERY DAY 90 tablet 3    isosorbide mononitrate (IMDUR) 30 MG extended release tablet TAKE 1 TABLET EVERY DAY 90 tablet 3    hydroCHLOROthiazide (HYDRODIURIL) 25 MG tablet TAKE 1 TABLET EVERY DAY (Patient taking differently: Take 25 mg by mouth daily) 90 tablet 1    aspirin EC 81 MG EC tablet Take 1 tablet by mouth 2 times daily (Patient taking differently: Take 81 mg by mouth daily) 60 tablet 0    vitamin D (CHOLECALCIFEROL) 25 MCG (1000 UT) TABS tablet Take 1,000 Units by mouth daily      calcium carbonate 600 MG TABS tablet Take 1 tablet by mouth daily      Multiple Vitamins-Minerals (OCUVITE PO) Take 1 tablet by mouth 2 times daily      nitroGLYCERIN (NITROSTAT) 0.4 MG SL tablet Place 1 tablet under the tongue every 5 minutes as needed for Chest pain up to max of 3 total doses. If no relief after 1 dose, call 911. 18 tablet 3     No current facility-administered medications for this visit. No Known Allergies    Past Surgical History:   Procedure Laterality Date    CARDIAC CATHETERIZATION      stents 2020    EYE SURGERY      h/o eye bleed    FOOT SURGERY      deep calluses    HERNIA REPAIR N/A 2022    ROBOT ASSISTED LAPAROSCOPIC INCARCERATED VENTRAL HERNIA  REPAIR  WITH MESH performed by Zofia Lima DO at [address] [facility])      total-abdominal    TONSILLECTOMY      TOTAL HIP ARTHROPLASTY Left     TOTAL KNEE ARTHROPLASTY Right 10/14/2021    RIGHT KNEE TOTAL ARTHROPLASTY performed by Butch Christine MD at [facility]         Social History     Tobacco Use    Smoking status: Former     Types: Cigarettes     Quit date:      Years since quittin.0    Smokeless tobacco: Never   Vaping Use    Vaping Use: Never used   Substance Use Topics    Alcohol use: Never    Drug use: Never       Family History   Problem Relation Age of Onset    Heart Disease Mother     Cancer Sister         lung    Heart Disease Sister     Heart Attack Brother        /62   Pulse 76   Temp 97.2 °F (36.2 °C)   Ht 5' 3\" (1.6 m)   Wt 159 lb (72.1 kg)   SpO2 98%   BMI 28.17 kg/m²     Physical Exam  Vitals reviewed. Constitutional:       General: She is not in acute distress. Appearance: Normal appearance. She is well-developed. HENT:      Head: Normocephalic. Eyes:      Conjunctiva/sclera: Conjunctivae normal.      Pupils: Pupils are equal, round, and reactive to light. Neck:      Thyroid: No thyromegaly. Vascular: No carotid bruit or JVD. Trachea: No tracheal deviation. Cardiovascular:      Rate and Rhythm: Normal rate and regular rhythm. Heart sounds: Normal heart sounds. No murmur heard.   Pulmonary:      Effort: Pulmonary effort is normal. No respiratory distress. Breath sounds: Normal breath sounds. No wheezing or rhonchi. Musculoskeletal:         General: Normal range of motion. Cervical back: Normal range of motion and neck supple. Lymphadenopathy:      Cervical: No cervical adenopathy. Skin:     General: Skin is warm and dry. Findings: No rash. Neurological:      Mental Status: She is alert. Psychiatric:         Mood and Affect: Mood normal.         Behavior: Behavior normal.         Thought Content: Thought content normal.       ASSESSMENT/PLAN:  1. Encounter for subsequent annual wellness visit in Medicare patient  -MAW completed, see additional note.  -Screenings reviewed in HPI. 2. Nonrheumatic aortic valve stenosis  -Stable, asymptomatic. Continue management per cardiology. 3. Spinal stenosis of lumbar region with neurogenic claudication  -Stable, controlled. Continue gabapentin at current dose, tolerates well. 4. Prediabetes  -Stable, diet controlled. Continue to limit carbs, sugars. Continue regular exercise. -A1c in 6 months  - Hemoglobin A1C; Future    5. Essential hypertension  -Stable, controlled. Continue current medication.  - CBC with Auto Differential; Future    6. Mixed hyperlipidemia  -Stable, controlled. Continue atorvastatin at current dose. Continue healthy diet, regular exercise.  -Fasting lipid in 6 months. - Lipid Panel; Future  - Comprehensive Metabolic Panel; Future    7. Age-related osteoporosis without current pathological fracture  -Reorder DEXA since it was not scheduled previously. -Continue Prolia, calcium, vitamin D.  - DEXA BONE DENSITY 2 SITES; Future  - Vitamin D 25 Hydroxy; Future    8. Microscopic hematuria  -Recent UTI, symptoms have resolved. Repeat UA due to microscopic blood. - POCT Urinalysis no Micro    9. Ventral hernia without obstruction or gangrene  -s/p repair, doing well    10. Therapeutic drug monitoring    - POCT Rapid Drug Screen    11. Chronic bilateral low back pain without sciatica  -as above  - gabapentin (NEURONTIN) 100 MG capsule; Take 1 capsule by mouth 2 times daily for 90 days. Max Daily Amount: 200 mg  Dispense: 180 capsule; Refill: 1         Return in 6 months (on 7/6/2023) for follow up. Frankey Becket was seen today for medicare awv. Diagnoses and all orders for this visit:    Encounter for subsequent annual wellness visit in Medicare patient    Nonrheumatic aortic valve stenosis    Spinal stenosis of lumbar region with neurogenic claudication    Prediabetes  -     Hemoglobin A1C; Future    Essential hypertension  -     CBC with Auto Differential; Future    Mixed hyperlipidemia  -     Lipid Panel; Future  -     Comprehensive Metabolic Panel; Future    Age-related osteoporosis without current pathological fracture  -     DEXA BONE DENSITY 2 SITES; Future  -     Vitamin D 25 Hydroxy; Future    Microscopic hematuria  -     POCT Urinalysis no Micro    Ventral hernia without obstruction or gangrene    Therapeutic drug monitoring  -     POCT Rapid Drug Screen    Chronic bilateral low back pain without sciatica  -     gabapentin (NEURONTIN) 100 MG capsule; Take 1 capsule by mouth 2 times daily for 90 days. Max Daily Amount: 200 mg    Medications Discontinued During This Encounter   Medication Reason    gabapentin (NEURONTIN) 100 MG capsule REORDER     Patient Instructions        Learning About Vision Tests  What are vision tests? The four most common vision tests are visual acuity tests, refraction, visual field tests, and color vision tests. Visual acuity (sharpness) tests  These tests are used: To see if you need glasses or contact lenses. To monitor an eye problem. To check an eye injury. Visual acuity tests are done as part of routine exams. You may also have this test when you get your 's license or apply for some types of jobs. Visual field tests  These tests are used:   To check for vision loss in any area of your range of vision. To screen for certain eye diseases. To look for nerve damage after a stroke, head injury, or other problem that could reduce blood flow to the brain. Refraction and color tests  A refraction test is done to find the right prescription for glasses and contact lenses. A color vision test is done to check for color blindness. Color vision is often tested as part of a routine exam. You may also have this test when you apply for a job where recognizing different colors is important, such as , electronics, or the [employer]. How are vision tests done? Visual acuity test   You cover one eye at a time. You read aloud from a wall chart across the room. You read aloud from a small card that you hold in your hand. Refraction   You look into a special device. The device puts lenses of different strengths in front of each eye to see how strong your glasses or contact lenses need to be. Visual field tests   Your doctor may have you look through special machines. Or your doctor may simply have you stare straight ahead while they move a finger into and out of your field of vision. Color vision test   You look at pieces of printed test patterns in various colors. You say what number or symbol you see. Your doctor may have you trace the number or symbol using a pointer. How do these tests feel? There is very little chance of having a problem from this test. If dilating drops are used for a vision test, they may make the eyes sting and cause a medicine taste in the mouth. Follow-up care is a key part of your treatment and safety. Be sure to make and go to all appointments, and call your doctor if you are having problems. It's also a good idea to know your test results and keep a list of the medicines you take. Where can you learn more? Go to http://www.tejada.com/ and enter G551 to learn more about \"Learning About Vision Tests. \"  Current as of: October 12, 2022               Content Version: 13.5  © 3052-0960 Healthwise, Incorporated. Care instructions adapted under license by [facility] ([facility]). If you have questions about a medical condition or this instruction, always ask your healthcare professional. [address] any warranty or liability for your use of this information. Advance Directives: Care Instructions  Overview  An advance directive is a legal way to state your wishes at the end of your life. It tells your family and your doctor what to do if you can't say what you want. There are two main types of advance directives. You can change them any time your wishes change. Living will. This form tells your family and your doctor your wishes about life support and other treatment. The form is also called a declaration. Medical power of . This form lets you name a person to make treatment decisions for you when you can't speak for yourself. This person is called a health care agent (health care proxy, health care surrogate). The form is also called a durable power of  for health care. If you do not have an advance directive, decisions about your medical care may be made by a family member, or by a doctor or a  who doesn't know you. It may help to think of an advance directive as a gift to the people who care for you. If you have one, they won't have to make tough decisions by themselves. For more information, including forms for your state, see the [address] website (www.caringinfo.org/planning/advance-directives/). Follow-up care is a key part of your treatment and safety. Be sure to make and go to all appointments, and call your doctor if you are having problems. It's also a good idea to know your test results and keep a list of the medicines you take. What should you include in an advance directive? Many states have a unique advance directive form.  (It may ask you to address specific issues.) Or you might use a universal form that's approved by many states. If your form doesn't tell you what to address, it may be hard to know what to include in your advance directive. Use the questions below to help you get started. Who do you want to make decisions about your medical care if you are not able to? What life-support measures do you want if you have a serious illness that gets worse over time or can't be cured? What are you most afraid of that might happen? (Maybe you're afraid of having pain, losing your independence, or being kept alive by machines.)  Where would you prefer to die? (Your home? A hospital? A nursing home?)  Do you want to donate your organs when you die? Do you want certain Gnosticist practices performed before you die? When should you call for help? Be sure to contact your doctor if you have any questions. Where can you learn more? Go to http://www.tejada.com/ and enter R264 to learn more about \"Advance Directives: Care Instructions. \"  Current as of: June 16, 2022               Content Version: 13.5  © 6902-8406 Healthwise, Incorporated. Care instructions adapted under license by [facility] ([facility]). If you have questions about a medical condition or this instruction, always ask your healthcare professional. [address] any warranty or liability for your use of this information. Personalized Preventive Plan for Sandeep Vuongman - 1/6/2023  Medicare offers a range of preventive health benefits. Some of the tests and screenings are paid in full while other may be subject to a deductible, co-insurance, and/or copay. Some of these benefits include a comprehensive review of your medical history including lifestyle, illnesses that may run in your family, and various assessments and screenings as appropriate. After reviewing your medical record and screening and assessments performed today your provider may have ordered immunizations, labs, imaging, and/or referrals for you.   A list of these orders (if applicable) as well as your Preventive Care list are included within your After Visit Summary for your review. Other Preventive Recommendations:    A preventive eye exam performed by an eye specialist is recommended every 1-2 years to screen for glaucoma; cataracts, macular degeneration, and other eye disorders. A preventive dental visit is recommended every 6 months. Try to get at least 150 minutes of exercise per week or 10,000 steps per day on a pedometer . Order or download the FREE \"Exercise & Physical Activity: Your Everyday Guide\" from [employer]. Call 4-381.226.9985 or search [employer] online. You need 1327-8240 mg of calcium and 3189-9773 IU of vitamin D per day. It is possible to meet your calcium requirement with diet alone, but a vitamin D supplement is usually necessary to meet this goal.  When exposed to the sun, use a sunscreen that protects against both UVA and UVB radiation with an SPF of 30 or greater. Reapply every 2 to 3 hours or after sweating, drying off with a towel, or swimming. Always wear a seat belt when traveling in a car. Always wear a helmet when riding a bicycle or motorcycle. Patient voicesunderstanding and agrees to plans along with risks and benefits of plan. Counseling:  Blade Love's case, medications and options were discussed in detail. Patient was instructed to call the office if she questionsregarding her treatment. Should her conditions worsen, she should return to office to be reassessed by BRAYDON Churchill. she Should to go the closest Emergency Department for any emergency. They verbalizedunderstanding the above instructions. Return in 6 months (on 7/6/2023) for follow up.

## 2023-01-06 NOTE — TELEPHONE ENCOUNTER
Can we call cardiology and see if they can notify her of her echocardiogram results? I reviewed it briefly with her but they ordered it so I would prefer they review and and result it. She has had trouble reaching them and she does not use my chart so she will need to be called with the results.

## 2023-01-08 ASSESSMENT — ENCOUNTER SYMPTOMS
COUGH: 0
SHORTNESS OF BREATH: 0
BACK PAIN: 1
DIARRHEA: 0
CHEST TIGHTNESS: 0
SORE THROAT: 0
NAUSEA: 0
WHEEZING: 0
ABDOMINAL PAIN: 0

## 2023-01-09 ENCOUNTER — TELEPHONE (OUTPATIENT)
Dept: CARDIOLOGY CLINIC | Age: 88
End: 2023-01-09

## 2023-01-18 ENCOUNTER — HOSPITAL ENCOUNTER (OUTPATIENT)
Dept: ULTRASOUND IMAGING | Age: 88
Discharge: HOME | End: 2023-01-18
Payer: MEDICARE

## 2023-01-18 DIAGNOSIS — M81.0 AGE-RELATED OSTEOPOROSIS WITHOUT CURRENT PATHOLOGICAL FRACTURE: ICD-10-CM

## 2023-01-18 PROCEDURE — 77080 DXA BONE DENSITY AXIAL: CPT

## 2023-01-20 DIAGNOSIS — I35.0 NONRHEUMATIC AORTIC VALVE STENOSIS: Primary | ICD-10-CM

## 2023-01-20 NOTE — PROGRESS NOTES
Echocardiogram largely stable. PAIGE less than 1 cm². SVI borderline normal.  Peak and mean gradient less than 4 m/s and 40 mmHg, respectively.

## 2023-01-25 NOTE — TELEPHONE ENCOUNTER
Left message for patient of office number for results per Dr. Gaytan Fus instruction on 1/23/23 afternoon as well as now.

## 2023-03-06 RX ORDER — HYDROCHLOROTHIAZIDE 25 MG/1
25 TABLET ORAL DAILY
Qty: 90 TABLET | Refills: 3 | Status: SHIPPED | OUTPATIENT
Start: 2023-03-06

## 2023-03-15 ENCOUNTER — OFFICE VISIT (OUTPATIENT)
Dept: PRIMARY CARE CLINIC | Age: 88
End: 2023-03-15
Payer: MEDICARE

## 2023-03-15 ENCOUNTER — HOSPITAL ENCOUNTER (OUTPATIENT)
Dept: CT IMAGING | Age: 88
Discharge: HOME | End: 2023-03-15
Payer: MEDICARE

## 2023-03-15 ENCOUNTER — HOSPITAL ENCOUNTER (OUTPATIENT)
Dept: GENERAL RADIOLOGY | Age: 88
Discharge: HOME | End: 2023-03-15
Payer: MEDICARE

## 2023-03-15 VITALS
HEIGHT: 63 IN | SYSTOLIC BLOOD PRESSURE: 110 MMHG | WEIGHT: 158 LBS | HEART RATE: 70 BPM | TEMPERATURE: 97 F | OXYGEN SATURATION: 96 % | DIASTOLIC BLOOD PRESSURE: 62 MMHG | BODY MASS INDEX: 28 KG/M2

## 2023-03-15 DIAGNOSIS — S09.93XA FACIAL TRAUMA, INITIAL ENCOUNTER: ICD-10-CM

## 2023-03-15 DIAGNOSIS — R04.0 EPISTAXIS: ICD-10-CM

## 2023-03-15 DIAGNOSIS — R07.81 RIB PAIN ON LEFT SIDE: ICD-10-CM

## 2023-03-15 DIAGNOSIS — S02.32XA CLOSED FRACTURE OF LEFT ORBITAL FLOOR, INITIAL ENCOUNTER (HCC): Primary | ICD-10-CM
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
## 2023-03-15 DIAGNOSIS — I10 ESSENTIAL HYPERTENSION: ICD-10-CM

## 2023-03-15 PROCEDURE — 71101 X-RAY EXAM UNILAT RIBS/CHEST: CPT

## 2023-03-15 PROCEDURE — G8417 CALC BMI ABV UP PARAM F/U: HCPCS | Performed by: NURSE PRACTITIONER

## 2023-03-15 PROCEDURE — 1123F ACP DISCUSS/DSCN MKR DOCD: CPT | Performed by: NURSE PRACTITIONER

## 2023-03-15 PROCEDURE — 99214 OFFICE O/P EST MOD 30 MIN: CPT | Performed by: NURSE PRACTITIONER

## 2023-03-15 PROCEDURE — G8427 DOCREV CUR MEDS BY ELIG CLIN: HCPCS | Performed by: NURSE PRACTITIONER

## 2023-03-15 PROCEDURE — 1090F PRES/ABSN URINE INCON ASSESS: CPT | Performed by: NURSE PRACTITIONER

## 2023-03-15 PROCEDURE — G8484 FLU IMMUNIZE NO ADMIN: HCPCS | Performed by: NURSE PRACTITIONER

## 2023-03-15 PROCEDURE — 1036F TOBACCO NON-USER: CPT | Performed by: NURSE PRACTITIONER

## 2023-03-15 PROCEDURE — 70486 CT MAXILLOFACIAL W/O DYE: CPT

## 2023-03-15 SDOH — ECONOMIC STABILITY: FOOD INSECURITY: WITHIN THE PAST 12 MONTHS, YOU WORRIED THAT YOUR FOOD WOULD RUN OUT BEFORE YOU GOT MONEY TO BUY MORE.: NEVER TRUE

## 2023-03-15 SDOH — ECONOMIC STABILITY: FOOD INSECURITY: WITHIN THE PAST 12 MONTHS, THE FOOD YOU BOUGHT JUST DIDN'T LAST AND YOU DIDN'T HAVE MONEY TO GET MORE.: NEVER TRUE

## 2023-03-15 SDOH — ECONOMIC STABILITY: INCOME INSECURITY: HOW HARD IS IT FOR YOU TO PAY FOR THE VERY BASICS LIKE FOOD, HOUSING, MEDICAL CARE, AND HEATING?: NOT HARD AT ALL

## 2023-03-15 SDOH — ECONOMIC STABILITY: HOUSING INSECURITY
IN THE LAST 12 MONTHS, WAS THERE A TIME WHEN YOU DID NOT HAVE A STEADY PLACE TO SLEEP OR SLEPT IN A SHELTER (INCLUDING NOW)?: NO

## 2023-03-15 ASSESSMENT — ENCOUNTER SYMPTOMS
SORE THROAT: 0
WHEEZING: 0
NAUSEA: 0
SHORTNESS OF BREATH: 0
DIARRHEA: 0
CHEST TIGHTNESS: 0
COUGH: 0
ABDOMINAL PAIN: 0

## 2023-03-15 NOTE — PROGRESS NOTES
Niko Brown is a 81 y.o. female who presents today for  Chief Complaint   Patient presents with    Constance Lundberg when she was on vacation last Saturday. Has been coughing up some blood since Monday       HPI:  She sustained a fall while in [facility] on 3/11. She tripped over a concrete parking bumper and landed on her left side hitting the left side of her face on the concrete and L rib cage. No loss of consciousness. She developed a nosebleed from the L nare immediately following the fall which took a few hours before it stopped completely. Has not recurred. She takes ASA 81 mg daily. EMS evaluated her on site. She elected to not go to ER. She has had no dizziness or confusion. No headaches. No visual disturbance. She states she coughed up a \"blood clot\" 2 days ago (2 days following the fall). She had scant blood in her sputum this morning. No shortness of breath. BP is stable on amlodipine, HCTZ. Review of Systems   Constitutional:  Negative for chills and fever. HENT:  Positive for nosebleeds. Negative for congestion, ear pain and sore throat. Eyes:  Negative for visual disturbance. Respiratory:  Negative for cough, chest tightness, shortness of breath and wheezing. Cardiovascular:  Negative for chest pain. Gastrointestinal:  Negative for abdominal pain, diarrhea and nausea. Musculoskeletal:  Positive for arthralgias (L rib cage pain). Negative for myalgias. Skin:  Negative for rash. Neurological:  Negative for dizziness, light-headedness and headaches.      Past Medical History:   Diagnosis Date    Age-related osteoporosis without current pathological fracture     Basal cell carcinoma (BCC) of scalp     skin    Coronary artery disease involving native coronary artery of native heart without angina pectoris     stents done in [place]; sees [facility] cardiology    DDD (degenerative disc disease), lumbar     Essential hypertension     Knee pain     Mixed hyperlipidemia Ventral hernia        Current Outpatient Medications   Medication Sig Dispense Refill    hydroCHLOROthiazide (HYDRODIURIL) 25 MG tablet Take 1 tablet by mouth daily 90 tablet 3    gabapentin (NEURONTIN) 100 MG capsule Take 1 capsule by mouth 2 times daily for 90 days. Max Daily Amount: 200 mg 180 capsule 1    potassium chloride (KLOR-CON) 10 MEQ extended release tablet TAKE 1 TABLET EVERY DAY 90 tablet 3    denosumab (PROLIA) 60 MG/ML SOSY SC injection Inject 1 mL into the skin every 6 months 1 mL 3    Multiple Vitamins-Minerals (PRESERVISION AREDS 2) CAPS Take by mouth in the morning and at bedtime      atorvastatin (LIPITOR) 20 MG tablet Take 1 tablet by mouth nightly 90 tablet 2    amLODIPine (NORVASC) 10 MG tablet TAKE 1 TABLET EVERY DAY 90 tablet 3    isosorbide mononitrate (IMDUR) 30 MG extended release tablet TAKE 1 TABLET EVERY DAY 90 tablet 3    aspirin EC 81 MG EC tablet Take 1 tablet by mouth 2 times daily (Patient taking differently: Take 81 mg by mouth daily) 60 tablet 0    vitamin D (CHOLECALCIFEROL) 25 MCG (1000 UT) TABS tablet Take 1,000 Units by mouth daily      calcium carbonate 600 MG TABS tablet Take 1 tablet by mouth daily      Multiple Vitamins-Minerals (OCUVITE PO) Take 1 tablet by mouth 2 times daily      nitroGLYCERIN (NITROSTAT) 0.4 MG SL tablet Place 1 tablet under the tongue every 5 minutes as needed for Chest pain up to max of 3 total doses. If no relief after 1 dose, call 911. 25 tablet 3     No current facility-administered medications for this visit.        No Known Allergies    Past Surgical History:   Procedure Laterality Date    CARDIAC CATHETERIZATION      stents 2019, 2020    EYE SURGERY      h/o eye bleed    FOOT SURGERY      deep calluses    HERNIA REPAIR N/A 8/12/2022    ROBOT ASSISTED LAPAROSCOPIC INCARCERATED VENTRAL HERNIA  REPAIR  WITH MESH performed by Catalina Buenrostro DO at [facility] (CERVIX STATUS UNKNOWN)      [facility] ARTHROPLASTY Left 2019    TOTAL KNEE ARTHROPLASTY Right 10/14/2021    RIGHT KNEE TOTAL ARTHROPLASTY performed by Stacey Luther MD at [facility] [address]         Social History     Tobacco Use    Smoking status: Former     Types: Cigarettes     Quit date:      Years since quittin.2    Smokeless tobacco: Never   Vaping Use    Vaping Use: Never used   Substance Use Topics    Alcohol use: Never    Drug use: Never       Family History   Problem Relation Age of Onset    Heart Disease Mother     Cancer Sister         lung    Heart Disease Sister     Heart Attack Brother        /62   Pulse 70   Temp 97 °F (36.1 °C)   Ht 5' 3\" (1.6 m)   Wt 158 lb (71.7 kg)   SpO2 96%   BMI 27.99 kg/m²     Physical Exam  Vitals reviewed. Constitutional:       General: She is not in acute distress. Appearance: Normal appearance. She is well-developed. HENT:      Head: Normocephalic. Comments: Small laceration above L eyebrow. Bruising to L periorbital region extending to L cheek. Facial bones nontender to palpation. Right Ear: Tympanic membrane and external ear normal.      Left Ear: Tympanic membrane and external ear normal.      Nose: Nose normal. No nasal deformity. Right Nostril: No epistaxis or septal hematoma. Left Nostril: No epistaxis or septal hematoma. Mouth/Throat:      Mouth: Mucous membranes are moist.      Pharynx: No oropharyngeal exudate or posterior oropharyngeal erythema. Eyes:      Conjunctiva/sclera: Conjunctivae normal.      Pupils: Pupils are equal, round, and reactive to light. Neck:      Thyroid: No thyromegaly. Vascular: No carotid bruit or JVD. Trachea: No tracheal deviation. Cardiovascular:      Rate and Rhythm: Normal rate and regular rhythm. Heart sounds: Normal heart sounds. No murmur heard. Pulmonary:      Effort: Pulmonary effort is normal. No respiratory distress. Breath sounds: Normal breath sounds.  No wheezing or rhonchi. Musculoskeletal:         General: Normal range of motion. Cervical back: Normal range of motion and neck supple. Lymphadenopathy:      Cervical: No cervical adenopathy. Skin:     General: Skin is warm and dry. Findings: No rash. Neurological:      Mental Status: She is alert. Psychiatric:         Mood and Affect: Mood normal.         Behavior: Behavior normal.         Thought Content: Thought content normal.       ASSESSMENT/PLAN:  1. Facial trauma, initial encounter  -Given her extensive bruising and facial trauma, we will proceed with CT of facial bones to rule out occult fracture. - CT FACIAL BONES WO CONTRAST; Future    2. Fall, initial encounter    - [facility]; Future  - XR RIBS LEFT INCLUDE CHEST (MIN 3 VIEWS); Future    3. Epistaxis  -Resolved, secondary to trauma. She will report any recurrence. Refer to ENT with any recurrence. 4. Rib pain on left side  -Rib XR, include CXR   - XR RIBS LEFT INCLUDE CHEST (MIN 3 VIEWS); Future    5. Essential hypertension  -Stable, controlled. Continue amlodipine, HCTZ. Return for as scheduled. Carolyn Kurtz was seen today for fall. Diagnoses and all orders for this visit:    Facial trauma, initial encounter  -     CT FACIAL BONES WO CONTRAST; Future    Fall, initial encounter  -     CT FACIAL BONES WO CONTRAST; Future  -     XR RIBS LEFT INCLUDE CHEST (MIN 3 VIEWS); Future    Epistaxis    Rib pain on left side  -     XR RIBS LEFT INCLUDE CHEST (MIN 3 VIEWS); Future    Essential hypertension    There are no discontinued medications. There are no Patient Instructions on file for this visit. Patient voicesunderstanding and agrees to plans along with risks and benefits of plan. Counseling:  Linh Harper Love's case, medications and options were discussed in detail. Patient was instructed to call the office if she questionsregarding her treatment.  Should her conditions worsen, she should return to office to be reassessed by BRAYDON Camacho. she Should to go the closest Emergency Department for any emergency. They verbalizedunderstanding the above instructions. Return for as scheduled.

## 2023-03-16 RX ORDER — AMOXICILLIN AND CLAVULANATE POTASSIUM 875; 125 MG/1; MG/1
1 TABLET, FILM COATED ORAL 2 TIMES DAILY
Qty: 20 TABLET | Refills: 0 | Status: SHIPPED | OUTPATIENT
Start: 2023-03-16 | End: 2023-03-26

## 2023-03-21 ENCOUNTER — OFFICE VISIT (OUTPATIENT)
Dept: ENT CLINIC | Age: 88
End: 2023-03-21
Payer: MEDICARE

## 2023-03-21 VITALS — DIASTOLIC BLOOD PRESSURE: 72 MMHG | SYSTOLIC BLOOD PRESSURE: 126 MMHG

## 2023-03-21 DIAGNOSIS — S02.32XA CLOSED FRACTURE OF LEFT ORBITAL FLOOR, INITIAL ENCOUNTER (HCC): Primary | ICD-10-CM

## 2023-03-21 PROCEDURE — G8427 DOCREV CUR MEDS BY ELIG CLIN: HCPCS | Performed by: OTOLARYNGOLOGY

## 2023-03-21 PROCEDURE — G8484 FLU IMMUNIZE NO ADMIN: HCPCS | Performed by: OTOLARYNGOLOGY

## 2023-03-21 PROCEDURE — 1036F TOBACCO NON-USER: CPT | Performed by: OTOLARYNGOLOGY

## 2023-03-21 PROCEDURE — G8417 CALC BMI ABV UP PARAM F/U: HCPCS | Performed by: OTOLARYNGOLOGY

## 2023-03-21 PROCEDURE — 99203 OFFICE O/P NEW LOW 30 MIN: CPT | Performed by: OTOLARYNGOLOGY

## 2023-03-21 PROCEDURE — 1090F PRES/ABSN URINE INCON ASSESS: CPT | Performed by: OTOLARYNGOLOGY

## 2023-03-21 PROCEDURE — 1123F ACP DISCUSS/DSCN MKR DOCD: CPT | Performed by: OTOLARYNGOLOGY

## 2023-03-21 ASSESSMENT — ENCOUNTER SYMPTOMS
ALLERGIC/IMMUNOLOGIC NEGATIVE: 1
RESPIRATORY NEGATIVE: 1
GASTROINTESTINAL NEGATIVE: 1
EYES NEGATIVE: 1

## 2023-03-21 NOTE — PROGRESS NOTES
3/21/2023    Phoenix Mandujano (:  1935) is a 81 y.o. female, Established patient, here for evaluation of the following chief complaint(s):  New Patient (Epistaxis, orbital fracture)      Vitals:    23 1645   BP: 126/72       Wt Readings from Last 3 Encounters:   03/15/23 158 lb (71.7 kg)   23 159 lb (72.1 kg)   22 158 lb (71.7 kg)       BP Readings from Last 3 Encounters:   23 126/72   03/15/23 110/62   23 122/62         SUBJECTIVE/OBJECTIVE:    Seen today for left orbital floor fracture but a week and a half ago she fell and landed on her face. She says she did not go to the emergency room. Her primary care ordered a CT scan showing a comminuted orbital floor fracture on the left. Patient denies double vision or visual changes. She is recovering fairly well. Review of Systems   Constitutional: Negative. HENT: Negative. Eyes: Negative. Respiratory: Negative. Cardiovascular: Negative. Gastrointestinal: Negative. Endocrine: Negative. Musculoskeletal: Negative. Skin: Negative. Allergic/Immunologic: Negative. Neurological: Negative. Hematological: Negative. Psychiatric/Behavioral: Negative. Physical Exam  Vitals reviewed. Constitutional:       Appearance: Normal appearance. She is normal weight. HENT:      Head: Normocephalic and atraumatic. Right Ear: Tympanic membrane, ear canal and external ear normal.      Left Ear: Tympanic membrane, ear canal and external ear normal.      Nose: Nose normal.      Mouth/Throat:      Mouth: Mucous membranes are moist.      Pharynx: Oropharynx is clear. Eyes:      Extraocular Movements: Extraocular movements intact. Pupils: Pupils are equal, round, and reactive to light. Comments: Some periorbital ecchymosis extraocular muscles intact no double vision. Cardiovascular:      Rate and Rhythm: Normal rate and regular rhythm.    Pulmonary:      Effort: Pulmonary effort is

## 2023-03-28 ENCOUNTER — TELEPHONE (OUTPATIENT)
Dept: ENT CLINIC | Age: 88
End: 2023-03-28

## 2023-03-28 NOTE — TELEPHONE ENCOUNTER
Pt LVM asking if it was still normal to have blood draining down her throat. Dr. Keiry Pierre stated this is normal and expected for about another week. If she is still having the drainage next Tuesday she will call back to let us know.

## 2023-04-17 ENCOUNTER — OFFICE VISIT (OUTPATIENT)
Dept: CARDIOLOGY CLINIC | Age: 88
End: 2023-04-17
Payer: MEDICARE

## 2023-04-17 VITALS
DIASTOLIC BLOOD PRESSURE: 62 MMHG | WEIGHT: 156 LBS | OXYGEN SATURATION: 94 % | BODY MASS INDEX: 27.64 KG/M2 | SYSTOLIC BLOOD PRESSURE: 130 MMHG | HEART RATE: 67 BPM | HEIGHT: 63 IN

## 2023-04-17 DIAGNOSIS — I35.0 NONRHEUMATIC AORTIC VALVE STENOSIS: Primary | ICD-10-CM

## 2023-04-17 PROCEDURE — 99214 OFFICE O/P EST MOD 30 MIN: CPT | Performed by: INTERNAL MEDICINE

## 2023-04-17 PROCEDURE — G8417 CALC BMI ABV UP PARAM F/U: HCPCS | Performed by: INTERNAL MEDICINE

## 2023-04-17 PROCEDURE — 1090F PRES/ABSN URINE INCON ASSESS: CPT | Performed by: INTERNAL MEDICINE

## 2023-04-17 PROCEDURE — 1123F ACP DISCUSS/DSCN MKR DOCD: CPT | Performed by: INTERNAL MEDICINE

## 2023-04-17 PROCEDURE — 1036F TOBACCO NON-USER: CPT | Performed by: INTERNAL MEDICINE

## 2023-04-17 PROCEDURE — G8427 DOCREV CUR MEDS BY ELIG CLIN: HCPCS | Performed by: INTERNAL MEDICINE

## 2023-04-17 NOTE — PROGRESS NOTES
fraction 78%. Continue baby aspirin, amlodipine, and long-acting nitrate. Has rarely used nitroglycerin as needed. Essential hypertension, goal blood pressure less than 130/80  Continue amlodipine and hydrochlorothiazide     Dyslipidemia, goal LDL less than 70  Continue Lipitor     Abnormal EKG--chronic right bundle branch block                    Electronically signed by Camille Berger MD on 4/17/2023 at 11:31 AM    Camille Berger MD, DARIA, [facility] [place]  Noninvasive Cardiology Consultant    This dictation was generated by voice recognition computer software. Although all attempts are made to edit the dictation for accuracy, there may be errors in the transcription that are not intended.

## 2023-04-18 ASSESSMENT — ENCOUNTER SYMPTOMS
BLOOD IN STOOL: 0
WHEEZING: 0
NAUSEA: 0
EYE DISCHARGE: 0
EYE REDNESS: 0
DIARRHEA: 0
COUGH: 0
VOMITING: 0
EYE PAIN: 0
CHEST TIGHTNESS: 0
ABDOMINAL DISTENTION: 0
CONSTIPATION: 0
SORE THROAT: 0
SHORTNESS OF BREATH: 0
ABDOMINAL PAIN: 0
FACIAL SWELLING: 0
APNEA: 0

## 2023-04-21 RX ORDER — ATORVASTATIN CALCIUM 20 MG/1
TABLET, FILM COATED ORAL
Qty: 90 TABLET | Refills: 2 | Status: SHIPPED | OUTPATIENT
Start: 2023-04-21

## 2023-05-23 ENCOUNTER — TELEPHONE (OUTPATIENT)
Dept: PRIMARY CARE CLINIC | Age: 88
End: 2023-05-23

## 2023-06-16 DIAGNOSIS — M54.9 MID BACK PAIN: ICD-10-CM

## 2023-06-16 DIAGNOSIS — R10.84 GENERALIZED ABDOMINAL PAIN: ICD-10-CM

## 2023-06-16 LAB
ALBUMIN SERPL-MCNC: 4.4 G/DL (ref 3.5–5.2)
ALP SERPL-CCNC: 78 U/L (ref 35–104)
ALT SERPL-CCNC: 14 U/L (ref 5–33)
ANION GAP SERPL CALCULATED.3IONS-SCNC: 13 MMOL/L (ref 7–19)
AST SERPL-CCNC: 20 U/L (ref 5–32)
BASOPHILS # BLD: 0 K/UL (ref 0–0.2)
BASOPHILS NFR BLD: 0.4 % (ref 0–1)
BILIRUB SERPL-MCNC: 0.4 MG/DL (ref 0.2–1.2)
BUN SERPL-MCNC: 15 MG/DL (ref 8–23)
CALCIUM SERPL-MCNC: 10.6 MG/DL (ref 8.8–10.2)
CHLORIDE SERPL-SCNC: 91 MMOL/L (ref 98–111)
CO2 SERPL-SCNC: 28 MMOL/L (ref 22–29)
CREAT SERPL-MCNC: 0.8 MG/DL (ref 0.5–0.9)
EOSINOPHIL # BLD: 0.1 K/UL (ref 0–0.6)
EOSINOPHIL NFR BLD: 1 % (ref 0–5)
ERYTHROCYTE [DISTWIDTH] IN BLOOD BY AUTOMATED COUNT: 14.2 % (ref 11.5–14.5)
GLUCOSE SERPL-MCNC: 118 MG/DL (ref 74–109)
HCT VFR BLD AUTO: 39.5 % (ref 37–47)
HGB BLD-MCNC: 13.1 G/DL (ref 12–16)
IMM GRANULOCYTES # BLD: 0 K/UL
LIPASE SERPL-CCNC: 27 U/L (ref 13–60)
LYMPHOCYTES # BLD: 2.8 K/UL (ref 1.1–4.5)
LYMPHOCYTES NFR BLD: 29.8 % (ref 20–40)
MCH RBC QN AUTO: 28.4 PG (ref 27–31)
MCHC RBC AUTO-ENTMCNC: 33.2 G/DL (ref 33–37)
MCV RBC AUTO: 85.5 FL (ref 81–99)
MONOCYTES # BLD: 0.6 K/UL (ref 0–0.9)
MONOCYTES NFR BLD: 6.4 % (ref 0–10)
NEUTROPHILS # BLD: 5.8 K/UL (ref 1.5–7.5)
NEUTS SEG NFR BLD: 62.2 % (ref 50–65)
PLATELET # BLD AUTO: 309 K/UL (ref 130–400)
PMV BLD AUTO: 9.4 FL (ref 9.4–12.3)
POTASSIUM SERPL-SCNC: 3.7 MMOL/L (ref 3.5–5)
PROT SERPL-MCNC: 7.6 G/DL (ref 6.6–8.7)
RBC # BLD AUTO: 4.62 M/UL (ref 4.2–5.4)
SODIUM SERPL-SCNC: 132 MMOL/L (ref 136–145)
WBC # BLD AUTO: 9.2 K/UL (ref 4.8–10.8)

## 2023-06-19 ENCOUNTER — TELEPHONE (OUTPATIENT)
Dept: NEUROSURGERY | Age: 88
End: 2023-06-19

## 2023-06-19 NOTE — TELEPHONE ENCOUNTER
[facility]    Diagnosis/Reason for Referral?    S22.050A (ICD-10-CM) - Compression fracture of T6 vertebra, initial encounter ([facility] [address]   S32.010A (ICD-10-CM) - Compression fracture of L1 vertebra, initial encounter ([facility] [address]       2. Who is completing questionnaire? Patient  Caregiver Family      3. Has the patient had any previous spinal/brain surgeries? No   A. If yes, what is the name of the facility in which the surgery was performed? B. Procedure/Surgery performed? C. Who was the surgeon? D. When was the surgery? MM/YY       E. Did the patient improve after the surgery? 4. Is this a second opinion? If yes, Dr. Andrea Regan would like to review patient first before making the appointment. No  5. Have MRI Images been obtain within the last year? Yes  No      XR  CT     If yes, where was the imaging performed? [facility]     If yes, what part of the body? Lumbar  Cervical  Thoracic  Brain     If yes, when was it obtained? 6/16/23    Note: if the scan was performed at a facility other than [facility], the disc will need to be brought to the appointment or we need to reach out to obtain the disc. A. Was the patient instructed to provide the disc? Yes   No      8. Has the patient had a NCV/EMG within the last year? Yes  No     If yes, where was it performed and date? MM/YY  Location:      9. Has the patient been to Physical Therapy? Yes  No     If yes, what location, how long attended, and last visit? Location:        Therapy Lasted:    Date of Last Visit:      10. Has the patient been to Pain Management? Yes  No     If yes, what location and last visit     Location:   Last Visit:   Is it helping?

## 2023-06-20 ENCOUNTER — TELEPHONE (OUTPATIENT)
Dept: PRIMARY CARE CLINIC | Age: 88
End: 2023-06-20

## 2023-06-20 NOTE — TELEPHONE ENCOUNTER
----- Message from Nafisa Vázquez sent at 6/19/2023  2:29 PM CDT -----  Subject: Message to Provider    QUESTIONS  Information for Provider? Patient called the Neurosurgeon and they cannot   get her in till 7/5. She cannot wait that long as she is in a lot of pain. Is there anyway to get her in sooner whether to them or another   Neurosurgeon in her network?  ---------------------------------------------------------------------------  --------------  [id number] [employer]  [phone]; OK to leave message on voicemail  ---------------------------------------------------------------------------  --------------  SCRIPT ANSWERS  Relationship to Patient?  Self

## 2023-06-21 ENCOUNTER — TELEPHONE (OUTPATIENT)
Dept: PRIMARY CARE CLINIC | Age: 88
End: 2023-06-21

## 2023-06-21 DIAGNOSIS — S32.010A COMPRESSION FRACTURE OF L1 VERTEBRA, INITIAL ENCOUNTER (HCC): ICD-10-CM

## 2023-06-21 DIAGNOSIS — M54.9 MID BACK PAIN: ICD-10-CM

## 2023-06-21 DIAGNOSIS — S22.050A COMPRESSION FRACTURE OF T6 VERTEBRA, INITIAL ENCOUNTER (HCC): Primary | ICD-10-CM

## 2023-06-21 NOTE — TELEPHONE ENCOUNTER
If Dr. Rico Grayson can see her this week if we can't get her in sooner with Dr. Yisel Jackson, that would be fine. She needs a TLSO brace for the time being. She needs to wear it during the day. I have placed a DME order. See if the hydrocodone has helped. I can send a new rx in if needed.   See if previous dosing was ok and if she tolerated it (7.5 mg bid)

## 2023-06-22 ENCOUNTER — OFFICE VISIT (OUTPATIENT)
Dept: NEUROSURGERY | Age: 88
End: 2023-06-22

## 2023-06-22 VITALS
SYSTOLIC BLOOD PRESSURE: 115 MMHG | DIASTOLIC BLOOD PRESSURE: 80 MMHG | HEIGHT: 63 IN | RESPIRATION RATE: 18 BRPM | BODY MASS INDEX: 28.53 KG/M2 | HEART RATE: 80 BPM | WEIGHT: 161 LBS

## 2023-06-22 DIAGNOSIS — M54.9 MID BACK PAIN: ICD-10-CM

## 2023-06-22 DIAGNOSIS — S22.050A COMPRESSION FRACTURE OF T6 VERTEBRA, INITIAL ENCOUNTER (HCC): Primary | ICD-10-CM

## 2023-06-22 DIAGNOSIS — S22.050A COMPRESSION FRACTURE OF T6 VERTEBRA, INITIAL ENCOUNTER (HCC): ICD-10-CM

## 2023-06-22 DIAGNOSIS — S32.010A COMPRESSION FRACTURE OF L1 VERTEBRA, INITIAL ENCOUNTER (HCC): ICD-10-CM

## 2023-06-22 RX ORDER — HYDROCODONE BITARTRATE AND ACETAMINOPHEN 7.5; 325 MG/1; MG/1
1 TABLET ORAL 2 TIMES DAILY PRN
Qty: 28 TABLET | Refills: 0 | Status: SHIPPED | OUTPATIENT
Start: 2023-06-22 | End: 2023-07-06

## 2023-06-22 ASSESSMENT — ENCOUNTER SYMPTOMS
RESPIRATORY NEGATIVE: 1
GASTROINTESTINAL NEGATIVE: 1
BACK PAIN: 1
EYES NEGATIVE: 1

## 2023-06-22 NOTE — PROGRESS NOTES
[facility] Neurosurgery  Office Visit        Chief Complaint   Patient presents with    New Patient     Establishing care    Results     XR T 6/16/23    Back Pain     Patient states that she has compression fracture of T6 vertebra. She does not go to PT or PM. Her PCP is sending in an order for a back brace if need. Numbness     Patient states that she has hand numbness. HISTORY OF PRESENT ILLNESS:      The patient is a 81 y.o. female with a history of osteoporosis who presents for evaluation of a T6 compression fracture that was noted on recent x-rays obtained to evaluate new thoracic back pain. She states her pain began insidiously after she went to the gym for her [employer] exercise class ~2 weeks ago. She initially felt she had pulled a muscle and was given a prescription for muscle relaxers and this did not help her pain so she had x-rays taken of her thoracic spine on 6/16/2023. She describes mid-thoracic back pain with some radiation around her chest wall. She denies any associated numbness or weakness. Her pain is worsened by movement and interferes with her sleep. She has been prescribed Norco 7.5mg to take at night and this does help her sleep. She also takes tylenol during the day with some relief. She has been prescribed Prolia for osteoporosis, however she states that she has not received any for almost 1 year because she has had difficulty getting her prescription filled. She does report a prior history of another compression fracture (she believes L1) as well.       MEDICAL HISTORY:             Past Medical History:   Diagnosis Date    Age-related osteoporosis without current pathological fracture     Basal cell carcinoma (BCC) of scalp     skin    Coronary artery disease involving native coronary artery of native heart without angina pectoris     stents done in [place]; sees [facility] cardiology    DDD (degenerative disc disease), lumbar     Essential hypertension     Knee pain

## 2023-06-22 NOTE — TELEPHONE ENCOUNTER
Pt notified. She does want another refill of the hydrocodone. She said she tolerated it well. She has an appointment today with Dr. Christofer Negron and will let me know after that where she wants the brace sent.

## 2023-06-23 ENCOUNTER — TELEPHONE (OUTPATIENT)
Dept: PRIMARY CARE CLINIC | Age: 88
End: 2023-06-23

## 2023-06-23 DIAGNOSIS — S22.050A COMPRESSION FRACTURE OF T6 VERTEBRA, INITIAL ENCOUNTER (HCC): ICD-10-CM

## 2023-06-23 DIAGNOSIS — M54.9 MID BACK PAIN: ICD-10-CM

## 2023-06-23 DIAGNOSIS — S32.010A COMPRESSION FRACTURE OF L1 VERTEBRA, INITIAL ENCOUNTER (HCC): ICD-10-CM

## 2023-06-23 RX ORDER — TIZANIDINE 2 MG/1
TABLET ORAL
Qty: 30 TABLET | Refills: 1 | Status: ON HOLD | OUTPATIENT
Start: 2023-06-23 | End: 2023-07-24

## 2023-06-23 RX ORDER — HYDROCODONE BITARTRATE AND ACETAMINOPHEN 7.5; 325 MG/1; MG/1
1 TABLET ORAL 2 TIMES DAILY PRN
Qty: 28 TABLET | Refills: 0 | OUTPATIENT
Start: 2023-06-23 | End: 2023-07-07

## 2023-06-23 NOTE — TELEPHONE ENCOUNTER
Alana Lancaster called to request a refill on her medication. Last office visit : 6/16/2023   Next office visit : 7/7/2023     Last UDS:   Amphetamine Screen, Urine   Date Value Ref Range Status   01/06/2023 neg  Final     Barbiturate Screen, Urine   Date Value Ref Range Status   01/06/2023 neg  Final     Benzodiazepine Screen, Urine   Date Value Ref Range Status   01/06/2023 neg  Final     Buprenorphine Urine   Date Value Ref Range Status   01/06/2023 neg  Final     Cocaine Metabolite Screen, Urine   Date Value Ref Range Status   01/06/2023 neg  Final     Gabapentin Screen, Urine   Date Value Ref Range Status   01/06/2023 neg  Final     MDMA, Urine   Date Value Ref Range Status   01/06/2023 neg  Final     Methamphetamine, Urine   Date Value Ref Range Status   01/06/2023 neg  Final     Opiate Scrn, Ur   Date Value Ref Range Status   01/06/2023 neg  Final     Oxycodone Screen, Ur   Date Value Ref Range Status   01/06/2023 neg  Final     PCP Screen, Urine   Date Value Ref Range Status   01/06/2023 neg  Final     Propoxyphene Screen, Urine   Date Value Ref Range Status   01/06/2023 neg  Final     THC Screen, Urine   Date Value Ref Range Status   01/06/2023 neg  Final     Tricyclic Antidepressants, Urine   Date Value Ref Range Status   01/06/2023 neg  Final       Last Artemio Moralez:   Medication Contract:    Last Fill: 6/22/23    Requested Prescriptions     Pending Prescriptions Disp Refills    HYDROcodone-acetaminophen (LORCET PLUS) 7.5-325 MG per tablet 28 tablet 0     Sig: Take 1 tablet by mouth 2 times daily as needed for Pain for up to 14 days. Intended supply: 14 days. Take lowest dose possible to manage pain Max Daily Amount: 2 tablets         Please approve or refuse this medication.    Nakia Aguilar LPN

## 2023-06-23 NOTE — TELEPHONE ENCOUNTER
----- Message from Vanessa Lawrence sent at 6/22/2023  2:40 PM CDT -----  Subject: Refill Request    QUESTIONS  Name of Medication? HYDROcodone-acetaminophen (LORCET PLUS) 7.5-325 MG per   tablet  Patient-reported dosage and instructions? twice daily  How many days do you have left? 0  Preferred Pharmacy? [facility]. phone number (if available)? [phone]  Additional Information for Provider? pt said she has to have this med sent   to [facility] have it in stock   ---------------------------------------------------------------------------  --------------  CALL BACK INFO  What is the best way for the office to contact you? OK to leave message on   voicemail  Preferred Call Back Phone Number? [phone]  ---------------------------------------------------------------------------  --------------  SCRIPT ANSWERS  Relationship to Patient?  Self

## 2023-07-03 ENCOUNTER — HOSPITAL ENCOUNTER (OUTPATIENT)
Dept: GENERAL RADIOLOGY | Age: 88
Discharge: HOME | End: 2023-07-03
Payer: MEDICARE

## 2023-07-03 DIAGNOSIS — M81.0 AGE-RELATED OSTEOPOROSIS WITHOUT CURRENT PATHOLOGICAL FRACTURE: ICD-10-CM

## 2023-07-03 DIAGNOSIS — I10 ESSENTIAL HYPERTENSION: ICD-10-CM

## 2023-07-03 DIAGNOSIS — S22.050A COMPRESSION FRACTURE OF T6 VERTEBRA, INITIAL ENCOUNTER (HCC): ICD-10-CM

## 2023-07-03 DIAGNOSIS — E78.2 MIXED HYPERLIPIDEMIA: ICD-10-CM

## 2023-07-03 DIAGNOSIS — R73.03 PREDIABETES: ICD-10-CM

## 2023-07-03 LAB
25(OH)D3 SERPL-MCNC: 44.9 NG/ML
ALBUMIN SERPL-MCNC: 4.5 G/DL (ref 3.5–5.2)
ALP SERPL-CCNC: 74 U/L (ref 35–104)
ALT SERPL-CCNC: 12 U/L (ref 5–33)
ANION GAP SERPL CALCULATED.3IONS-SCNC: 11 MMOL/L (ref 7–19)
AST SERPL-CCNC: 19 U/L (ref 5–32)
BASOPHILS # BLD: 0.1 K/UL (ref 0–0.2)
BASOPHILS NFR BLD: 0.8 % (ref 0–1)
BILIRUB SERPL-MCNC: 0.3 MG/DL (ref 0.2–1.2)
BUN SERPL-MCNC: 15 MG/DL (ref 8–23)
CALCIUM SERPL-MCNC: 10.1 MG/DL (ref 8.8–10.2)
CHLORIDE SERPL-SCNC: 97 MMOL/L (ref 98–111)
CHOLEST SERPL-MCNC: 137 MG/DL (ref 160–199)
CO2 SERPL-SCNC: 28 MMOL/L (ref 22–29)
CREAT SERPL-MCNC: 0.7 MG/DL (ref 0.5–0.9)
EOSINOPHIL # BLD: 0.2 K/UL (ref 0–0.6)
EOSINOPHIL NFR BLD: 2.1 % (ref 0–5)
ERYTHROCYTE [DISTWIDTH] IN BLOOD BY AUTOMATED COUNT: 14.4 % (ref 11.5–14.5)
GLUCOSE SERPL-MCNC: 96 MG/DL (ref 74–109)
HBA1C MFR BLD: 5.9 % (ref 4–6)
HCT VFR BLD AUTO: 39.6 % (ref 37–47)
HDLC SERPL-MCNC: 47 MG/DL (ref 65–121)
HGB BLD-MCNC: 12.7 G/DL (ref 12–16)
IMM GRANULOCYTES # BLD: 0 K/UL
LDLC SERPL CALC-MCNC: 65 MG/DL
LYMPHOCYTES # BLD: 2.2 K/UL (ref 1.1–4.5)
LYMPHOCYTES NFR BLD: 29.6 % (ref 20–40)
MCH RBC QN AUTO: 28.5 PG (ref 27–31)
MCHC RBC AUTO-ENTMCNC: 32.1 G/DL (ref 33–37)
MCV RBC AUTO: 89 FL (ref 81–99)
MONOCYTES # BLD: 0.5 K/UL (ref 0–0.9)
MONOCYTES NFR BLD: 6.2 % (ref 0–10)
NEUTROPHILS # BLD: 4.6 K/UL (ref 1.5–7.5)
NEUTS SEG NFR BLD: 61 % (ref 50–65)
PLATELET # BLD AUTO: 343 K/UL (ref 130–400)
PMV BLD AUTO: 10 FL (ref 9.4–12.3)
POTASSIUM SERPL-SCNC: 4.1 MMOL/L (ref 3.5–5)
PROT SERPL-MCNC: 7.7 G/DL (ref 6.6–8.7)
RBC # BLD AUTO: 4.45 M/UL (ref 4.2–5.4)
SODIUM SERPL-SCNC: 136 MMOL/L (ref 136–145)
TRIGL SERPL-MCNC: 124 MG/DL (ref 0–149)
WBC # BLD AUTO: 7.6 K/UL (ref 4.8–10.8)

## 2023-07-03 PROCEDURE — 72072 X-RAY EXAM THORAC SPINE 3VWS: CPT

## 2023-07-05 ENCOUNTER — OFFICE VISIT (OUTPATIENT)
Dept: NEUROSURGERY | Age: 88
End: 2023-07-05
Payer: MEDICARE

## 2023-07-05 VITALS
DIASTOLIC BLOOD PRESSURE: 70 MMHG | BODY MASS INDEX: 28.53 KG/M2 | HEART RATE: 80 BPM | HEIGHT: 63 IN | RESPIRATION RATE: 18 BRPM | SYSTOLIC BLOOD PRESSURE: 115 MMHG | WEIGHT: 161 LBS

## 2023-07-05 DIAGNOSIS — S22.050D COMPRESSION FRACTURE OF T6 VERTEBRA WITH ROUTINE HEALING, SUBSEQUENT ENCOUNTER: Primary | ICD-10-CM

## 2023-07-05 PROCEDURE — 1036F TOBACCO NON-USER: CPT | Performed by: NEUROLOGICAL SURGERY

## 2023-07-05 PROCEDURE — 1090F PRES/ABSN URINE INCON ASSESS: CPT | Performed by: NEUROLOGICAL SURGERY

## 2023-07-05 PROCEDURE — G8417 CALC BMI ABV UP PARAM F/U: HCPCS | Performed by: NEUROLOGICAL SURGERY

## 2023-07-05 PROCEDURE — G8427 DOCREV CUR MEDS BY ELIG CLIN: HCPCS | Performed by: NEUROLOGICAL SURGERY

## 2023-07-05 PROCEDURE — 99213 OFFICE O/P EST LOW 20 MIN: CPT | Performed by: NEUROLOGICAL SURGERY

## 2023-07-05 PROCEDURE — 1123F ACP DISCUSS/DSCN MKR DOCD: CPT | Performed by: NEUROLOGICAL SURGERY

## 2023-07-05 ASSESSMENT — ENCOUNTER SYMPTOMS
RESPIRATORY NEGATIVE: 1
BACK PAIN: 1
EYES NEGATIVE: 1
GASTROINTESTINAL NEGATIVE: 1

## 2023-07-05 NOTE — PROGRESS NOTES
NEUROSURGERY FOLLOW UP      Chief Complaint:   Chief Complaint   Patient presents with    Follow-up     Patient states that her back pain is better than last visit. Results     XR T 7/3/23         Interval Update:    Planned follow-up with repeat thoracic x-rays. She is doing well. She reports improvement in her back pain. She is no longer requiring narcotic pain medication. HPI:     The patient is a 81 y.o. female with a history of osteoporosis who presents for evaluation of a T6 compression fracture that was noted on recent x-rays obtained to evaluate new thoracic back pain. She states her pain began insidiously after she went to the gym for her [employer] exercise class ~2 weeks ago. She initially felt she had pulled a muscle and was given a prescription for muscle relaxers and this did not help her pain so she had x-rays taken of her thoracic spine on 6/16/2023. She describes mid-thoracic back pain with some radiation around her chest wall. She denies any associated numbness or weakness. Her pain is worsened by movement and interferes with her sleep. She has been prescribed Norco 7.5mg to take at night and this does help her sleep. She also takes tylenol during the day with some relief. She has been prescribed Prolia for osteoporosis, however she states that she has not received any for almost 1 year because she has had difficulty getting her prescription filled. She does report a prior history of another compression fracture (she believes L1) as well. ROS:    Constitutional: Negative. HENT: Negative. Eyes: Negative. Respiratory: Negative. Cardiovascular: Negative. Gastrointestinal: Negative. Genitourinary: Negative. Musculoskeletal:  Positive for back pain, joint pain and myalgias. Skin: Negative. Neurological:  Positive for tingling and weakness. Endo/Heme/Allergies: Negative. Psychiatric/Behavioral: Negative.         Review of systems was obtained by

## 2023-07-07 ENCOUNTER — TELEPHONE (OUTPATIENT)
Dept: PRIMARY CARE CLINIC | Age: 88
End: 2023-07-07

## 2023-07-07 ENCOUNTER — OFFICE VISIT (OUTPATIENT)
Dept: PRIMARY CARE CLINIC | Age: 88
End: 2023-07-07
Payer: MEDICARE

## 2023-07-07 ENCOUNTER — TELEPHONE (OUTPATIENT)
Dept: INFUSION THERAPY | Age: 88
End: 2023-07-07

## 2023-07-07 VITALS
HEIGHT: 63 IN | WEIGHT: 157 LBS | OXYGEN SATURATION: 97 % | SYSTOLIC BLOOD PRESSURE: 120 MMHG | TEMPERATURE: 97.6 F | HEART RATE: 74 BPM | DIASTOLIC BLOOD PRESSURE: 62 MMHG | BODY MASS INDEX: 27.82 KG/M2

## 2023-07-07 DIAGNOSIS — R73.03 PREDIABETES: ICD-10-CM

## 2023-07-07 DIAGNOSIS — S22.050A COMPRESSION FRACTURE OF T6 VERTEBRA, INITIAL ENCOUNTER (HCC): ICD-10-CM

## 2023-07-07 DIAGNOSIS — M81.0 AGE-RELATED OSTEOPOROSIS WITHOUT CURRENT PATHOLOGICAL FRACTURE: Primary | ICD-10-CM

## 2023-07-07 DIAGNOSIS — I35.0 NONRHEUMATIC AORTIC VALVE STENOSIS: ICD-10-CM

## 2023-07-07 DIAGNOSIS — I10 ESSENTIAL HYPERTENSION: ICD-10-CM

## 2023-07-07 DIAGNOSIS — E78.2 MIXED HYPERLIPIDEMIA: ICD-10-CM

## 2023-07-07 DIAGNOSIS — I25.10 CORONARY ARTERY DISEASE INVOLVING NATIVE CORONARY ARTERY OF NATIVE HEART WITHOUT ANGINA PECTORIS: ICD-10-CM

## 2023-07-07 DIAGNOSIS — S32.010A COMPRESSION FRACTURE OF L1 VERTEBRA, INITIAL ENCOUNTER (HCC): ICD-10-CM

## 2023-07-07 DIAGNOSIS — R07.9 CHEST PAIN, UNSPECIFIED TYPE: ICD-10-CM

## 2023-07-07 DIAGNOSIS — M81.0 AGE-RELATED OSTEOPOROSIS WITHOUT CURRENT PATHOLOGICAL FRACTURE: ICD-10-CM

## 2023-07-07 PROCEDURE — G8417 CALC BMI ABV UP PARAM F/U: HCPCS | Performed by: NURSE PRACTITIONER

## 2023-07-07 PROCEDURE — 99214 OFFICE O/P EST MOD 30 MIN: CPT | Performed by: NURSE PRACTITIONER

## 2023-07-07 PROCEDURE — 1123F ACP DISCUSS/DSCN MKR DOCD: CPT | Performed by: NURSE PRACTITIONER

## 2023-07-07 PROCEDURE — 1090F PRES/ABSN URINE INCON ASSESS: CPT | Performed by: NURSE PRACTITIONER

## 2023-07-07 PROCEDURE — 1036F TOBACCO NON-USER: CPT | Performed by: NURSE PRACTITIONER

## 2023-07-07 PROCEDURE — G8427 DOCREV CUR MEDS BY ELIG CLIN: HCPCS | Performed by: NURSE PRACTITIONER

## 2023-07-07 RX ORDER — ACETAMINOPHEN 325 MG/1
650 TABLET ORAL
OUTPATIENT
Start: 2023-07-07

## 2023-07-07 RX ORDER — ALBUTEROL SULFATE 90 UG/1
4 AEROSOL, METERED RESPIRATORY (INHALATION) PRN
OUTPATIENT
Start: 2023-07-07

## 2023-07-07 RX ORDER — SODIUM CHLORIDE 9 MG/ML
INJECTION, SOLUTION INTRAVENOUS CONTINUOUS
OUTPATIENT
Start: 2023-07-07

## 2023-07-07 RX ORDER — DIPHENHYDRAMINE HYDROCHLORIDE 50 MG/ML
50 INJECTION INTRAMUSCULAR; INTRAVENOUS
OUTPATIENT
Start: 2023-07-07

## 2023-07-07 RX ORDER — EPINEPHRINE 1 MG/ML
0.3 INJECTION, SOLUTION, CONCENTRATE INTRAVENOUS PRN
OUTPATIENT
Start: 2023-07-07

## 2023-07-07 RX ORDER — ONDANSETRON 2 MG/ML
8 INJECTION INTRAMUSCULAR; INTRAVENOUS
OUTPATIENT
Start: 2023-07-07

## 2023-07-07 ASSESSMENT — ENCOUNTER SYMPTOMS
CHEST TIGHTNESS: 0
ABDOMINAL PAIN: 0
WHEEZING: 0
SORE THROAT: 0
SHORTNESS OF BREATH: 0
DIARRHEA: 0
COUGH: 0
NAUSEA: 0

## 2023-07-07 NOTE — PROGRESS NOTES
Arthur Rincon is a 81 y.o. female who presents today for  Chief Complaint   Patient presents with    Follow-up       HPI:  Here for routine follow-up. She had a recent T6 and L1 compression fracture. L1 may be chronic. She was evaluated by neurosurgery. Brace not required. Surgical intervention not recommended. She was advised to avoid lifting, excessive movement. Pain is controlled with Tylenol. She is having minimal pain. She was initially given hydrocodone but has not required this recently. She has osteoporosis but has not had her Prolia since 7/2022 due to difficulty getting rx. Remains on otc calcium, vit d. She had an episode of chest pain 2 days ago while leaving Dr. Yi Beauchamp office. Pain was midsternal and did not radiate. No shortness of breath but had diaphoresis. She took a nitroglycerin and had relief within a few minutes. No recurrent symptoms. She has a history of CAD with stent. She is followed by [facility] cardiology. She takes imdur daily. Has aortic valve stenosis, chronic. Hypertension  Taking amlodipine, HCTZ. No adverse effects from medication. No lightheadedness, palpitations, or chest pain. Hyperlipidemia  Tolerating atorvastatin without side effects. . Attempting to reduce processed sugar and cholesterol from diet.     Labs reviewed  Lab Results   Component Value Date    WBC 7.6 07/03/2023    HGB 12.7 07/03/2023    HCT 39.6 07/03/2023    MCV 89.0 07/03/2023     07/03/2023     Lab Results   Component Value Date     07/03/2023    K 4.1 07/03/2023    CL 97 (L) 07/03/2023    CO2 28 07/03/2023    BUN 15 07/03/2023    CREATININE 0.7 07/03/2023    GLUCOSE 96 07/03/2023    CALCIUM 10.1 07/03/2023    PROT 7.7 07/03/2023    LABALBU 4.5 07/03/2023    BILITOT 0.3 07/03/2023    ALKPHOS 74 07/03/2023    AST 19 07/03/2023    ALT 12 07/03/2023    LABGLOM >60 07/03/2023    GFRAA >59 07/21/2022       Hemoglobin A1C   Date Value Ref Range Status   07/03/2023 5.9

## 2023-07-07 NOTE — TELEPHONE ENCOUNTER
Corie with [facility] primary care called and spoke to Matthew Stewart, RN giving a Prolia 60 mg SQ verbal order. She requested pt have injection ASAP. Pt added to noemi on 7/10 at 3 pm. Ramone Page will notify pt. Pt does have recent calcium level in chart that is WNL.

## 2023-07-12 DIAGNOSIS — M54.50 CHRONIC BILATERAL LOW BACK PAIN WITHOUT SCIATICA: ICD-10-CM

## 2023-07-12 DIAGNOSIS — G89.29 CHRONIC BILATERAL LOW BACK PAIN WITHOUT SCIATICA: ICD-10-CM

## 2023-07-12 RX ORDER — GABAPENTIN 100 MG/1
100 CAPSULE ORAL 2 TIMES DAILY
Qty: 180 CAPSULE | Refills: 0 | Status: SHIPPED | OUTPATIENT
Start: 2023-07-12 | End: 2023-08-09 | Stop reason: DRUGHIGH

## 2023-07-12 NOTE — TELEPHONE ENCOUNTER
Please update ramandeep. Also her contract needs to be updated - please make note for next visit. Closure 3 Information: This tab is for additional flaps and grafts above and beyond our usual structured repairs.  Please note if you enter information here it will not currently bill and you will need to add the billing information manually.

## 2023-07-12 NOTE — TELEPHONE ENCOUNTER
Maty Perez called to request a refill on her medication. Last office visit : 7/7/2023   Next office visit : 1/8/2024     Last UDS:   Amphetamine Screen, Urine   Date Value Ref Range Status   01/06/2023 neg  Final     Barbiturate Screen, Urine   Date Value Ref Range Status   01/06/2023 neg  Final     Benzodiazepine Screen, Urine   Date Value Ref Range Status   01/06/2023 neg  Final     Buprenorphine Urine   Date Value Ref Range Status   01/06/2023 neg  Final     Cocaine Metabolite Screen, Urine   Date Value Ref Range Status   01/06/2023 neg  Final     Gabapentin Screen, Urine   Date Value Ref Range Status   01/06/2023 neg  Final     MDMA, Urine   Date Value Ref Range Status   01/06/2023 neg  Final     Methamphetamine, Urine   Date Value Ref Range Status   01/06/2023 neg  Final     Opiate Scrn, Ur   Date Value Ref Range Status   01/06/2023 neg  Final     Oxycodone Screen, Ur   Date Value Ref Range Status   01/06/2023 neg  Final     PCP Screen, Urine   Date Value Ref Range Status   01/06/2023 neg  Final     Propoxyphene Screen, Urine   Date Value Ref Range Status   01/06/2023 neg  Final     THC Screen, Urine   Date Value Ref Range Status   01/06/2023 neg  Final     Tricyclic Antidepressants, Urine   Date Value Ref Range Status   01/06/2023 neg  Final       Last Tomas Agosto: 7/12/23  Medication Contract: 7/29/22   Last Fill: 1/6/23    Requested Prescriptions     Pending Prescriptions Disp Refills    gabapentin (NEURONTIN) 100 MG capsule [Pharmacy Med Name: GABAPENTIN 100 MG CAPSULE] 180 capsule      Sig: TAKE 1 CAPSULE BY MOUTH 2 TIMES DAILY FOR 90 DAYS. MAX DAILY AMOUNT: 200 MG         Please approve or refuse this medication.    Spenser Strange

## 2023-07-13 ENCOUNTER — OFFICE VISIT (OUTPATIENT)
Dept: CARDIOLOGY CLINIC | Age: 88
End: 2023-07-13
Payer: MEDICARE

## 2023-07-13 VITALS
DIASTOLIC BLOOD PRESSURE: 58 MMHG | BODY MASS INDEX: 27.82 KG/M2 | SYSTOLIC BLOOD PRESSURE: 124 MMHG | HEIGHT: 63 IN | HEART RATE: 72 BPM | WEIGHT: 157 LBS

## 2023-07-13 DIAGNOSIS — I25.10 CORONARY ARTERY DISEASE INVOLVING NATIVE CORONARY ARTERY OF NATIVE HEART WITHOUT ANGINA PECTORIS: ICD-10-CM

## 2023-07-13 DIAGNOSIS — I35.0 NONRHEUMATIC AORTIC VALVE STENOSIS: ICD-10-CM

## 2023-07-13 DIAGNOSIS — I10 ESSENTIAL HYPERTENSION: ICD-10-CM

## 2023-07-13 DIAGNOSIS — R07.9 CHEST PAIN, UNSPECIFIED TYPE: Primary | ICD-10-CM

## 2023-07-13 DIAGNOSIS — E78.2 MIXED HYPERLIPIDEMIA: ICD-10-CM

## 2023-07-13 PROCEDURE — 1036F TOBACCO NON-USER: CPT | Performed by: CLINICAL NURSE SPECIALIST

## 2023-07-13 PROCEDURE — G8427 DOCREV CUR MEDS BY ELIG CLIN: HCPCS | Performed by: CLINICAL NURSE SPECIALIST

## 2023-07-13 PROCEDURE — 1123F ACP DISCUSS/DSCN MKR DOCD: CPT | Performed by: CLINICAL NURSE SPECIALIST

## 2023-07-13 PROCEDURE — 93000 ELECTROCARDIOGRAM COMPLETE: CPT | Performed by: CLINICAL NURSE SPECIALIST

## 2023-07-13 PROCEDURE — 1090F PRES/ABSN URINE INCON ASSESS: CPT | Performed by: CLINICAL NURSE SPECIALIST

## 2023-07-13 PROCEDURE — 99214 OFFICE O/P EST MOD 30 MIN: CPT | Performed by: CLINICAL NURSE SPECIALIST

## 2023-07-13 PROCEDURE — G8417 CALC BMI ABV UP PARAM F/U: HCPCS | Performed by: CLINICAL NURSE SPECIALIST

## 2023-07-13 NOTE — PROGRESS NOTES
[facility] Cardiology  [address] [facility], [address] [place] [address]  Phone: [phone]  Fax: [phone]    OFFICE VISIT:  7/13/2023    [facility]: 1935    Reason For Visit:  Sunshine Mckeon is a 81 y.o. female who is here for Follow-up (Pt had chest pain last week and has taken a Nitro), Cardiac Valve Problem, and Coronary Artery Disease  History of hyperlipidemia, hypertension, degenerative joint disease and aortic stenosis. She has coronary disease with note multiple stenting in the past.  Echo May 2022 showed possible low-flow low gradient severe aortic stenosis. -Recommended repeat echo    Schear today after episode last week of having chest pain. He states it occurred at rest.  Came on suddenly and was sharp. She took a nitro pretty quickly with relief. She denies any other significant changes or change of activity tolerance. She not had any recurrent episodes    Subjective  Sunshine Mckeon denies exertional chest pain, shortness of breath, orthopnea, paroxysmal nocturnal dyspnea, syncope, presyncope, arrhythmia, edema and fatigue. The patient denies numbness or weakness to suggest cerebrovascular accident or transient ischemic attack. BRAYDON Esquivel is PCP and follows labs .   Neha Babb has the following history as recorded in [facility]:    Patient Active Problem List    Diagnosis Date Noted    Prediabetes 01/06/2023    Recurrent UTI 12/27/2021    Aortic stenosis 12/27/2021    Ventral hernia without obstruction or gangrene 12/27/2021    Spinal stenosis of lumbar region with neurogenic claudication 11/30/2021    Primary osteoarthritis of right knee 10/14/2021    Chronic bilateral low back pain without sciatica 09/24/2021    Macular degeneration of both eyes 09/24/2021    Essential hypertension     Mixed hyperlipidemia     Age-related osteoporosis without current

## 2023-07-13 NOTE — PATIENT INSTRUCTIONS
echo ASAP  Maintain good blood pressure control-goal<130/80 at rest  Maintain good cholesterol control LDL goal<70 with arterial disease  If you are diabetic work to keep/obtain hemoglobin A1c< 7    Follow up in Oct   Call with any questions or concerns  Follow up with BRAYDON Hernandez for non cardiac problems  Report any new problems  Cardiovascular Fitness-Exercise as tolerated. Strive for 30 minutes of exercise most days of the week. Cardiac / Healthy Diet- Avoid processed high fat foods, maintain low sodium/salt   Continue current medications as directed  Continue plan of treatment  It is always recommended that you bring your medications bottles with you to each visit - this is for your safety!

## 2023-07-14 ENCOUNTER — HOSPITAL ENCOUNTER (OUTPATIENT)
Dept: NON INVASIVE DIAGNOSTICS | Age: 88
Discharge: HOME | End: 2023-07-14
Payer: MEDICARE

## 2023-07-14 DIAGNOSIS — I35.0 NONRHEUMATIC AORTIC VALVE STENOSIS: ICD-10-CM

## 2023-07-14 PROCEDURE — C8929 TTE W OR WO FOL WCON,DOPPLER: HCPCS

## 2023-07-14 PROCEDURE — 6360000004 HC RX CONTRAST MEDICATION: Performed by: INTERNAL MEDICINE

## 2023-07-14 RX ADMIN — PERFLUTREN 1.5 ML: 6.52 INJECTION, SUSPENSION INTRAVENOUS at 14:10

## 2023-07-17 ENCOUNTER — TELEPHONE (OUTPATIENT)
Dept: CARDIOLOGY CLINIC | Age: 88
End: 2023-07-17

## 2023-07-17 NOTE — TELEPHONE ENCOUNTER
----- Message from Alejandra Steel sent at 7/17/2023  1:52 PM CDT -----  I called patient and went over results. Patient is agreeable to have a heart cath. Will you please call and set her up at your earliest convenience. Thanks!  ----- Message -----  From: Sony Brown MD  Sent: 7/17/2023  12:47 PM CDT  To: Alejandra Steel    Most recent echocardiogram notable for progression of known aortic stenosis, now high gradient/severe. Apparently she also had some chest pain recently. If patient is agreeable, will move forward with coronary angiogram as part of work-up of severe symptomatic aortic stenosis. Further recommendations will follow.

## 2023-07-17 NOTE — TELEPHONE ENCOUNTER
Called and spoke with patient, have [address] scheduled for 7/24/2023 with a tentative time of 8 am with arrival of 6 am.  Advised we will contact patient if time/date changes. Patient is to be NPO after midnight. Patient instructed to arrive through front entrance of hospital and make immediate left. Patient advised may take morning medications with sip of water. Patient does not have IV dye allergy. Patient verbally understood.      Labs are current

## 2023-07-19 ENCOUNTER — HOSPITAL ENCOUNTER (OUTPATIENT)
Dept: INFUSION THERAPY | Age: 88
Setting detail: INFUSION SERIES
Discharge: HOME | End: 2023-07-19
Payer: MEDICARE

## 2023-07-19 DIAGNOSIS — M81.0 AGE-RELATED OSTEOPOROSIS WITHOUT CURRENT PATHOLOGICAL FRACTURE: Primary | ICD-10-CM

## 2023-07-19 PROCEDURE — 6360000002 HC RX W HCPCS: Performed by: NURSE PRACTITIONER

## 2023-07-19 PROCEDURE — 96372 THER/PROPH/DIAG INJ SC/IM: CPT

## 2023-07-19 RX ORDER — ALBUTEROL SULFATE 90 UG/1
4 AEROSOL, METERED RESPIRATORY (INHALATION) PRN
OUTPATIENT
Start: 2024-01-14

## 2023-07-19 RX ORDER — ONDANSETRON 2 MG/ML
8 INJECTION INTRAMUSCULAR; INTRAVENOUS
OUTPATIENT
Start: 2024-01-14

## 2023-07-19 RX ORDER — SODIUM CHLORIDE 9 MG/ML
INJECTION, SOLUTION INTRAVENOUS CONTINUOUS
OUTPATIENT
Start: 2024-01-14

## 2023-07-19 RX ORDER — ACETAMINOPHEN 325 MG/1
650 TABLET ORAL
OUTPATIENT
Start: 2024-01-14

## 2023-07-19 RX ORDER — DIPHENHYDRAMINE HYDROCHLORIDE 50 MG/ML
50 INJECTION INTRAMUSCULAR; INTRAVENOUS
OUTPATIENT
Start: 2024-01-14

## 2023-07-19 RX ORDER — EPINEPHRINE 1 MG/ML
0.3 INJECTION, SOLUTION, CONCENTRATE INTRAVENOUS PRN
OUTPATIENT
Start: 2024-01-14

## 2023-07-19 RX ADMIN — DENOSUMAB 60 MG: 60 INJECTION SUBCUTANEOUS at 14:22

## 2023-07-19 NOTE — DISCHARGE INSTRUCTIONS
denosumab (Prolia)  Pronunciation:  lorenzo winter mab  Brand:  Prolia  What is the most important information I should know about Prolia? This medication guide provides information about the Prolia brand of denosumab. Lemus Osullivan is another brand of denosumab used to prevent bone fractures and other skeletal conditions in people with tumors that have spread to the bone. Prolia can cause many serious side effects. Call your doctor at once if you have a fever, chills, pain or burning when you urinate, severe stomach pain, cough, shortness of breath, skin problems, numbness or tingling, severe or unusual pain, or skin problems. Do not use if you are pregnant. Use effective birth control while using Prolia, and for at least 5 months after you stop. What is denosumab (Prolia)? Denosumab is a monoclonal antibody. Monoclonal antibodies are made to target and destroy only certain cells in the body. This may help to protect healthy cells from damage. The Prolia brand of denosumab is used to treat osteoporosis or bone loss in men and women who have a high risk of bone fracture. Prolia is sometimes used in people whose bone fracture is caused by certain medicines or cancer treatments. This medication guide provides information about the Prolia brand of denosumab. Lemus Osullivan is another brand of denosumab used to prevent bone fractures and other skeletal conditions in people with tumors that have spread to the bone. Denosumab may also be used for purposes not listed in this medication guide. What should I discuss with my healthcare provider before receiving Prolia? You should not receive Prolia if you are allergic to denosumab, or if you have:  low levels of calcium in your blood (hypocalcemia); or  if you are pregnant. While you are using Prolia, you should not receive Xgeva, another brand of denosumab.   Tell your doctor if you have ever had:  kidney disease (or if you are on dialysis);  a weak immune system (caused by

## 2023-07-21 ENCOUNTER — CLINICAL DOCUMENTATION (OUTPATIENT)
Facility: HOSPITAL | Age: 88
End: 2023-07-21

## 2023-07-21 NOTE — PROGRESS NOTES
Patient Assistance    Met with: Reviewed chart    Navigator Type: Infusion  Documentation Type: New Patient  Contact Type: No Contact  Navigation Status: New Patient  Status of Patient Insurance Coverage: Patient has active coverage          Additional notes: Reviewed chart and no assistance is available at this time. I will place patient on a wait list for assistance to open. She has [phone] left in her OOP max and a co pay for OP infusion of 365.

## 2023-07-23 NOTE — H&P
Patient:  Regan Neff                  1935  MRN: [id number]    PROBLEM LIST:    Patient Active Problem List    Diagnosis Date Noted    Prediabetes 01/06/2023     Priority: Medium    Recurrent UTI 12/27/2021    Aortic stenosis 12/27/2021    Ventral hernia without obstruction or gangrene 12/27/2021    Spinal stenosis of lumbar region with neurogenic claudication 11/30/2021    Primary osteoarthritis of right knee 10/14/2021    Chronic bilateral low back pain without sciatica 09/24/2021    Macular degeneration of both eyes 09/24/2021    Essential hypertension     Mixed hyperlipidemia     Age-related osteoporosis without current pathological fracture     Coronary artery disease involving native coronary artery of native heart without angina pectoris     DDD (degenerative disc disease), lumbar     Basal cell carcinoma (BCC) of scalp        PRESENTATION: Regan Neff is a 81y.o. year old female who presents with complaints of chest pain occurring at rest and activity relieved with nitroglycerin with recent echocardiogram showing severe aortic stenosis, being referred for right and left heart catheterization. Echocardiogram 7/14/2023 showed an ejection fraction of 78% with moderate right-sided chamber dilatation with severe aortic stenosis and a mean gradient of 37 mmHg. Prior echocardiogram 5/13/2022 with mean gradient of 27 mmHg. Patient has a history of hypertension, coronary artery disease with prior PCI in 2019 and 2020 in [place]. REVIEW OF SYSTEMS:  Review of Systems   Constitutional:  Negative for activity change, fatigue and fever. HENT:  Negative for ear pain, hearing loss and tinnitus. Eyes:  Negative for discharge and visual disturbance. Respiratory:  Positive for chest tightness. Negative for cough, shortness of breath and wheezing. Cardiovascular:  Positive for chest pain. Negative for palpitations and leg swelling.    Gastrointestinal:  Negative for abdominal distention, blood in

## 2023-07-24 ENCOUNTER — HOSPITAL ENCOUNTER (OUTPATIENT)
Dept: CARDIAC CATH/INVASIVE PROCEDURES | Age: 88
Discharge: HOME | End: 2023-07-24
Attending: INTERNAL MEDICINE | Admitting: INTERNAL MEDICINE
Payer: MEDICARE

## 2023-07-24 VITALS
OXYGEN SATURATION: 92 % | HEIGHT: 63 IN | DIASTOLIC BLOOD PRESSURE: 58 MMHG | RESPIRATION RATE: 14 BRPM | BODY MASS INDEX: 27.82 KG/M2 | HEART RATE: 66 BPM | WEIGHT: 157 LBS | TEMPERATURE: 97.4 F | SYSTOLIC BLOOD PRESSURE: 117 MMHG

## 2023-07-24 PROCEDURE — 99152 MOD SED SAME PHYS/QHP 5/>YRS: CPT | Performed by: INTERNAL MEDICINE

## 2023-07-24 PROCEDURE — 99152 MOD SED SAME PHYS/QHP 5/>YRS: CPT

## 2023-07-24 PROCEDURE — 93460 R&L HRT ART/VENTRICLE ANGIO: CPT

## 2023-07-24 PROCEDURE — 6360000002 HC RX W HCPCS

## 2023-07-24 PROCEDURE — 2709999900 HC NON-CHARGEABLE SUPPLY

## 2023-07-24 PROCEDURE — 6370000000 HC RX 637 (ALT 250 FOR IP): Performed by: INTERNAL MEDICINE

## 2023-07-24 PROCEDURE — C1760 CLOSURE DEV, VASC: HCPCS

## 2023-07-24 PROCEDURE — 6360000004 HC RX CONTRAST MEDICATION: Performed by: INTERNAL MEDICINE

## 2023-07-24 PROCEDURE — 2580000003 HC RX 258: Performed by: INTERNAL MEDICINE

## 2023-07-24 PROCEDURE — 93460 R&L HRT ART/VENTRICLE ANGIO: CPT | Performed by: INTERNAL MEDICINE

## 2023-07-24 PROCEDURE — C1894 INTRO/SHEATH, NON-LASER: HCPCS

## 2023-07-24 PROCEDURE — 99153 MOD SED SAME PHYS/QHP EA: CPT

## 2023-07-24 PROCEDURE — C1769 GUIDE WIRE: HCPCS

## 2023-07-24 PROCEDURE — C1725 CATH, TRANSLUMIN NON-LASER: HCPCS

## 2023-07-24 RX ORDER — SODIUM CHLORIDE 9 MG/ML
INJECTION, SOLUTION INTRAVENOUS PRN
Status: DISCONTINUED | OUTPATIENT
Start: 2023-07-24 | End: 2023-07-24 | Stop reason: HOSPADM

## 2023-07-24 RX ORDER — ASPIRIN 325 MG
325 TABLET ORAL ONCE
Status: COMPLETED | OUTPATIENT
Start: 2023-07-24 | End: 2023-07-24

## 2023-07-24 RX ORDER — SODIUM CHLORIDE 0.9 % (FLUSH) 0.9 %
5-40 SYRINGE (ML) INJECTION EVERY 12 HOURS SCHEDULED
Status: DISCONTINUED | OUTPATIENT
Start: 2023-07-24 | End: 2023-07-24 | Stop reason: HOSPADM

## 2023-07-24 RX ORDER — SODIUM CHLORIDE 9 MG/ML
INJECTION, SOLUTION INTRAVENOUS CONTINUOUS
Status: ACTIVE | OUTPATIENT
Start: 2023-07-24 | End: 2023-07-24

## 2023-07-24 RX ORDER — SODIUM CHLORIDE 0.9 % (FLUSH) 0.9 %
5-40 SYRINGE (ML) INJECTION PRN
Status: DISCONTINUED | OUTPATIENT
Start: 2023-07-24 | End: 2023-07-24 | Stop reason: HOSPADM

## 2023-07-24 RX ORDER — NITROGLYCERIN 0.4 MG/1
0.4 TABLET SUBLINGUAL EVERY 5 MIN PRN
Status: DISCONTINUED | OUTPATIENT
Start: 2023-07-24 | End: 2023-07-24 | Stop reason: HOSPADM

## 2023-07-24 RX ORDER — ACETAMINOPHEN 325 MG/1
650 TABLET ORAL EVERY 4 HOURS PRN
Status: DISCONTINUED | OUTPATIENT
Start: 2023-07-24 | End: 2023-07-24 | Stop reason: HOSPADM

## 2023-07-24 RX ORDER — ONDANSETRON 2 MG/ML
4 INJECTION INTRAMUSCULAR; INTRAVENOUS EVERY 6 HOURS PRN
Status: DISCONTINUED | OUTPATIENT
Start: 2023-07-24 | End: 2023-07-24 | Stop reason: HOSPADM

## 2023-07-24 RX ADMIN — IOPAMIDOL 120 ML: 612 INJECTION, SOLUTION INTRAVENOUS at 09:30

## 2023-07-24 RX ADMIN — SODIUM CHLORIDE: 9 INJECTION, SOLUTION INTRAVENOUS at 07:02

## 2023-07-24 RX ADMIN — ASPIRIN 325 MG: 325 TABLET ORAL at 07:05

## 2023-07-24 ASSESSMENT — ENCOUNTER SYMPTOMS
VOMITING: 0
DIARRHEA: 0
SHORTNESS OF BREATH: 0
WHEEZING: 0
CHEST TIGHTNESS: 1
CONSTIPATION: 0
EYE DISCHARGE: 0
COUGH: 0
BACK PAIN: 0
ABDOMINAL DISTENTION: 0
BLOOD IN STOOL: 0

## 2023-07-24 NOTE — PROCEDURES
Cardiac catheterization preliminary note:    RCA with ostial to proximal stents which appear patent with moderate mid disease. RCA provides a robust collateral to an occluded circumflex. Left main with aneurysmal and plaque without obstruction. Circumflex ostial to proximal 90% stenosis. Circumflex mid 100% occluded after a small to moderate-sized OM branch with 80% ostial disease. LAD with proximal to mid and diagonal stents which appear patent. Normal LV systolic function. Probable severe aortic stenosis (mean gradient 39 mmHg)  Mild pulm hypertension with PA 30/15 mmHg  PCWP 15 mmHg. Plan: Medical management for coronary artery disease. CT surgery evaluation for aortic stenosis.

## 2023-07-24 NOTE — PROGRESS NOTES
Returned post cath. Awake and alert. Puncture sites to right groin clear with gauze and tegaderm dressing in place. Denies any c/o pain. Daughter at bedside.

## 2023-07-25 RX ORDER — TIZANIDINE 2 MG/1
TABLET ORAL
Qty: 30 TABLET | Refills: 1 | Status: SHIPPED | OUTPATIENT
Start: 2023-07-25

## 2023-07-25 NOTE — TELEPHONE ENCOUNTER
Graciela Bales called to request a refill on her medication.       Last office visit : 7/7/2023   Next office visit : 1/8/2024     Requested Prescriptions     Pending Prescriptions Disp Refills    tiZANidine (Anita Palomares) 2 MG tablet [Pharmacy Med Name: TIZANIDINE HCL 2 MG TABLET] 30 tablet 1     Sig: TAKE 1 TABLET BY MOUTH NIGHTLY AS NEEDED FOR BACK PAIN            Leigha Kaur

## 2023-07-27 ENCOUNTER — HOSPITAL ENCOUNTER (OUTPATIENT)
Dept: GENERAL RADIOLOGY | Age: 88
Discharge: HOME | End: 2023-07-27
Payer: MEDICARE

## 2023-07-27 ENCOUNTER — OFFICE VISIT (OUTPATIENT)
Dept: PRIMARY CARE CLINIC | Age: 88
End: 2023-07-27
Payer: MEDICARE

## 2023-07-27 VITALS
WEIGHT: 160.4 LBS | BODY MASS INDEX: 28.42 KG/M2 | HEIGHT: 63 IN | SYSTOLIC BLOOD PRESSURE: 138 MMHG | HEART RATE: 76 BPM | TEMPERATURE: 98.7 F | DIASTOLIC BLOOD PRESSURE: 68 MMHG | OXYGEN SATURATION: 98 %

## 2023-07-27 DIAGNOSIS — M54.6 CHRONIC LEFT-SIDED THORACIC BACK PAIN: Primary | ICD-10-CM

## 2023-07-27 DIAGNOSIS — G89.29 CHRONIC LEFT-SIDED THORACIC BACK PAIN: ICD-10-CM

## 2023-07-27 DIAGNOSIS — M54.6 CHRONIC LEFT-SIDED THORACIC BACK PAIN: ICD-10-CM

## 2023-07-27 DIAGNOSIS — G89.29 CHRONIC LEFT-SIDED THORACIC BACK PAIN: Primary | ICD-10-CM

## 2023-07-27 PROCEDURE — 72070 X-RAY EXAM THORAC SPINE 2VWS: CPT

## 2023-07-27 PROCEDURE — G8427 DOCREV CUR MEDS BY ELIG CLIN: HCPCS | Performed by: FAMILY MEDICINE

## 2023-07-27 PROCEDURE — 1090F PRES/ABSN URINE INCON ASSESS: CPT | Performed by: FAMILY MEDICINE

## 2023-07-27 PROCEDURE — 1036F TOBACCO NON-USER: CPT | Performed by: FAMILY MEDICINE

## 2023-07-27 PROCEDURE — G8417 CALC BMI ABV UP PARAM F/U: HCPCS | Performed by: FAMILY MEDICINE

## 2023-07-27 PROCEDURE — 1123F ACP DISCUSS/DSCN MKR DOCD: CPT | Performed by: FAMILY MEDICINE

## 2023-07-27 PROCEDURE — 99214 OFFICE O/P EST MOD 30 MIN: CPT | Performed by: FAMILY MEDICINE

## 2023-07-27 PROCEDURE — 72100 X-RAY EXAM L-S SPINE 2/3 VWS: CPT

## 2023-07-27 RX ORDER — TRAMADOL HYDROCHLORIDE 50 MG/1
50 TABLET ORAL EVERY 8 HOURS PRN
Qty: 42 TABLET | Refills: 0 | Status: SHIPPED | OUTPATIENT
Start: 2023-07-27 | End: 2023-08-10

## 2023-07-27 ASSESSMENT — ENCOUNTER SYMPTOMS
NAUSEA: 0
DIARRHEA: 0
WHEEZING: 0
TROUBLE SWALLOWING: 0
ABDOMINAL PAIN: 0
CONSTIPATION: 0
SHORTNESS OF BREATH: 0
BACK PAIN: 1
COUGH: 0
VOMITING: 0

## 2023-07-31 ENCOUNTER — OFFICE VISIT (OUTPATIENT)
Dept: PRIMARY CARE CLINIC | Age: 88
End: 2023-07-31
Payer: MEDICARE

## 2023-07-31 ENCOUNTER — TELEPHONE (OUTPATIENT)
Dept: NEUROSURGERY | Age: 88
End: 2023-07-31

## 2023-07-31 VITALS
DIASTOLIC BLOOD PRESSURE: 62 MMHG | SYSTOLIC BLOOD PRESSURE: 138 MMHG | WEIGHT: 160 LBS | OXYGEN SATURATION: 93 % | RESPIRATION RATE: 20 BRPM | HEART RATE: 68 BPM | TEMPERATURE: 97.6 F | HEIGHT: 63 IN | BODY MASS INDEX: 28.35 KG/M2

## 2023-07-31 DIAGNOSIS — R30.0 DYSURIA: Primary | ICD-10-CM

## 2023-07-31 LAB
APPEARANCE FLUID: ABNORMAL
BILIRUBIN, POC: ABNORMAL
BLOOD URINE, POC: ABNORMAL
CLARITY, POC: ABNORMAL
COLOR, POC: YELLOW
GLUCOSE URINE, POC: ABNORMAL
KETONES, POC: ABNORMAL
LEUKOCYTE EST, POC: ABNORMAL
NITRITE, POC: ABNORMAL
PH, POC: 7.5
PROTEIN, POC: >=300
SPECIFIC GRAVITY, POC: 1.02
UROBILINOGEN, POC: 0.2

## 2023-07-31 PROCEDURE — 1090F PRES/ABSN URINE INCON ASSESS: CPT | Performed by: INTERNAL MEDICINE

## 2023-07-31 PROCEDURE — 81002 URINALYSIS NONAUTO W/O SCOPE: CPT | Performed by: INTERNAL MEDICINE

## 2023-07-31 PROCEDURE — 99213 OFFICE O/P EST LOW 20 MIN: CPT | Performed by: INTERNAL MEDICINE

## 2023-07-31 PROCEDURE — G8417 CALC BMI ABV UP PARAM F/U: HCPCS | Performed by: INTERNAL MEDICINE

## 2023-07-31 PROCEDURE — 1123F ACP DISCUSS/DSCN MKR DOCD: CPT | Performed by: INTERNAL MEDICINE

## 2023-07-31 PROCEDURE — G8427 DOCREV CUR MEDS BY ELIG CLIN: HCPCS | Performed by: INTERNAL MEDICINE

## 2023-07-31 PROCEDURE — 1036F TOBACCO NON-USER: CPT | Performed by: INTERNAL MEDICINE

## 2023-07-31 RX ORDER — CEFDINIR 300 MG/1
300 CAPSULE ORAL 2 TIMES DAILY
Qty: 10 CAPSULE | Refills: 0 | Status: SHIPPED | OUTPATIENT
Start: 2023-07-31 | End: 2023-08-05

## 2023-07-31 ASSESSMENT — ENCOUNTER SYMPTOMS: BACK PAIN: 1

## 2023-07-31 NOTE — PROGRESS NOTES
Narendra Mahan ( 1935) is a 81 y.o. female, [facility] for [address] [place] [address], here for evaluation of the following chief complaint(s). Urinary Pain (Since 23) and Nausea      Patient was encouraged and advised to be compliant with all  medications leads an active lifestyle and promote maintaining a healthy weight, encouraged not to use cigarettes, laboratory results discussed and reviewed with patient's during this visit   ASSESSMENT/PLAN:  Problem List       Dysuria - Primary      Uncontrolled, submit urine for culture, emeprically treat with omnicef           Relevant Medications    cefdinir (OMNICEF) 300 MG capsule    Other Relevant Orders    POCT Urinalysis no Micro (Completed)    Culture, Urine         No flowsheet data found. PHQ Scores 2021   PHQ2 Score 0 0 0   PHQ9 Score 0 0 0       Results for orders placed or performed in visit on 23   POCT Urinalysis no Micro   Result Value Ref Range    Color, UA yellow     Clarity, UA cloudy     Glucose, UA POC neg     Bilirubin, UA neg     Ketones, UA neg     Spec Grav, UA 1.020     Blood, UA POC mod     pH, UA 7.5     Protein, UA POC >=300     Urobilinogen, UA 0.2     Leukocytes, UA large     Nitrite, UA neg     Appearance, Fluid Cloudy (A) Clear, Slightly Cloudy       No follow-ups on file. Urinary Tract Infection  This is a new problem. The current episode started in the past 7 days. The problem has been gradually worsening since onset. Pertinent negatives include no hematuria or pain. Review of Systems   Constitutional:  Negative for fatigue and fever. Genitourinary:  Positive for dysuria. Negative for flank pain and hematuria. Musculoskeletal:  Positive for back pain. Physical Exam  Constitutional:       Appearance: She is obese. Pulmonary:      Effort: Pulmonary effort is normal.   Abdominal:      General: Abdomen is flat. Bowel sounds are normal. There is no distension. Palpations: Abdomen is soft.  There is

## 2023-07-31 NOTE — TELEPHONE ENCOUNTER
Patient LVM stating that she needs sooner appt with Dr Christi Garcia. Attempted to contact patient, LVM stating that patient should call back. Only slot I have is tomorrow at 2:30 pm, arrive at 1:30 pm for XR.

## 2023-08-01 ENCOUNTER — OFFICE VISIT (OUTPATIENT)
Dept: NEUROSURGERY | Age: 88
End: 2023-08-01
Payer: MEDICARE

## 2023-08-01 VITALS
DIASTOLIC BLOOD PRESSURE: 70 MMHG | RESPIRATION RATE: 18 BRPM | HEIGHT: 63 IN | BODY MASS INDEX: 28.35 KG/M2 | HEART RATE: 77 BPM | WEIGHT: 160 LBS | SYSTOLIC BLOOD PRESSURE: 120 MMHG

## 2023-08-01 DIAGNOSIS — S22.050D COMPRESSION FRACTURE OF T6 VERTEBRA WITH ROUTINE HEALING, SUBSEQUENT ENCOUNTER: Primary | ICD-10-CM

## 2023-08-01 DIAGNOSIS — M80.00XA AGE-RELATED OSTEOPOROSIS WITH CURRENT PATHOLOGICAL FRACTURE, INITIAL ENCOUNTER: ICD-10-CM

## 2023-08-01 DIAGNOSIS — S22.070A COMPRESSION FRACTURE OF T10 VERTEBRA, INITIAL ENCOUNTER (HCC): ICD-10-CM

## 2023-08-01 DIAGNOSIS — S22.050D COMPRESSION FRACTURE OF T6 VERTEBRA WITH ROUTINE HEALING, SUBSEQUENT ENCOUNTER: ICD-10-CM

## 2023-08-01 LAB
BASOPHILS # BLD: 0 K/UL (ref 0–0.2)
BASOPHILS NFR BLD: 0.4 % (ref 0–1)
EOSINOPHIL # BLD: 0.1 K/UL (ref 0–0.6)
EOSINOPHIL NFR BLD: 0.8 % (ref 0–5)
ERYTHROCYTE [DISTWIDTH] IN BLOOD BY AUTOMATED COUNT: 14.3 % (ref 11.5–14.5)
HCT VFR BLD AUTO: 37.5 % (ref 37–47)
HGB BLD-MCNC: 12.8 G/DL (ref 12–16)
IMM GRANULOCYTES # BLD: 0 K/UL
LYMPHOCYTES # BLD: 1.8 K/UL (ref 1.1–4.5)
LYMPHOCYTES NFR BLD: 17.9 % (ref 20–40)
MCH RBC QN AUTO: 28.5 PG (ref 27–31)
MCHC RBC AUTO-ENTMCNC: 34.1 G/DL (ref 33–37)
MCV RBC AUTO: 83.5 FL (ref 81–99)
MONOCYTES # BLD: 0.6 K/UL (ref 0–0.9)
MONOCYTES NFR BLD: 6.3 % (ref 0–10)
NEUTROPHILS # BLD: 7.4 K/UL (ref 1.5–7.5)
NEUTS SEG NFR BLD: 74.2 % (ref 50–65)
PLATELET # BLD AUTO: 338 K/UL (ref 130–400)
PMV BLD AUTO: 9.6 FL (ref 9.4–12.3)
RBC # BLD AUTO: 4.49 M/UL (ref 4.2–5.4)
WBC # BLD AUTO: 10 K/UL (ref 4.8–10.8)

## 2023-08-01 PROCEDURE — 1123F ACP DISCUSS/DSCN MKR DOCD: CPT | Performed by: NEUROLOGICAL SURGERY

## 2023-08-01 PROCEDURE — 99214 OFFICE O/P EST MOD 30 MIN: CPT | Performed by: NEUROLOGICAL SURGERY

## 2023-08-01 PROCEDURE — G8427 DOCREV CUR MEDS BY ELIG CLIN: HCPCS | Performed by: NEUROLOGICAL SURGERY

## 2023-08-01 PROCEDURE — G8417 CALC BMI ABV UP PARAM F/U: HCPCS | Performed by: NEUROLOGICAL SURGERY

## 2023-08-01 PROCEDURE — 1090F PRES/ABSN URINE INCON ASSESS: CPT | Performed by: NEUROLOGICAL SURGERY

## 2023-08-01 PROCEDURE — 1036F TOBACCO NON-USER: CPT | Performed by: NEUROLOGICAL SURGERY

## 2023-08-01 ASSESSMENT — ENCOUNTER SYMPTOMS
GASTROINTESTINAL NEGATIVE: 1
RESPIRATORY NEGATIVE: 1
BACK PAIN: 1
EYES NEGATIVE: 1

## 2023-08-01 NOTE — PROGRESS NOTES
Review of Systems   Constitutional: Negative. HENT: Negative. Eyes: Negative. Respiratory: Negative. Cardiovascular: Negative. Gastrointestinal: Negative. Genitourinary: Negative. Musculoskeletal:  Positive for back pain, joint pain and myalgias. Skin: Negative. Neurological:  Positive for weakness. Endo/Heme/Allergies: Negative. Psychiatric/Behavioral: Negative.
spine and will send the appropriate laboratory studies. I will also plan to have her follow up with Francis Jensen NP in our office to discuss alternatives to Prolia for her osteoporosis. I will plan to follow up with her in 1m to review her updated imaging and review her laboratory results.       Electronically signed by Janice Allison MD on 8/1/2023 at 1:40 PM

## 2023-08-02 LAB
BACTERIA UR CULT: ABNORMAL
BACTERIA UR CULT: ABNORMAL
ORGANISM: ABNORMAL

## 2023-08-03 DIAGNOSIS — S22.050D COMPRESSION FRACTURE OF T6 VERTEBRA WITH ROUTINE HEALING, SUBSEQUENT ENCOUNTER: ICD-10-CM

## 2023-08-03 DIAGNOSIS — S22.070A COMPRESSION FRACTURE OF T10 VERTEBRA, INITIAL ENCOUNTER (HCC): ICD-10-CM

## 2023-08-03 DIAGNOSIS — M80.00XA AGE-RELATED OSTEOPOROSIS WITH CURRENT PATHOLOGICAL FRACTURE, INITIAL ENCOUNTER: ICD-10-CM

## 2023-08-03 LAB
DEPRECATED KAPPA LC FREE/LAMBDA SER: 0.96 {RATIO} (ref 0.26–1.65)
KAPPA LC FREE SER-MCNC: 21.72 MG/L (ref 3.3–19.4)
LAMBDA LC FREE SERPL-MCNC: 22.72 MG/L (ref 5.71–26.3)

## 2023-08-04 LAB
ALBUMIN SERPL-MCNC: 3.72 G/DL (ref 3.75–5.01)
ALPHA1 GLOB SERPL ELPH-MCNC: 0.45 G/DL (ref 0.19–0.46)
ALPHA2 GLOB SERPL ELPH-MCNC: 1.2 G/DL (ref 0.48–1.05)
B-GLOBULIN SERPL ELPH-MCNC: 1.02 G/DL (ref 0.48–1.1)
GAMMA GLOB SERPL ELPH-MCNC: 0.81 G/DL (ref 0.62–1.51)
INTERPRETATION SERPL IFE-IMP: ABNORMAL
PROT SERPL-MCNC: 7.2 G/DL (ref 6.3–8.2)
PROTEIN ELECTROPHORESIS, SERUM: ABNORMAL

## 2023-08-05 ENCOUNTER — TRANSCRIBE ORDERS (OUTPATIENT)
Dept: ADMINISTRATIVE | Facility: HOSPITAL | Age: 88
End: 2023-08-05

## 2023-08-05 DIAGNOSIS — S22.050D COMPRESSION FRACTURE OF T6 VERTEBRA WITH ROUTINE HEALING: Primary | ICD-10-CM

## 2023-08-05 DIAGNOSIS — M80.00XA AGE-RELATED OSTEOPOROSIS WITH CURRENT PATHOLOGICAL FRACTURE, INITIAL ENCOUNTER: ICD-10-CM

## 2023-08-05 DIAGNOSIS — S22.070A COMPRESSION FRACTURE OF T10 VERTEBRA, INITIAL ENCOUNTER: ICD-10-CM

## 2023-08-08 ENCOUNTER — OFFICE VISIT (OUTPATIENT)
Dept: CARDIOTHORACIC SURGERY | Age: 88
End: 2023-08-08
Payer: MEDICARE

## 2023-08-08 VITALS
HEART RATE: 76 BPM | BODY MASS INDEX: 28 KG/M2 | SYSTOLIC BLOOD PRESSURE: 138 MMHG | OXYGEN SATURATION: 94 % | DIASTOLIC BLOOD PRESSURE: 59 MMHG | RESPIRATION RATE: 18 BRPM | HEIGHT: 63 IN | WEIGHT: 158 LBS

## 2023-08-08 DIAGNOSIS — I35.0 AORTIC STENOSIS, SEVERE: Primary | ICD-10-CM

## 2023-08-08 LAB
ALBUMIN ?TM MFR UR ELPH: 57.5 %
ALPHA1 GLOB ?TM MFR UR ELPH: 7.4 %
ALPHA2 GLOB ?TM MFR UR ELPH: 10.7 %
B-GLOBULIN ?TM MFR UR ELPH: 15.6 %
COLLECT DURATION TIME SPEC: 24 H
GAMMA GLOB ?TM MFR UR ELPH: 8.8 %
INTERPRETATION UR IFE-IMP: ABNORMAL
M PROTEIN 24H MFR UR ELPH: 0 %
M PROTEIN 24H UR ELPH-MRATE: 0 MG/24 HRS
PATHOLOGY STUDY: ABNORMAL
PROT 24H UR-MRATE: 154 MG/D (ref 40–150)
PROT UR-MCNC: 11 MG/DL
SPECIMEN VOL ?TM UR: 1400 ML

## 2023-08-08 PROCEDURE — G8427 DOCREV CUR MEDS BY ELIG CLIN: HCPCS | Performed by: THORACIC SURGERY (CARDIOTHORACIC VASCULAR SURGERY)

## 2023-08-08 PROCEDURE — 1123F ACP DISCUSS/DSCN MKR DOCD: CPT | Performed by: THORACIC SURGERY (CARDIOTHORACIC VASCULAR SURGERY)

## 2023-08-08 PROCEDURE — 1036F TOBACCO NON-USER: CPT | Performed by: THORACIC SURGERY (CARDIOTHORACIC VASCULAR SURGERY)

## 2023-08-08 PROCEDURE — 1090F PRES/ABSN URINE INCON ASSESS: CPT | Performed by: THORACIC SURGERY (CARDIOTHORACIC VASCULAR SURGERY)

## 2023-08-08 PROCEDURE — G8417 CALC BMI ABV UP PARAM F/U: HCPCS | Performed by: THORACIC SURGERY (CARDIOTHORACIC VASCULAR SURGERY)

## 2023-08-08 PROCEDURE — 99204 OFFICE O/P NEW MOD 45 MIN: CPT | Performed by: THORACIC SURGERY (CARDIOTHORACIC VASCULAR SURGERY)

## 2023-08-09 DIAGNOSIS — M54.50 CHRONIC BILATERAL LOW BACK PAIN WITHOUT SCIATICA: Primary | ICD-10-CM

## 2023-08-09 DIAGNOSIS — G89.29 CHRONIC BILATERAL LOW BACK PAIN WITHOUT SCIATICA: Primary | ICD-10-CM

## 2023-08-09 DIAGNOSIS — M54.9 MID BACK PAIN: ICD-10-CM

## 2023-08-09 DIAGNOSIS — S32.010A COMPRESSION FRACTURE OF L1 VERTEBRA, INITIAL ENCOUNTER (HCC): ICD-10-CM

## 2023-08-09 DIAGNOSIS — S22.050A COMPRESSION FRACTURE OF T6 VERTEBRA, INITIAL ENCOUNTER (HCC): ICD-10-CM

## 2023-08-09 RX ORDER — HYDROCODONE BITARTRATE AND ACETAMINOPHEN 7.5; 325 MG/1; MG/1
1 TABLET ORAL 2 TIMES DAILY PRN
Qty: 14 TABLET | Refills: 0 | Status: SHIPPED | OUTPATIENT
Start: 2023-08-09 | End: 2023-08-11

## 2023-08-09 RX ORDER — GABAPENTIN 300 MG/1
300 CAPSULE ORAL 2 TIMES DAILY
Qty: 60 CAPSULE | Refills: 0 | Status: SHIPPED | OUTPATIENT
Start: 2023-08-09 | End: 2023-08-25 | Stop reason: DRUGHIGH

## 2023-08-09 NOTE — TELEPHONE ENCOUNTER
Clem Johnson called to request a refill on her medication. Last office visit : 7/31/2023   Next office visit : 8/14/2023     Last UDS:   Amphetamine Screen, Urine   Date Value Ref Range Status   01/06/2023 neg  Final     Barbiturate Screen, Urine   Date Value Ref Range Status   01/06/2023 neg  Final     Benzodiazepine Screen, Urine   Date Value Ref Range Status   01/06/2023 neg  Final     Buprenorphine Urine   Date Value Ref Range Status   01/06/2023 neg  Final     Cocaine Metabolite Screen, Urine   Date Value Ref Range Status   01/06/2023 neg  Final     Gabapentin Screen, Urine   Date Value Ref Range Status   01/06/2023 neg  Final     MDMA, Urine   Date Value Ref Range Status   01/06/2023 neg  Final     Methamphetamine, Urine   Date Value Ref Range Status   01/06/2023 neg  Final     Opiate Scrn, Ur   Date Value Ref Range Status   01/06/2023 neg  Final     Oxycodone Screen, Ur   Date Value Ref Range Status   01/06/2023 neg  Final     PCP Screen, Urine   Date Value Ref Range Status   01/06/2023 neg  Final     Propoxyphene Screen, Urine   Date Value Ref Range Status   01/06/2023 neg  Final     THC Screen, Urine   Date Value Ref Range Status   01/06/2023 neg  Final     Tricyclic Antidepressants, Urine   Date Value Ref Range Status   01/06/2023 neg  Final        Last Anna Lonnie: 7/14/23  Medication Contract: Needs updated   Last Fill: 6/22/23 and 7/12/23    Requested Prescriptions     Pending Prescriptions Disp Refills    HYDROcodone-acetaminophen (LORCET PLUS) 7.5-325 MG per tablet 28 tablet 0     Sig: Take 1 tablet by mouth 2 times daily as needed for Pain for up to 14 days. Intended supply: 14 days. Take lowest dose possible to manage pain Max Daily Amount: 2 tablets         Please approve or refuse this medication.    Leah Cain

## 2023-08-09 NOTE — TELEPHONE ENCOUNTER
Patient saw Dr. Johana Crain on 7/27/23 and has another fracture on her back she has been to see Neurosurgery on 8/1/23. She is requesting another fill of her Hydrocodone and a new prescription of her Gabapentin per Dr. Johana Crain and his increase that is documented in his notes.

## 2023-08-09 NOTE — TELEPHONE ENCOUNTER
Did she titrate the dose up on the gabapentin to 300 mg twice daily? We need to update her contract. It looks like UDS was updated but not contract.

## 2023-08-10 DIAGNOSIS — M54.6 CHRONIC LEFT-SIDED THORACIC BACK PAIN: ICD-10-CM

## 2023-08-10 DIAGNOSIS — G89.29 CHRONIC LEFT-SIDED THORACIC BACK PAIN: ICD-10-CM

## 2023-08-11 DIAGNOSIS — M54.6 CHRONIC LEFT-SIDED THORACIC BACK PAIN: ICD-10-CM

## 2023-08-11 DIAGNOSIS — G89.29 CHRONIC LEFT-SIDED THORACIC BACK PAIN: ICD-10-CM

## 2023-08-11 RX ORDER — TRAMADOL HYDROCHLORIDE 50 MG/1
50 TABLET ORAL EVERY 8 HOURS PRN
Qty: 21 TABLET | Refills: 0 | Status: SHIPPED | OUTPATIENT
Start: 2023-08-11 | End: 2023-08-18

## 2023-08-11 RX ORDER — TRAMADOL HYDROCHLORIDE 50 MG/1
50 TABLET ORAL EVERY 8 HOURS PRN
Qty: 42 TABLET | Refills: 0 | OUTPATIENT
Start: 2023-08-11 | End: 2023-08-25

## 2023-08-11 NOTE — TELEPHONE ENCOUNTER
Patient had requested the wrong medication. [facility] is out of that medication anyway. I will call and cancel the Hydrocodone.

## 2023-08-11 NOTE — TELEPHONE ENCOUNTER
Mona Jung called to request a refill on her medication. Last office visit : 7/31/2023   Next office visit : 8/14/2023     Last UDS:   Amphetamine Screen, Urine   Date Value Ref Range Status   01/06/2023 neg  Final     Barbiturate Screen, Urine   Date Value Ref Range Status   01/06/2023 neg  Final     Benzodiazepine Screen, Urine   Date Value Ref Range Status   01/06/2023 neg  Final     Buprenorphine Urine   Date Value Ref Range Status   01/06/2023 neg  Final     Cocaine Metabolite Screen, Urine   Date Value Ref Range Status   01/06/2023 neg  Final     Gabapentin Screen, Urine   Date Value Ref Range Status   01/06/2023 neg  Final     MDMA, Urine   Date Value Ref Range Status   01/06/2023 neg  Final     Methamphetamine, Urine   Date Value Ref Range Status   01/06/2023 neg  Final     Opiate Scrn, Ur   Date Value Ref Range Status   01/06/2023 neg  Final     Oxycodone Screen, Ur   Date Value Ref Range Status   01/06/2023 neg  Final     PCP Screen, Urine   Date Value Ref Range Status   01/06/2023 neg  Final     Propoxyphene Screen, Urine   Date Value Ref Range Status   01/06/2023 neg  Final     THC Screen, Urine   Date Value Ref Range Status   01/06/2023 neg  Final     Tricyclic Antidepressants, Urine   Date Value Ref Range Status   01/06/2023 neg  Final       Last Nadege Castañedamariano: 7/14/23  Medication Contract: 7/29/22   Last Fill: 7/27/23    Requested Prescriptions     Pending Prescriptions Disp Refills    traMADol (ULTRAM) 50 MG tablet 42 tablet 0     Sig: Take 1 tablet by mouth every 8 hours as needed for Pain for up to 14 days. Intended supply: 3 days. Take lowest dose possible to manage pain Max Daily Amount: 150 mg         Please approve or refuse this medication.    Noni Jenkins

## 2023-08-11 NOTE — TELEPHONE ENCOUNTER
I sent in a week supply and we can discuss further at her appt next week.   I discontinued the hydrocodone from list.

## 2023-08-14 RX ORDER — ISOSORBIDE MONONITRATE 30 MG/1
TABLET, EXTENDED RELEASE ORAL
Qty: 90 TABLET | Refills: 3 | Status: SHIPPED | OUTPATIENT
Start: 2023-08-14

## 2023-08-21 RX ORDER — AMLODIPINE BESYLATE 10 MG/1
TABLET ORAL
Qty: 90 TABLET | Refills: 3 | Status: SHIPPED | OUTPATIENT
Start: 2023-08-21

## 2023-08-25 ENCOUNTER — OFFICE VISIT (OUTPATIENT)
Dept: PRIMARY CARE CLINIC | Age: 88
End: 2023-08-25
Payer: MEDICARE

## 2023-08-25 VITALS
DIASTOLIC BLOOD PRESSURE: 64 MMHG | SYSTOLIC BLOOD PRESSURE: 126 MMHG | HEIGHT: 63 IN | BODY MASS INDEX: 27.82 KG/M2 | WEIGHT: 157 LBS | OXYGEN SATURATION: 96 % | HEART RATE: 76 BPM | TEMPERATURE: 98.4 F

## 2023-08-25 DIAGNOSIS — S22.079D CLOSED FRACTURE OF TENTH THORACIC VERTEBRA WITH ROUTINE HEALING, UNSPECIFIED FRACTURE MORPHOLOGY, SUBSEQUENT ENCOUNTER: ICD-10-CM

## 2023-08-25 DIAGNOSIS — N30.00 ACUTE CYSTITIS WITHOUT HEMATURIA: ICD-10-CM

## 2023-08-25 DIAGNOSIS — I10 ESSENTIAL HYPERTENSION: ICD-10-CM

## 2023-08-25 DIAGNOSIS — Z87.440 RECENT URINARY TRACT INFECTION: ICD-10-CM

## 2023-08-25 DIAGNOSIS — R73.03 PREDIABETES: ICD-10-CM

## 2023-08-25 DIAGNOSIS — M54.50 CHRONIC BILATERAL LOW BACK PAIN WITHOUT SCIATICA: ICD-10-CM

## 2023-08-25 DIAGNOSIS — Z51.81 THERAPEUTIC DRUG MONITORING: ICD-10-CM

## 2023-08-25 DIAGNOSIS — G89.29 CHRONIC BILATERAL LOW BACK PAIN WITHOUT SCIATICA: ICD-10-CM

## 2023-08-25 DIAGNOSIS — I25.10 CORONARY ARTERY DISEASE INVOLVING NATIVE CORONARY ARTERY OF NATIVE HEART WITHOUT ANGINA PECTORIS: ICD-10-CM

## 2023-08-25 DIAGNOSIS — I35.0 NONRHEUMATIC AORTIC VALVE STENOSIS: ICD-10-CM

## 2023-08-25 DIAGNOSIS — M54.9 MID BACK PAIN: ICD-10-CM

## 2023-08-25 LAB
ALCOHOL URINE: NORMAL
AMPHETAMINE SCREEN, URINE: NORMAL
APPEARANCE FLUID: CLEAR
BARBITURATE SCREEN, URINE: NORMAL
BENZODIAZEPINE SCREEN, URINE: NORMAL
BILIRUBIN, POC: NORMAL
BLOOD URINE, POC: NORMAL
BUPRENORPHINE URINE: NORMAL
CLARITY, POC: CLEAR
COCAINE METABOLITE SCREEN URINE: NORMAL
COLOR, POC: YELLOW
FENTANYL SCREEN, URINE: NORMAL
GABAPENTIN SCREEN, URINE: NORMAL
GLUCOSE URINE, POC: NORMAL
KETONES, POC: NORMAL
LEUKOCYTE EST, POC: NORMAL
MDMA URINE: NORMAL
METHADONE SCREEN, URINE: NORMAL
METHAMPHETAMINE, URINE: NORMAL
NITRITE, POC: NORMAL
OPIATE SCREEN URINE: NORMAL
OXYCODONE SCREEN URINE: NORMAL
PH, POC: 7
PHENCYCLIDINE SCREEN URINE: NORMAL
PROPOXYPHENE SCREEN, URINE: NORMAL
PROTEIN, POC: NORMAL
SPECIFIC GRAVITY, POC: 1.02
SYNTHETIC CANNABINOIDS(K2) SCREEN, URINE: NORMAL
THC SCREEN, URINE: NORMAL
TRAMADOL SCREEN URINE: NORMAL
TRICYCLIC ANTIDEPRESSANTS, UR: NORMAL
UROBILINOGEN, POC: 0.2

## 2023-08-25 PROCEDURE — 1036F TOBACCO NON-USER: CPT | Performed by: NURSE PRACTITIONER

## 2023-08-25 PROCEDURE — 99214 OFFICE O/P EST MOD 30 MIN: CPT | Performed by: NURSE PRACTITIONER

## 2023-08-25 PROCEDURE — 1123F ACP DISCUSS/DSCN MKR DOCD: CPT | Performed by: NURSE PRACTITIONER

## 2023-08-25 PROCEDURE — G8427 DOCREV CUR MEDS BY ELIG CLIN: HCPCS | Performed by: NURSE PRACTITIONER

## 2023-08-25 PROCEDURE — 1090F PRES/ABSN URINE INCON ASSESS: CPT | Performed by: NURSE PRACTITIONER

## 2023-08-25 PROCEDURE — G8417 CALC BMI ABV UP PARAM F/U: HCPCS | Performed by: NURSE PRACTITIONER

## 2023-08-25 PROCEDURE — 81002 URINALYSIS NONAUTO W/O SCOPE: CPT | Performed by: NURSE PRACTITIONER

## 2023-08-25 RX ORDER — GABAPENTIN 300 MG/1
300 CAPSULE ORAL NIGHTLY
Qty: 30 CAPSULE | Refills: 0 | Status: SHIPPED
Start: 2023-08-25 | End: 2023-09-24

## 2023-08-25 RX ORDER — GABAPENTIN 100 MG/1
100 CAPSULE ORAL EVERY MORNING
Qty: 90 CAPSULE | Refills: 0
Start: 2023-08-25 | End: 2023-09-24

## 2023-08-25 ASSESSMENT — ENCOUNTER SYMPTOMS
WHEEZING: 0
DIARRHEA: 0
SHORTNESS OF BREATH: 0
CHEST TIGHTNESS: 0
ABDOMINAL PAIN: 0
BACK PAIN: 1
COUGH: 0
SORE THROAT: 0
NAUSEA: 0

## 2023-08-25 NOTE — PROGRESS NOTES
Francisco Alatorre is a 81 y.o. female who presents today for  Chief Complaint   Patient presents with    Follow-up     Due for UDS? HPI:  Here for follow-up. She had an episode of chest pain last month in setting of CAD. She is followed by [facility] cardiology. She underwent heart cath with findings of mid circumflex occlusion, patent stent in proximal RCA, patent stent proximal to mid LAD. Also severe aortic stenosis with mean gradient 37 mmHg. Cardiology referred her to CTS. Dr. Chely Vigil plans on referring for valve replacement since there is no current structural heart cardiologist at [facility]. She has had no further chest pain but has shortness of breath with exertion which is stable. She is followed by Dr. Savannah Nolan for thoracic compression fractures. She had a T6 compression fracture several weeks ago that did not require surgery or bracing. Then a new T10 compression fracture last month. She is scheduled for MRI 9/5. Has appointment following week with Dr. Savannah Nolan. She took tramadol briefly but no longer requires any pain medication. She is not taking any OTC analgesics either. She has been under further work-up per neurosurgery to rule out multiple myeloma. SPEP normal.    She has underlying osteoporosis and takes Prolia. She has taken gabapentin chronically for lower back pain, spinal stenosis. In light of recent fractures, dose was titrated up by another provider in our office to 300 mg bid (previously 100 mg bid). She sleeps well at night with the evening dose but has some fogginess with daytime dose. Prediabetes, diet controlled. A1c 5.9 last month. She was seen by another provider in our office recently for UTI. She completed treatment. She is having no significant urinary symptoms currently but would like to recheck urine. Review of Systems   Constitutional:  Negative for chills and fever. HENT:  Negative for congestion, ear pain and sore throat.     Respiratory:

## 2023-08-27 LAB — BACTERIA UR CULT: NORMAL

## 2023-08-31 ENCOUNTER — OFFICE VISIT (OUTPATIENT)
Dept: CARDIOLOGY CLINIC | Age: 88
End: 2023-08-31
Payer: MEDICARE

## 2023-08-31 VITALS
OXYGEN SATURATION: 95 % | HEART RATE: 68 BPM | BODY MASS INDEX: 27.82 KG/M2 | SYSTOLIC BLOOD PRESSURE: 128 MMHG | DIASTOLIC BLOOD PRESSURE: 70 MMHG | WEIGHT: 157 LBS | HEIGHT: 63 IN

## 2023-08-31 DIAGNOSIS — I35.0 NONRHEUMATIC AORTIC VALVE STENOSIS: ICD-10-CM

## 2023-08-31 DIAGNOSIS — I25.10 CORONARY ARTERY DISEASE INVOLVING NATIVE CORONARY ARTERY OF NATIVE HEART WITHOUT ANGINA PECTORIS: Primary | ICD-10-CM

## 2023-08-31 DIAGNOSIS — E78.2 MIXED HYPERLIPIDEMIA: ICD-10-CM

## 2023-08-31 DIAGNOSIS — I10 ESSENTIAL HYPERTENSION: ICD-10-CM

## 2023-08-31 DIAGNOSIS — I35.0 NONRHEUMATIC AORTIC VALVE STENOSIS: Primary | ICD-10-CM

## 2023-08-31 PROCEDURE — G8427 DOCREV CUR MEDS BY ELIG CLIN: HCPCS | Performed by: CLINICAL NURSE SPECIALIST

## 2023-08-31 PROCEDURE — 1090F PRES/ABSN URINE INCON ASSESS: CPT | Performed by: CLINICAL NURSE SPECIALIST

## 2023-08-31 PROCEDURE — 99214 OFFICE O/P EST MOD 30 MIN: CPT | Performed by: CLINICAL NURSE SPECIALIST

## 2023-08-31 PROCEDURE — G8417 CALC BMI ABV UP PARAM F/U: HCPCS | Performed by: CLINICAL NURSE SPECIALIST

## 2023-08-31 PROCEDURE — 1036F TOBACCO NON-USER: CPT | Performed by: CLINICAL NURSE SPECIALIST

## 2023-08-31 PROCEDURE — 1123F ACP DISCUSS/DSCN MKR DOCD: CPT | Performed by: CLINICAL NURSE SPECIALIST

## 2023-08-31 NOTE — PROGRESS NOTES
[facility] Cardiology  [address] [facility], [address] [id number]  Phone: [phone]  Fax: [phone]    OFFICE VISIT:  8/31/2023    [facility]: 1935    Reason For Visit:  Sunshine Mckeon is a 81 y.o. female who is here for Follow-up (No symptoms) and Cardiac Valve Problem  History of hyperlipidemia, hypertension, degenerative joint disease and aortic stenosis. She has coronary disease with note multiple stenting in the past.  Echo May 2022 showed possible low-flow low gradient severe aortic stenosis. -Recommended repeat echo  Jaime echo 7/17/2023 showed progression of aortic stenosis. Heart catheterization 7/24/2023. Severe aortic stenosis with diffuse coronary disease. Recommended evaluation by CT surgery. The surgery evaluation thought surgical intervention was high risk. Recommended TAVR approach for aortic valve replacement. She is here today in follow-up she is accompanied with her daughter. She states overall she is doing fairly well. [place] [facility] [place] [facility] [place] and sunglasses hit her right eye. Denies any significant dizziness or syncope or presyncope. No recent anginal-like symptoms. Elmo Huynh denies exertional chest pain, shortness of breath, orthopnea, paroxysmal nocturnal dyspnea, syncope, presyncope, arrhythmia, edema and fatigue. The patient denies numbness or weakness to suggest cerebrovascular accident or transient ischemic attack. BRAYDON Esquivel is PCP and follows labs .   Neha Babb has the following history as recorded in [facility]:    Patient Active Problem List    Diagnosis Date Noted    Prediabetes 01/06/2023    Dysuria 07/31/2023    Recurrent UTI 12/27/2021    Aortic stenosis 12/27/2021    Ventral hernia without obstruction or gangrene 12/27/2021    Spinal stenosis of lumbar region with neurogenic claudication 11/30/2021    Primary osteoarthritis of right knee 10/14/2021    Chronic bilateral low

## 2023-08-31 NOTE — PATIENT INSTRUCTIONS
Will refer to [facility] for TAVR   Maintain good blood pressure control-goal<130/80 at rest  Maintain good cholesterol control LDL goal<70 with arterial disease  If you are diabetic work to keep/obtain hemoglobin A1c< 7    Follow up after procedure   Call with any questions or concerns  Follow up with BRAYDON Lomax for non cardiac problems  Report any new problems  Cardiovascular Fitness-Exercise as tolerated. Strive for 30 minutes of exercise most days of the week. Cardiac / Healthy Diet- Avoid processed high fat foods, maintain low sodium/salt   Continue current medications as directed  Continue plan of treatment  It is always recommended that you bring your medications bottles with you to each visit - this is for your safety!

## 2023-09-05 ENCOUNTER — HOSPITAL ENCOUNTER (OUTPATIENT)
Dept: MRI IMAGING | Facility: HOSPITAL | Age: 88
Discharge: HOME | End: 2023-09-05
Admitting: NEUROLOGICAL SURGERY
Payer: MEDICARE

## 2023-09-05 DIAGNOSIS — S22.050D COMPRESSION FRACTURE OF T6 VERTEBRA WITH ROUTINE HEALING: ICD-10-CM

## 2023-09-05 DIAGNOSIS — M80.00XA AGE-RELATED OSTEOPOROSIS WITH CURRENT PATHOLOGICAL FRACTURE, INITIAL ENCOUNTER: ICD-10-CM

## 2023-09-05 DIAGNOSIS — S22.070A COMPRESSION FRACTURE OF T10 VERTEBRA, INITIAL ENCOUNTER: ICD-10-CM

## 2023-09-05 LAB — CREAT BLDA-MCNC: 0.8 MG/DL (ref 0.6–1.3)

## 2023-09-05 PROCEDURE — A9577 INJ MULTIHANCE: HCPCS | Performed by: NEUROLOGICAL SURGERY

## 2023-09-05 PROCEDURE — 72157 MRI CHEST SPINE W/O & W/DYE: CPT

## 2023-09-05 PROCEDURE — 82565 ASSAY OF CREATININE: CPT

## 2023-09-05 PROCEDURE — 0 GADOBENATE DIMEGLUMINE 529 MG/ML SOLUTION: Performed by: NEUROLOGICAL SURGERY

## 2023-09-05 RX ADMIN — GADOBENATE DIMEGLUMINE 14 ML: 529 INJECTION, SOLUTION INTRAVENOUS at 11:25

## 2023-09-11 ENCOUNTER — OFFICE VISIT (OUTPATIENT)
Dept: NEUROSURGERY | Age: 88
End: 2023-09-11
Payer: MEDICARE

## 2023-09-11 VITALS
SYSTOLIC BLOOD PRESSURE: 120 MMHG | DIASTOLIC BLOOD PRESSURE: 72 MMHG | RESPIRATION RATE: 18 BRPM | HEIGHT: 63 IN | WEIGHT: 157 LBS | BODY MASS INDEX: 27.82 KG/M2 | HEART RATE: 80 BPM

## 2023-09-11 DIAGNOSIS — S22.070D COMPRESSION FRACTURE OF T10 VERTEBRA WITH ROUTINE HEALING, SUBSEQUENT ENCOUNTER: ICD-10-CM

## 2023-09-11 DIAGNOSIS — S22.050D COMPRESSION FRACTURE OF T6 VERTEBRA WITH ROUTINE HEALING, SUBSEQUENT ENCOUNTER: Primary | ICD-10-CM

## 2023-09-11 PROCEDURE — 1123F ACP DISCUSS/DSCN MKR DOCD: CPT | Performed by: NEUROLOGICAL SURGERY

## 2023-09-11 PROCEDURE — 1090F PRES/ABSN URINE INCON ASSESS: CPT | Performed by: NEUROLOGICAL SURGERY

## 2023-09-11 PROCEDURE — 99214 OFFICE O/P EST MOD 30 MIN: CPT | Performed by: NEUROLOGICAL SURGERY

## 2023-09-11 PROCEDURE — 1036F TOBACCO NON-USER: CPT | Performed by: NEUROLOGICAL SURGERY

## 2023-09-11 PROCEDURE — G8417 CALC BMI ABV UP PARAM F/U: HCPCS | Performed by: NEUROLOGICAL SURGERY

## 2023-09-11 PROCEDURE — G8427 DOCREV CUR MEDS BY ELIG CLIN: HCPCS | Performed by: NEUROLOGICAL SURGERY

## 2023-09-11 ASSESSMENT — ENCOUNTER SYMPTOMS
BACK PAIN: 1
RESPIRATORY NEGATIVE: 1
GASTROINTESTINAL NEGATIVE: 1
EYES NEGATIVE: 1

## 2023-09-11 NOTE — PROGRESS NOTES
NEUROSURGERY FOLLOW UP      Chief Complaint:   Chief Complaint   Patient presents with    Follow-up     Patient is here to follow up after imaging and states that her back pain is better than last visit. Results     [facility] 9/5/23 pushed         Interval Update:    Patient presents for planned follow up. Her back pain has improved considerably since our last visit. She had labwork done to assess for multiple myeloma given her multiple compression fractures and this was unremarkable. She also completed an MRI at [facility].  She continues to deny any radicular pain or sensory loss but she does feel generally weak. HPI:     The patient is a 81 y.o. female with a history of osteoporosis who presents for evaluation of a T6 compression fracture that was noted on recent x-rays obtained to evaluate new thoracic back pain. She states her pain began insidiously after she went to the gym for her [employer] exercise class ~2 weeks ago. She initially felt she had pulled a muscle and was given a prescription for muscle relaxers and this did not help her pain so she had x-rays taken of her thoracic spine on 6/16/2023. She describes mid-thoracic back pain with some radiation around her chest wall. She denies any associated numbness or weakness. Her pain is worsened by movement and interferes with her sleep. She has been prescribed Norco 7.5mg to take at night and this does help her sleep. She also takes tylenol during the day with some relief. She has been prescribed Prolia for osteoporosis, however she states that she has not received any for almost 1 year because she has had difficulty getting her prescription filled. She does report a prior history of another compression fracture (she believes L1) as well. ROS:    Constitutional: Negative. HENT: Negative. Eyes: Negative. Respiratory: Negative. Cardiovascular: Negative. Gastrointestinal: Negative. Genitourinary: Negative.

## 2023-09-25 DIAGNOSIS — M54.50 CHRONIC BILATERAL LOW BACK PAIN WITHOUT SCIATICA: ICD-10-CM

## 2023-09-25 DIAGNOSIS — M54.9 MID BACK PAIN: ICD-10-CM

## 2023-09-25 DIAGNOSIS — G89.29 CHRONIC BILATERAL LOW BACK PAIN WITHOUT SCIATICA: ICD-10-CM

## 2023-09-25 RX ORDER — GABAPENTIN 300 MG/1
300 CAPSULE ORAL NIGHTLY
Qty: 30 CAPSULE | Refills: 2 | Status: SHIPPED | OUTPATIENT
Start: 2023-09-25 | End: 2023-12-24

## 2023-09-25 NOTE — TELEPHONE ENCOUNTER
Kin Crenshaw called to request a refill on her medication. Last office visit : 8/25/2023   Next office visit : 1/8/2024     Last UDS:   Amphetamine Screen, Urine   Date Value Ref Range Status   08/25/2023 neg  Final     Barbiturate Screen, Urine   Date Value Ref Range Status   08/25/2023 neg  Final     Benzodiazepine Screen, Urine   Date Value Ref Range Status   08/25/2023 neg  Final     Buprenorphine Urine   Date Value Ref Range Status   08/25/2023 neg  Final     Cocaine Metabolite Screen, Urine   Date Value Ref Range Status   08/25/2023 neg  Final     Gabapentin Screen, Urine   Date Value Ref Range Status   08/25/2023 neg  Final     MDMA, Urine   Date Value Ref Range Status   08/25/2023 neg  Final     Methamphetamine, Urine   Date Value Ref Range Status   08/25/2023 neg  Final     Opiate Scrn, Ur   Date Value Ref Range Status   08/25/2023 neg  Final     Oxycodone Screen, Ur   Date Value Ref Range Status   08/25/2023 neg  Final     PCP Screen, Urine   Date Value Ref Range Status   08/25/2023 neg  Final     Propoxyphene Screen, Urine   Date Value Ref Range Status   08/25/2023 neg  Final     THC Screen, Urine   Date Value Ref Range Status   08/25/2023 neg  Final     Tricyclic Antidepressants, Urine   Date Value Ref Range Status   08/25/2023 neg  Final       Last Amadou Pratt: 8/25/23  Medication Contract: 8/25/23   Last Fill: 8/25/23    Requested Prescriptions     Pending Prescriptions Disp Refills    gabapentin (NEURONTIN) 300 MG capsule 30 capsule 0     Sig: Take 1 capsule by mouth at bedtime for 30 days. Max Daily Amount: 300 mg                           Please approve or refuse this medication.    Patsy Mccurdy LPN

## 2023-10-20 ENCOUNTER — HOSPITAL ENCOUNTER (OUTPATIENT)
Dept: GENERAL RADIOLOGY | Age: 88
Discharge: HOME | End: 2023-10-20
Payer: MEDICARE

## 2023-10-20 DIAGNOSIS — S22.050D COMPRESSION FRACTURE OF T6 VERTEBRA WITH ROUTINE HEALING: ICD-10-CM

## 2023-10-20 DIAGNOSIS — R52 PAIN: ICD-10-CM

## 2023-10-20 DIAGNOSIS — S22.070D COMPRESSION FRACTURE OF T10 VERTEBRA WITH ROUTINE HEALING: ICD-10-CM

## 2023-10-20 PROCEDURE — 72072 X-RAY EXAM THORAC SPINE 3VWS: CPT

## 2023-10-20 RX ORDER — NITROGLYCERIN 0.4 MG/1
0.4 TABLET SUBLINGUAL EVERY 5 MIN PRN
Qty: 25 TABLET | Refills: 3 | Status: SHIPPED | OUTPATIENT
Start: 2023-10-20

## 2023-10-20 NOTE — TELEPHONE ENCOUNTER
Jonathan Abreu called to request a refill on her medication. Last office visit : 8/25/2023   Next office visit : 1/8/2024     Requested Prescriptions     Pending Prescriptions Disp Refills    nitroGLYCERIN (NITROSTAT) 0.4 MG SL tablet 25 tablet 3     Sig: Place 1 tablet under the tongue every 5 minutes as needed for Chest pain up to max of 3 total doses. If no relief after 1 dose, call 911.             Garry Ovalle

## 2023-10-23 ENCOUNTER — OFFICE VISIT (OUTPATIENT)
Dept: NEUROSURGERY | Age: 88
End: 2023-10-23
Payer: MEDICARE

## 2023-10-23 VITALS
DIASTOLIC BLOOD PRESSURE: 82 MMHG | WEIGHT: 156.97 LBS | RESPIRATION RATE: 18 BRPM | BODY MASS INDEX: 27.81 KG/M2 | HEART RATE: 77 BPM | SYSTOLIC BLOOD PRESSURE: 132 MMHG | HEIGHT: 63 IN

## 2023-10-23 DIAGNOSIS — S22.050D COMPRESSION FRACTURE OF T6 VERTEBRA WITH ROUTINE HEALING, SUBSEQUENT ENCOUNTER: Primary | ICD-10-CM

## 2023-10-23 DIAGNOSIS — S22.070D COMPRESSION FRACTURE OF T10 VERTEBRA WITH ROUTINE HEALING, SUBSEQUENT ENCOUNTER: ICD-10-CM

## 2023-10-23 PROCEDURE — 1123F ACP DISCUSS/DSCN MKR DOCD: CPT | Performed by: NEUROLOGICAL SURGERY

## 2023-10-23 PROCEDURE — 1090F PRES/ABSN URINE INCON ASSESS: CPT | Performed by: NEUROLOGICAL SURGERY

## 2023-10-23 PROCEDURE — G8427 DOCREV CUR MEDS BY ELIG CLIN: HCPCS | Performed by: NEUROLOGICAL SURGERY

## 2023-10-23 PROCEDURE — 99213 OFFICE O/P EST LOW 20 MIN: CPT | Performed by: NEUROLOGICAL SURGERY

## 2023-10-23 PROCEDURE — G8484 FLU IMMUNIZE NO ADMIN: HCPCS | Performed by: NEUROLOGICAL SURGERY

## 2023-10-23 PROCEDURE — 1036F TOBACCO NON-USER: CPT | Performed by: NEUROLOGICAL SURGERY

## 2023-10-23 PROCEDURE — G8417 CALC BMI ABV UP PARAM F/U: HCPCS | Performed by: NEUROLOGICAL SURGERY

## 2023-10-23 RX ORDER — SALICYLIC ACID 40 %
81 ADHESIVE PATCH, MEDICATED TOPICAL DAILY
Qty: 90 TABLET | Refills: 3 | Status: SHIPPED | OUTPATIENT
Start: 2023-10-23

## 2023-10-23 ASSESSMENT — ENCOUNTER SYMPTOMS
BACK PAIN: 1
RESPIRATORY NEGATIVE: 1
EYES NEGATIVE: 1
GASTROINTESTINAL NEGATIVE: 1

## 2023-10-31 ENCOUNTER — OFFICE VISIT (OUTPATIENT)
Dept: PRIMARY CARE CLINIC | Age: 88
End: 2023-10-31
Payer: MEDICARE

## 2023-10-31 VITALS
HEART RATE: 67 BPM | BODY MASS INDEX: 27.46 KG/M2 | OXYGEN SATURATION: 95 % | DIASTOLIC BLOOD PRESSURE: 70 MMHG | TEMPERATURE: 97.6 F | WEIGHT: 155 LBS | SYSTOLIC BLOOD PRESSURE: 130 MMHG | HEIGHT: 63 IN

## 2023-10-31 DIAGNOSIS — S22.079D CLOSED FRACTURE OF TENTH THORACIC VERTEBRA WITH ROUTINE HEALING, UNSPECIFIED FRACTURE MORPHOLOGY, SUBSEQUENT ENCOUNTER: ICD-10-CM

## 2023-10-31 DIAGNOSIS — M54.50 CHRONIC BILATERAL LOW BACK PAIN WITHOUT SCIATICA: ICD-10-CM

## 2023-10-31 DIAGNOSIS — Z09 HOSPITAL DISCHARGE FOLLOW-UP: Primary | ICD-10-CM

## 2023-10-31 DIAGNOSIS — G89.29 CHRONIC BILATERAL LOW BACK PAIN WITHOUT SCIATICA: ICD-10-CM

## 2023-10-31 DIAGNOSIS — Z95.2 S/P TAVR (TRANSCATHETER AORTIC VALVE REPLACEMENT): ICD-10-CM

## 2023-10-31 DIAGNOSIS — M54.9 MID BACK PAIN: ICD-10-CM

## 2023-10-31 DIAGNOSIS — J30.2 SEASONAL ALLERGIC RHINITIS, UNSPECIFIED TRIGGER: ICD-10-CM

## 2023-10-31 DIAGNOSIS — I25.10 CORONARY ARTERY DISEASE INVOLVING NATIVE CORONARY ARTERY OF NATIVE HEART WITHOUT ANGINA PECTORIS: ICD-10-CM

## 2023-10-31 DIAGNOSIS — I10 ESSENTIAL HYPERTENSION: ICD-10-CM

## 2023-10-31 DIAGNOSIS — I35.0 SEVERE AORTIC STENOSIS: ICD-10-CM

## 2023-10-31 PROCEDURE — 1111F DSCHRG MED/CURRENT MED MERGE: CPT | Performed by: NURSE PRACTITIONER

## 2023-10-31 PROCEDURE — 1123F ACP DISCUSS/DSCN MKR DOCD: CPT | Performed by: NURSE PRACTITIONER

## 2023-10-31 PROCEDURE — G8427 DOCREV CUR MEDS BY ELIG CLIN: HCPCS | Performed by: NURSE PRACTITIONER

## 2023-10-31 PROCEDURE — 1090F PRES/ABSN URINE INCON ASSESS: CPT | Performed by: NURSE PRACTITIONER

## 2023-10-31 PROCEDURE — 99214 OFFICE O/P EST MOD 30 MIN: CPT | Performed by: NURSE PRACTITIONER

## 2023-10-31 PROCEDURE — G8484 FLU IMMUNIZE NO ADMIN: HCPCS | Performed by: NURSE PRACTITIONER

## 2023-10-31 PROCEDURE — 1036F TOBACCO NON-USER: CPT | Performed by: NURSE PRACTITIONER

## 2023-10-31 PROCEDURE — G8417 CALC BMI ABV UP PARAM F/U: HCPCS | Performed by: NURSE PRACTITIONER

## 2023-10-31 RX ORDER — GABAPENTIN 100 MG/1
100 CAPSULE ORAL EVERY MORNING
Qty: 90 CAPSULE | Refills: 1 | Status: ON HOLD | OUTPATIENT
Start: 2023-10-31 | End: 2023-11-30

## 2023-10-31 ASSESSMENT — ENCOUNTER SYMPTOMS
ABDOMINAL PAIN: 0
BACK PAIN: 1
NAUSEA: 0
WHEEZING: 0
DIARRHEA: 0
CHEST TIGHTNESS: 0
SHORTNESS OF BREATH: 0
SORE THROAT: 0
COUGH: 0

## 2023-10-31 NOTE — PROGRESS NOTES
Post-Discharge Transitional Care Follow Up    Graciela Bales   YOB: 1935    Date of Office Visit:  10/31/2023  Date of Hospital Admission: 10/16/23  Date of Hospital Discharge: 10/17/23    Care management risk score Rising risk (score 2-5) and Complex Care (Scores >=6): No Risk Score On File     Non face to face  following discharge, date last encounter closed (first attempt may have been earlier): *No documented post hospital discharge outreach found in the last 14 days     Call initiated 2 business days of discharge: *No response recorded in the last 14 days    ASSESSMENT/PLAN:   Severe aortic stenosis  -s/p TAVR, improved symptoms  -Continue management per cardiology    S/P TAVR (transcatheter aortic valve replacement)    Essential hypertension  -Stable, continue amlodipine, HCTZ    Coronary artery disease involving native coronary artery of native heart without angina pectoris  -Stable, continue atorvastatin, ASA  -Continue management per cardiology    Chronic bilateral low back pain without sciatica  -Stable, continue gabapentin 100 mg qam, 300 mg qhs. Tolerates well, effective. UDS, controlled Rx contract, [facility].  -     gabapentin (NEURONTIN) 100 MG capsule; Take 1 capsule by mouth every morning for 30 days. In addition to 300 mg nightly Max Daily Amount: 100 mg, Disp-90 capsule, R-1Normal    Mid back pain  -Stable, as above  -     gabapentin (NEURONTIN) 100 MG capsule; Take 1 capsule by mouth every morning for 30 days. In addition to 300 mg nightly Max Daily Amount: 100 mg, Disp-90 capsule, R-1Normal    Closed fracture of tenth thoracic vertebra with routine healing, unspecified fracture morphology, subsequent encounter  -Improved, f/u with neurosurgery prn    Seasonal allergic rhinitis, unspecified trigger  -Advised Flonase as needed. May use saline spray as needed as well. Mucinex prn. Medical Decision Making: high complexity  Return for as scheduled.            Subjective:

## 2023-11-02 ENCOUNTER — APPOINTMENT (OUTPATIENT)
Dept: GENERAL RADIOLOGY | Age: 88
DRG: 066 | End: 2023-11-02
Payer: MEDICARE

## 2023-11-02 ENCOUNTER — APPOINTMENT (OUTPATIENT)
Dept: CT IMAGING | Age: 88
DRG: 066 | End: 2023-11-02
Payer: MEDICARE

## 2023-11-02 ENCOUNTER — HOSPITAL ENCOUNTER (INPATIENT)
Age: 88
LOS: 5 days | Discharge: NURSING, REHAB OR LONG-TERM CARE | DRG: 066 | End: 2023-11-07
Attending: EMERGENCY MEDICINE | Admitting: HOSPITALIST
Payer: MEDICARE

## 2023-11-02 DIAGNOSIS — I63.9 ACUTE CVA (CEREBROVASCULAR ACCIDENT) (HCC): Primary | ICD-10-CM

## 2023-11-02 DIAGNOSIS — E04.1 THYROID NODULE: ICD-10-CM

## 2023-11-02 DIAGNOSIS — R91.1 PULMONARY NODULE: ICD-10-CM

## 2023-11-02 LAB
ALBUMIN SERPL-MCNC: 4.4 G/DL (ref 3.5–5.2)
ALP SERPL-CCNC: 77 U/L (ref 35–104)
ALT SERPL-CCNC: 11 U/L (ref 5–33)
ANION GAP SERPL CALCULATED.3IONS-SCNC: 11 MMOL/L (ref 7–19)
AST SERPL-CCNC: 21 U/L (ref 5–32)
BASOPHILS # BLD: 0.1 K/UL (ref 0–0.2)
BASOPHILS NFR BLD: 0.6 % (ref 0–1)
BILIRUB SERPL-MCNC: 0.4 MG/DL (ref 0.2–1.2)
BUN SERPL-MCNC: 18 MG/DL (ref 8–23)
CALCIUM SERPL-MCNC: 9.5 MG/DL (ref 8.8–10.2)
CHLORIDE SERPL-SCNC: 96 MMOL/L (ref 98–111)
CO2 SERPL-SCNC: 26 MMOL/L (ref 22–29)
CREAT SERPL-MCNC: 0.8 MG/DL (ref 0.5–0.9)
EOSINOPHIL # BLD: 0.1 K/UL (ref 0–0.6)
EOSINOPHIL NFR BLD: 1.4 % (ref 0–5)
ERYTHROCYTE [DISTWIDTH] IN BLOOD BY AUTOMATED COUNT: 14.4 % (ref 11.5–14.5)
GLUCOSE BLD-MCNC: 101 MG/DL (ref 70–99)
GLUCOSE SERPL-MCNC: 110 MG/DL (ref 74–109)
HCT VFR BLD AUTO: 36.2 % (ref 37–47)
HGB BLD-MCNC: 12 G/DL (ref 12–16)
IMM GRANULOCYTES # BLD: 0 K/UL
LYMPHOCYTES # BLD: 3.9 K/UL (ref 1.1–4.5)
LYMPHOCYTES NFR BLD: 38.7 % (ref 20–40)
MCH RBC QN AUTO: 28.2 PG (ref 27–31)
MCHC RBC AUTO-ENTMCNC: 33.1 G/DL (ref 33–37)
MCV RBC AUTO: 85.2 FL (ref 81–99)
MONOCYTES # BLD: 0.7 K/UL (ref 0–0.9)
MONOCYTES NFR BLD: 7.1 % (ref 0–10)
NEUTROPHILS # BLD: 5.2 K/UL (ref 1.5–7.5)
NEUTS SEG NFR BLD: 51.9 % (ref 50–65)
PERFORMED ON: ABNORMAL
PLATELET # BLD AUTO: 345 K/UL (ref 130–400)
PMV BLD AUTO: 9.7 FL (ref 9.4–12.3)
POTASSIUM SERPL-SCNC: 3.6 MMOL/L (ref 3.5–5)
PROT SERPL-MCNC: 7.8 G/DL (ref 6.6–8.7)
RBC # BLD AUTO: 4.25 M/UL (ref 4.2–5.4)
SODIUM SERPL-SCNC: 133 MMOL/L (ref 136–145)
TROPONIN, HIGH SENSITIVITY: 16 NG/L (ref 0–14)
WBC # BLD AUTO: 10.1 K/UL (ref 4.8–10.8)

## 2023-11-02 PROCEDURE — 74018 RADEX ABDOMEN 1 VIEW: CPT

## 2023-11-02 PROCEDURE — 6360000002 HC RX W HCPCS: Performed by: HOSPITALIST

## 2023-11-02 PROCEDURE — 1210000000 HC MED SURG R&B

## 2023-11-02 PROCEDURE — 70450 CT HEAD/BRAIN W/O DYE: CPT

## 2023-11-02 PROCEDURE — 36415 COLL VENOUS BLD VENIPUNCTURE: CPT

## 2023-11-02 PROCEDURE — 6360000002 HC RX W HCPCS: Performed by: EMERGENCY MEDICINE

## 2023-11-02 PROCEDURE — 94760 N-INVAS EAR/PLS OXIMETRY 1: CPT

## 2023-11-02 PROCEDURE — 96375 TX/PRO/DX INJ NEW DRUG ADDON: CPT

## 2023-11-02 PROCEDURE — 70498 CT ANGIOGRAPHY NECK: CPT

## 2023-11-02 PROCEDURE — 2580000003 HC RX 258: Performed by: HOSPITALIST

## 2023-11-02 PROCEDURE — C9113 INJ PANTOPRAZOLE SODIUM, VIA: HCPCS | Performed by: HOSPITALIST

## 2023-11-02 PROCEDURE — 82962 GLUCOSE BLOOD TEST: CPT

## 2023-11-02 PROCEDURE — 6360000004 HC RX CONTRAST MEDICATION: Performed by: EMERGENCY MEDICINE

## 2023-11-02 PROCEDURE — 99223 1ST HOSP IP/OBS HIGH 75: CPT | Performed by: PSYCHIATRY & NEUROLOGY

## 2023-11-02 PROCEDURE — 2580000003 HC RX 258

## 2023-11-02 PROCEDURE — 96374 THER/PROPH/DIAG INJ IV PUSH: CPT

## 2023-11-02 PROCEDURE — 85025 COMPLETE CBC W/AUTO DIFF WBC: CPT

## 2023-11-02 PROCEDURE — 99285 EMERGENCY DEPT VISIT HI MDM: CPT

## 2023-11-02 PROCEDURE — 84484 ASSAY OF TROPONIN QUANT: CPT

## 2023-11-02 PROCEDURE — 71045 X-RAY EXAM CHEST 1 VIEW: CPT

## 2023-11-02 PROCEDURE — 80053 COMPREHEN METABOLIC PANEL: CPT

## 2023-11-02 RX ORDER — ONDANSETRON 2 MG/ML
4 INJECTION INTRAMUSCULAR; INTRAVENOUS EVERY 6 HOURS PRN
Status: DISCONTINUED | OUTPATIENT
Start: 2023-11-02 | End: 2023-11-07 | Stop reason: HOSPADM

## 2023-11-02 RX ORDER — PROCHLORPERAZINE EDISYLATE 5 MG/ML
10 INJECTION INTRAMUSCULAR; INTRAVENOUS EVERY 6 HOURS PRN
Status: DISCONTINUED | OUTPATIENT
Start: 2023-11-02 | End: 2023-11-02 | Stop reason: SDUPTHER

## 2023-11-02 RX ORDER — LABETALOL HYDROCHLORIDE 5 MG/ML
10 INJECTION, SOLUTION INTRAVENOUS EVERY 10 MIN PRN
Status: DISCONTINUED | OUTPATIENT
Start: 2023-11-02 | End: 2023-11-07 | Stop reason: HOSPADM

## 2023-11-02 RX ORDER — ASPIRIN 81 MG/1
81 TABLET ORAL DAILY
Status: CANCELLED | OUTPATIENT
Start: 2023-11-02

## 2023-11-02 RX ORDER — ONDANSETRON 2 MG/ML
4 INJECTION INTRAMUSCULAR; INTRAVENOUS EVERY 6 HOURS PRN
Status: CANCELLED | OUTPATIENT
Start: 2023-11-02

## 2023-11-02 RX ORDER — HYDROCHLOROTHIAZIDE 25 MG/1
25 TABLET ORAL DAILY
Status: CANCELLED | OUTPATIENT
Start: 2023-11-02

## 2023-11-02 RX ORDER — ASPIRIN 81 MG/1
81 TABLET, CHEWABLE ORAL DAILY
Status: DISCONTINUED | OUTPATIENT
Start: 2023-11-03 | End: 2023-11-07 | Stop reason: HOSPADM

## 2023-11-02 RX ORDER — PHENOL 1.4 %
1 AEROSOL, SPRAY (ML) MUCOUS MEMBRANE DAILY
Status: CANCELLED | OUTPATIENT
Start: 2023-11-02

## 2023-11-02 RX ORDER — ATORVASTATIN CALCIUM 40 MG/1
20 TABLET, FILM COATED ORAL NIGHTLY
Status: CANCELLED | OUTPATIENT
Start: 2023-11-02

## 2023-11-02 RX ORDER — ONDANSETRON 4 MG/1
4 TABLET, ORALLY DISINTEGRATING ORAL EVERY 8 HOURS PRN
Status: CANCELLED | OUTPATIENT
Start: 2023-11-02

## 2023-11-02 RX ORDER — ACETAMINOPHEN 325 MG/1
650 TABLET ORAL EVERY 6 HOURS PRN
Status: CANCELLED | OUTPATIENT
Start: 2023-11-02

## 2023-11-02 RX ORDER — ASPIRIN 300 MG/1
300 SUPPOSITORY RECTAL DAILY
Status: DISCONTINUED | OUTPATIENT
Start: 2023-11-03 | End: 2023-11-07 | Stop reason: HOSPADM

## 2023-11-02 RX ORDER — MAGNESIUM SULFATE IN WATER 40 MG/ML
2000 INJECTION, SOLUTION INTRAVENOUS PRN
Status: CANCELLED | OUTPATIENT
Start: 2023-11-02

## 2023-11-02 RX ORDER — LABETALOL HYDROCHLORIDE 5 MG/ML
10 INJECTION, SOLUTION INTRAVENOUS EVERY 10 MIN PRN
Status: CANCELLED | OUTPATIENT
Start: 2023-11-02

## 2023-11-02 RX ORDER — METOCLOPRAMIDE HYDROCHLORIDE 5 MG/ML
5 INJECTION INTRAMUSCULAR; INTRAVENOUS
Status: DISCONTINUED | OUTPATIENT
Start: 2023-11-03 | End: 2023-11-05

## 2023-11-02 RX ORDER — POTASSIUM CHLORIDE 20 MEQ/1
40 TABLET, EXTENDED RELEASE ORAL PRN
Status: CANCELLED | OUTPATIENT
Start: 2023-11-02

## 2023-11-02 RX ORDER — METOCLOPRAMIDE HYDROCHLORIDE 5 MG/ML
10 INJECTION INTRAMUSCULAR; INTRAVENOUS
Status: DISCONTINUED | OUTPATIENT
Start: 2023-11-02 | End: 2023-11-02 | Stop reason: DRUGHIGH

## 2023-11-02 RX ORDER — ENOXAPARIN SODIUM 100 MG/ML
40 INJECTION SUBCUTANEOUS DAILY
Status: DISCONTINUED | OUTPATIENT
Start: 2023-11-03 | End: 2023-11-07 | Stop reason: HOSPADM

## 2023-11-02 RX ORDER — SODIUM CHLORIDE 9 MG/ML
INJECTION, SOLUTION INTRAVENOUS PRN
Status: DISCONTINUED | OUTPATIENT
Start: 2023-11-02 | End: 2023-11-07 | Stop reason: HOSPADM

## 2023-11-02 RX ORDER — ATORVASTATIN CALCIUM 40 MG/1
40 TABLET, FILM COATED ORAL NIGHTLY
Status: CANCELLED | OUTPATIENT
Start: 2023-11-02

## 2023-11-02 RX ORDER — ONDANSETRON 2 MG/ML
4 INJECTION INTRAMUSCULAR; INTRAVENOUS ONCE
Status: COMPLETED | OUTPATIENT
Start: 2023-11-02 | End: 2023-11-02

## 2023-11-02 RX ORDER — POTASSIUM CHLORIDE 7.45 MG/ML
10 INJECTION INTRAVENOUS PRN
Status: CANCELLED | OUTPATIENT
Start: 2023-11-02

## 2023-11-02 RX ORDER — NICOTINE 21 MG/24HR
1 PATCH, TRANSDERMAL 24 HOURS TRANSDERMAL DAILY
Status: CANCELLED | OUTPATIENT
Start: 2023-11-02

## 2023-11-02 RX ORDER — AMLODIPINE BESYLATE 5 MG/1
10 TABLET ORAL DAILY
Status: CANCELLED | OUTPATIENT
Start: 2023-11-02

## 2023-11-02 RX ORDER — ENOXAPARIN SODIUM 100 MG/ML
40 INJECTION SUBCUTANEOUS DAILY
Status: CANCELLED | OUTPATIENT
Start: 2023-11-02

## 2023-11-02 RX ORDER — SODIUM CHLORIDE 0.9 % (FLUSH) 0.9 %
5-40 SYRINGE (ML) INJECTION PRN
Status: DISCONTINUED | OUTPATIENT
Start: 2023-11-02 | End: 2023-11-07 | Stop reason: HOSPADM

## 2023-11-02 RX ORDER — POLYETHYLENE GLYCOL 3350 17 G/17G
17 POWDER, FOR SOLUTION ORAL DAILY PRN
Status: DISCONTINUED | OUTPATIENT
Start: 2023-11-02 | End: 2023-11-07 | Stop reason: HOSPADM

## 2023-11-02 RX ORDER — PROCHLORPERAZINE EDISYLATE 5 MG/ML
10 INJECTION INTRAMUSCULAR; INTRAVENOUS EVERY 6 HOURS PRN
Status: DISCONTINUED | OUTPATIENT
Start: 2023-11-02 | End: 2023-11-07 | Stop reason: HOSPADM

## 2023-11-02 RX ORDER — ATORVASTATIN CALCIUM 80 MG/1
80 TABLET, FILM COATED ORAL NIGHTLY
Status: DISCONTINUED | OUTPATIENT
Start: 2023-11-02 | End: 2023-11-07 | Stop reason: HOSPADM

## 2023-11-02 RX ORDER — GABAPENTIN 100 MG/1
100 CAPSULE ORAL EVERY MORNING
Status: CANCELLED | OUTPATIENT
Start: 2023-11-03

## 2023-11-02 RX ORDER — ACETAMINOPHEN 650 MG/1
650 SUPPOSITORY RECTAL EVERY 6 HOURS PRN
Status: CANCELLED | OUTPATIENT
Start: 2023-11-02

## 2023-11-02 RX ORDER — METOCLOPRAMIDE HYDROCHLORIDE 5 MG/ML
10 INJECTION INTRAMUSCULAR; INTRAVENOUS ONCE
Status: COMPLETED | OUTPATIENT
Start: 2023-11-02 | End: 2023-11-02

## 2023-11-02 RX ORDER — ONDANSETRON 4 MG/1
4 TABLET, ORALLY DISINTEGRATING ORAL EVERY 8 HOURS PRN
Status: DISCONTINUED | OUTPATIENT
Start: 2023-11-02 | End: 2023-11-07 | Stop reason: HOSPADM

## 2023-11-02 RX ORDER — SODIUM CHLORIDE 0.9 % (FLUSH) 0.9 %
5-40 SYRINGE (ML) INJECTION EVERY 12 HOURS SCHEDULED
Status: DISCONTINUED | OUTPATIENT
Start: 2023-11-02 | End: 2023-11-07 | Stop reason: HOSPADM

## 2023-11-02 RX ADMIN — ONDANSETRON 4 MG: 2 INJECTION INTRAMUSCULAR; INTRAVENOUS at 19:40

## 2023-11-02 RX ADMIN — SODIUM CHLORIDE, PRESERVATIVE FREE 40 MG: 5 INJECTION INTRAVENOUS at 23:30

## 2023-11-02 RX ADMIN — SODIUM CHLORIDE, PRESERVATIVE FREE 10 ML: 5 INJECTION INTRAVENOUS at 23:00

## 2023-11-02 RX ADMIN — IOPAMIDOL 75 ML: 755 INJECTION, SOLUTION INTRAVENOUS at 18:33

## 2023-11-02 RX ADMIN — METOCLOPRAMIDE 10 MG: 5 INJECTION, SOLUTION INTRAMUSCULAR; INTRAVENOUS at 20:18

## 2023-11-02 ASSESSMENT — PAIN - FUNCTIONAL ASSESSMENT: PAIN_FUNCTIONAL_ASSESSMENT: NONE - DENIES PAIN

## 2023-11-02 ASSESSMENT — ENCOUNTER SYMPTOMS
NAUSEA: 0
RHINORRHEA: 0
COUGH: 0
DIARRHEA: 0
SHORTNESS OF BREATH: 0
SORE THROAT: 0
ABDOMINAL PAIN: 0
VOMITING: 0
BACK PAIN: 0

## 2023-11-02 NOTE — ED NOTES
[facility]  Dr. Jane Grove called Code Stroke     [address] [facility] CT notified     1826 PA announcement     5070 Dr. Obinna Calderon, Neurology, notified     6092 Notified Stroke Coordinator     Guille Hubbard 5:45 p.m. today     Arrived via [facility]  11/02/23 [phone]

## 2023-11-03 ENCOUNTER — APPOINTMENT (OUTPATIENT)
Dept: MRI IMAGING | Age: 88
DRG: 066 | End: 2023-11-03
Payer: MEDICARE

## 2023-11-03 ENCOUNTER — APPOINTMENT (OUTPATIENT)
Dept: CT IMAGING | Age: 88
DRG: 066 | End: 2023-11-03
Payer: MEDICARE

## 2023-11-03 PROBLEM — Z51.5 PALLIATIVE CARE PATIENT: Status: ACTIVE | Noted: 2023-11-03

## 2023-11-03 LAB
25(OH)D3 SERPL-MCNC: 42.7 NG/ML
ANION GAP SERPL CALCULATED.3IONS-SCNC: 13 MMOL/L (ref 7–19)
BUN SERPL-MCNC: 17 MG/DL (ref 8–23)
CALCIUM SERPL-MCNC: 9.1 MG/DL (ref 8.8–10.2)
CHLORIDE SERPL-SCNC: 96 MMOL/L (ref 98–111)
CHOLEST SERPL-MCNC: 139 MG/DL (ref 160–199)
CO2 SERPL-SCNC: 24 MMOL/L (ref 22–29)
CREAT SERPL-MCNC: 0.7 MG/DL (ref 0.5–0.9)
ERYTHROCYTE [DISTWIDTH] IN BLOOD BY AUTOMATED COUNT: 14.3 % (ref 11.5–14.5)
FOLATE SERPL-MCNC: 19.7 NG/ML (ref 4.8–37.3)
GLUCOSE SERPL-MCNC: 145 MG/DL (ref 74–109)
HBA1C MFR BLD: 5.7 % (ref 4–6)
HCT VFR BLD AUTO: 36.5 % (ref 37–47)
HDLC SERPL-MCNC: 50 MG/DL (ref 65–121)
HGB BLD-MCNC: 11.9 G/DL (ref 12–16)
INR PPP: 1.09 (ref 0.88–1.18)
LDLC SERPL CALC-MCNC: 79 MG/DL
MCH RBC QN AUTO: 28.1 PG (ref 27–31)
MCHC RBC AUTO-ENTMCNC: 32.6 G/DL (ref 33–37)
MCV RBC AUTO: 86.1 FL (ref 81–99)
PLATELET # BLD AUTO: 335 K/UL (ref 130–400)
PMV BLD AUTO: 9.2 FL (ref 9.4–12.3)
POTASSIUM SERPL-SCNC: 3.6 MMOL/L (ref 3.5–5)
PROTHROMBIN TIME: 13.8 SEC (ref 12–14.6)
RBC # BLD AUTO: 4.24 M/UL (ref 4.2–5.4)
SODIUM SERPL-SCNC: 133 MMOL/L (ref 136–145)
TRIGL SERPL-MCNC: 50 MG/DL (ref 0–149)
TSH SERPL DL<=0.005 MIU/L-ACNC: 3.12 UIU/ML (ref 0.35–5.5)
VIT B12 SERPL-MCNC: 984 PG/ML (ref 211–946)
WBC # BLD AUTO: 12.8 K/UL (ref 4.8–10.8)

## 2023-11-03 PROCEDURE — 80048 BASIC METABOLIC PNL TOTAL CA: CPT

## 2023-11-03 PROCEDURE — 6360000004 HC RX CONTRAST MEDICATION: Performed by: PSYCHIATRY & NEUROLOGY

## 2023-11-03 PROCEDURE — 99222 1ST HOSP IP/OBS MODERATE 55: CPT | Performed by: PHYSICIAN ASSISTANT

## 2023-11-03 PROCEDURE — 92523 SPEECH SOUND LANG COMPREHEN: CPT

## 2023-11-03 PROCEDURE — 97530 THERAPEUTIC ACTIVITIES: CPT

## 2023-11-03 PROCEDURE — 82607 VITAMIN B-12: CPT

## 2023-11-03 PROCEDURE — 2580000003 HC RX 258: Performed by: HOSPITALIST

## 2023-11-03 PROCEDURE — 83036 HEMOGLOBIN GLYCOSYLATED A1C: CPT

## 2023-11-03 PROCEDURE — 99232 SBSQ HOSP IP/OBS MODERATE 35: CPT | Performed by: PSYCHIATRY & NEUROLOGY

## 2023-11-03 PROCEDURE — 82746 ASSAY OF FOLIC ACID SERUM: CPT

## 2023-11-03 PROCEDURE — 6370000000 HC RX 637 (ALT 250 FOR IP)

## 2023-11-03 PROCEDURE — 6360000002 HC RX W HCPCS: Performed by: HOSPITALIST

## 2023-11-03 PROCEDURE — 85027 COMPLETE CBC AUTOMATED: CPT

## 2023-11-03 PROCEDURE — 94760 N-INVAS EAR/PLS OXIMETRY 1: CPT

## 2023-11-03 PROCEDURE — 82306 VITAMIN D 25 HYDROXY: CPT

## 2023-11-03 PROCEDURE — 2580000003 HC RX 258

## 2023-11-03 PROCEDURE — 80061 LIPID PANEL: CPT

## 2023-11-03 PROCEDURE — 36415 COLL VENOUS BLD VENIPUNCTURE: CPT

## 2023-11-03 PROCEDURE — C8929 TTE W OR WO FOL WCON,DOPPLER: HCPCS

## 2023-11-03 PROCEDURE — 85610 PROTHROMBIN TIME: CPT

## 2023-11-03 PROCEDURE — 84443 ASSAY THYROID STIM HORMONE: CPT

## 2023-11-03 PROCEDURE — 92610 EVALUATE SWALLOWING FUNCTION: CPT

## 2023-11-03 PROCEDURE — 97166 OT EVAL MOD COMPLEX 45 MIN: CPT

## 2023-11-03 PROCEDURE — 74177 CT ABD & PELVIS W/CONTRAST: CPT

## 2023-11-03 PROCEDURE — A9577 INJ MULTIHANCE: HCPCS | Performed by: PSYCHIATRY & NEUROLOGY

## 2023-11-03 PROCEDURE — 6360000004 HC RX CONTRAST MEDICATION

## 2023-11-03 PROCEDURE — 70553 MRI BRAIN STEM W/O & W/DYE: CPT

## 2023-11-03 PROCEDURE — C9113 INJ PANTOPRAZOLE SODIUM, VIA: HCPCS | Performed by: HOSPITALIST

## 2023-11-03 PROCEDURE — 6360000002 HC RX W HCPCS

## 2023-11-03 PROCEDURE — 97162 PT EVAL MOD COMPLEX 30 MIN: CPT

## 2023-11-03 PROCEDURE — 1210000000 HC MED SURG R&B

## 2023-11-03 RX ADMIN — METOCLOPRAMIDE 5 MG: 5 INJECTION, SOLUTION INTRAMUSCULAR; INTRAVENOUS at 21:15

## 2023-11-03 RX ADMIN — SODIUM CHLORIDE, PRESERVATIVE FREE 40 MG: 5 INJECTION INTRAVENOUS at 12:03

## 2023-11-03 RX ADMIN — METOCLOPRAMIDE 5 MG: 5 INJECTION, SOLUTION INTRAMUSCULAR; INTRAVENOUS at 07:49

## 2023-11-03 RX ADMIN — SODIUM CHLORIDE: 9 INJECTION, SOLUTION INTRAVENOUS at 09:26

## 2023-11-03 RX ADMIN — SODIUM CHLORIDE, PRESERVATIVE FREE 40 MG: 5 INJECTION INTRAVENOUS at 23:59

## 2023-11-03 RX ADMIN — METOCLOPRAMIDE 5 MG: 5 INJECTION, SOLUTION INTRAMUSCULAR; INTRAVENOUS at 12:03

## 2023-11-03 RX ADMIN — PERFLUTREN 1.5 ML: 6.52 INJECTION, SUSPENSION INTRAVENOUS at 14:28

## 2023-11-03 RX ADMIN — GADOBENATE DIMEGLUMINE 14 ML: 529 INJECTION, SOLUTION INTRAVENOUS at 07:38

## 2023-11-03 RX ADMIN — IOPAMIDOL 70 ML: 755 INJECTION, SOLUTION INTRAVENOUS at 13:52

## 2023-11-03 RX ADMIN — ASPIRIN 300 MG: 300 SUPPOSITORY RECTAL at 09:27

## 2023-11-03 RX ADMIN — ONDANSETRON 4 MG: 2 INJECTION INTRAMUSCULAR; INTRAVENOUS at 09:27

## 2023-11-03 RX ADMIN — SODIUM CHLORIDE, PRESERVATIVE FREE 10 ML: 5 INJECTION INTRAVENOUS at 21:15

## 2023-11-03 RX ADMIN — METOCLOPRAMIDE 5 MG: 5 INJECTION, SOLUTION INTRAMUSCULAR; INTRAVENOUS at 17:48

## 2023-11-03 RX ADMIN — SODIUM CHLORIDE, PRESERVATIVE FREE 10 ML: 5 INJECTION INTRAVENOUS at 09:26

## 2023-11-03 RX ADMIN — ENOXAPARIN SODIUM 40 MG: 100 INJECTION SUBCUTANEOUS at 09:27

## 2023-11-03 NOTE — ED NOTES
Report called to Marla Mtz, [address], [address] [place] [address], ESTHELA  11/02/23 2044

## 2023-11-03 NOTE — CARE COORDINATION
Case Management Assessment  Initial Evaluation  Case Management Assessment  Initial Evaluation    Date/Time of Evaluation: 11/3/2023 5:46 PM  Assessment Completed by: Ellen Sanchez RN    If patient is discharged prior to next notation, then this note serves as note for discharge by case management. Patient Name: Narendra Mahan                   YOB: 1935  Diagnosis: Thyroid nodule [E04.1]  Pulmonary nodule [R91.1]  Acute CVA (cerebrovascular accident) [facility]) [I63.9]                   Date / Time: 11/2/2023  6:22 PM    Patient Admission Status: Inpatient   Readmission Risk (Low < 19, Mod (19-27), High > 27): Readmission Risk Score: 9.9    Current PCP: BRAYDON Zurita  PCP verified by CM? (P) Yes    Chart Reviewed: Yes      History Provided by:  won  Patient Orientation: yes    Patient Cognition: (P) Alert    Hospitalization in the last 30 days (Readmission):  No    If yes, Readmission Assessment in CM Navigator will be completed.     Advance Directives:      Code Status: DNR   Patient's Primary Decision Maker is:      Primary Decision Maker: Dayanara Liang - Child - [phone]    Primary Decision Maker: Jefferson Alba Child - [phone]    Primary Decision Maker: Sara Pedraza  Child  [phone]    Discharge Planning:    Patient lives with: (P) Alone Type of Home: (P) Acute Rehab  Primary Care Giver: (P) Self  Patient Support Systems include: (P) Children   Current Financial resources: (P) Medicare  Current community resources: (P) None  Current services prior to admission: (P) None            Current DME:              Type of Home Care services:  (P) None    ADLS  Prior functional level: (P) Independent in ADLs/IADLs  Current functional level: (P) Assistance with the following:    PT AM-PAC:   /24  OT AM-PAC:   /24    Family can provide assistance at DC: (P) Yes  Would you like Case Management to discuss the discharge plan with any other family members/significant others, and

## 2023-11-03 NOTE — PROGRESS NOTES
Facility/Department: [facility] SURG SERVICES  Initial Speech/Language/Cognitive/Swallow Assessment    NAME: Sharmila Hidalgo  : 1935   MRN: [id number]  ADMISSION DATE: 2023  ADMITTING DIAGNOSIS: has Essential hypertension; Mixed hyperlipidemia; Age-related osteoporosis without current pathological fracture; Coronary artery disease involving native coronary artery of native heart without angina pectoris; DDD (degenerative disc disease), lumbar; Basal cell carcinoma (BCC) of scalp; Chronic bilateral low back pain without sciatica; Macular degeneration of both eyes; Primary osteoarthritis of right knee; Spinal stenosis of lumbar region with neurogenic claudication; Recurrent UTI; Aortic stenosis; Ventral hernia without obstruction or gangrene; Prediabetes; Dysuria; S/P TAVR (transcatheter aortic valve replacement); Cerebrovascular accident (CVA) ([address]); Nodule of left lobe of thyroid gland; Left upper lobe pulmonary nodule; and Palliative care patient on their problem list.    Date of Eval: 11/3/2023   Evaluating Therapist: THEA Del Rosario    Pain:  Pain Assessment: None - Denies Pain    Assessment:  Completed assessment. Patient exhibited decreased volume of speech and slow, decreased lingual movements during verbalizations. Patient also exhibited decreased select and sustained attention, slow processing, delayed auditory comprehension of increased complexity, and delayed verbalizations noted. Also evaluated patient's swallowing function. Patient exhibited decreased oral prep of more solid consistencies, inconsistently fast oral transit and suspected swallow delay with thin liquids, and sluggish, inconsistently mildly decreased laryngeal elevation for swallow airway protection. No outward S/S penetration/aspiration was noted with any ice chip trial, puree consistency trial, regular solid consistency trial, or nectar thick liquid trial presented during assessment this date.  Mild delayed throat clears were pudding/applesauce. If patient receives mouth care prior to intake, okay for ice chips and small sips regular water IN BETWEEN MEALS for comfort. Will continue to follow.      Electronically signed by THEA Deal on 11/3/2023 at 2:33 PM

## 2023-11-03 NOTE — ACP (ADVANCE CARE PLANNING)
Advance Care Planning     Advance Care Planning (ACP) Physician/NP/PA (Provider) Conversation      Date of ACP Conversation: 11/03/2023    Conversation Conducted with:   Patient with [address]: Next of Kin by law (only applies in absence of above) (name) [facility] and [address] [place] [address] Street:    Current Designated Health Care Decision Maker:    Primary Decision Maker: Robbie Madsen - Child - [phone]    Primary Decision Maker: Bhargav Post - [phone]    Primary Decision Maker: Amilcar Delgado - Child - [phone]    Care Preferences:    Ventilation:  No    Resuscitation:  No    Length of Voluntary ACP Conversation in minutes:  10 minutes included w/ total consult time     Author EFRAIN Desai

## 2023-11-03 NOTE — ED NOTES
Pt continues to have multiple episodes of emesis, orders for reglan received.   Pt HOB remains elevated d/t thick secretions     Leonard Gordon RN  11/02/23 2031

## 2023-11-03 NOTE — PROGRESS NOTES
Occupational Therapy  Facility/Department: [facility]  Occupational Therapy Initial Assessment    Name: Sharmila Hidalgo  : 1935  MRN: [id number]  Date of Service: 11/3/2023    Discharge Recommendations:  Patient would benefit from continued therapy after discharge          Patient Diagnosis(es): The primary encounter diagnosis was Acute CVA (cerebrovascular accident) ([address]). Diagnoses of Pulmonary nodule and Thyroid nodule were also pertinent to this visit. Past Medical History:  has a past medical history of Age-related osteoporosis without current pathological fracture, Basal cell carcinoma (BCC) of scalp, Coronary artery disease involving native coronary artery of native heart without angina pectoris, DDD (degenerative disc disease), lumbar, Essential hypertension, Knee pain, Macular degeneration, Mixed hyperlipidemia, and Ventral hernia. Past Surgical History:  has a past surgical history that includes Eye surgery; Hysterectomy; Varicose vein surgery; Foot surgery; Total hip arthroplasty (Left, ); Cardiac catheterization; Total knee arthroplasty (Right, 10/14/2021); Tonsillectomy; hernia repair (N/A, 2022); Diagnostic Cardiac Cath Lab Procedure (2023); and Aortic valve repair (2023). Treatment Diagnosis: CVA      Assessment   Performance deficits / Impairments: Decreased functional mobility ; Decreased ADL status; Decreased balance  Assessment: Patient sat EOB and stood. Presents with posterior tilt while standing. Slight impaired coordination in R hand  Treatment Diagnosis: CVA  Prognosis: Good  Decision Making: Low Complexity  REQUIRES OT FOLLOW-UP: Yes  Activity Tolerance  Activity Tolerance: Patient Tolerated treatment well        Plan   Occupational Therapy Plan  Times Per Week: 3-5  Times Per Day:  Once a day     Restrictions  Restrictions/Precautions  Restrictions/Precautions: Fall Risk  Required Braces or Orthoses?: No    Subjective   General  Chart Reviewed:

## 2023-11-03 NOTE — H&P
[facility] - History & Physical      PCP: BRAYDON Hendrix    Date of Admission: 11/2/2023    Date of Service: 11/2/2023    Chief Complaint: Slurred speech, right-sided facial droop    History Of Present Illness: The patient is a 81 y.o. female with basal cell carcinoma, HTN, osteoporosis, hyperlipidemia, aortic stenosis, s/p TAVR, diastolic heart failure, CAD s/p stent placement complaining of strokelike symptoms. Unable to provide HPI obtained from ER documentation. Currently patient is somnolent. At approximately [phone] patient began having right-sided facial droop, slurred speech and abnormal gait. EMS was notified upon their arrival they noted mild right-sided weakness and minimal dysarthria. She denies recent fall or trauma. Denies fever, chills, nausea, vomiting, abdominal pain, shortness of breath, chest pain. Denies headache or visual disturbance. Work-up in ER CT head no acute intracranial abnormality, CTA head neck 1.5 pleural-based nodule left upper lobe, 2.4 cm left thyroid lobe nodule, CXR atelectasis, small left pleural effusion possible. Patient is to be admitted to the hospital service with consultation to neurology due to acute CVA.   Past Medical History:        Diagnosis Date    Age-related osteoporosis without current pathological fracture     Basal cell carcinoma (BCC) of scalp     skin    Coronary artery disease involving native coronary artery of native heart without angina pectoris     stents done in [place]; sees [facility] cardiology    DDD (degenerative disc disease), lumbar     Essential hypertension     Knee pain     Macular degeneration     Mixed hyperlipidemia     Ventral hernia        Past Surgical History:        Procedure Laterality Date    AORTIC VALVE REPAIR  09/16/2023    CARDIAC CATHETERIZATION      stents 2019, 2020    DIAGNOSTIC CARDIAC CATH LAB PROCEDURE  07/24/2023    Diffuse coronary disease, patent stents, severe AS-no intervention territorial infarct is seen. The bony cranium appears normal.  The visualized paranasal sinuses are clear. Soft tissues without significant abnormality. 1.  No acute intracranial abnormality. Chronic changes as described. All CT scans are performed using dose optimization techniques as appropriate to the performed exam and include at least one of the following: Automated exposure control, adjustment of the mA and/or kV according to size, and the use of iterative reconstruction technique. ______________________________________ Electronically signed by: Eli Cameron M.D. Date:     11/02/2023 Time:    18:39       Assessment/Plan:  Principal Problem:    Acute CVA (cerebrovascular accident)    - Neurology consultation and recommendations appreciated   - Aspirin and statin   - MRI brain    - CD    - ECHO    - NIHSS/Neuro checks   - Nursing swallow assessment   - PT/OT/SLP   - FLP in a.m./ HgbA1c    - PRN labetalol with strict parameters   - Monitor on telemetry  Active Problems:    Essential hypertension   -Currently 134/51  - Allow permissive hypertension     Mixed hyperlipidemia   -FLP    -statin     Nodule of left lobe of thyroid gland/  Left upper lobe pulmonary nodule   -Outpatient follow-up with PCP    DVT prophylaxis Lovenox       Signed:  BRAYDON Brunner - CNP, 11/2/2023 7:37 PM      EMR Dragon/Transcription disclaimer:   Much of this encounter note is an electronic transcription/translation of spoken language to printed text.  The electronic translation of spoken language may permit erroneous, or at times, nonsensical words or phrases to be inadvertently transcribed; although attempts have made to review the note for such errors, some may still exist.

## 2023-11-03 NOTE — PLAN OF CARE
Problem: Discharge Planning  Goal: Discharge to home or other facility with appropriate resources  Outcome: Progressing  Flowsheets  Taken 11/3/2023 1010 by Gianna Brown RN  Discharge to home or other facility with appropriate resources: Identify barriers to discharge with patient and caregiver  Taken 11/2/2023 2200 by Marjorie Harrison RN  Discharge to home or other facility with appropriate resources: Identify barriers to discharge with patient and caregiver     Problem: Chronic Conditions and Co-morbidities  Goal: Patient's chronic conditions and co-morbidity symptoms are monitored and maintained or improved  11/3/2023 1019 by Gianna Brown RN  Outcome: Progressing  Flowsheets (Taken 11/3/2023 1010)  Care Plan - Patient's Chronic Conditions and Co-Morbidity Symptoms are Monitored and Maintained or Improved: Monitor and assess patient's chronic conditions and comorbid symptoms for stability, deterioration, or improvement  11/3/2023 0237 by Marjorie Harrison RN  Outcome: Progressing  Flowsheets (Taken 11/2/2023 2200)  Care Plan - Patient's Chronic Conditions and Co-Morbidity Symptoms are Monitored and Maintained or Improved: Monitor and assess patient's chronic conditions and comorbid symptoms for stability, deterioration, or improvement     Problem: Safety - Adult  Goal: Free from fall injury  11/3/2023 1019 by Gianna Brown RN  Outcome: Progressing  11/3/2023 0237 by Marjorie Harrison RN  Outcome: Progressing  Flowsheets (Taken 11/3/2023 0000)  Free From Fall Injury: Instruct family/caregiver on patient safety

## 2023-11-03 NOTE — PROGRESS NOTES
Physical Therapy  Facility/Department: [facility] SURG SERVICES  Physical Therapy Initial Assessment    Name: Donna Tang  : 1935  MRN: [id number]  Date of Service: 11/3/2023    Discharge Recommendations:  Continue to assess pending progress, 24 hour supervision or assist, Patient would benefit from continued therapy after discharge          Patient Diagnosis(es): The primary encounter diagnosis was Acute CVA (cerebrovascular accident) ([address] Diagnoses of Pulmonary nodule and Thyroid nodule were also pertinent to this visit. Past Medical History:  has a past medical history of Age-related osteoporosis without current pathological fracture, Basal cell carcinoma (BCC) of scalp, Coronary artery disease involving native coronary artery of native heart without angina pectoris, DDD (degenerative disc disease), lumbar, Essential hypertension, Knee pain, Macular degeneration, Mixed hyperlipidemia, and Ventral hernia. Past Surgical History:  has a past surgical history that includes Eye surgery; Hysterectomy; Varicose vein surgery; Foot surgery; Total hip arthroplasty (Left, ); Cardiac catheterization; Total knee arthroplasty (Right, 10/14/2021); Tonsillectomy; hernia repair (N/A, 2022); Diagnostic Cardiac Cath Lab Procedure (2023); and Aortic valve repair (2023). Assessment   Body Structures, Functions, Activity Limitations Requiring Skilled Therapeutic Intervention: Decreased functional mobility ; Decreased ADL status; Decreased body mechanics; Decreased cognition;Decreased safe awareness;Decreased balance;Decreased strength;Decreased posture;Decreased coordination  Assessment: Pt. will benefit from cont. PT to decrease impairments. Pt. a fall risk and should not attempt mobility on her own at this time. Pt. needs 24 hr care. She is not safe to attempt mobility on her own. She could practice standing in SS for safest transfers. Pt. likely could step to a chair with 2A, but limited by nausea today.

## 2023-11-03 NOTE — PROGRESS NOTES
Pharmacy Adjustment per [address] [employer] protocol    Sharmila Hidalgo is a 81 y.o. female. Pharmacy has adjusted medications per [address] [employer] protocol. Recent Labs     11/02/23  1826   BUN 18       Recent Labs     11/02/23  1826   CREATININE 0.8       Estimated Creatinine Clearance: 47 mL/min (based on SCr of 0.8 mg/dL).     Height:   Ht Readings from Last 1 Encounters:   10/31/23 1.6 m (5' 2.99\")     Weight:  Wt Readings from Last 1 Encounters:   11/02/23 70.3 kg (155 lb)         Plan: Adjust the following medications based on [address] protocol:           Metoclopramide to 5 mg IV four times daily    Electronically signed by Carlos Finch [facility] on 11/2/2023 at 11:29 PM

## 2023-11-03 NOTE — PROGRESS NOTES
Patient unable to pass bedside swallow at this time,speech will evaluate today. Patient will wake up to talk is A/O but falls back asleep. Patient had 2 episodes of emesis since coming to floor. Protonix given and medications ordered by provider.

## 2023-11-03 NOTE — CONSULTS
[facility] Neurology Consult      Patient:   Mona Jung  MR#:    [id number]  Account Number:                   [id number]      Room:    04/04   YOB: 1935  Date of Progress Note: 11/2/2023  Time of Note                           7:21 PM  Attending Physician:  Olegario Lee MD  Consulting Physician:  Toma Lanes, DO       CHIEF COMPLAINT:  Dysarthria, right sided weakness     HISTORY OF PRESENT ILLNESS:   This is a 81 y.o. female who was admitted with strokelike symptoms. The patient's last known well was around 5:45 PM.  She developed speech difficulty and right-sided weakness suddenly. She does not have a prior stroke history. No history of atrial fibrillation. No history of carotid artery disease. Her weakness does appear to be improving though she remains fairly uncoordinated in the right upper extremity. Her speech has improved but does remain dysarthric. IV thrombolytic therapy was discussed with the patient and family. After the risk and benefits were discussed they opted against treatment. The patient will be admitted for further workup. REVIEW OF SYSTEMS:  Constitutional - No fever or chills. HENT -  No Scalp tenderness. No tinnitus or significant hearing loss. No nose bleeding, no sore throat. Eyes - No sudden vision change or eye pain  Respiratory - No significant shortness of breath or cough  Cardiovascular - No chest pain. No palpitations or significant leg swelling  Gastrointestinal - No abdominal swelling or pain. Genitourinary - No difficulty urinating, dysuria  Musculoskeletal - No back pain or myalgia. Skin - No color change or rash  Neurologic - No seizures. Hematologic - No easy bruising or spontaneous bleeding. Psychiatric - No anxiety.      PAST MEDICAL HISTORY:      Diagnosis Date    Age-related osteoporosis without current pathological fracture     Basal cell carcinoma (BCC) of scalp     skin    Coronary artery disease involving native coronary

## 2023-11-04 LAB
ANION GAP SERPL CALCULATED.3IONS-SCNC: 11 MMOL/L (ref 7–19)
BASOPHILS # BLD: 0.1 K/UL (ref 0–0.2)
BASOPHILS NFR BLD: 0.7 % (ref 0–1)
BUN SERPL-MCNC: 15 MG/DL (ref 8–23)
CALCIUM SERPL-MCNC: 8.7 MG/DL (ref 8.8–10.2)
CHLORIDE SERPL-SCNC: 104 MMOL/L (ref 98–111)
CO2 SERPL-SCNC: 25 MMOL/L (ref 22–29)
CREAT SERPL-MCNC: 0.7 MG/DL (ref 0.5–0.9)
EOSINOPHIL # BLD: 0.2 K/UL (ref 0–0.6)
EOSINOPHIL NFR BLD: 1.7 % (ref 0–5)
ERYTHROCYTE [DISTWIDTH] IN BLOOD BY AUTOMATED COUNT: 14.8 % (ref 11.5–14.5)
GLUCOSE SERPL-MCNC: 90 MG/DL (ref 74–109)
HCT VFR BLD AUTO: 35.4 % (ref 37–47)
HGB BLD-MCNC: 11.4 G/DL (ref 12–16)
IMM GRANULOCYTES # BLD: 0 K/UL
LYMPHOCYTES # BLD: 2.8 K/UL (ref 1.1–4.5)
LYMPHOCYTES NFR BLD: 30.2 % (ref 20–40)
MAGNESIUM SERPL-MCNC: 2.7 MG/DL (ref 1.6–2.4)
MCH RBC QN AUTO: 28.2 PG (ref 27–31)
MCHC RBC AUTO-ENTMCNC: 32.2 G/DL (ref 33–37)
MCV RBC AUTO: 87.6 FL (ref 81–99)
MONOCYTES # BLD: 0.6 K/UL (ref 0–0.9)
MONOCYTES NFR BLD: 6.1 % (ref 0–10)
NEUTROPHILS # BLD: 5.7 K/UL (ref 1.5–7.5)
NEUTS SEG NFR BLD: 61 % (ref 50–65)
PLATELET # BLD AUTO: 275 K/UL (ref 130–400)
PMV BLD AUTO: 9.4 FL (ref 9.4–12.3)
POTASSIUM SERPL-SCNC: 3.3 MMOL/L (ref 3.5–5)
RBC # BLD AUTO: 4.04 M/UL (ref 4.2–5.4)
SODIUM SERPL-SCNC: 140 MMOL/L (ref 136–145)
WBC # BLD AUTO: 9.4 K/UL (ref 4.8–10.8)

## 2023-11-04 PROCEDURE — 94760 N-INVAS EAR/PLS OXIMETRY 1: CPT

## 2023-11-04 PROCEDURE — 99232 SBSQ HOSP IP/OBS MODERATE 35: CPT | Performed by: PSYCHIATRY & NEUROLOGY

## 2023-11-04 PROCEDURE — 85025 COMPLETE CBC W/AUTO DIFF WBC: CPT

## 2023-11-04 PROCEDURE — 6360000002 HC RX W HCPCS: Performed by: HOSPITALIST

## 2023-11-04 PROCEDURE — 1210000000 HC MED SURG R&B

## 2023-11-04 PROCEDURE — 2580000003 HC RX 258

## 2023-11-04 PROCEDURE — 36415 COLL VENOUS BLD VENIPUNCTURE: CPT

## 2023-11-04 PROCEDURE — 6360000002 HC RX W HCPCS

## 2023-11-04 PROCEDURE — 6370000000 HC RX 637 (ALT 250 FOR IP)

## 2023-11-04 PROCEDURE — 83735 ASSAY OF MAGNESIUM: CPT

## 2023-11-04 PROCEDURE — 80048 BASIC METABOLIC PNL TOTAL CA: CPT

## 2023-11-04 PROCEDURE — C9113 INJ PANTOPRAZOLE SODIUM, VIA: HCPCS | Performed by: HOSPITALIST

## 2023-11-04 PROCEDURE — 2580000003 HC RX 258: Performed by: HOSPITALIST

## 2023-11-04 RX ORDER — POTASSIUM CHLORIDE 20 MEQ/1
40 TABLET, EXTENDED RELEASE ORAL PRN
Status: DISCONTINUED | OUTPATIENT
Start: 2023-11-04 | End: 2023-11-07 | Stop reason: HOSPADM

## 2023-11-04 RX ORDER — POTASSIUM CHLORIDE 7.45 MG/ML
10 INJECTION INTRAVENOUS PRN
Status: DISCONTINUED | OUTPATIENT
Start: 2023-11-04 | End: 2023-11-07 | Stop reason: HOSPADM

## 2023-11-04 RX ADMIN — POTASSIUM CHLORIDE 40 MEQ: 1500 TABLET, EXTENDED RELEASE ORAL at 09:48

## 2023-11-04 RX ADMIN — METOCLOPRAMIDE 5 MG: 5 INJECTION, SOLUTION INTRAMUSCULAR; INTRAVENOUS at 21:45

## 2023-11-04 RX ADMIN — METOCLOPRAMIDE 5 MG: 5 INJECTION, SOLUTION INTRAMUSCULAR; INTRAVENOUS at 06:14

## 2023-11-04 RX ADMIN — ENOXAPARIN SODIUM 40 MG: 100 INJECTION SUBCUTANEOUS at 09:48

## 2023-11-04 RX ADMIN — SODIUM CHLORIDE, PRESERVATIVE FREE 40 MG: 5 INJECTION INTRAVENOUS at 12:37

## 2023-11-04 RX ADMIN — ASPIRIN 81 MG: 81 TABLET, CHEWABLE ORAL at 09:48

## 2023-11-04 RX ADMIN — SODIUM CHLORIDE, PRESERVATIVE FREE 10 ML: 5 INJECTION INTRAVENOUS at 10:01

## 2023-11-04 RX ADMIN — SODIUM CHLORIDE, PRESERVATIVE FREE 40 MG: 5 INJECTION INTRAVENOUS at 23:58

## 2023-11-04 RX ADMIN — SODIUM CHLORIDE, PRESERVATIVE FREE 10 ML: 5 INJECTION INTRAVENOUS at 21:45

## 2023-11-04 RX ADMIN — METOCLOPRAMIDE 5 MG: 5 INJECTION, SOLUTION INTRAMUSCULAR; INTRAVENOUS at 09:49

## 2023-11-04 RX ADMIN — ATORVASTATIN CALCIUM 80 MG: 80 TABLET, FILM COATED ORAL at 21:45

## 2023-11-04 RX ADMIN — METOCLOPRAMIDE 5 MG: 5 INJECTION, SOLUTION INTRAMUSCULAR; INTRAVENOUS at 17:24

## 2023-11-05 LAB
ANION GAP SERPL CALCULATED.3IONS-SCNC: 11 MMOL/L (ref 7–19)
BASOPHILS # BLD: 0.1 K/UL (ref 0–0.2)
BASOPHILS NFR BLD: 0.9 % (ref 0–1)
BUN SERPL-MCNC: 12 MG/DL (ref 8–23)
CALCIUM SERPL-MCNC: 8.4 MG/DL (ref 8.8–10.2)
CHLORIDE SERPL-SCNC: 104 MMOL/L (ref 98–111)
CO2 SERPL-SCNC: 25 MMOL/L (ref 22–29)
CREAT SERPL-MCNC: 0.5 MG/DL (ref 0.5–0.9)
EOSINOPHIL # BLD: 0.2 K/UL (ref 0–0.6)
EOSINOPHIL NFR BLD: 2.3 % (ref 0–5)
ERYTHROCYTE [DISTWIDTH] IN BLOOD BY AUTOMATED COUNT: 14.7 % (ref 11.5–14.5)
GLUCOSE SERPL-MCNC: 93 MG/DL (ref 74–109)
HCT VFR BLD AUTO: 36.3 % (ref 37–47)
HGB BLD-MCNC: 11.7 G/DL (ref 12–16)
IMM GRANULOCYTES # BLD: 0 K/UL
LYMPHOCYTES # BLD: 2 K/UL (ref 1.1–4.5)
LYMPHOCYTES NFR BLD: 24.9 % (ref 20–40)
MCH RBC QN AUTO: 28 PG (ref 27–31)
MCHC RBC AUTO-ENTMCNC: 32.2 G/DL (ref 33–37)
MCV RBC AUTO: 86.8 FL (ref 81–99)
MONOCYTES # BLD: 0.6 K/UL (ref 0–0.9)
MONOCYTES NFR BLD: 7 % (ref 0–10)
NEUTROPHILS # BLD: 5.2 K/UL (ref 1.5–7.5)
NEUTS SEG NFR BLD: 64.8 % (ref 50–65)
PLATELET # BLD AUTO: 272 K/UL (ref 130–400)
PMV BLD AUTO: 9.6 FL (ref 9.4–12.3)
POTASSIUM SERPL-SCNC: 3.9 MMOL/L (ref 3.5–5)
RBC # BLD AUTO: 4.18 M/UL (ref 4.2–5.4)
SODIUM SERPL-SCNC: 140 MMOL/L (ref 136–145)
WBC # BLD AUTO: 8.1 K/UL (ref 4.8–10.8)

## 2023-11-05 PROCEDURE — 6370000000 HC RX 637 (ALT 250 FOR IP)

## 2023-11-05 PROCEDURE — 80048 BASIC METABOLIC PNL TOTAL CA: CPT

## 2023-11-05 PROCEDURE — 36415 COLL VENOUS BLD VENIPUNCTURE: CPT

## 2023-11-05 PROCEDURE — 94760 N-INVAS EAR/PLS OXIMETRY 1: CPT

## 2023-11-05 PROCEDURE — 2580000003 HC RX 258

## 2023-11-05 PROCEDURE — 6360000002 HC RX W HCPCS

## 2023-11-05 PROCEDURE — 85025 COMPLETE CBC W/AUTO DIFF WBC: CPT

## 2023-11-05 PROCEDURE — 99232 SBSQ HOSP IP/OBS MODERATE 35: CPT | Performed by: PSYCHIATRY & NEUROLOGY

## 2023-11-05 PROCEDURE — 1210000000 HC MED SURG R&B

## 2023-11-05 PROCEDURE — 6360000002 HC RX W HCPCS: Performed by: HOSPITALIST

## 2023-11-05 RX ORDER — METOCLOPRAMIDE HYDROCHLORIDE 5 MG/ML
10 INJECTION INTRAMUSCULAR; INTRAVENOUS
Status: DISCONTINUED | OUTPATIENT
Start: 2023-11-05 | End: 2023-11-05

## 2023-11-05 RX ORDER — ISOSORBIDE MONONITRATE 30 MG/1
30 TABLET, EXTENDED RELEASE ORAL DAILY
Status: DISCONTINUED | OUTPATIENT
Start: 2023-11-05 | End: 2023-11-07 | Stop reason: HOSPADM

## 2023-11-05 RX ORDER — GABAPENTIN 300 MG/1
300 CAPSULE ORAL NIGHTLY
Status: DISCONTINUED | OUTPATIENT
Start: 2023-11-05 | End: 2023-11-07 | Stop reason: HOSPADM

## 2023-11-05 RX ORDER — PANTOPRAZOLE SODIUM 40 MG/1
40 TABLET, DELAYED RELEASE ORAL
Status: DISCONTINUED | OUTPATIENT
Start: 2023-11-06 | End: 2023-11-07 | Stop reason: HOSPADM

## 2023-11-05 RX ORDER — AMLODIPINE BESYLATE 10 MG/1
10 TABLET ORAL DAILY
Status: DISCONTINUED | OUTPATIENT
Start: 2023-11-05 | End: 2023-11-07 | Stop reason: HOSPADM

## 2023-11-05 RX ORDER — GABAPENTIN 100 MG/1
100 CAPSULE ORAL EVERY MORNING
Status: DISCONTINUED | OUTPATIENT
Start: 2023-11-05 | End: 2023-11-07 | Stop reason: HOSPADM

## 2023-11-05 RX ORDER — MULTIVITAMIN WITH IRON
1 TABLET ORAL 2 TIMES DAILY
Status: DISCONTINUED | OUTPATIENT
Start: 2023-11-05 | End: 2023-11-07 | Stop reason: HOSPADM

## 2023-11-05 RX ORDER — HYDROCHLOROTHIAZIDE 25 MG/1
25 TABLET ORAL DAILY
Status: DISCONTINUED | OUTPATIENT
Start: 2023-11-05 | End: 2023-11-07 | Stop reason: HOSPADM

## 2023-11-05 RX ADMIN — THERA TABS 1 TABLET: TAB at 10:32

## 2023-11-05 RX ADMIN — AMLODIPINE BESYLATE 10 MG: 10 TABLET ORAL at 10:32

## 2023-11-05 RX ADMIN — GABAPENTIN 300 MG: 300 CAPSULE ORAL at 21:45

## 2023-11-05 RX ADMIN — METOCLOPRAMIDE 10 MG: 5 INJECTION, SOLUTION INTRAMUSCULAR; INTRAVENOUS at 07:11

## 2023-11-05 RX ADMIN — ASPIRIN 81 MG: 81 TABLET, CHEWABLE ORAL at 08:11

## 2023-11-05 RX ADMIN — ATORVASTATIN CALCIUM 80 MG: 80 TABLET, FILM COATED ORAL at 21:45

## 2023-11-05 RX ADMIN — GABAPENTIN 100 MG: 100 CAPSULE ORAL at 10:32

## 2023-11-05 RX ADMIN — ISOSORBIDE MONONITRATE 30 MG: 30 TABLET, EXTENDED RELEASE ORAL at 10:32

## 2023-11-05 RX ADMIN — THERA TABS 1 TABLET: TAB at 21:45

## 2023-11-05 RX ADMIN — SODIUM CHLORIDE, PRESERVATIVE FREE 10 ML: 5 INJECTION INTRAVENOUS at 21:45

## 2023-11-05 RX ADMIN — ENOXAPARIN SODIUM 40 MG: 100 INJECTION SUBCUTANEOUS at 08:11

## 2023-11-05 RX ADMIN — SODIUM CHLORIDE, PRESERVATIVE FREE 10 ML: 5 INJECTION INTRAVENOUS at 08:12

## 2023-11-05 NOTE — PROGRESS NOTES
Pharmacy Adjustment per [facility] protocol    Steven Spear is a 81 y.o. female. Pharmacy has adjusted medications per [facility] protocol. Recent Labs     11/04/23  0244 11/05/23  0305   BUN 15 12       Recent Labs     11/04/23  0244 11/05/23  0305   CREATININE 0.7 0.5       Estimated Creatinine Clearance: 73 mL/min (based on SCr of 0.5 mg/dL).     Height:   Ht Readings from Last 1 Encounters:   11/03/23 1.575 m (5' 2\")     Weight:  Wt Readings from Last 1 Encounters:   11/03/23 70.4 kg (155 lb 4.8 oz)         Plan: Adjust the following medications based on [facility] protocol:           Metoclopramide back to 10 mg IV four times daily    Electronically signed by Gerber Mayers [facility] on 11/5/2023 at 4:48 AM

## 2023-11-06 LAB
ANION GAP SERPL CALCULATED.3IONS-SCNC: 11 MMOL/L (ref 7–19)
BASOPHILS # BLD: 0.1 K/UL (ref 0–0.2)
BASOPHILS NFR BLD: 0.8 % (ref 0–1)
BUN SERPL-MCNC: 16 MG/DL (ref 8–23)
CALCIUM SERPL-MCNC: 8.6 MG/DL (ref 8.8–10.2)
CHLORIDE SERPL-SCNC: 105 MMOL/L (ref 98–111)
CO2 SERPL-SCNC: 24 MMOL/L (ref 22–29)
CREAT SERPL-MCNC: 0.6 MG/DL (ref 0.5–0.9)
EOSINOPHIL # BLD: 0.2 K/UL (ref 0–0.6)
EOSINOPHIL NFR BLD: 2.4 % (ref 0–5)
ERYTHROCYTE [DISTWIDTH] IN BLOOD BY AUTOMATED COUNT: 14.6 % (ref 11.5–14.5)
GLUCOSE SERPL-MCNC: 92 MG/DL (ref 74–109)
HCT VFR BLD AUTO: 34.2 % (ref 37–47)
HGB BLD-MCNC: 11 G/DL (ref 12–16)
IMM GRANULOCYTES # BLD: 0 K/UL
LYMPHOCYTES # BLD: 2.5 K/UL (ref 1.1–4.5)
LYMPHOCYTES NFR BLD: 32.1 % (ref 20–40)
MCH RBC QN AUTO: 28 PG (ref 27–31)
MCHC RBC AUTO-ENTMCNC: 32.2 G/DL (ref 33–37)
MCV RBC AUTO: 87 FL (ref 81–99)
MONOCYTES # BLD: 0.5 K/UL (ref 0–0.9)
MONOCYTES NFR BLD: 6 % (ref 0–10)
NEUTROPHILS # BLD: 4.5 K/UL (ref 1.5–7.5)
NEUTS SEG NFR BLD: 58.4 % (ref 50–65)
PLATELET # BLD AUTO: 261 K/UL (ref 130–400)
PMV BLD AUTO: 9.8 FL (ref 9.4–12.3)
POTASSIUM SERPL-SCNC: 3.7 MMOL/L (ref 3.5–5)
RBC # BLD AUTO: 3.93 M/UL (ref 4.2–5.4)
SODIUM SERPL-SCNC: 140 MMOL/L (ref 136–145)
WBC # BLD AUTO: 7.8 K/UL (ref 4.8–10.8)

## 2023-11-06 PROCEDURE — 94760 N-INVAS EAR/PLS OXIMETRY 1: CPT

## 2023-11-06 PROCEDURE — 97530 THERAPEUTIC ACTIVITIES: CPT

## 2023-11-06 PROCEDURE — 6370000000 HC RX 637 (ALT 250 FOR IP)

## 2023-11-06 PROCEDURE — 97129 THER IVNTJ 1ST 15 MIN: CPT

## 2023-11-06 PROCEDURE — 85025 COMPLETE CBC W/AUTO DIFF WBC: CPT

## 2023-11-06 PROCEDURE — 80048 BASIC METABOLIC PNL TOTAL CA: CPT

## 2023-11-06 PROCEDURE — 36415 COLL VENOUS BLD VENIPUNCTURE: CPT

## 2023-11-06 PROCEDURE — 97116 GAIT TRAINING THERAPY: CPT

## 2023-11-06 PROCEDURE — 92526 ORAL FUNCTION THERAPY: CPT

## 2023-11-06 PROCEDURE — 1210000000 HC MED SURG R&B

## 2023-11-06 PROCEDURE — 97535 SELF CARE MNGMENT TRAINING: CPT

## 2023-11-06 PROCEDURE — 6360000002 HC RX W HCPCS

## 2023-11-06 RX ADMIN — ASPIRIN 81 MG: 81 TABLET, CHEWABLE ORAL at 09:50

## 2023-11-06 RX ADMIN — THERA TABS 1 TABLET: TAB at 20:49

## 2023-11-06 RX ADMIN — ISOSORBIDE MONONITRATE 30 MG: 30 TABLET, EXTENDED RELEASE ORAL at 09:50

## 2023-11-06 RX ADMIN — GABAPENTIN 300 MG: 300 CAPSULE ORAL at 20:49

## 2023-11-06 RX ADMIN — ENOXAPARIN SODIUM 40 MG: 100 INJECTION SUBCUTANEOUS at 09:49

## 2023-11-06 RX ADMIN — THERA TABS 1 TABLET: TAB at 09:50

## 2023-11-06 RX ADMIN — PANTOPRAZOLE SODIUM 40 MG: 40 TABLET, DELAYED RELEASE ORAL at 08:15

## 2023-11-06 RX ADMIN — GABAPENTIN 100 MG: 100 CAPSULE ORAL at 09:50

## 2023-11-06 RX ADMIN — ATORVASTATIN CALCIUM 80 MG: 80 TABLET, FILM COATED ORAL at 20:49

## 2023-11-06 NOTE — PROGRESS NOTES
Daily Progress Note    Date:2023  Patient: Jessica Alvarez  : 1935  J:[id number]  CODE:DNR No additional code details  BRAYDON Beal    Admit Date: 2023  6:22 PM   LOS: 4 days     Chief Complaint   Patient presents with    Cerebrovascular Accident     Stroke alert by EMS, acute onset of slurred speech and abnormal gait, right side facial droops, right arm weakness, LKW 1745; symptoms improving with EMS. FS glucose 76 per EMS         Subjective: Bryanenlyn Rice. Pt feeling well overall. Worked with PT/OT/SLP this morning. Family at bedside. Hospital Summary: This patient is an 60-year-old female with PMH basal cell carcinoma, hypertension, osteoporosis, hyperlipidemia, aortic stenosis s/p TAVR on , diastolic heart failure, CAD who presented to [facility] ED on  for evaluation of strokelike symptoms including slurred speech, right-sided facial droop, abnormal gait, and somnolence. On arrival to ER, patient was dysarthric with right-sided weakness. Head CT showed no acute abnormality, CTA showed a 1.5 cm KECIA nodule and 2.4 cm left thyroid nodule otherwise no acute findings, chest x-ray showed atelectasis. Lab work was unremarkable. Patient admitted to hospitalist with consult to neurology. MRI obtained which indicates tiny multifocal acute infarcts in the right cerebellum without mass effect or hemorrhage. Echo completed and shows no PFO. On 11/3, KUB obtained due to nausea and vomiting which could not rule out SBO. Patient did have a large bowel movement with resolution of her nausea. CT of the abdomen was obtained which shows no acute findings. Precert is pending from inpatient rehab evaluation. She would also benefit from OP opthamology evaluation due to history of macular degeneration and worsening vision. Review of Systems   All other systems reviewed and are negative.       Objective:      Vital signs in last 24 hours:  Patient Vitals for the past 24

## 2023-11-06 NOTE — PROGRESS NOTES
Facility/Department: [facility] SURG SERVICES  Speech/Language/Cognitive/Swallow Therapy     NAME: Hola Martin  : 1935   MRN: [id number]  ADMISSION DATE: 2023  ADMITTING DIAGNOSIS: has Essential hypertension; Mixed hyperlipidemia; Age-related osteoporosis without current pathological fracture; Coronary artery disease involving native coronary artery of native heart without angina pectoris; DDD (degenerative disc disease), lumbar; Basal cell carcinoma (BCC) of scalp; Chronic bilateral low back pain without sciatica; Macular degeneration of both eyes; Primary osteoarthritis of right knee; Spinal stenosis of lumbar region with neurogenic claudication; Recurrent UTI; Aortic stenosis; Ventral hernia without obstruction or gangrene; Prediabetes; Dysuria; S/P TAVR (transcatheter aortic valve replacement); Cerebrovascular accident (CVA) ([address]); Nodule of left lobe of thyroid gland; Left upper lobe pulmonary nodule; and Palliative care patient on their problem list.    Date of Treat: 2023   Evaluating Therapist: THEA Cabral    Pain:  Pain Assessment: None - Denies Pain    Assessment:  Completed re-assessment. Patient exhibited decreased volume of speech and slowed lingual movements during verbalizations. Patient also exhibited slow processing with delayed, but appropriate, auditory comprehension of increased complexity and structured responsive speech noted. Also re-evaluated patient's swallowing function. Patient exhibited sluggish, inconsistently mildly decreased laryngeal elevation for swallow airway protection. Even so, no outward S/S penetration/aspiration was observed with any regular solid consistency presentation or thin H2O trial presented during treatment session this date. At this time, would trial thin liquids. Continue regular solid consistency. Recommend meds whole in pudding/applesauce. Will continue to follow.      Subjective:  General  Chart Reviewed: Yes  Patient assessed for rehabilitation services?: Yes  Family / Caregiver Present: Yes     Objective:  Oral Motor:   Labial ROM: Adequate during labial retraction trials and labial protrusion trials. Labial Strength: Adequate during labial compression trials. Labial Coordination: Slowed movements were noted. Lingual ROM: Adequate during lingual extension trials with full point achieved; adequate during lingual elevation trials without use of accessory jaw movement; adequate movements noted bilaterally. Lingual Symmetry: Deviation was noted, to the left, during lingual extension trials. Lingual Strength: Decreased   Lingual Coordination: Slowed movements were noted. Auditory Comprehension  Comprehension:  (While mild delays were noted, patient demonstrated ability to answer yes/no questions of increased complexity and to follow complex 1 and simple 2 step commands at independent level.)     Expression  Primary Mode of Expression:  (Confrontation naming of items in room was considered to be [facility]. Structured responsive speech was considered to be mildly delayed. Responses in natural conversation were considered to be appropriate.)     Motor Speech:  (Patient exhibited decreased volume of speech and slowed lingual movements during verbalizations.)     Overall Orientation Status:  (Patient demonstrated ability to verbalize name, birthday, age, address, phone number, city, state, hospital, floor, month, TRUDY, and year at independent level.)     Memory:  (Patient demonstrated appropriate immediate memory with sequences of unrelated numbers/words set up to 5 items without repetitions provided. Patient was 3/3 short term memory with 5 minute delay+distractions present at independent level.)     Problem Solving:  (Patient verbalized PCP and pharmacy at independent level. Patient verbalized conditions in which she takes medications independently.  Patient demonstrated ability to verbalize appropriate solutions to situations that could occur

## 2023-11-06 NOTE — PROGRESS NOTES
Occupational Therapy  Facility/Department: [facility] SURG SERVICES  Daily Treatment Note  NAME: Stevenson Plummer  : 1935  MRN: [id number]    Date of Service: 2023    Discharge Recommendations:  Patient would benefit from continued therapy after discharge, IP Rehab       Assessment   Performance deficits / Impairments: Decreased functional mobility ; Decreased ADL status; Decreased balance  Assessment: Noted a standing balance deficit during ADL's. Pt CGA to min assist for most ADL's. Pt continues to benefit from skilled OT services. Treatment Diagnosis: CVA  Prognosis: Good  Activity Tolerance  Activity Tolerance: Patient Tolerated treatment well         Patient Diagnosis(es): The primary encounter diagnosis was Acute CVA (cerebrovascular accident) ([address] Diagnoses of Pulmonary nodule and Thyroid nodule were also pertinent to this visit. has a past medical history of Age-related osteoporosis without current pathological fracture, Basal cell carcinoma (BCC) of scalp, Coronary artery disease involving native coronary artery of native heart without angina pectoris, DDD (degenerative disc disease), lumbar, Essential hypertension, Knee pain, Macular degeneration, Mixed hyperlipidemia, and Ventral hernia. has a past surgical history that includes Eye surgery; Hysterectomy; Varicose vein surgery; Foot surgery; Total hip arthroplasty (Left, ); Cardiac catheterization; Total knee arthroplasty (Right, 10/14/2021); Tonsillectomy; hernia repair (N/A, 2022); Diagnostic Cardiac Cath Lab Procedure (2023); and Aortic valve repair (2023).     Restrictions  Restrictions/Precautions  Restrictions/Precautions: Fall Risk, Contact Precautions  Required Braces or Orthoses?: No  Position Activity Restriction  Other position/activity restrictions: MDRO  Subjective   General  Chart Reviewed: Yes  Patient assessed for rehabilitation services?: Yes  Additional Pertinent Hx: (degenerative disc disease), lumbar,

## 2023-11-06 NOTE — PROGRESS NOTES
Activity Tolerance   Activity Tolerance Patient tolerated treatment well;Patient limited by endurance   PT Plan of Care   Monday X   Safety Devices   Type of Devices Call light within reach; Chair alarm in place;Gait belt;Left in chair           Electronically signed by Yasemin Goodwin PTA on 11/6/2023 at 10:21 AM

## 2023-11-06 NOTE — CARE COORDINATION
The [address] [facility]  Notification of Admission Decision      [] Patient has been accepted for admit to [facility] on :       Please write discharge orders and summary prior to discharge. [x] Patient acceptance to Rehab pending the following : insurance approval    [] Eval in progress       [] Patient determined to be ineligible for services at [facility] because : We recommend you consider        Thank you for your referral, we appreciate you. If you have any questions, please feel   free to contact me at [phone].    Electronically Signed by Jaycee Fan, Admissions Coordinator 11/6/2023 12:43 PM

## 2023-11-07 ENCOUNTER — HOSPITAL ENCOUNTER (INPATIENT)
Age: 88
LOS: 7 days | Discharge: HOME | DRG: 057 | End: 2023-11-14
Attending: PSYCHIATRY & NEUROLOGY | Admitting: PSYCHIATRY & NEUROLOGY
Payer: MEDICARE

## 2023-11-07 VITALS
OXYGEN SATURATION: 98 % | RESPIRATION RATE: 16 BRPM | SYSTOLIC BLOOD PRESSURE: 117 MMHG | DIASTOLIC BLOOD PRESSURE: 55 MMHG | BODY MASS INDEX: 28.58 KG/M2 | WEIGHT: 155.3 LBS | TEMPERATURE: 97.7 F | HEIGHT: 62 IN | HEART RATE: 66 BPM

## 2023-11-07 DIAGNOSIS — I10 ESSENTIAL HYPERTENSION: ICD-10-CM

## 2023-11-07 DIAGNOSIS — I63.9 ACUTE ISCHEMIC STROKE (HCC): Primary | ICD-10-CM

## 2023-11-07 DIAGNOSIS — I63.341 CEREBROVASCULAR ACCIDENT (CVA) DUE TO THROMBOSIS OF RIGHT CEREBELLAR ARTERY (HCC): ICD-10-CM

## 2023-11-07 DIAGNOSIS — M54.9 MID BACK PAIN: ICD-10-CM

## 2023-11-07 DIAGNOSIS — M54.50 CHRONIC BILATERAL LOW BACK PAIN WITHOUT SCIATICA: ICD-10-CM

## 2023-11-07 DIAGNOSIS — I25.10 CORONARY ARTERY DISEASE INVOLVING NATIVE CORONARY ARTERY OF NATIVE HEART WITHOUT ANGINA PECTORIS: ICD-10-CM

## 2023-11-07 DIAGNOSIS — G89.29 CHRONIC BILATERAL LOW BACK PAIN WITHOUT SCIATICA: ICD-10-CM

## 2023-11-07 LAB
ANION GAP SERPL CALCULATED.3IONS-SCNC: 11 MMOL/L (ref 7–19)
BASOPHILS # BLD: 0.1 K/UL (ref 0–0.2)
BASOPHILS NFR BLD: 0.9 % (ref 0–1)
BUN SERPL-MCNC: 16 MG/DL (ref 8–23)
CALCIUM SERPL-MCNC: 8.8 MG/DL (ref 8.8–10.2)
CHLORIDE SERPL-SCNC: 104 MMOL/L (ref 98–111)
CO2 SERPL-SCNC: 23 MMOL/L (ref 22–29)
CREAT SERPL-MCNC: 0.6 MG/DL (ref 0.5–0.9)
EOSINOPHIL # BLD: 0.2 K/UL (ref 0–0.6)
EOSINOPHIL NFR BLD: 2.1 % (ref 0–5)
ERYTHROCYTE [DISTWIDTH] IN BLOOD BY AUTOMATED COUNT: 14.7 % (ref 11.5–14.5)
GLUCOSE BLD-MCNC: 120 MG/DL (ref 70–99)
GLUCOSE SERPL-MCNC: 97 MG/DL (ref 74–109)
HCT VFR BLD AUTO: 37 % (ref 37–47)
HGB BLD-MCNC: 12.2 G/DL (ref 12–16)
IMM GRANULOCYTES # BLD: 0 K/UL
LYMPHOCYTES # BLD: 2.8 K/UL (ref 1.1–4.5)
LYMPHOCYTES NFR BLD: 35.2 % (ref 20–40)
MCH RBC QN AUTO: 28.4 PG (ref 27–31)
MCHC RBC AUTO-ENTMCNC: 33 G/DL (ref 33–37)
MCV RBC AUTO: 86 FL (ref 81–99)
MONOCYTES # BLD: 0.4 K/UL (ref 0–0.9)
MONOCYTES NFR BLD: 5.3 % (ref 0–10)
NEUTROPHILS # BLD: 4.5 K/UL (ref 1.5–7.5)
NEUTS SEG NFR BLD: 56.2 % (ref 50–65)
PERFORMED ON: ABNORMAL
PLATELET # BLD AUTO: 268 K/UL (ref 130–400)
PMV BLD AUTO: 9.7 FL (ref 9.4–12.3)
POTASSIUM SERPL-SCNC: 3.8 MMOL/L (ref 3.5–5)
RBC # BLD AUTO: 4.3 M/UL (ref 4.2–5.4)
SODIUM SERPL-SCNC: 138 MMOL/L (ref 136–145)
WBC # BLD AUTO: 8 K/UL (ref 4.8–10.8)

## 2023-11-07 PROCEDURE — 97110 THERAPEUTIC EXERCISES: CPT

## 2023-11-07 PROCEDURE — 36415 COLL VENOUS BLD VENIPUNCTURE: CPT

## 2023-11-07 PROCEDURE — 1180000000 HC REHAB R&B

## 2023-11-07 PROCEDURE — 6360000002 HC RX W HCPCS

## 2023-11-07 PROCEDURE — 97530 THERAPEUTIC ACTIVITIES: CPT

## 2023-11-07 PROCEDURE — 94150 VITAL CAPACITY TEST: CPT

## 2023-11-07 PROCEDURE — 97116 GAIT TRAINING THERAPY: CPT

## 2023-11-07 PROCEDURE — 6370000000 HC RX 637 (ALT 250 FOR IP)

## 2023-11-07 PROCEDURE — 97535 SELF CARE MNGMENT TRAINING: CPT

## 2023-11-07 PROCEDURE — 6370000000 HC RX 637 (ALT 250 FOR IP): Performed by: STUDENT IN AN ORGANIZED HEALTH CARE EDUCATION/TRAINING PROGRAM

## 2023-11-07 PROCEDURE — 85025 COMPLETE CBC W/AUTO DIFF WBC: CPT

## 2023-11-07 PROCEDURE — 82962 GLUCOSE BLOOD TEST: CPT

## 2023-11-07 PROCEDURE — 99231 SBSQ HOSP IP/OBS SF/LOW 25: CPT | Performed by: PHYSICIAN ASSISTANT

## 2023-11-07 PROCEDURE — 80048 BASIC METABOLIC PNL TOTAL CA: CPT

## 2023-11-07 RX ORDER — GABAPENTIN 300 MG/1
300 CAPSULE ORAL NIGHTLY
Status: CANCELLED | OUTPATIENT
Start: 2023-11-07

## 2023-11-07 RX ORDER — SODIUM CHLORIDE 0.9 % (FLUSH) 0.9 %
5-40 SYRINGE (ML) INJECTION EVERY 12 HOURS SCHEDULED
Status: CANCELLED | OUTPATIENT
Start: 2023-11-07

## 2023-11-07 RX ORDER — ONDANSETRON 2 MG/ML
4 INJECTION INTRAMUSCULAR; INTRAVENOUS EVERY 6 HOURS PRN
Status: DISCONTINUED | OUTPATIENT
Start: 2023-11-07 | End: 2023-11-14 | Stop reason: HOSPADM

## 2023-11-07 RX ORDER — ISOSORBIDE MONONITRATE 30 MG/1
30 TABLET, EXTENDED RELEASE ORAL DAILY
Status: DISCONTINUED | OUTPATIENT
Start: 2023-11-08 | End: 2023-11-14 | Stop reason: HOSPADM

## 2023-11-07 RX ORDER — ACETAMINOPHEN 325 MG/1
650 TABLET ORAL EVERY 4 HOURS PRN
Status: DISCONTINUED | OUTPATIENT
Start: 2023-11-07 | End: 2023-11-14 | Stop reason: HOSPADM

## 2023-11-07 RX ORDER — PANTOPRAZOLE SODIUM 40 MG/1
40 TABLET, DELAYED RELEASE ORAL
Status: CANCELLED | OUTPATIENT
Start: 2023-11-08

## 2023-11-07 RX ORDER — ONDANSETRON 2 MG/ML
4 INJECTION INTRAMUSCULAR; INTRAVENOUS EVERY 6 HOURS PRN
Status: CANCELLED | OUTPATIENT
Start: 2023-11-07

## 2023-11-07 RX ORDER — LABETALOL HYDROCHLORIDE 5 MG/ML
10 INJECTION, SOLUTION INTRAVENOUS EVERY 10 MIN PRN
Status: CANCELLED | OUTPATIENT
Start: 2023-11-07

## 2023-11-07 RX ORDER — POLYETHYLENE GLYCOL 3350 17 G/17G
17 POWDER, FOR SOLUTION ORAL DAILY PRN
Status: CANCELLED | OUTPATIENT
Start: 2023-11-07

## 2023-11-07 RX ORDER — ASPIRIN 81 MG/1
81 TABLET, CHEWABLE ORAL DAILY
Status: CANCELLED | OUTPATIENT
Start: 2023-11-08

## 2023-11-07 RX ORDER — ATORVASTATIN CALCIUM 80 MG/1
80 TABLET, FILM COATED ORAL NIGHTLY
Status: DISCONTINUED | OUTPATIENT
Start: 2023-11-07 | End: 2023-11-14 | Stop reason: HOSPADM

## 2023-11-07 RX ORDER — ENOXAPARIN SODIUM 100 MG/ML
40 INJECTION SUBCUTANEOUS DAILY
Status: DISCONTINUED | OUTPATIENT
Start: 2023-11-08 | End: 2023-11-14 | Stop reason: HOSPADM

## 2023-11-07 RX ORDER — ENOXAPARIN SODIUM 100 MG/ML
40 INJECTION SUBCUTANEOUS DAILY
Status: CANCELLED | OUTPATIENT
Start: 2023-11-08

## 2023-11-07 RX ORDER — GABAPENTIN 300 MG/1
300 CAPSULE ORAL NIGHTLY
Status: DISCONTINUED | OUTPATIENT
Start: 2023-11-07 | End: 2023-11-14 | Stop reason: HOSPADM

## 2023-11-07 RX ORDER — ONDANSETRON 4 MG/1
4 TABLET, ORALLY DISINTEGRATING ORAL EVERY 8 HOURS PRN
Status: DISCONTINUED | OUTPATIENT
Start: 2023-11-07 | End: 2023-11-14 | Stop reason: HOSPADM

## 2023-11-07 RX ORDER — SODIUM CHLORIDE 0.9 % (FLUSH) 0.9 %
5-40 SYRINGE (ML) INJECTION PRN
Status: DISCONTINUED | OUTPATIENT
Start: 2023-11-07 | End: 2023-11-14 | Stop reason: HOSPADM

## 2023-11-07 RX ORDER — SODIUM CHLORIDE 0.9 % (FLUSH) 0.9 %
5-40 SYRINGE (ML) INJECTION EVERY 12 HOURS SCHEDULED
Status: DISCONTINUED | OUTPATIENT
Start: 2023-11-07 | End: 2023-11-07

## 2023-11-07 RX ORDER — PROCHLORPERAZINE EDISYLATE 5 MG/ML
10 INJECTION INTRAMUSCULAR; INTRAVENOUS EVERY 6 HOURS PRN
Status: CANCELLED | OUTPATIENT
Start: 2023-11-07

## 2023-11-07 RX ORDER — MULTIVITAMIN WITH IRON
1 TABLET ORAL 2 TIMES DAILY
Status: CANCELLED | OUTPATIENT
Start: 2023-11-07

## 2023-11-07 RX ORDER — SODIUM CHLORIDE 0.9 % (FLUSH) 0.9 %
5-40 SYRINGE (ML) INJECTION PRN
Status: CANCELLED | OUTPATIENT
Start: 2023-11-07

## 2023-11-07 RX ORDER — LABETALOL HYDROCHLORIDE 5 MG/ML
10 INJECTION, SOLUTION INTRAVENOUS EVERY 10 MIN PRN
Status: DISCONTINUED | OUTPATIENT
Start: 2023-11-07 | End: 2023-11-14 | Stop reason: HOSPADM

## 2023-11-07 RX ORDER — PANTOPRAZOLE SODIUM 40 MG/1
40 TABLET, DELAYED RELEASE ORAL
Status: DISCONTINUED | OUTPATIENT
Start: 2023-11-08 | End: 2023-11-14 | Stop reason: HOSPADM

## 2023-11-07 RX ORDER — PROCHLORPERAZINE EDISYLATE 5 MG/ML
10 INJECTION INTRAMUSCULAR; INTRAVENOUS EVERY 6 HOURS PRN
Status: DISCONTINUED | OUTPATIENT
Start: 2023-11-07 | End: 2023-11-14 | Stop reason: HOSPADM

## 2023-11-07 RX ORDER — POTASSIUM CHLORIDE 7.45 MG/ML
10 INJECTION INTRAVENOUS PRN
Status: DISCONTINUED | OUTPATIENT
Start: 2023-11-07 | End: 2023-11-14 | Stop reason: HOSPADM

## 2023-11-07 RX ORDER — ASPIRIN 81 MG/1
81 TABLET, CHEWABLE ORAL DAILY
Status: DISCONTINUED | OUTPATIENT
Start: 2023-11-08 | End: 2023-11-14 | Stop reason: HOSPADM

## 2023-11-07 RX ORDER — POTASSIUM CHLORIDE 20 MEQ/1
40 TABLET, EXTENDED RELEASE ORAL PRN
Status: DISCONTINUED | OUTPATIENT
Start: 2023-11-07 | End: 2023-11-14 | Stop reason: HOSPADM

## 2023-11-07 RX ORDER — GABAPENTIN 100 MG/1
100 CAPSULE ORAL EVERY MORNING
Status: DISCONTINUED | OUTPATIENT
Start: 2023-11-08 | End: 2023-11-14 | Stop reason: HOSPADM

## 2023-11-07 RX ORDER — SODIUM CHLORIDE 9 MG/ML
INJECTION, SOLUTION INTRAVENOUS PRN
Status: DISCONTINUED | OUTPATIENT
Start: 2023-11-07 | End: 2023-11-14 | Stop reason: HOSPADM

## 2023-11-07 RX ORDER — SODIUM CHLORIDE 9 MG/ML
INJECTION, SOLUTION INTRAVENOUS PRN
Status: CANCELLED | OUTPATIENT
Start: 2023-11-07

## 2023-11-07 RX ORDER — ISOSORBIDE MONONITRATE 30 MG/1
30 TABLET, EXTENDED RELEASE ORAL DAILY
Status: CANCELLED | OUTPATIENT
Start: 2023-11-08

## 2023-11-07 RX ORDER — MULTIVITAMIN WITH IRON
1 TABLET ORAL 2 TIMES DAILY
Status: DISCONTINUED | OUTPATIENT
Start: 2023-11-07 | End: 2023-11-14 | Stop reason: HOSPADM

## 2023-11-07 RX ORDER — ASPIRIN 300 MG/1
300 SUPPOSITORY RECTAL DAILY
Status: CANCELLED | OUTPATIENT
Start: 2023-11-08

## 2023-11-07 RX ORDER — ATORVASTATIN CALCIUM 80 MG/1
80 TABLET, FILM COATED ORAL NIGHTLY
Status: CANCELLED | OUTPATIENT
Start: 2023-11-07

## 2023-11-07 RX ORDER — ONDANSETRON 4 MG/1
4 TABLET, ORALLY DISINTEGRATING ORAL EVERY 8 HOURS PRN
Status: CANCELLED | OUTPATIENT
Start: 2023-11-07

## 2023-11-07 RX ORDER — POTASSIUM CHLORIDE 20 MEQ/1
40 TABLET, EXTENDED RELEASE ORAL PRN
Status: CANCELLED | OUTPATIENT
Start: 2023-11-07

## 2023-11-07 RX ORDER — ASPIRIN 300 MG/1
300 SUPPOSITORY RECTAL DAILY
Status: DISCONTINUED | OUTPATIENT
Start: 2023-11-08 | End: 2023-11-14 | Stop reason: HOSPADM

## 2023-11-07 RX ORDER — GABAPENTIN 100 MG/1
100 CAPSULE ORAL EVERY MORNING
Status: CANCELLED | OUTPATIENT
Start: 2023-11-08

## 2023-11-07 RX ORDER — POLYETHYLENE GLYCOL 3350 17 G/17G
17 POWDER, FOR SOLUTION ORAL DAILY PRN
Status: DISCONTINUED | OUTPATIENT
Start: 2023-11-07 | End: 2023-11-14 | Stop reason: HOSPADM

## 2023-11-07 RX ORDER — POTASSIUM CHLORIDE 7.45 MG/ML
10 INJECTION INTRAVENOUS PRN
Status: CANCELLED | OUTPATIENT
Start: 2023-11-07

## 2023-11-07 RX ADMIN — GABAPENTIN 100 MG: 100 CAPSULE ORAL at 09:42

## 2023-11-07 RX ADMIN — ATORVASTATIN CALCIUM 80 MG: 80 TABLET, FILM COATED ORAL at 20:24

## 2023-11-07 RX ADMIN — THERA TABS 1 TABLET: TAB at 20:24

## 2023-11-07 RX ADMIN — ISOSORBIDE MONONITRATE 30 MG: 30 TABLET, EXTENDED RELEASE ORAL at 09:42

## 2023-11-07 RX ADMIN — THERA TABS 1 TABLET: TAB at 09:42

## 2023-11-07 RX ADMIN — ENOXAPARIN SODIUM 40 MG: 100 INJECTION SUBCUTANEOUS at 09:42

## 2023-11-07 RX ADMIN — ASPIRIN 81 MG: 81 TABLET, CHEWABLE ORAL at 09:42

## 2023-11-07 RX ADMIN — PANTOPRAZOLE SODIUM 40 MG: 40 TABLET, DELAYED RELEASE ORAL at 05:23

## 2023-11-07 RX ADMIN — GABAPENTIN 300 MG: 300 CAPSULE ORAL at 20:24

## 2023-11-07 NOTE — CARE COORDINATION
The [address] [facility] [address] at [facility]  Notification of Admission Decision      [x] Patient has been accepted for admit to [facility] on : 11/7/2023 Room 825      Please write discharge orders and summary prior to discharge. [] Patient acceptance to Rehab pending the following :    [] Eval in progress       [] Patient determined to be ineligible for services at [facility] because : We recommend you consider        Thank you for your referral, we appreciate you. If you have any questions, please feel   free to contact me at [phone].    Electronically Signed by Wellington Whelan Admissions Coordinator 11/7/2023 1:47 PM

## 2023-11-07 NOTE — PROGRESS NOTES
Occupational Therapy  Facility/Department: [facility] SURG SERVICES  Daily Treatment Note  NAME: Evelyn Newman  : 1935  MRN: [id number]    Date of Service: 2023    Discharge Recommendations:  Patient would benefit from continued therapy after discharge       Assessment   Performance deficits / Impairments: Decreased functional mobility ; Decreased ADL status; Decreased balance  Assessment: Pt tolerated tx well. Pt more steady today during functional mobility and functional transfers. Pt continues to benefit from skilled OT services. Treatment Diagnosis: CVA  Prognosis: Good  Activity Tolerance  Activity Tolerance: Patient Tolerated treatment well         Patient Diagnosis(es): The primary encounter diagnosis was Acute CVA (cerebrovascular accident) ([address] Diagnoses of Pulmonary nodule and Thyroid nodule were also pertinent to this visit. has a past medical history of Age-related osteoporosis without current pathological fracture, Basal cell carcinoma (BCC) of scalp, Coronary artery disease involving native coronary artery of native heart without angina pectoris, DDD (degenerative disc disease), lumbar, Essential hypertension, Knee pain, Macular degeneration, Mixed hyperlipidemia, and Ventral hernia. has a past surgical history that includes Eye surgery; Hysterectomy; Varicose vein surgery; Foot surgery; Total hip arthroplasty (Left, ); Cardiac catheterization; Total knee arthroplasty (Right, 10/14/2021); Tonsillectomy; hernia repair (N/A, 2022); Diagnostic Cardiac Cath Lab Procedure (2023); and Aortic valve repair (2023).     Restrictions  Restrictions/Precautions  Restrictions/Precautions: Fall Risk, Contact Precautions  Required Braces or Orthoses?: No  Position Activity Restriction  Other position/activity restrictions: MDRO  Subjective   General  Chart Reviewed: Yes  Patient assessed for rehabilitation services?: Yes  Additional Pertinent Hx: (degenerative disc disease), lumbar,

## 2023-11-07 NOTE — DISCHARGE SUMMARY
[address]    DEPARTMENT OF HOSPITALIST MEDICINE      DISCHARGE SUMMARY:      PATIENT NAME:  Jessica Alvarez  :  1935  MRN:  [id number]    Admission Date:   2023  6:22 PM Attending: Monroe Restrepo MD   Discharge Date:   2023              PCP: Sherryle Masters, APRN  Length of Stay: 5 days     Chief Complaint on Admission:   Chief Complaint   Patient presents with    Cerebrovascular Accident     Stroke alert by EMS, acute onset of slurred speech and abnormal gait, right side facial droops, right arm weakness, LKW 1745; symptoms improving with EMS. FS glucose 76 per EMS       Consultants:     [address]  IP CONSULT TO PALLIATIVE CARE  IP CONSULT TO REHAB/TCU ADMISSION COORDINATOR  IP CONSULT TO REHAB/TCU ADMISSION COORDINATOR       [address]  COURSE  AND  TREATMENT:     This patient is an 72-year-old female with PMH basal cell carcinoma, hypertension, osteoporosis, hyperlipidemia, aortic stenosis s/p TAVR on , diastolic heart failure, CAD who presented to [facility] ED on  for evaluation of strokelike symptoms including slurred speech, right-sided facial droop, abnormal gait, and somnolence. On arrival to ER, patient was dysarthric with right-sided weakness. Head CT showed no acute abnormality, CTA showed a 1.5 cm KECIA nodule and 2.4 cm left thyroid nodule otherwise no acute findings, chest x-ray showed atelectasis. Lab work was unremarkable. Patient admitted to hospitalist with consult to neurology. MRI obtained which indicates tiny multifocal acute infarcts in the right cerebellum without mass effect or hemorrhage. Echo completed and shows no PFO. On 11/3, KUB obtained due to nausea and vomiting which could not rule out SBO. Patient did have a large bowel movement with resolution of her nausea. CT of the abdomen was obtained which shows no acute findings. Worked with PT/OT and was accepted to inpatient rehab.  Pt discharged to reformatted images. Vascular calcifications. Calcified and noncalcified plaque in the right carotid bulb and proximal internal carotid artery. Less than 50% stenosis. Tortuous course of the internal carotid arteries extending into the retropharyngeal area. Calcified and noncalcified plaque in the left carotid bulb and proximal internal carotid artery without high-grade stenosis. Tortuous , retropharyngeal course of the internal carotid artery. The vertebral arteries are patent. Calcifications in the carotid siphons. The anterior and posterior intracranial circulation is intact. No findings of high-grade stenosis or occlusion. No intracranial aneurysm  1.5 cm pleural-based nodule in the left upper lobe. Heterogeneous thyroid gland. 2.4 cm left thyroid lobe nodule with a central calcification. Calcified and noncalcified plaque in the carotid arteries. However, no high-grade stenosis or occlusion. The vertebral arteries are patent. No significant stenosis or occlusion of the intracranial circulation. No intracranial aneurysm. Unexpected findings. 1.5 cm pleural-based nodule in the left upper lobe. Recommend follow-up outpatient CT chest.  2.4 cm left thyroid lobe nodule. Recommend follow-up outpatient thyroid ultrasound. All CT scans are performed using dose optimization techniques as appropriate to the performed exam and include at least one of the following: Automated exposure control, adjustment of the mA and/or kV according to size, and the use of iterative reconstruction technique. ______________________________________ Electronically signed by: Victor Manuel MATTHEWS Date:     11/02/2023 Time:    18:58     CT HEAD WO CONTRAST    Result Date: 11/2/2023  EXAM: CT BRAIN WITHOUT CONTRAST    HISTORY:  Dysarthria    TECHNIQUE:  CT of the brain without intravenous contrast.  FINDINGS:  There is no acute hemorrhage midline shift or mass effect.   No hydrocephalus or abnormal extra-axial fluid

## 2023-11-07 NOTE — PLAN OF CARE
[address] [facility] TREATMENT PLAN      Clem Johnson    : 1935  Acct #: [de-identified]  MRN: [id number]   PHYSICIAN:  Windy Knight MD  Primary Problem    Patient Active Problem List   Diagnosis    Essential hypertension    Mixed hyperlipidemia    Age-related osteoporosis without current pathological fracture    Coronary artery disease involving native coronary artery of native heart without angina pectoris    DDD (degenerative disc disease), lumbar    Basal cell carcinoma (BCC) of scalp    Chronic bilateral low back pain without sciatica    Macular degeneration of both eyes    Primary osteoarthritis of right knee    Spinal stenosis of lumbar region with neurogenic claudication    Recurrent UTI    Aortic stenosis    Ventral hernia without obstruction or gangrene    Prediabetes    Dysuria    S/P TAVR (transcatheter aortic valve replacement)    Cerebrovascular accident (CVA) ([address])    Nodule of left lobe of thyroid gland    Left upper lobe pulmonary nodule    Palliative care patient    Acute ischemic stroke ([address])    Urinary incontinence       Rehabilitation Diagnosis:     Acute ischemic stroke ([address]) [I63.9]       ADMIT DATE:2023   CARE PLAN     NURSING:  Clem Johnson while on this unit will:     [] Be continent of bowel and bladder      [x] Have an adequate number of bowel movements   [x] Urinate with no urinary retention >300ml in bladder   [] Complete bladder protocol with bustos removal   [] Maintain O2 SATs at ___%   [x] Have pain managed while on ARU        [] Be pain free by discharge    [x] Have no skin breakdown while on ARU   [] Have improved skin integrity via wound measurements   [] Have no signs/symptoms of infection at the wound site  [x] Be free from injury during hospitalization   [] Complete education with patient/family with understanding demonstrated for:  [] Adjustment   [x] Other:   Nursing interventions may include bowel/bladder training, education for medical assistive devices,

## 2023-11-07 NOTE — PROGRESS NOTES
Physical Therapy    Patient Name: Turner Thompson  MRN: [id number]  Date: 11/7/2023 11/07/23 1206   Restrictions/Precautions   Restrictions/Precautions Fall Risk;Contact Precautions   Subjective   Subjective Pt. stated she would take a walk then wanted assistance to bathroom. Pain Assessment   Pain Assessment None - Denies Pain   Transfers   Sit to Stand Contact guard assistance   Stand to Sit Contact guard assistance   Ambulation   Surface Level tile   Device Rolling Walker   Assistance Contact guard assistance   Gait Deviations Decreased step length;Decreased step height   Distance 110ft, 10ft   Exercises   Hip Flexion BL x10   Knee Long Arc Quad BL x10   Ankle Pumps BL x10   Comments Seated in recliner   Short Term Goals   Time Frame for Short Term Goals 2 wks   Short Term Goal 1 supine to sit indep   Short Term Goal 2 sit to stand indep   Short Term Goal 3 amb. 200' with AAD SBA   Short Term Goal 4 bed to chair SBA   Conditions Requiring Skilled Therapeutic Intervention   Body Structures, Functions, Activity Limitations Requiring Skilled Therapeutic Intervention Decreased functional mobility ; Decreased ADL status; Decreased body mechanics; Decreased cognition;Decreased safe awareness;Decreased balance;Decreased strength;Decreased posture;Decreased coordination   Assessment Pt increased amb distance this date with no LOB noted or complaints. She required VC to reach back to the chair before sitting in order to lower herself into sitting. Pt was positioned in chair with all needs within reach; family present. Activity Tolerance   Activity Tolerance Patient tolerated treatment well   PT Plan of Care   Tuesday X   Safety Devices   Type of Devices Call light within reach; Chair alarm in place;Gait belt;Left in chair         Electronically signed by Betty Flowers PTA Student on 11/7/2023 at 12:12 PM

## 2023-11-08 PROBLEM — R32 URINARY INCONTINENCE: Status: ACTIVE | Noted: 2023-11-08

## 2023-11-08 LAB
ALBUMIN SERPL-MCNC: 4.3 G/DL (ref 3.5–5.2)
ALP SERPL-CCNC: 76 U/L (ref 35–104)
ALT SERPL-CCNC: 16 U/L (ref 5–33)
ANION GAP SERPL CALCULATED.3IONS-SCNC: 7 MMOL/L (ref 7–19)
AST SERPL-CCNC: 20 U/L (ref 5–32)
BILIRUB SERPL-MCNC: 0.5 MG/DL (ref 0.2–1.2)
BUN SERPL-MCNC: 12 MG/DL (ref 8–23)
CALCIUM SERPL-MCNC: 9.2 MG/DL (ref 8.8–10.2)
CHLORIDE SERPL-SCNC: 103 MMOL/L (ref 98–111)
CO2 SERPL-SCNC: 30 MMOL/L (ref 22–29)
CREAT SERPL-MCNC: 0.6 MG/DL (ref 0.5–0.9)
ERYTHROCYTE [DISTWIDTH] IN BLOOD BY AUTOMATED COUNT: 14.6 % (ref 11.5–14.5)
GLUCOSE SERPL-MCNC: 101 MG/DL (ref 74–109)
HCT VFR BLD AUTO: 38.8 % (ref 37–47)
HGB BLD-MCNC: 12.9 G/DL (ref 12–16)
MCH RBC QN AUTO: 29.1 PG (ref 27–31)
MCHC RBC AUTO-ENTMCNC: 33.2 G/DL (ref 33–37)
MCV RBC AUTO: 87.6 FL (ref 81–99)
PLATELET # BLD AUTO: 259 K/UL (ref 130–400)
PMV BLD AUTO: 9.9 FL (ref 9.4–12.3)
POTASSIUM SERPL-SCNC: 4 MMOL/L (ref 3.5–5)
PROT SERPL-MCNC: 6.8 G/DL (ref 6.6–8.7)
RBC # BLD AUTO: 4.43 M/UL (ref 4.2–5.4)
SODIUM SERPL-SCNC: 140 MMOL/L (ref 136–145)
TSH SERPL DL<=0.005 MIU/L-ACNC: 3.07 UIU/ML (ref 0.35–5.5)
VIT B12 SERPL-MCNC: 1036 PG/ML (ref 211–946)
WBC # BLD AUTO: 6.3 K/UL (ref 4.8–10.8)

## 2023-11-08 PROCEDURE — 99223 1ST HOSP IP/OBS HIGH 75: CPT | Performed by: PSYCHIATRY & NEUROLOGY

## 2023-11-08 PROCEDURE — 84134 ASSAY OF PREALBUMIN: CPT

## 2023-11-08 PROCEDURE — 80053 COMPREHEN METABOLIC PANEL: CPT

## 2023-11-08 PROCEDURE — 84443 ASSAY THYROID STIM HORMONE: CPT

## 2023-11-08 PROCEDURE — 97535 SELF CARE MNGMENT TRAINING: CPT

## 2023-11-08 PROCEDURE — 97165 OT EVAL LOW COMPLEX 30 MIN: CPT

## 2023-11-08 PROCEDURE — 82607 VITAMIN B-12: CPT

## 2023-11-08 PROCEDURE — 94760 N-INVAS EAR/PLS OXIMETRY 1: CPT

## 2023-11-08 PROCEDURE — 97110 THERAPEUTIC EXERCISES: CPT

## 2023-11-08 PROCEDURE — 92610 EVALUATE SWALLOWING FUNCTION: CPT

## 2023-11-08 PROCEDURE — 1180000000 HC REHAB R&B

## 2023-11-08 PROCEDURE — 6370000000 HC RX 637 (ALT 250 FOR IP): Performed by: STUDENT IN AN ORGANIZED HEALTH CARE EDUCATION/TRAINING PROGRAM

## 2023-11-08 PROCEDURE — 36415 COLL VENOUS BLD VENIPUNCTURE: CPT

## 2023-11-08 PROCEDURE — 6360000002 HC RX W HCPCS: Performed by: STUDENT IN AN ORGANIZED HEALTH CARE EDUCATION/TRAINING PROGRAM

## 2023-11-08 PROCEDURE — 97161 PT EVAL LOW COMPLEX 20 MIN: CPT

## 2023-11-08 PROCEDURE — 97116 GAIT TRAINING THERAPY: CPT

## 2023-11-08 PROCEDURE — 85027 COMPLETE CBC AUTOMATED: CPT

## 2023-11-08 PROCEDURE — 92522 EVALUATE SPEECH PRODUCTION: CPT

## 2023-11-08 RX ADMIN — THERA TABS 1 TABLET: TAB at 20:50

## 2023-11-08 RX ADMIN — GABAPENTIN 100 MG: 100 CAPSULE ORAL at 07:44

## 2023-11-08 RX ADMIN — ATORVASTATIN CALCIUM 80 MG: 80 TABLET, FILM COATED ORAL at 20:50

## 2023-11-08 RX ADMIN — GABAPENTIN 300 MG: 300 CAPSULE ORAL at 20:50

## 2023-11-08 RX ADMIN — PANTOPRAZOLE SODIUM 40 MG: 40 TABLET, DELAYED RELEASE ORAL at 05:46

## 2023-11-08 RX ADMIN — ISOSORBIDE MONONITRATE 30 MG: 30 TABLET, EXTENDED RELEASE ORAL at 07:45

## 2023-11-08 RX ADMIN — THERA TABS 1 TABLET: TAB at 07:45

## 2023-11-08 RX ADMIN — ASPIRIN 81 MG: 81 TABLET, CHEWABLE ORAL at 07:44

## 2023-11-08 RX ADMIN — ENOXAPARIN SODIUM 40 MG: 100 INJECTION SUBCUTANEOUS at 07:44

## 2023-11-08 NOTE — H&P
[facility]   History and Physical        Patient:   Ana María Orozco  MR#:    [id number]  Account Number:                   [id number]      Room:    [facility]/816-02   YOB: 1935  Date of Progress Note: 11/8/2023  Time of Note                           7:34 AM  Attending Physician:  Karissa Paul MD        Admitting diagnosis:Cerebral infarction, unspecified    Secondary diagnoses:Hemiplegia and hemiparesis following cerebral infarction affecting right dominant side,Dysphagia following cerebral infarction,Dysarthria following cerebral infarction,Essential (primary) hypertension,Mixed hyperlipidemia,Unspecified urinary incontinence,Atherosclerotic heart disease of native coronary artery without angina pectoris,Unspecified diastolic (congestive) heart failure    CHIEF COMPLAINT: Acute ischemic stroke      HISTORY OF PRESENT ILLNESS:   This 81 y.o. female right-handed female with history of HTN, mixed hyperlipidemia, CAD, urinary incontinence, Macular degeneration, diastolic CHF, aortic stenosis S/P AVR 10/16/23 and basal cell CA. She presented to [facility] ER on 11/2/23 with c/o right-sided weakness, facial droop, slurred speech and abnormal gait. CT head no acute intracranial abnormality, CTA head neck 1.5 pleural-based nodule left upper lobe, 2.4 cm left thyroid lobe nodule, CXR atelectasis, small left pleural effusion possible. Patient is to be admitted to the Hospitalist service with consult for neurology. She was seen by Dr. Ryder Ponce. MRI with small right cerebellar multifocal infarcts. Suspect small vessel event. CTA head and neck negative. Echo unremarkable. Patient placed on antiplatelet therapy for now, continue statin and allow permissive hypertension. On 11/3, KUB obtained due to nausea and vomiting which could not rule out SBO. Patient did have a large bowel movement with resolution of her nausea. CT of the abdomen was obtained which shows no acute findings.  Incidental findings of Nodule of left

## 2023-11-09 LAB
ALBUMIN SERPL-MCNC: 3.5 G/DL (ref 3.5–5.2)
ALP SERPL-CCNC: 62 U/L (ref 35–104)
ALT SERPL-CCNC: 13 U/L (ref 5–33)
AST SERPL-CCNC: 18 U/L (ref 5–32)
BASOPHILS # BLD: 0.1 K/UL (ref 0–0.2)
BASOPHILS NFR BLD: 0.9 % (ref 0–1)
BILIRUB SERPL-MCNC: 0.4 MG/DL (ref 0.2–1.2)
BUN SERPL-MCNC: 15 MG/DL (ref 8–23)
CALCIUM SERPL-MCNC: 9 MG/DL (ref 8.8–10.2)
CHLORIDE SERPL-SCNC: 104 MMOL/L (ref 98–111)
CREAT SERPL-MCNC: 0.6 MG/DL (ref 0.5–0.9)
EOSINOPHIL # BLD: 0.2 K/UL (ref 0–0.6)
EOSINOPHIL NFR BLD: 2.3 % (ref 0–5)
ERYTHROCYTE [DISTWIDTH] IN BLOOD BY AUTOMATED COUNT: 14.5 % (ref 11.5–14.5)
GLUCOSE BLD-MCNC: 100 MG/DL (ref 70–99)
GLUCOSE BLD-MCNC: 109 MG/DL (ref 70–99)
GLUCOSE BLD-MCNC: 98 MG/DL (ref 70–99)
GLUCOSE SERPL-MCNC: 93 MG/DL (ref 74–109)
HCT VFR BLD AUTO: 35 % (ref 37–47)
HGB BLD-MCNC: 11 G/DL (ref 12–16)
IMM GRANULOCYTES # BLD: 0 K/UL
LYMPHOCYTES # BLD: 2.4 K/UL (ref 1.1–4.5)
LYMPHOCYTES NFR BLD: 37.5 % (ref 20–40)
MCH RBC QN AUTO: 28 PG (ref 27–31)
MCHC RBC AUTO-ENTMCNC: 31.4 G/DL (ref 33–37)
MCV RBC AUTO: 89.1 FL (ref 81–99)
MONOCYTES # BLD: 0.5 K/UL (ref 0–0.9)
MONOCYTES NFR BLD: 7.2 % (ref 0–10)
NEUTROPHILS # BLD: 3.3 K/UL (ref 1.5–7.5)
NEUTS SEG NFR BLD: 51.9 % (ref 50–65)
PERFORMED ON: ABNORMAL
PERFORMED ON: ABNORMAL
PERFORMED ON: NORMAL
PLATELET # BLD AUTO: 221 K/UL (ref 130–400)
PMV BLD AUTO: 10.1 FL (ref 9.4–12.3)
POTASSIUM SERPL-SCNC: 4.1 MMOL/L (ref 3.5–5)
PREALB SERPL-MCNC: 21 MG/DL (ref 20–40)
PROT SERPL-MCNC: 6.1 G/DL (ref 6.6–8.7)
RBC # BLD AUTO: 3.93 M/UL (ref 4.2–5.4)
SODIUM SERPL-SCNC: 140 MMOL/L (ref 136–145)
WBC # BLD AUTO: 6.4 K/UL (ref 4.8–10.8)

## 2023-11-09 PROCEDURE — 99233 SBSQ HOSP IP/OBS HIGH 50: CPT | Performed by: PSYCHIATRY & NEUROLOGY

## 2023-11-09 PROCEDURE — 97535 SELF CARE MNGMENT TRAINING: CPT

## 2023-11-09 PROCEDURE — 1180000000 HC REHAB R&B

## 2023-11-09 PROCEDURE — 94760 N-INVAS EAR/PLS OXIMETRY 1: CPT

## 2023-11-09 PROCEDURE — 97129 THER IVNTJ 1ST 15 MIN: CPT

## 2023-11-09 PROCEDURE — 6370000000 HC RX 637 (ALT 250 FOR IP): Performed by: STUDENT IN AN ORGANIZED HEALTH CARE EDUCATION/TRAINING PROGRAM

## 2023-11-09 PROCEDURE — 6360000002 HC RX W HCPCS: Performed by: STUDENT IN AN ORGANIZED HEALTH CARE EDUCATION/TRAINING PROGRAM

## 2023-11-09 PROCEDURE — 97110 THERAPEUTIC EXERCISES: CPT

## 2023-11-09 PROCEDURE — 36415 COLL VENOUS BLD VENIPUNCTURE: CPT

## 2023-11-09 PROCEDURE — 97130 THER IVNTJ EA ADDL 15 MIN: CPT

## 2023-11-09 PROCEDURE — 80053 COMPREHEN METABOLIC PANEL: CPT

## 2023-11-09 PROCEDURE — 85025 COMPLETE CBC W/AUTO DIFF WBC: CPT

## 2023-11-09 PROCEDURE — 97116 GAIT TRAINING THERAPY: CPT

## 2023-11-09 PROCEDURE — 97530 THERAPEUTIC ACTIVITIES: CPT

## 2023-11-09 PROCEDURE — 82962 GLUCOSE BLOOD TEST: CPT

## 2023-11-09 RX ADMIN — GABAPENTIN 300 MG: 300 CAPSULE ORAL at 20:41

## 2023-11-09 RX ADMIN — ISOSORBIDE MONONITRATE 30 MG: 30 TABLET, EXTENDED RELEASE ORAL at 07:22

## 2023-11-09 RX ADMIN — THERA TABS 1 TABLET: TAB at 07:22

## 2023-11-09 RX ADMIN — ENOXAPARIN SODIUM 40 MG: 100 INJECTION SUBCUTANEOUS at 07:21

## 2023-11-09 RX ADMIN — THERA TABS 1 TABLET: TAB at 20:41

## 2023-11-09 RX ADMIN — PANTOPRAZOLE SODIUM 40 MG: 40 TABLET, DELAYED RELEASE ORAL at 05:26

## 2023-11-09 RX ADMIN — ATORVASTATIN CALCIUM 80 MG: 80 TABLET, FILM COATED ORAL at 20:41

## 2023-11-09 RX ADMIN — GABAPENTIN 100 MG: 100 CAPSULE ORAL at 07:22

## 2023-11-09 RX ADMIN — ASPIRIN 81 MG: 81 TABLET, CHEWABLE ORAL at 07:22

## 2023-11-09 ASSESSMENT — PAIN SCALES - GENERAL: PAINLEVEL_OUTOF10: 0

## 2023-11-09 NOTE — PLAN OF CARE
Problem: Discharge Planning  Goal: Discharge to home or other facility with appropriate resources  11/9/2023 1005 by Fabian Bernard LPN  Outcome: Progressing  Flowsheets (Taken 11/9/2023 0728)  Discharge to home or other facility with appropriate resources: Refer to discharge planning if patient needs post-hospital services based on physician order or complex needs related to functional status, cognitive ability or social support system  11/8/2023 2319 by Mary Royal RN  Outcome: Progressing     Problem: Safety - Adult  Goal: Free from fall injury  11/9/2023 1005 by Fabian Bernard LPN  Outcome: Progressing  11/8/2023 2319 by Mary Royal RN  Outcome: Progressing  Flowsheets (Taken 11/8/2023 2318)  Free From Fall Injury: Instruct family/caregiver on patient safety     Problem: Pain  Goal: Verbalizes/displays adequate comfort level or baseline comfort level  11/9/2023 1005 by Fabian Bernard LPN  Outcome: Progressing  11/8/2023 2319 by Mary Royal RN  Outcome: Progressing     Problem: ABCDS Injury Assessment  Goal: Absence of physical injury  11/9/2023 1005 by Fabian Bernard LPN  Outcome: Progressing  11/8/2023 2319 by Mary Royal RN  Outcome: Progressing  Flowsheets (Taken 11/8/2023 2318)  Absence of Physical Injury: Implement safety measures based on patient assessment     Problem: Neurosensory - Adult  Goal: Achieves stable or improved neurological status  Outcome: Progressing  Flowsheets (Taken 11/9/2023 0728)  Achieves stable or improved neurological status: Assess for and report changes in neurological status     Problem: Skin/Tissue Integrity - Adult  Goal: Skin integrity remains intact  Outcome: Progressing  Flowsheets (Taken 11/9/2023 0951)  Skin Integrity Remains Intact: Monitor for areas of redness and/or skin breakdown     Problem: Musculoskeletal - Adult  Goal: Return mobility to safest level of function  Outcome: Progressing  Flowsheets (Taken

## 2023-11-09 NOTE — PLAN OF CARE
Problem: Discharge Planning  Goal: Discharge to home or other facility with appropriate resources  11/8/2023 2319 by Charles Barr RN  Outcome: Progressing  11/8/2023 1032 by Aziza Burnham RN  Outcome: Progressing     Problem: Safety - Adult  Goal: Free from fall injury  11/8/2023 2319 by Charles Barr RN  Outcome: Progressing  Flowsheets (Taken 11/8/2023 2318)  Free From Fall Injury: Instruct family/caregiver on patient safety  11/8/2023 1032 by Aziza Burnham RN  Outcome: Progressing     Problem: Pain  Goal: Verbalizes/displays adequate comfort level or baseline comfort level  11/8/2023 2319 by Charles Barr RN  Outcome: Progressing  11/8/2023 1032 by Aziza Burnham RN  Outcome: Progressing     Problem: ABCDS Injury Assessment  Goal: Absence of physical injury  11/8/2023 2319 by Charles Barr RN  Outcome: Progressing  Flowsheets (Taken 11/8/2023 2318)  Absence of Physical Injury: Implement safety measures based on patient assessment  11/8/2023 1032 by Aziza Burnham RN  Outcome: Progressing

## 2023-11-10 PROCEDURE — 6360000002 HC RX W HCPCS: Performed by: STUDENT IN AN ORGANIZED HEALTH CARE EDUCATION/TRAINING PROGRAM

## 2023-11-10 PROCEDURE — 97530 THERAPEUTIC ACTIVITIES: CPT

## 2023-11-10 PROCEDURE — 97129 THER IVNTJ 1ST 15 MIN: CPT

## 2023-11-10 PROCEDURE — 92526 ORAL FUNCTION THERAPY: CPT

## 2023-11-10 PROCEDURE — 97130 THER IVNTJ EA ADDL 15 MIN: CPT

## 2023-11-10 PROCEDURE — 99232 SBSQ HOSP IP/OBS MODERATE 35: CPT | Performed by: PSYCHIATRY & NEUROLOGY

## 2023-11-10 PROCEDURE — 97116 GAIT TRAINING THERAPY: CPT

## 2023-11-10 PROCEDURE — 97535 SELF CARE MNGMENT TRAINING: CPT

## 2023-11-10 PROCEDURE — 94760 N-INVAS EAR/PLS OXIMETRY 1: CPT

## 2023-11-10 PROCEDURE — 6370000000 HC RX 637 (ALT 250 FOR IP): Performed by: STUDENT IN AN ORGANIZED HEALTH CARE EDUCATION/TRAINING PROGRAM

## 2023-11-10 PROCEDURE — 1180000000 HC REHAB R&B

## 2023-11-10 PROCEDURE — 97110 THERAPEUTIC EXERCISES: CPT

## 2023-11-10 RX ADMIN — ENOXAPARIN SODIUM 40 MG: 100 INJECTION SUBCUTANEOUS at 07:31

## 2023-11-10 RX ADMIN — PANTOPRAZOLE SODIUM 40 MG: 40 TABLET, DELAYED RELEASE ORAL at 05:36

## 2023-11-10 RX ADMIN — THERA TABS 1 TABLET: TAB at 07:31

## 2023-11-10 RX ADMIN — GABAPENTIN 100 MG: 100 CAPSULE ORAL at 07:31

## 2023-11-10 RX ADMIN — THERA TABS 1 TABLET: TAB at 21:46

## 2023-11-10 RX ADMIN — ATORVASTATIN CALCIUM 80 MG: 80 TABLET, FILM COATED ORAL at 21:46

## 2023-11-10 RX ADMIN — ASPIRIN 81 MG: 81 TABLET, CHEWABLE ORAL at 07:31

## 2023-11-10 RX ADMIN — GABAPENTIN 300 MG: 300 CAPSULE ORAL at 21:46

## 2023-11-10 RX ADMIN — ISOSORBIDE MONONITRATE 30 MG: 30 TABLET, EXTENDED RELEASE ORAL at 07:31

## 2023-11-10 ASSESSMENT — PAIN SCALES - GENERAL: PAINLEVEL_OUTOF10: 0

## 2023-11-10 NOTE — PLAN OF CARE
Problem: Discharge Planning  Goal: Discharge to home or other facility with appropriate resources  Outcome: Progressing  Flowsheets  Taken 11/10/2023 0738 by Angeli Weston LPN  Discharge to home or other facility with appropriate resources: Refer to discharge planning if patient needs post-hospital services based on physician order or complex needs related to functional status, cognitive ability or social support system  Taken 11/9/2023 2042 by Mary Martinez LPN  Discharge to home or other facility with appropriate resources: Refer to discharge planning if patient needs post-hospital services based on physician order or complex needs related to functional status, cognitive ability or social support system     Problem: Safety - Adult  Goal: Free from fall injury  Outcome: Progressing     Problem: Pain  Goal: Verbalizes/displays adequate comfort level or baseline comfort level  Outcome: Progressing     Problem: ABCDS Injury Assessment  Goal: Absence of physical injury  Outcome: Progressing     Problem: Neurosensory - Adult  Goal: Achieves stable or improved neurological status  Outcome: Progressing  Flowsheets  Taken 11/10/2023 0738 by Angeli Weston LPN  Achieves stable or improved neurological status: Assess for and report changes in neurological status  Taken 11/9/2023 2042 by Mary Martinez LPN  Achieves stable or improved neurological status: Assess for and report changes in neurological status     Problem: Skin/Tissue Integrity - Adult  Goal: Skin integrity remains intact  Outcome: Progressing  Flowsheets (Taken 11/10/2023 0706)  Skin Integrity Remains Intact: Monitor for areas of redness and/or skin breakdown     Problem: Musculoskeletal - Adult  Goal: Return mobility to safest level of function  Outcome: Progressing  Flowsheets  Taken 11/10/2023 0738 by Angeli Weston LPN  Return Mobility to Safest Level of Function: Assess patient stability and activity tolerance for standing,

## 2023-11-11 PROCEDURE — 92526 ORAL FUNCTION THERAPY: CPT

## 2023-11-11 PROCEDURE — 92507 TX SP LANG VOICE COMM INDIV: CPT

## 2023-11-11 PROCEDURE — 97110 THERAPEUTIC EXERCISES: CPT

## 2023-11-11 PROCEDURE — 1180000000 HC REHAB R&B

## 2023-11-11 PROCEDURE — 97535 SELF CARE MNGMENT TRAINING: CPT

## 2023-11-11 PROCEDURE — 6360000002 HC RX W HCPCS: Performed by: STUDENT IN AN ORGANIZED HEALTH CARE EDUCATION/TRAINING PROGRAM

## 2023-11-11 PROCEDURE — 6370000000 HC RX 637 (ALT 250 FOR IP): Performed by: STUDENT IN AN ORGANIZED HEALTH CARE EDUCATION/TRAINING PROGRAM

## 2023-11-11 PROCEDURE — 97530 THERAPEUTIC ACTIVITIES: CPT

## 2023-11-11 PROCEDURE — 97116 GAIT TRAINING THERAPY: CPT

## 2023-11-11 RX ADMIN — GABAPENTIN 100 MG: 100 CAPSULE ORAL at 07:50

## 2023-11-11 RX ADMIN — PANTOPRAZOLE SODIUM 40 MG: 40 TABLET, DELAYED RELEASE ORAL at 05:57

## 2023-11-11 RX ADMIN — ENOXAPARIN SODIUM 40 MG: 100 INJECTION SUBCUTANEOUS at 07:50

## 2023-11-11 RX ADMIN — ATORVASTATIN CALCIUM 80 MG: 80 TABLET, FILM COATED ORAL at 22:07

## 2023-11-11 RX ADMIN — GABAPENTIN 300 MG: 300 CAPSULE ORAL at 22:07

## 2023-11-11 RX ADMIN — ASPIRIN 81 MG: 81 TABLET, CHEWABLE ORAL at 07:54

## 2023-11-11 RX ADMIN — ISOSORBIDE MONONITRATE 30 MG: 30 TABLET, EXTENDED RELEASE ORAL at 07:50

## 2023-11-11 RX ADMIN — THERA TABS 1 TABLET: TAB at 22:07

## 2023-11-11 RX ADMIN — THERA TABS 1 TABLET: TAB at 07:50

## 2023-11-11 ASSESSMENT — PAIN SCALES - GENERAL: PAINLEVEL_OUTOF10: 0

## 2023-11-11 NOTE — PLAN OF CARE
Problem: Discharge Planning  Goal: Discharge to home or other facility with appropriate resources  Outcome: Progressing     Problem: Safety - Adult  Goal: Free from fall injury  Outcome: Progressing     Problem: Pain  Goal: Verbalizes/displays adequate comfort level or baseline comfort level  Outcome: Progressing     Problem: ABCDS Injury Assessment  Goal: Absence of physical injury  Outcome: Progressing     Problem: Neurosensory - Adult  Goal: Achieves stable or improved neurological status  Outcome: Progressing     Problem: Skin/Tissue Integrity - Adult  Goal: Skin integrity remains intact  Outcome: Progressing     Problem: Musculoskeletal - Adult  Goal: Return mobility to safest level of function  Outcome: Progressing     Problem: Infection - Adult  Goal: Absence of infection at discharge  Outcome: Progressing     Problem: Chronic Conditions and Co-morbidities  Goal: Patient's chronic conditions and co-morbidity symptoms are monitored and maintained or improved  Outcome: Progressing

## 2023-11-12 PROCEDURE — 1180000000 HC REHAB R&B

## 2023-11-12 PROCEDURE — 6360000002 HC RX W HCPCS: Performed by: STUDENT IN AN ORGANIZED HEALTH CARE EDUCATION/TRAINING PROGRAM

## 2023-11-12 PROCEDURE — 6370000000 HC RX 637 (ALT 250 FOR IP): Performed by: STUDENT IN AN ORGANIZED HEALTH CARE EDUCATION/TRAINING PROGRAM

## 2023-11-12 RX ADMIN — GABAPENTIN 300 MG: 300 CAPSULE ORAL at 21:54

## 2023-11-12 RX ADMIN — ATORVASTATIN CALCIUM 80 MG: 80 TABLET, FILM COATED ORAL at 21:54

## 2023-11-12 RX ADMIN — ASPIRIN 81 MG: 81 TABLET, CHEWABLE ORAL at 09:30

## 2023-11-12 RX ADMIN — PANTOPRAZOLE SODIUM 40 MG: 40 TABLET, DELAYED RELEASE ORAL at 05:36

## 2023-11-12 RX ADMIN — ENOXAPARIN SODIUM 40 MG: 100 INJECTION SUBCUTANEOUS at 09:30

## 2023-11-12 RX ADMIN — GABAPENTIN 100 MG: 100 CAPSULE ORAL at 09:30

## 2023-11-12 RX ADMIN — THERA TABS 1 TABLET: TAB at 09:30

## 2023-11-12 RX ADMIN — ISOSORBIDE MONONITRATE 30 MG: 30 TABLET, EXTENDED RELEASE ORAL at 09:30

## 2023-11-12 RX ADMIN — THERA TABS 1 TABLET: TAB at 21:54

## 2023-11-12 NOTE — PLAN OF CARE
Problem: Discharge Planning  Goal: Discharge to home or other facility with appropriate resources  11/12/2023 0155 by Ann Yu RN  Outcome: Progressing  11/11/2023 1328 by Leidy Velez LPN  Outcome: Progressing     Problem: Safety - Adult  Goal: Free from fall injury  11/12/2023 0155 by Ann Yu RN  Outcome: Progressing  11/11/2023 1328 by Leidy Velez LPN  Outcome: Progressing     Problem: Pain  Goal: Verbalizes/displays adequate comfort level or baseline comfort level  11/12/2023 0155 by Ann Yu RN  Outcome: Progressing  11/11/2023 1328 by Leidy Velez LPN  Outcome: Progressing     Problem: ABCDS Injury Assessment  Goal: Absence of physical injury  11/12/2023 0155 by Ann Yu RN  Outcome: Progressing  11/11/2023 1328 by Leidy Velez LPN  Outcome: Progressing     Problem: Neurosensory - Adult  Goal: Achieves stable or improved neurological status  11/12/2023 0155 by Ann Yu RN  Outcome: Progressing  11/11/2023 1328 by Leidy Velez LPN  Outcome: Progressing  Flowsheets (Taken 11/11/2023 0749)  Achieves stable or improved neurological status: Assess for and report changes in neurological status     Problem: Skin/Tissue Integrity - Adult  Goal: Skin integrity remains intact  11/12/2023 0155 by Ann Yu RN  Outcome: Progressing  11/11/2023 1328 by Leidy Velez LPN  Outcome: Progressing  Flowsheets (Taken 11/11/2023 0749)  Skin Integrity Remains Intact: Monitor for areas of redness and/or skin breakdown     Problem: Musculoskeletal - Adult  Goal: Return mobility to safest level of function  11/12/2023 0155 by Ann Yu RN  Outcome: Progressing  11/11/2023 1328 by Leidy Velez LPN  Outcome: Progressing  Flowsheets (Taken 11/11/2023 0749)  Return Mobility to Safest Level of Function: Assist with transfers and ambulation using safe patient handling equipment as needed     Problem: Infection - Adult  Goal: Absence of infection at discharge  11/12/2023

## 2023-11-13 ENCOUNTER — TELEPHONE (OUTPATIENT)
Dept: INTERNAL MEDICINE CLINIC | Age: 88
End: 2023-11-13

## 2023-11-13 LAB
ALBUMIN SERPL-MCNC: 3.6 G/DL (ref 3.5–5.2)
ALP SERPL-CCNC: 60 U/L (ref 35–104)
ALT SERPL-CCNC: 20 U/L (ref 5–33)
ANION GAP SERPL CALCULATED.3IONS-SCNC: 9 MMOL/L (ref 7–19)
AST SERPL-CCNC: 22 U/L (ref 5–32)
BASOPHILS # BLD: 0 K/UL (ref 0–0.2)
BASOPHILS NFR BLD: 0.6 % (ref 0–1)
BILIRUB SERPL-MCNC: 0.3 MG/DL (ref 0.2–1.2)
BUN SERPL-MCNC: 13 MG/DL (ref 8–23)
CALCIUM SERPL-MCNC: 8.8 MG/DL (ref 8.8–10.2)
CHLORIDE SERPL-SCNC: 103 MMOL/L (ref 98–111)
CO2 SERPL-SCNC: 26 MMOL/L (ref 22–29)
CREAT SERPL-MCNC: 0.6 MG/DL (ref 0.5–0.9)
EOSINOPHIL # BLD: 0.1 K/UL (ref 0–0.6)
EOSINOPHIL NFR BLD: 2.1 % (ref 0–5)
ERYTHROCYTE [DISTWIDTH] IN BLOOD BY AUTOMATED COUNT: 14.7 % (ref 11.5–14.5)
GLUCOSE SERPL-MCNC: 88 MG/DL (ref 74–109)
HCT VFR BLD AUTO: 33.9 % (ref 37–47)
HGB BLD-MCNC: 10.9 G/DL (ref 12–16)
IMM GRANULOCYTES # BLD: 0 K/UL
LYMPHOCYTES # BLD: 2.4 K/UL (ref 1.1–4.5)
LYMPHOCYTES NFR BLD: 38.1 % (ref 20–40)
MCH RBC QN AUTO: 28.2 PG (ref 27–31)
MCHC RBC AUTO-ENTMCNC: 32.2 G/DL (ref 33–37)
MCV RBC AUTO: 87.8 FL (ref 81–99)
MONOCYTES # BLD: 0.5 K/UL (ref 0–0.9)
MONOCYTES NFR BLD: 7.4 % (ref 0–10)
NEUTROPHILS # BLD: 3.2 K/UL (ref 1.5–7.5)
NEUTS SEG NFR BLD: 51.6 % (ref 50–65)
PLATELET # BLD AUTO: 197 K/UL (ref 130–400)
PMV BLD AUTO: 10.5 FL (ref 9.4–12.3)
POTASSIUM SERPL-SCNC: 4.2 MMOL/L (ref 3.5–5)
PROT SERPL-MCNC: 5.9 G/DL (ref 6.6–8.7)
RBC # BLD AUTO: 3.86 M/UL (ref 4.2–5.4)
SODIUM SERPL-SCNC: 138 MMOL/L (ref 136–145)
WBC # BLD AUTO: 6.2 K/UL (ref 4.8–10.8)

## 2023-11-13 PROCEDURE — 6360000002 HC RX W HCPCS: Performed by: STUDENT IN AN ORGANIZED HEALTH CARE EDUCATION/TRAINING PROGRAM

## 2023-11-13 PROCEDURE — 97129 THER IVNTJ 1ST 15 MIN: CPT

## 2023-11-13 PROCEDURE — 97530 THERAPEUTIC ACTIVITIES: CPT

## 2023-11-13 PROCEDURE — 99232 SBSQ HOSP IP/OBS MODERATE 35: CPT | Performed by: PSYCHIATRY & NEUROLOGY

## 2023-11-13 PROCEDURE — 97110 THERAPEUTIC EXERCISES: CPT

## 2023-11-13 PROCEDURE — 97130 THER IVNTJ EA ADDL 15 MIN: CPT

## 2023-11-13 PROCEDURE — 97116 GAIT TRAINING THERAPY: CPT

## 2023-11-13 PROCEDURE — 85025 COMPLETE CBC W/AUTO DIFF WBC: CPT

## 2023-11-13 PROCEDURE — 6370000000 HC RX 637 (ALT 250 FOR IP): Performed by: STUDENT IN AN ORGANIZED HEALTH CARE EDUCATION/TRAINING PROGRAM

## 2023-11-13 PROCEDURE — 92526 ORAL FUNCTION THERAPY: CPT

## 2023-11-13 PROCEDURE — 1180000000 HC REHAB R&B

## 2023-11-13 PROCEDURE — 36415 COLL VENOUS BLD VENIPUNCTURE: CPT

## 2023-11-13 PROCEDURE — 80053 COMPREHEN METABOLIC PANEL: CPT

## 2023-11-13 PROCEDURE — 97535 SELF CARE MNGMENT TRAINING: CPT

## 2023-11-13 RX ORDER — ATORVASTATIN CALCIUM 80 MG/1
80 TABLET, FILM COATED ORAL NIGHTLY
Qty: 30 TABLET | Refills: 0 | Status: SHIPPED | OUTPATIENT
Start: 2023-11-13

## 2023-11-13 RX ORDER — GABAPENTIN 100 MG/1
100 CAPSULE ORAL EVERY MORNING
Qty: 30 CAPSULE | Refills: 0 | Status: SHIPPED | OUTPATIENT
Start: 2023-11-14 | End: 2023-12-14

## 2023-11-13 RX ORDER — ASPIRIN 81 MG/1
81 TABLET, CHEWABLE ORAL DAILY
Qty: 30 TABLET | Refills: 0 | Status: SHIPPED | OUTPATIENT
Start: 2023-11-14

## 2023-11-13 RX ORDER — PANTOPRAZOLE SODIUM 40 MG/1
40 TABLET, DELAYED RELEASE ORAL
Qty: 30 TABLET | Refills: 0 | Status: SHIPPED | OUTPATIENT
Start: 2023-11-14

## 2023-11-13 RX ORDER — ISOSORBIDE MONONITRATE 30 MG/1
30 TABLET, EXTENDED RELEASE ORAL DAILY
Qty: 30 TABLET | Refills: 0 | Status: SHIPPED | OUTPATIENT
Start: 2023-11-14

## 2023-11-13 RX ORDER — GABAPENTIN 300 MG/1
300 CAPSULE ORAL NIGHTLY
Qty: 30 CAPSULE | Refills: 0 | Status: SHIPPED | OUTPATIENT
Start: 2023-11-13 | End: 2023-12-13

## 2023-11-13 RX ADMIN — THERA TABS 1 TABLET: TAB at 20:52

## 2023-11-13 RX ADMIN — ASPIRIN 81 MG: 81 TABLET, CHEWABLE ORAL at 08:15

## 2023-11-13 RX ADMIN — GABAPENTIN 300 MG: 300 CAPSULE ORAL at 20:52

## 2023-11-13 RX ADMIN — THERA TABS 1 TABLET: TAB at 08:15

## 2023-11-13 RX ADMIN — GABAPENTIN 100 MG: 100 CAPSULE ORAL at 08:15

## 2023-11-13 RX ADMIN — ENOXAPARIN SODIUM 40 MG: 100 INJECTION SUBCUTANEOUS at 08:15

## 2023-11-13 RX ADMIN — ISOSORBIDE MONONITRATE 30 MG: 30 TABLET, EXTENDED RELEASE ORAL at 08:15

## 2023-11-13 RX ADMIN — ATORVASTATIN CALCIUM 80 MG: 80 TABLET, FILM COATED ORAL at 20:52

## 2023-11-13 RX ADMIN — PANTOPRAZOLE SODIUM 40 MG: 40 TABLET, DELAYED RELEASE ORAL at 05:55

## 2023-11-13 NOTE — TELEPHONE ENCOUNTER
[facility] has referral from [facility] floor     Will [address] follow pt for [address] [facility],[address] ?

## 2023-11-13 NOTE — DISCHARGE INSTRUCTIONS
Discharge Instructions      Patient Instructions:   Home  Therapy orders: PT, OT, SLP, and nursing     Discharge lab work: none  Code status: DNR   Activity: activity as tolerated  Diet: ADULT DIET;  Regular    Wound Care: as directed  Equipment: as per therapy

## 2023-11-13 NOTE — PLAN OF CARE
Problem: Discharge Planning  Goal: Discharge to home or other facility with appropriate resources  Outcome: Progressing  Flowsheets (Taken 11/13/2023 0815)  Discharge to home or other facility with appropriate resources: Refer to discharge planning if patient needs post-hospital services based on physician order or complex needs related to functional status, cognitive ability or social support system     Problem: Safety - Adult  Goal: Free from fall injury  Outcome: Progressing     Problem: Pain  Goal: Verbalizes/displays adequate comfort level or baseline comfort level  Outcome: Progressing     Problem: ABCDS Injury Assessment  Goal: Absence of physical injury  Outcome: Progressing     Problem: Neurosensory - Adult  Goal: Achieves stable or improved neurological status  Outcome: Progressing  Flowsheets (Taken 11/13/2023 0815)  Achieves stable or improved neurological status: Assess for and report changes in neurological status     Problem: Skin/Tissue Integrity - Adult  Goal: Skin integrity remains intact  Outcome: Progressing  Flowsheets (Taken 11/13/2023 0815)  Skin Integrity Remains Intact: Monitor for areas of redness and/or skin breakdown     Problem: Musculoskeletal - Adult  Goal: Return mobility to safest level of function  Outcome: Progressing  Flowsheets (Taken 11/13/2023 0815)  Return Mobility to Safest Level of Function: Assist with transfers and ambulation using safe patient handling equipment as needed     Problem: Infection - Adult  Goal: Absence of infection at discharge  Outcome: Progressing  Flowsheets (Taken 11/13/2023 0815)  Absence of infection at discharge: Monitor lab/diagnostic results     Problem: Chronic Conditions and Co-morbidities  Goal: Patient's chronic conditions and co-morbidity symptoms are monitored and maintained or improved  Outcome: Progressing  Flowsheets (Taken 11/13/2023 0815)  Care Plan - Patient's Chronic Conditions and Co-Morbidity Symptoms are Monitored and Maintained

## 2023-11-13 NOTE — PATIENT CARE CONFERENCE
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      PHYSICAL THERAPY  GROSS ASSESSMENT       BED MOBILITY  Bed mobility  Rolling to Left: Independent  Rolling to Right: Independent  Supine to Sit: Independent  Sit to Supine: Independent  Scooting: Stand by assistance  Bed Mobility Comments: entered and exited L side of bed, no rails       TRANSFERS  Transfers  Sit to Stand: Independent  Stand to Sit: Independent  Bed to Chair: Supervision  Car Transfer: Stand by assistance (used hurry-cane)  Comment: pt doesn't alway use brakes on rollator  WHEELCHAIR PROPULSION  Propulsion 1  Propulsion: Manual  Level: Level Tile  Method: RUE, LUE  Level of Assistance: Stand by assistance  Description/ Details: slow, started to deviate toward the R a little due to LUE pushing harder than R  Distance: 50 FT  AMBULATION  Ambulation  Surface: Level tile  Device: Rollator  Other Apparatus: Wheelchair follow  Assistance: Supervision  Quality of Gait: reciprocal pattern, intermittent downward gaze, upright posture  Gait Deviations: Decreased step length, Decreased step height  Distance: 275 FT  Comments: 3 L turns  STAIRS  Stairs  # Steps : 8  Stairs Height: 4\"  Rails: Bilateral  Curbs: 6\"  Device:  (hurry-cane in R hand, L hh support)  Assistance: Contact guard assistance  Comment: stairs performed 2 times  GOALS:  Short Term Goals  Time Frame for Short Term Goals: 1 Week  Short Term Goal 1: Bed mobility independent w/ rails  Short Term Goal 2: Sit to stand SBA w/ cues  Short Term Goal 3: Ambulate 150 FT RW SBA  Short Term Goal 4: Car transfer SBA w/o cues  Short Term Goal 5: HEP SBA    Long Term Goals  Time Frame for Long Term Goals : 2 Week  Long Term Goal 1: Bed mobility independent w/o rails  Long Term Goal 2: Sit to stand

## 2023-11-14 ENCOUNTER — TELEPHONE (OUTPATIENT)
Dept: PRIMARY CARE CLINIC | Age: 88
End: 2023-11-14

## 2023-11-14 VITALS
HEIGHT: 62 IN | SYSTOLIC BLOOD PRESSURE: 145 MMHG | TEMPERATURE: 97.3 F | OXYGEN SATURATION: 93 % | BODY MASS INDEX: 28.4 KG/M2 | RESPIRATION RATE: 18 BRPM | HEART RATE: 61 BPM | DIASTOLIC BLOOD PRESSURE: 65 MMHG

## 2023-11-14 PROCEDURE — 99239 HOSP IP/OBS DSCHRG MGMT >30: CPT | Performed by: PSYCHIATRY & NEUROLOGY

## 2023-11-14 PROCEDURE — 6360000002 HC RX W HCPCS: Performed by: STUDENT IN AN ORGANIZED HEALTH CARE EDUCATION/TRAINING PROGRAM

## 2023-11-14 PROCEDURE — 93246 EXT ECG>7D<15D RECORDING: CPT

## 2023-11-14 PROCEDURE — 6370000000 HC RX 637 (ALT 250 FOR IP): Performed by: STUDENT IN AN ORGANIZED HEALTH CARE EDUCATION/TRAINING PROGRAM

## 2023-11-14 RX ADMIN — THERA TABS 1 TABLET: TAB at 07:38

## 2023-11-14 RX ADMIN — GABAPENTIN 100 MG: 100 CAPSULE ORAL at 07:38

## 2023-11-14 RX ADMIN — ISOSORBIDE MONONITRATE 30 MG: 30 TABLET, EXTENDED RELEASE ORAL at 07:38

## 2023-11-14 RX ADMIN — ASPIRIN 81 MG: 81 TABLET, CHEWABLE ORAL at 07:38

## 2023-11-14 RX ADMIN — PANTOPRAZOLE SODIUM 40 MG: 40 TABLET, DELAYED RELEASE ORAL at 07:38

## 2023-11-14 RX ADMIN — ENOXAPARIN SODIUM 40 MG: 100 INJECTION SUBCUTANEOUS at 07:38

## 2023-11-14 NOTE — PLAN OF CARE
Problem: Discharge Planning  Goal: Discharge to home or other facility with appropriate resources  11/13/2023 2320 by Sheryle Goldberg, LPN  Outcome: Progressing  11/13/2023 1759 by Gavin Chamorro RN  Outcome: Progressing  Flowsheets (Taken 11/13/2023 0815)  Discharge to home or other facility with appropriate resources: Refer to discharge planning if patient needs post-hospital services based on physician order or complex needs related to functional status, cognitive ability or social support system     Problem: Safety - Adult  Goal: Free from fall injury  11/13/2023 2320 by Sheryle Goldberg, LPN  Outcome: Progressing  11/13/2023 1759 by Gavin Chamorro RN  Outcome: Progressing     Problem: Pain  Goal: Verbalizes/displays adequate comfort level or baseline comfort level  11/13/2023 2320 by Sheryle Goldberg, LPN  Outcome: Progressing  11/13/2023 1759 by Gavin Chamorro RN  Outcome: Progressing     Problem: ABCDS Injury Assessment  Goal: Absence of physical injury  11/13/2023 2320 by Sheryle Goldberg, LPN  Outcome: Progressing  11/13/2023 1759 by Gavin Chamorro RN  Outcome: Progressing     Problem: Neurosensory - Adult  Goal: Achieves stable or improved neurological status  11/13/2023 2320 by Sheryle Goldberg, LPN  Outcome: Progressing  11/13/2023 1759 by Gavin Chamorro RN  Outcome: Progressing  Flowsheets (Taken 11/13/2023 0815)  Achieves stable or improved neurological status: Assess for and report changes in neurological status     Problem: Skin/Tissue Integrity - Adult  Goal: Skin integrity remains intact  11/13/2023 2320 by Sheryle Goldberg, LPN  Outcome: Progressing  11/13/2023 1759 by Gavin Chamorro RN  Outcome: Progressing  Flowsheets (Taken 11/13/2023 0815)  Skin Integrity Remains Intact: Monitor for areas of redness and/or skin breakdown     Problem: Musculoskeletal - Adult  Goal: Return mobility to safest level of function  11/13/2023 2320 by Sheryle Goldberg, LPN  Outcome: Progressing  11/13/2023 1759 by Gavin Chamorro

## 2023-11-14 NOTE — PROGRESS NOTES
11/09/23 0900   Transfers   Sit to Stand Contact guard assistance  (Verbal cueing to push up from chair and not pull from RW)   Stand to Sit Contact guard assistance  (verbal cueing to reach back w/ both hands before sitting.)   Ambulation   Surface Level tile   Device Parallel Bars   Assistance Contact guard assistance   Quality of Gait Slide stepping to the R, then to L ending back in w/c, pt was watching position of feet. Gait Deviations None   Distance 12ft   Comments In both L and R side step pt leaned R. Ambulation 2   Surface - 2 level tile   Device 2 Rolling Walker   Assistance 2 Contact guard assistance   Quality of Gait 2 Pt ambulates w/ up right posture and reciprical gait, Pt deviates to the L during ambulation especially when pt is distracted by environmental factors   Gait Deviations Deviated path   Distance 200ft   Comments 200ft w/ 2 L turns   Ambulation 3   Surface - 3 level tile   Device 3 Cane Single point cane   Assistance 3 Minimal assistance   Quality of Gait 3 Step to gait w/ single point cane in L hand. pt staggered to R during ambulation requiring minimal assitance. Pt took shorter L steps when compared to R. Gait Deviations Slow Lolly;Decreased step length;Staggers   Distance 30ft   PT Exercises   PROM Exercises Standing exercises CGA : reciprical marching x20, R hip abduction x20, L hip abduction x20, Heel raises x20  (Pt R sls during hip abduction demonstrated leaning to the R. Heel raise pt leans to the R.)   A/AROM Exercises Knee ext R, L w/ 1lb ankle weights  (Pt required verbal cueing to [facility] PT in the stomach\" to get into full ex. R hip required tactile cueing to eleminate hip flex.)   Assessment   Assessment Pt ambulate 200ft w/ rw only deviating whne distracted. Pt performed standing exercises in parallel bars with leaning to the R. Pt ambulated 30ft with a step to gait w/ single point cane, staggering to the R w/ decread step length of the L.    Safety Devices   Type of
11/09/23 1300   Transfers   Sit to Stand Contact guard assistance   Stand to Sit Contact guard assistance   Ambulation   Device Parallel Bars   Assistance Contact guard assistance   Quality of Gait Pt has decreaed step length on danelle LE, decrease hip flexion of danelle LE using a step to gait pattern. Pt did not use the parallel bars for support during ambulation. Pt demonstrated a R lean during ambulation. Gait Deviations Decreased step length; Slow Lolly;Decreased step height   Distance 12ft   Comments Pt   Ambulation 2   Surface - 2 level tile   Device 2 Large Base Quad Cane  (LBQC in L UE)   Other Apparatus 2 Wheelchair follow   Assistance 2 Contact guard assistance   Quality of Gait 2 Pt was given a LBQC L UE. Pt was instructed on a step to gait pattern, moving LBQC and R LE at the same time, steping to with LLE. Pt required consistent cueing and demonstration on gait pattern with no improvement between demonstrations. Pt was consistenly unstable despite PT cueing/demonstration and is not ready for a LBQC. Gait Deviations Slow Lolly;Decreased step length   Distance 16ft   Ambulation 3   Surface - 3 level tile   Device 3 Large Base Quad Cane  (LBQC in R UE)   Other Apparatus 3 Wheelchair follow   Assistance 3 Contact guard assistance   Quality of Gait 3 Pt was given LBQC in R UE. Pt has demonstrated step to pattern leading w/ LBQC and LLE. Pt required consistent cueing and demonstration, with no sign of improvement in the pattern. Pt gait was unsteady w/ pt leading the LBQC and LLE to far in front of RLE, not allowing the RLE to make a full step. Pt was able to ambulate farther w/ the [facility] [place] [place] in [facility]. Pt was consistenly unstable despite PT cueing/demonstration and is not ready for a LBQC. Gait Deviations Slow Lolly;Decreased step length   Distance 26ft   PT Exercises   Exercise Treatment Backwards walking in parallel bars, CGA, Pt ambulated w/ danelle UE on parallel bars backwards towards w/c.  Pt demosntrated a
11/13/23 1300   Bed mobility   Rolling to Left Independent   Rolling to Right Independent   Supine to Sit Independent   Sit to Supine Independent   Bed Mobility Comments entered and exited L side of bed, no rails   Transfers   Sit to Stand Independent   Stand to Sit Independent   Car Transfer Stand by assistance  (used hurry-cane)   Ambulation   Surface Level tile   Device Rollator   Assistance Supervision   Quality of Gait reciprocal pattern, intermittent downward gaze, upright posture   Distance 275 FT   Comments 3 L turns   Stairs/Curb   Stairs? Yes   Stairs   # Steps  8   Stairs Height 4\"   Rails Bilateral   Curbs 6\"   Device   (hurry-cane in R hand, L hh support)   Assistance Contact guard assistance   Comment stairs performed 2 times   Assessment   Assessment Todays afternoon session was FTD, 3 daughters present. Pt demonstrated ability to transfer in and out of car w/ hurrycane and daughter SBA. Pt and family educated on ascending/descending stairs w/ good leg up and bad leg down. Pt demonstrated ability to ascend/descend stairs w/ bilateral hand rails w/ daughter CGA guarding behind going up and guarding in front when coming down. Pt and family educated on how to ascend/descend curb/door threshold using hurrycane and family being on L side providing [facility] support. Pt demonstrated ability to go up w/ good and down w/ bad but needed intermittent cuing to perform correct sequencing some of the time. Pt appears more confident and steady ambulating w/ rollator than she does w/ hurrycane. Recommended to use rollator for ambulation at this time w/ hurrycane used for stairs and in and out of car. Patient independent in bed mobility and independent in transfers and ambulating up to 50 FT w/ rollator.    Safety Devices   Type of Devices Gait belt;Left in chair  (left w/ OT)   PT Individual Minutes   Time In 1300   Time Out 1400   Minutes 60
4 Eyes Skin Assessment    Jared Merritt is being assessed upon: Admission    I agree that I, Ernestine Pérez RN, along with Kala Ahuja LPN (either 2 RN's or 1 LPN and 1 RN) have performed a thorough Head to Toe Skin Assessment on the patient. ALL assessment sites listed below have been assessed. Areas assessed by both nurses:     [x]   Head, Face, and Ears   [x]   Shoulders, Back, and Chest  [x]   Arms, Elbows, and Hands   [x]   Coccyx, Sacrum, and Ischium  [x]   Legs, Feet, and Heels    Does the Patient have Skin Breakdown? No    If yes, description of skin breakdown:    Does the Patient have Surgical Incision(s)?  No    Enrique Prevention initiated: Yes  Wound Care Orders initiated: No    [facility] nurse consulted for Pressure Injury (Stage 3,4, Unstageable, DTI, NWPT, and Complex wounds) and New or Established Ostomies: No        Primary Nurse eSignature: Ernestine Pérez RN on 11/7/2023 at 7:20 PM      Co-Signer eSignature: Electronically signed by Kala Ahuja LPN on 37/9/57 at 73:93 PM CST
[address] [facility]  Test for Patient Kasson in the Areas of Transfers/Ambulation    Ambulation/Transfers   Independent ambulation in room with assistive device:  Yes     -Device Type:  ROLLATOR  Independent transfers from wheelchair to surface in room:  Yes     Bathroom Transfers  Independent transfers in patient bathroom:  Yes         Inpatient Rehabilitation Nursing and Therapies feel as though the patient qualifies for an acute rehabilitation test for independence in the areas of transfers and ambulation prior to discharging from Inpatient Rehabilitation Unit at [facility].  This test for independence in the areas of transfers and ambulation must be agreed upon by the patient's physician, nurse, and therapists.         Nurse Approval:  Electronically signed by Tony Walker RN on 11/13/23 at 5:59 PM CST      Physical Therapist Approval:  Electronically signed by Rigoberto Chamorro PT on 11/13/23 at 2:07 PM CST      Occupational Therapist Approval: Electronically signed by ZANE Yarbrough on 11/13/23 at 2:11 PM CST
Facility/Department: [facility] 8 REHAB UNIT  Initial Speech/Language/Cognitive Assessment    NAME: June Current  : 1935   MRN: [id number]  ADMISSION DATE: 2023  ADMITTING DIAGNOSIS: has Essential hypertension; Mixed hyperlipidemia; Age-related osteoporosis without current pathological fracture; Coronary artery disease involving native coronary artery of native heart without angina pectoris; DDD (degenerative disc disease), lumbar; Basal cell carcinoma (BCC) of scalp; Chronic bilateral low back pain without sciatica; Macular degeneration of both eyes; Primary osteoarthritis of right knee; Spinal stenosis of lumbar region with neurogenic claudication; Recurrent UTI; Aortic stenosis; Ventral hernia without obstruction or gangrene; Prediabetes; Dysuria; S/P TAVR (transcatheter aortic valve replacement); Cerebrovascular accident (CVA) ([address] [facility]); Nodule of left lobe of thyroid gland; Left upper lobe pulmonary nodule; Palliative care patient; Acute ischemic stroke [facility]); and Urinary incontinence on their problem list.    Date of Eval: 2023   Evaluating Therapist: THEA Morgan    Clinical Impression     Initial Speech/Language/Cognitive Evaluation evaluation completed on this date. Speech assessment completed. Patient exhibits dysarthria characterized by blended word boundaries, imprecise consonants,  decreased lingual and labial ROM, strength, and coordination. Portions of the RIPA, WAB and Crash Inventory were completed. MILD deficits noted within the following areas: expressive language, orientation and executive function. Patient performing at a independent level prior to recent hospitalization. Patient does have macular degeneration and does have difficulties with vision. Patient goals are to return to prior level of functioning. Patient would benefit from continued skilled ST services at this time for cognition, expressive language, and dysarthria. SLP will continue to follow and treat.     RECENT
Facility/Department: [facility] 8 REHAB UNIT  Occupational Therapy     Name: Allan Thornton  : 1935  MRN: [id number]  Date of Service: 2023    Discharge Recommendations:  Home with Home health OT      Patient Diagnosis(es): There were no encounter diagnoses. Past Medical History:  has a past medical history of Age-related osteoporosis without current pathological fracture, Basal cell carcinoma (BCC) of scalp, Coronary artery disease involving native coronary artery of native heart without angina pectoris, DDD (degenerative disc disease), lumbar, Essential hypertension, Knee pain, Macular degeneration, Mixed hyperlipidemia, and Ventral hernia. Past Surgical History:  has a past surgical history that includes Eye surgery; Hysterectomy; Varicose vein surgery; Foot surgery; Total hip arthroplasty (Left, ); Cardiac catheterization; Total knee arthroplasty (Right, 10/14/2021); Tonsillectomy; hernia repair (N/A, 2022); Diagnostic Cardiac Cath Lab Procedure (2023); and Aortic valve repair (2023). Treatment Diagnosis: CVA      Assessment   Performance deficits / Impairments: Decreased functional mobility ; Decreased ADL status; Decreased balance;Decreased endurance;Decreased high-level IADLs  Treatment Diagnosis: CVA  Prognosis: Good  Activity Tolerance  Activity Tolerance: Patient Tolerated treatment well       Plan   Occupational Therapy Plan  Current Treatment Recommendations: Strengthening, Functional mobility training, Patient/Caregiver education & training, Equipment evaluation, education, & procurement, Safety education & training, Self-Care / ADL, Home management training     Restrictions  Restrictions/Precautions  Restrictions/Precautions: Fall Risk, Contact Precautions, Aspiration Risk  Position Activity Restriction  Other position/activity restrictions: MDRO    Subjective   General  Chart Reviewed: Yes  Patient assessed for rehabilitation services?: Yes  Additional Pertinent Hx:
Facility/Department: [facility] REHAB UNIT   CLINICAL BEDSIDE SWALLOW EVALUATION    NAME: Stevenson Plummer  : 1935  MRN: [id number]    ADMISSION DATE: 2023  ADMITTING DIAGNOSIS: has Essential hypertension; Mixed hyperlipidemia; Age-related osteoporosis without current pathological fracture; Coronary artery disease involving native coronary artery of native heart without angina pectoris; DDD (degenerative disc disease), lumbar; Basal cell carcinoma (BCC) of scalp; Chronic bilateral low back pain without sciatica; Macular degeneration of both eyes; Primary osteoarthritis of right knee; Spinal stenosis of lumbar region with neurogenic claudication; Recurrent UTI; Aortic stenosis; Ventral hernia without obstruction or gangrene; Prediabetes; Dysuria; S/P TAVR (transcatheter aortic valve replacement); Cerebrovascular accident (CVA) ([address]); Nodule of left lobe of thyroid gland; Left upper lobe pulmonary nodule; Palliative care patient; Acute ischemic stroke [facility]); and Urinary incontinence on their problem list.    Date of Eval: 2023  Evaluating Therapist: THEA Whyte    Clinical Impression  Speech assessment completed on this date. Patient exhibits dysarthria characterized by blended word boundaries, imprecise consonants,  decreased lingual and labial ROM, strength, and coordination. Clinical Bedside Swallow Evaluation completed on this date. Oral prep reveals decreased vertical jaw movement observed with ice chips and regular solids. Adequate labial seal observed. Oral transit timing ranges from 1-2 seconds with ice chips, puree consistencies, regular solids, and thin liquids via consecutive sips side of cup. Minimum residue noted with regular solids and cleared with liquid wash. Liquid wash was effective in clearing minimal oral residue. Laryngeal movement observed to be consistently sluggish and mildly decreased for swallow airway protection.  Even so, no overt s/s of aspiration/penetration observed with
Facility/Department: [facility] 8 REHAB UNIT  Daily Treatment Note  NAME: Julienne Abebe  : 1935  MRN: [id number]    Date of Service: 2023    Discharge Recommendations:  Home with Home health OT       Assessment   Assessment: Participated in a variety of standing level and ambulatory level ADL with SBA and no loss of balance. Patient requesting discharge home while her daughter is still in the area to provide supervision. Treatment Diagnosis: CVA  Activity Tolerance  Activity Tolerance: Patient Tolerated treatment well         Patient Diagnosis(es): There were no encounter diagnoses. has a past medical history of Age-related osteoporosis without current pathological fracture, Basal cell carcinoma (BCC) of scalp, Coronary artery disease involving native coronary artery of native heart without angina pectoris, DDD (degenerative disc disease), lumbar, Essential hypertension, Knee pain, Macular degeneration, Mixed hyperlipidemia, and Ventral hernia. has a past surgical history that includes Eye surgery; Hysterectomy; Varicose vein surgery; Foot surgery; Total hip arthroplasty (Left, ); Cardiac catheterization; Total knee arthroplasty (Right, 10/14/2021); Tonsillectomy; hernia repair (N/A, 2022); Diagnostic Cardiac Cath Lab Procedure (2023); and Aortic valve repair (2023).     Restrictions  Restrictions/Precautions  Restrictions/Precautions: Fall Risk, Contact Precautions, Aspiration Risk  Required Braces or Orthoses?: No  Position Activity Restriction  Other position/activity restrictions: MDRO    Subjective   General  Chart Reviewed: Yes  Patient assessed for rehabilitation services?: Yes  Additional Pertinent Hx: (degenerative disc disease), lumbar, knee pain  Family / Caregiver Present: No  Diagnosis: CVA  Pre Treatment Pain Screening  Pain at present: 0  Scale Used: Numeric Score  Intervention List: Patient able to continue with treatment  Comments / Details: no pain reported during
Name: Camilo Agustin  MRN: [id number]  Date of Service:  11/10/2023     11/10/23 5350   Restrictions/Precautions   Restrictions/Precautions Fall Risk;Contact Precautions;Aspiration Risk   Required Braces or Orthoses? No   Position Activity Restriction   Other position/activity restrictions MDRO   General   Chart Reviewed Yes   Patient assessed for rehabilitation services? Yes   Additional Pertinent Hx Dysphagia, dysarthria, HTN, diastollic CHF, macular degeneration, aortic stenosis w/ AVR, basel cell CA, remote compressio fx T10 and L1   Diagnosis R cerebeallar multifocal infarct w/ R side weakness   Follows Commands WFL   Subjective   Subjective Pt brought into gym by JOHNIE, agrees to participate in therapy. Subjective   Pain Verbal: 0/10   Transfers   Sit to Stand Supervision;Stand by assistance   Stand to Sit Supervision;Stand by assistance   Bed to Chair Stand by assistance;Contact guard assistance   Ambulation   Surface Level tile   Device Rolling Walker   Assistance Stand by assistance;Contact guard assistance   Quality of Gait reciprocal, no LOB, intermittent downward gaze   Gait Deviations Decreased step length;Decreased step height   Distance 180'   Comments Multiple L/R turns, Focus on pt performing obstacle course for first ~65' in which pt stepped over strips in hallway and manuv. around cones. PT Exercises   Exercise Treatment BLE omnicycle X 7 min then X 8 min (~3 min sitting rest in between) on neuro setting aganist 3 la of resistance for 2,9 k,m w/ total activity of 80%   Activity Tolerance   Activity Tolerance Patient tolerated treatment well;Patient limited by endurance   Assessment   Assessment Pt stepped over strips reciprocally/step to pattern w/ 95% accuracy and had no LOB manuv. around cones- intermittent far proximity from RW w/ obstacle course.    Discharge Recommendations Continue to assess pending progress;Home with Home health PT;Home with assist PRN   Safety Devices   Type of Devices
Name: Curry Hamilton  MRN: [id number]  Date of Service:  11/11/2023 11/11/23 0900   Restrictions/Precautions   Restrictions/Precautions Fall Risk;Contact Precautions;Aspiration Risk   Required Braces or Orthoses? No   General   Chart Reviewed Yes   Patient assessed for rehabilitation services? Yes   Additional Pertinent Hx Dysphagia, dysarthria, HTN, diastollic CHF, macular degeneration, aortic stenosis w/ AVR, basel cell CA, remote compressio fx T10 and L1   Diagnosis R cerebeallar multifocal infarct w/ R side weakness   Follows Commands WFL   General Comment   Comments Pt able to sit on toilet and independently perform personal hygiene and can independently doff/don pants. Subjective   Subjective Pt sitting in recliner in room, agrees to participate in therapy. Hearing   Hearing X   Hearing Exceptions Bilateral hearing aid   Subjective   Pain Verbal: 0/10   Bed mobility   Sit to Supine Stand by assistance   Scooting Stand by assistance   Bed Mobility Comments On R side of bed- intermittent use of bedrail   Transfers   Sit to Stand Supervision;Stand by assistance   Stand to Sit Supervision;Stand by assistance   Bed to Chair Stand by assistance   Ambulation   Surface Level tile   Device Rolling Walker   Assistance Stand by assistance   Quality of Gait reciprocal, no LOB, intermittent downward gaze   Gait Deviations Decreased step length;Decreased step height   Distance 180', 15', 10'   Comments Multiple L/R turns   PT Exercises   Exercise Treatment Standing in // w/ BUE support to just use of RUE for support: sidestepping to L/R ~10' each way, fwd tandem amb ~10', bkwds \"tandem\" 10', NBOS + EC for ~30 sec.    Static Sitting Balance Exercises Pt sitting on mat table w/o back support for ~10 min while performing ball toss and balloon tapping w/ PTA   Activity Tolerance   Activity Tolerance Patient tolerated treatment well   Assessment   Assessment Pt tends to utilize RUE to tap balloon, requires v/c's to
Name: Heather Covarrubias  MRN: [id number]  Date of Service:  11/10/2023         11/10/23 0900   Restrictions/Precautions   Restrictions/Precautions Fall Risk;Contact Precautions;Aspiration Risk   Position Activity Restriction   Other position/activity restrictions MDRO   General   Chart Reviewed Yes   Patient assessed for rehabilitation services? Yes   Additional Pertinent Hx Dysphagia, dysarthria, HTN, diastollic CHF, macular degeneration, aortic stenosis w/ AVR, basel cell CA, remote compressio fx T10 and L1   Diagnosis R cerebeallar multifocal infarct w/ R side weakness   Follows Commands WFL   Subjective   Subjective Pt sitting in recliner in room, agrees to participate in therapy. Subjective   Pain Verbal: 0/10   Transfers   Sit to Stand Supervision;Stand by assistance   Stand to Sit Supervision;Stand by assistance   Bed to Chair Stand by assistance   Ambulation   Surface Level tile   Device Rolling Walker   Assistance Stand by assistance   Quality of Gait reciprocal, no LOB, intermittent downward gaze   Gait Deviations Decreased step length;Decreased step height   Distance 15' X 2, 150'   Comments Multiple L/R turns   PT Exercises   Exercise Treatment Sitting in recliner with 2.5# BLE ther-ex: DF/PF X 20, LAQ X 15, hip flexion X 15, hip add w/ ball + 5 sec hold X 15, hip abd aganist green tband X 15   Activity Tolerance   Activity Tolerance Patient tolerated treatment well   Assessment   Assessment Pt able to tolerate BLE ther-ex w/o c/o of increased pain and amb. up top 150' w/ RW requiring SBA.    Discharge Recommendations Continue to assess pending progress;Home with Home health PT;Home with assist PRN   Education   Education Given To Patient   Education Provided Home Exercise Program   Education Method Verbal;Demonstration;Printed Information/Hand-outs   Barriers to Learning None   Education Outcome Verbalized understanding;Demonstrated understanding   Safety Devices   Type of Devices Patient at risk for
Nutrition Assessment     Type and Reason for Visit: Initial    Nutrition Recommendations/Plan:   Continue POC. Malnutrition Assessment:  Malnutrition Status: No malnutrition    Nutrition Assessment:  Following for new admission to inpatient rehab unit. Pt presents adequately nourished AEB stable wt status, PO intake, and reported good appetite. Pt has lost 5lbs over the past 3 months which is insignificant wt loss. Pt reports she did have a decreased appetite following admission to the hospital but it is much improved now. Intake >50% so far. Preferences obtained. No nutritional needs identified at this time. Nutrition Related Findings:     Wound Type: None    Current Nutrition Therapies:    ADULT DIET;  Regular    Anthropometric Measures:  Height: 157.5 cm (5' 2\")  Current Body Wt: 70.4 kg (155 lb 4.8 oz)   BMI: 28.4    Nutrition Diagnosis:   No nutrition diagnosis at this time    Nutrition Interventions:   Food and/or Nutrient Delivery: Continue Current Diet  Nutrition Education/Counseling: No recommendation at this time  Coordination of Nutrition Care: Continue to monitor while inpatient       Goals:     Goals: PO intake 50% or greater       Nutrition Monitoring and Evaluation:   Behavioral-Environmental Outcomes: None Identified  Food/Nutrient Intake Outcomes: Food and Nutrient Intake  Physical Signs/Symptoms Outcomes: Biochemical Data, Weight, Nutrition Focused Physical Findings    Discharge Planning:    No discharge needs at this time     [place], [address] [facility] [place] [address]: [address] [place] [address]
Nutrition Assessment     Type and Reason for Visit: Initial    Nutrition Recommendations/Plan:   Continue POC. Malnutrition Assessment:  Malnutrition Status: No malnutrition    Nutrition Assessment:  Pt is nutritionally stable with good appetite and fair PO intake. No new wt available. She is up eating breakfast this morning and denies any nutritional concerns. Pt reports she is eager to go home. Continue POC. Nutrition Related Findings:   BM 11/11 Wound Type: None    Current Nutrition Therapies:    ADULT DIET;  Regular    Anthropometric Measures:  Height: 157.5 cm (5' 2\")  Current Body Wt: 70.4 kg (155 lb 4.8 oz)   BMI: 28.4    Nutrition Diagnosis:   No nutrition diagnosis at this time     Nutrition Interventions:   Food and/or Nutrient Delivery: Continue Current Diet  Nutrition Education/Counseling: No recommendation at this time  Coordination of Nutrition Care: Continue to monitor while inpatient       Goals:  Previous Goal Met: Progressing toward Goal(s)  Goals: PO intake 50% or greater       Nutrition Monitoring and Evaluation:   Behavioral-Environmental Outcomes: None Identified  Food/Nutrient Intake Outcomes: Food and Nutrient Intake  Physical Signs/Symptoms Outcomes: Biochemical Data, Weight, Nutrition Focused Physical Findings    Discharge Planning:    No discharge needs at this time     Shanika Blakely MS, [address] [facility], [place], [address]: [address] [place] [address]
Occupational Therapy  Facility/Department: [facility] 8 REHAB UNIT  Rehabilitation Occupational Therapy Daily Treatment Note    Date: 23  Patient Name: Ro Castaneda       Room: [facility]628-  MRN: [id number]  Account: [de-identified]   : 1935  (81 y.o.) Gender: female        Diagnosis: (P) CVA  Additional Pertinent Hx: (P) (degenerative disc disease), lumbar, knee pain    Treatment Diagnosis: (P) CVA   Past Medical History:  has a past medical history of Age-related osteoporosis without current pathological fracture, Basal cell carcinoma (BCC) of scalp, Coronary artery disease involving native coronary artery of native heart without angina pectoris, DDD (degenerative disc disease), lumbar, Essential hypertension, Knee pain, Macular degeneration, Mixed hyperlipidemia, and Ventral hernia. Past Surgical History:   has a past surgical history that includes Eye surgery; Hysterectomy; Varicose vein surgery; Foot surgery; Total hip arthroplasty (Left, ); Cardiac catheterization; Total knee arthroplasty (Right, 10/14/2021); Tonsillectomy; hernia repair (N/A, 2022); Diagnostic Cardiac Cath Lab Procedure (2023); and Aortic valve repair (2023).      23 0900   Restrictions/Precautions   Restrictions/Precautions Fall Risk;Contact Precautions;Aspiration Risk   Position Activity Restriction   Other position/activity restrictions MDRO   General   Additional Pertinent Hx (degenerative disc disease), lumbar, knee pain   Diagnosis CVA   ADL   Additional Comments see CARE scores   Balance   Standing Balance Stand by assistance   Functional Mobility   Functional - Mobility Device 4-Wheeled Walker  (CGA short distance without device)   Activity Other   Assist Level Stand by assistance   Functional Mobility Comments trialed 4 wheeled walker- pt did well and this would benefit her for ADLs   Transfers   Sit to stand Stand by assistance   Stand to sit Stand by assistance   Toilet Transfers   Toilet - Technique
Occupational Therapy  Facility/Department: [facility] 8 REHAB UNIT  Rehabilitation Occupational Therapy Daily Treatment Note    Date: 11/10/23  Patient Name: Sherron Bal       Room: 8150/578-90  MRN: [id number]  Account: [de-identified]   : 1935  (81 y.o.) Gender: female        Diagnosis: CVA  Additional Pertinent Hx: (degenerative disc disease), lumbar, knee pain    Treatment Diagnosis: CVA   Past Medical History:  has a past medical history of Age-related osteoporosis without current pathological fracture, Basal cell carcinoma (BCC) of scalp, Coronary artery disease involving native coronary artery of native heart without angina pectoris, DDD (degenerative disc disease), lumbar, Essential hypertension, Knee pain, Macular degeneration, Mixed hyperlipidemia, and Ventral hernia. Past Surgical History:   has a past surgical history that includes Eye surgery; Hysterectomy; Varicose vein surgery; Foot surgery; Total hip arthroplasty (Left, ); Cardiac catheterization; Total knee arthroplasty (Right, 10/14/2021); Tonsillectomy; hernia repair (N/A, 2022); Diagnostic Cardiac Cath Lab Procedure (2023); and Aortic valve repair (2023).      11/10/23 1000   Restrictions/Precautions   Restrictions/Precautions Fall Risk;Contact Precautions;Aspiration Risk   Position Activity Restriction   Other position/activity restrictions MDRO   General   Diagnosis CVA   Balance   Standing Balance Contact guard assistance   Functional Mobility   Functional - Mobility Device Rolling Walker   Activity To/from bathroom   Assist Level Contact guard assistance   Transfers   Sit to stand Stand by assistance;Contact guard assistance   Stand to sit Stand by assistance   Toilet Transfers   Toilet - Technique Ambulating   Toilet Transfer Contact guard assistance   Shower Transfers   Shower - Transfer From [employer] - Transfer Type To and From   [employer] - Transfer To Transfer tub bench   Shower - Technique Ambulating   Shower
Occupational Therapy  Facility/Department: [facility] 8 REHAB UNIT  Rehabilitation Occupational Therapy Daily Treatment Note    Date: 23  Patient Name: Pedro Holden       Room: 8897/740-29  MRN: [id number]  Account: [de-identified]   : 1935  (81 y.o.) Gender: female        Diagnosis: (P) CVA  Additional Pertinent Hx: (P) (degenerative disc disease), lumbar, knee pain    Treatment Diagnosis: (P) CVA   Past Medical History:  has a past medical history of Age-related osteoporosis without current pathological fracture, Basal cell carcinoma (BCC) of scalp, Coronary artery disease involving native coronary artery of native heart without angina pectoris, DDD (degenerative disc disease), lumbar, Essential hypertension, Knee pain, Macular degeneration, Mixed hyperlipidemia, and Ventral hernia. Past Surgical History:   has a past surgical history that includes Eye surgery; Hysterectomy; Varicose vein surgery; Foot surgery; Total hip arthroplasty (Left, ); Cardiac catheterization; Total knee arthroplasty (Right, 10/14/2021); Tonsillectomy; hernia repair (N/A, 2022); Diagnostic Cardiac Cath Lab Procedure (2023); and Aortic valve repair (2023).      23 1400   Restrictions/Precautions   Restrictions/Precautions Up Ad Aliza   Position Activity Restriction   Other position/activity restrictions MDRO   General   Additional Pertinent Hx (degenerative disc disease), lumbar, knee pain   Family / Caregiver Present Yes  (daughters)   Diagnosis CVA   Balance   Sitting Balance Independent   Standing Balance Modified independent    Functional Mobility   Functional - Mobility Device 4-Wheeled Walker   Assist Level Independent   Functional Mobility Comments reviewed tech with Rollator   Transfers   Sit to stand Modified independent   Stand to sit Modified independent   Toilet Transfers   Toilet - Technique Ambulating   Equipment Used Standard bedside commode   Toilet Transfer Modified independent   Toilet Transfers
Occupational Therapy  Facility/Department: [facility] 8 REHAB UNIT  Rehabilitation Occupational Therapy Daily Treatment Note    Date: 11/10/23  Patient Name: Teresa Cheung       Room: 8365/279-80  MRN: [id number]  Account: [de-identified]   : 1935  (81 y.o.) Gender: female        Diagnosis: (P) CVA  Additional Pertinent Hx: (P) (degenerative disc disease), lumbar, knee pain    Treatment Diagnosis: (P) CVA   Past Medical History:  has a past medical history of Age-related osteoporosis without current pathological fracture, Basal cell carcinoma (BCC) of scalp, Coronary artery disease involving native coronary artery of native heart without angina pectoris, DDD (degenerative disc disease), lumbar, Essential hypertension, Knee pain, Macular degeneration, Mixed hyperlipidemia, and Ventral hernia. Past Surgical History:   has a past surgical history that includes Eye surgery; Hysterectomy; Varicose vein surgery; Foot surgery; Total hip arthroplasty (Left, ); Cardiac catheterization; Total knee arthroplasty (Right, 10/14/2021); Tonsillectomy; hernia repair (N/A, 2022); Diagnostic Cardiac Cath Lab Procedure (2023); and Aortic valve repair (2023).      11/10/23 1400   Restrictions/Precautions   Restrictions/Precautions Fall Risk;Contact Precautions;Aspiration Risk   Position Activity Restriction   Other position/activity restrictions MDRO   General   Additional Pertinent Hx (degenerative disc disease), lumbar, knee pain   Diagnosis CVA   Balance   Standing Balance Contact guard assistance  (CGA dynamic, SBA static)   Standing Balance   Activity dynamic standing acts   Functional Mobility   Functional - Mobility Device Rolling Walker   Activity To/from bathroom   Assist Level Contact guard assistance   Functional Mobility Comments sidestpping around mat table to left and right   Transfers   Sit to stand Stand by assistance   Stand to sit Stand by assistance   OT Exercises   Exercise Treatment Cane/wand: 3#,
Patient:   Kristi Salazar  MR#:    [id number]   Room:    [facility]281-43   YOB: 1935  Date of Progress Note: 11/10/2023  Time of Note                           8:03 AM  Consulting Physician:   Aydee Pagan M.D. Attending Physician:  Aydee Pagan MD     Chief complaint Acute ischemic stroke    S:This 81 y.o. female  right-handed female with history of HTN, mixed hyperlipidemia, CAD, urinary incontinence, Macular degeneration, diastolic CHF, aortic stenosis S/P AVR 10/16/23 and basal cell CA. She presented to [facility] ER on 11/2/23 with c/o right-sided weakness, facial droop, slurred speech and abnormal gait. CT head no acute intracranial abnormality, CTA head neck 1.5 pleural-based nodule left upper lobe, 2.4 cm left thyroid lobe nodule, CXR atelectasis, small left pleural effusion possible. Patient is to be admitted to the Hospitalist service with consult for neurology. She was seen by Dr. Priscilla Echevarria. MRI with small right cerebellar multifocal infarcts. Suspect small vessel event. CTA head and neck negative. Echo unremarkable. Patient placed on antiplatelet therapy for now, continue statin and allow permissive hypertension. On 11/3, KUB obtained due to nausea and vomiting which could not rule out SBO. Patient did have a large bowel movement with resolution of her nausea. CT of the abdomen was obtained which shows no acute findings. Incidental findings of Nodule of left lobe of thyroid gland/ Left upper lobe pulmonary nodule with recommended OP f/u. Patient was evaluated by SPT and found to have dysphagia and dysarthria. Patient was placed on a a regular solid consistency with mildly thick/nectar thick liquids. Patient has now been upgraded to thin liquids. Patient continues to have right upper extremity dysmetria and is participating in both PT/OT. She is felt to need a stay on Rehab to work towards her goal of returning home with assist of her daughters.  She is now felt ready to start the Rehab
Patient:   Sandip Pandey  MR#:    [id number]   Room:    [address] [place] [address]   YOB: 1935  Date of Progress Note: 11/9/2023  Time of Note                           8:22 AM  Consulting Physician:   Monica Fisher M.D. Attending Physician:  Monica Fisher MD     Chief complaint Acute ischemic stroke    S:This 81 y.o. female  right-handed female with history of HTN, mixed hyperlipidemia, CAD, urinary incontinence, Macular degeneration, diastolic CHF, aortic stenosis S/P AVR 10/16/23 and basal cell CA. She presented to [facility] ER on 11/2/23 with c/o right-sided weakness, facial droop, slurred speech and abnormal gait. CT head no acute intracranial abnormality, CTA head neck 1.5 pleural-based nodule left upper lobe, 2.4 cm left thyroid lobe nodule, CXR atelectasis, small left pleural effusion possible. Patient is to be admitted to the Hospitalist service with consult for neurology. She was seen by Dr. Rosalind Biswas. MRI with small right cerebellar multifocal infarcts. Suspect small vessel event. CTA head and neck negative. Echo unremarkable. Patient placed on antiplatelet therapy for now, continue statin and allow permissive hypertension. On 11/3, KUB obtained due to nausea and vomiting which could not rule out SBO. Patient did have a large bowel movement with resolution of her nausea. CT of the abdomen was obtained which shows no acute findings. Incidental findings of Nodule of left lobe of thyroid gland/ Left upper lobe pulmonary nodule with recommended OP f/u. Patient was evaluated by SPT and found to have dysphagia and dysarthria. Patient was placed on a a regular solid consistency with mildly thick/nectar thick liquids. Patient has now been upgraded to thin liquids. Patient continues to have right upper extremity dysmetria and is participating in both PT/OT. She is felt to need a stay on Rehab to work towards her goal of returning home with assist of her daughters.  She is now felt ready to start the Rehab
[facility] Training Certificate    Brittani Lares (daughters) has been cleared to assist Teresa Cheung with the following activities:    Bed Transfers:  Yes     Toilet Transfers:  Yes     W/C Transfers:  Yes     Walking:  Yes     Car Transfers:  No     Stairs:  No     Allowed to take patient off floor:  Yes     Electronically signed by Veda Villela PT on 11/9/23 at 2:00 PM CST
[facility]  INPATIENT SPEECH THERAPY  [facility] 8 [facility] UNIT  TIME   1100  1200  61 MINUTES    [x]Daily Note  []Progress Note    Date: 2023  Patient Name: Clem Johnson        MRN: [id number]    Account #: [de-identified]  : 1935  (81 y.o.)  Gender: female   Primary Provider: Windy Knight MD  Swallowing Status/Diet:  Liquid Consistency Recommendation: Thin  Diet Solids Recommendation: Regular    Subjective: Patient alert, cooperative, and upright in recliner. No pain reported at this time. Objective:     Patient reports she is anxious to return home. She states she has a meeting to speak with  today about this. She states her daughters are coming this afternoon for FTD. Patient was able to recall 1/3 speech intelligibility strategies for dysarthria on this date. Patient is able to recall tasks completed within other therapies on this date. Patient was able to recall 6x medications she takes/what they are for. 1x instance of anomia noted during this task. She states she manages her own meds, despite visual deficits. Would recommend assistance with medication management tasks upon d/c due to visual deficits. Problem solving/recall/temporal orientation task completed. Patient is provided a variety of problems regarding time, money, and medication management. Adequate attention skills noted with this task. Task completed independently with 80% accuracy. Minimal semantic cues required during this task. Recall task completed. Patient is able to elicit 73/94 states independently. Given minimal-moderate semantic and phonemic cues, patient was able to elicit an additional 13/30 states. Oral motor exercises completed for lingual and labial strengthening. Pt completed 1 set of 10 reps of the following exercises: lingual extension, lingual lateralization, anterior lingual sweep, labial compression/retraction alternation, lateralizing labial compression.  Verbal cues and modeling
Steven Spear arrived to room # [phone]. Presented with: CVA  Mental Status: Patient is oriented and alert. There were no vitals filed for this visit. Patient safety contract and falls prevention contract reviewed with patient Yes. Oriented Patient and Family to room. Call light within reach. Yes.   Needs, issues or concerns expressed at this time: no.      Electronically signed by Thomas Vegas RN on 11/7/2023 at 3:45 PM
This  visited with pt to provide spiritual care and support. Pt had a family member with her. She says the pt came for rehab yesterday. This  said an open eyed prayer for pt. Pt and family member expressed gratitude for spiritual care.      Electronically signed by Santos Florez on 11/8/2023 at 3:11 PM
demonstrating modified indep with rollator use and good confidence; however, does not always engage brakes prior to transitions sit<>stand. Art Fulling to go home and indicates she will rely mostly on rollator initially. Safety Devices   Type of Devices Chair alarm in place;Call light within reach; Left in chair   PT Individual Minutes   Time In 1000   Time Out 1100   Minutes 60         Electronically signed by Armen Martin PTA on 11/13/2023 at 11:21 AM
dysphagia with liquids, foods or solids. She completed pharyngeal exercises. She was able to complete the effortful swallow. No overt s/s of aspiration observed with thin liquids via straw. Clear voicing was noted after all sips. Reviewed dysarthria strategies for increased speech intelligibility. Mild dysarthria is noted this date. She practiced words and sentences containing bilabials and fricatives. She was able to count on one breath up to 21. Breath support appeared adequate during conversation. She utilized her spirometer and reached 1500ml. Vocal intensity was also good. Diet Solids Recommendation:  Diet Solids Recommendation: Regular     Diet Liquid Recommendation:  Liquid Consistency Recommendation: Thin     Compensatory Swallowing Strategies: Alternate solids and liquids, Eat/Feed slowly, Upright as possible for all oral intake, No straws, Remain upright for 30-45 minutes after meals, Check for pocketing of food on the Left, Check for pocketing of food on the Right              SHORT TERM GOAL #1:  Goal 1: Patient will demonstrate understanding and use of speech intelligibility strategies to increase speech intelligibility to % to unfamiliar listeners. SHORT TERM GOAL #2:  Goal 2: Patient will complete oral motor exercises for lingual and labial strengthening. SHORT TERM GOAL #3:  Goal 3: Patient will utilize tools/strategies to promote increased clarity of speech at word, phrase, and sentence level with provision of mod cues/prompts. SHORT TERM GOAL #4:  Goal 4: Patient will complete problem solving/safety awareness/executive functioning tasks with 90% accuracy and minimal cues. SHORT TERM GOAL #5:  Goal 5: Patient will complete higher level cognitive tasks with 90% accuracy and minimal cues.     Swallowing Short Term Goals  Short-term Goals  Goal 1: Patient will tolerate regular solids with thin liquids with minimal overt s/s of aspiration/penetration during
noted this date after lunch. She denied any dysphagia with liquids, foods or solids. She attempted pharyngeal exercises. She attempted the [facility] but this proved challenging. She was able to complete the effortful swallow. No overt s/s of aspiration observed with thin liquids via straw. Clear voicing was noted after all sips. Reviewed dysarthria strategies for increased speech intelligibility. Mild dysarthria is noted this date. She practiced words containing bilabials and fricatives. She was able to count on one breath up to 20. Breath support appeared adequate during conversation. Vocal intensity was also good. She was able to provide information regarding her prior level of function and personal information. She enjoys shopping and going to the [employer] to play slot machines. She has macular degeneration but can read words on her large TV at home. She does have a cell phone that she uses and it does have large font. She uses it for phone calls mostly. She does use [employer] for texting. She does not have a landline at home. She does not have a life alert system but is open to getting one. She mostly prepares microwave meals or soups. She states she does not prepare large meals anymore. She manages her medications at home without assistance. She states she lines up her medications in the bottles and take out all of the pills and places these in a bowl. She does not use a pill organizer and likes her current system. She uses automatic payments for most of her bills. She was living alone prior to admission. Her daughter can stay with her after discharge. She states she wears upper/lower dentures. She states the lower denture does not fit as well. Mental manipulation tasks were completed. Reverse order was at 100%. Recall of presidents in reverse order. Some mild difficulty with recall. Her daughter reports she has exhibited good safety awareness and problem solving in her home.      Diet Solids
program.  No new complaints. Feels well.      REVIEW OF SYSTEMS:  Constitutional: No fevers No chills  Neck:No stiffness  Respiratory: No shortness of breath  Cardiovascular: No chest pain No palpitations  Gastrointestinal: No abdominal pain    Genitourinary: No Dysuria  Neurological: No headache, no confusion    Past Medical History:      Diagnosis Date    Age-related osteoporosis without current pathological fracture     Basal cell carcinoma (BCC) of scalp     skin    Coronary artery disease involving native coronary artery of native heart without angina pectoris     stents done in [place]; sees [facility] cardiology    DDD (degenerative disc disease), lumbar     Essential hypertension     Knee pain     Macular degeneration     Mixed hyperlipidemia     Ventral hernia        Past Surgical History:      Procedure Laterality Date    AORTIC VALVE REPAIR  09/16/2023    CARDIAC CATHETERIZATION      stents 2019, 2020    DIAGNOSTIC CARDIAC CATH LAB PROCEDURE  07/24/2023    Diffuse coronary disease, patent stents, severe AS-no intervention    EYE SURGERY      h/o eye bleed    FOOT SURGERY      deep calluses    HERNIA REPAIR N/A 08/12/2022    ROBOT ASSISTED LAPAROSCOPIC INCARCERATED VENTRAL HERNIA  REPAIR  WITH MESH performed by Sanjuana Villar DO at [facility] (CERVIX STATUS UNKNOWN)      total-abdominal    TONSILLECTOMY      TOTAL HIP ARTHROPLASTY Left 2019    TOTAL KNEE ARTHROPLASTY Right 10/14/2021    RIGHT KNEE TOTAL ARTHROPLASTY performed by Shawn Casey MD at [address]         Medications in Hospital:      Current Facility-Administered Medications:     Magnesium Hydroxide CHEW 1 tablet (Patient Supplied), 1 tablet, Oral, TID PRN, Riley Stauffer MD    propylene glycol-glycerin (artificial tears) 1-0.3 % ophthalmic solution SOLN 1 drop (Patient Supplied), 1 drop, Both Eyes, Q3H PRN, Marc Natarajan MD, 1 drop at 11/12/23 0930    sodium chloride flush 0.9 % injection 5-40 mL, 5-40
using speech intelligibility strategies. Distracted delayed recall of speech intelligibility strategies was accurate in 1/3 independently. Readdressed these with patient. Patient reported feeling like her voice is louder. When asked when patient noticed this, patient reported recently, possibly yesterday. Patient demonstrated ability to recall approximately 6 tasks completed in PT session earlier this date. Patient tasked to produce abstract category member given three members. This task was completed with 80% accuracy independently. This increased to 90% with multiple attempts for one trial and to 100% with significant verbal cues/prompts for one trial. Delay noted in 4/10 trials. Mildly decreased reasoning noted in this task. [id number]:  Patient completed task to identify the word that does not belong with the others in a concrete category verbally given five total words. This was done with 57% accuracy independently that increased to 86% with repetition of two trials and to 100% with repetition and multiple attempts for one trial. Mildly decreased reasoning in this task. Patient demonstrated ability to produce 3 solutions given a fictional situation independently. Patient demonstrated ability to produce number and names of grandchildren (15). Patient initially produced 12 but following a delay did demonstrate ability to produce final grandchild's name. Patient demonstrated ability to recall 3/3 speech intelligibility strategies with multiple attempts for one, min verbal prompting, and delay. Patient reported she uses a pill organizer and has an order she takes her medications in. Patient reported she knows each medication and denies having trouble with her medications. When asked regarding vision, patient reported reading glasses don't help, however, she does utilize a magnifying glass at times.  Patient reported she had been receiving shots in/around/for her eye(s) but is
5  Discharge Goal: Independent    Shower/Bathe Self  Assistance Needed: Supervision or touching assistance  CARE Score: 4  Discharge Goal: Independent    Upper Body Dressing  Assistance Needed: Setup or clean-up assistance  CARE Score: 5  Discharge Goal: Independent      Lower Body Dressing  Assistance Needed: Supervision or touching assistance  Comment: CGA  CARE Score: 4  Discharge Goal: Independent    Putting On/Taking Off Footwear  Assistance Needed: Supervision or touching assistance  CARE Score: 4  Discharge Goal: Independent      Picking Up Object  Assistance Needed: Dependent  CARE Score: 1  Discharge Goal: Independent  Goals  Short Term Goals  Time Frame for Short Term Goals: 1 week  Short Term Goal 1: Supervision with toileting. Short Term Goal 2: Supervision with toilet transfers. Short Term Goal 3: Supervision with bathing hygiene. Short Term Goal 4: Supervision with LB dressing. Short Term Goal 5: Supervision with ambulatory homemakng tasks. Short Term Goal 6: Patient will complete 1-2 handed static standing tasks for 4 minutes with supervision. Long Term Goals  Time Frame for Long Term Goals : 2 weeks  Long Term Goal 1: Mod I with toileting and toilet transfers. Long Term Goal 2: Mod I with bathing hygiene. Long Term Goal 3: Mod I with overall dressing. Long Term Goal 4: Mod I with ambulatory homemaking tasks. Long Term Goal 5: Patient verbalize DME Needs. Patient Goals   Patient goals : Return home independently.      Therapy Time   Individual Concurrent Group Co-treatment   Time In 1100         Time Out 1200         Minutes 60         Timed Code Treatment Minutes: 45 Minutes       Electronically signed by Justin West OT on 11/8/2023 at 3:48 PM
assistance  Physical Assistance Level: No physical assistance  CARE Score: 4  Discharge Goal: Independent    Walk 150 Feet  Assistance Needed: Supervision or touching assistance  Physical Assistance Level: No physical assistance  CARE Score: 4  Discharge Goal: Independent    Walking 10 Feet on Uneven Surfaces  Reason if not Attempted: Not attempted due to medical condition or safety concerns  CARE Score: 88  Discharge Goal: Not Attempted    1 Step (Curb)  Reason if not Attempted: Not attempted due to medical condition or safety concerns  CARE Score: 88  Discharge Goal: Independent    4 Steps  Reason if not Attempted: Not attempted due to medical condition or safety concerns  CARE Score: 88  Discharge Goal: Independent    12 Steps  Reason if not Attempted: Not attempted due to medical condition or safety concerns  CARE Score: 88  Discharge Goal: Not Attempted    Wheel 50 Feet with Two Turns  Assistance Needed: Supervision or touching assistance  CARE Score: 4  Discharge Goal: Independent    Wheel 150 Feet  Reason if not Attempted: Not attempted due to medical condition or safety concerns  CARE Score: 88  Discharge Goal: Not Attempted      LAST TREATMENT TIME  PT Individual Minutes  Time In: 1300  Time Out: 1400  Minutes: 60      Electronically signed by MARQUITA Alston on 11/8/23 at 4:01 PM CST
Gait  Not tested     Nursing/pcp notes, imaging,labs and vitals reviewed. PT,OT and/or speech notes reviewed    Lab Results   Component Value Date    WBC 6.2 11/13/2023    HGB 10.9 (L) 11/13/2023    HCT 33.9 (L) 11/13/2023    MCV 87.8 11/13/2023     11/13/2023     Lab Results   Component Value Date     11/13/2023    K 4.2 11/13/2023     11/13/2023    CO2 26 11/13/2023    BUN 13 11/13/2023    CREATININE 0.6 11/13/2023    GLUCOSE 88 11/13/2023    CALCIUM 8.8 11/13/2023    PROT 5.9 (L) 11/13/2023    LABALBU 3.6 11/13/2023    BILITOT 0.3 11/13/2023    ALKPHOS 60 11/13/2023    AST 22 11/13/2023    ALT 20 11/13/2023    LABGLOM >60 11/13/2023    GFRAA >59 07/21/2022     Lab Results   Component Value Date    INR 1.09 11/03/2023    INR 0.99 09/30/2021    PROTIME 13.8 11/03/2023    PROTIME 13.3 09/30/2021       Lennox Mulberry, PABLO  Physical Therapist Assistant  Physical Therapy  Progress Notes     Signed  Date of Service:  11/11/2023 10:40 AM     Signed                                                                         Name: Indigo Qureshi  MRN: [id number]  Date of Service:  11/11/2023 11/11/23 0900   Restrictions/Precautions   Restrictions/Precautions Fall Risk;Contact Precautions;Aspiration Risk   Required Braces or Orthoses? No   General   Chart Reviewed Yes   Patient assessed for rehabilitation services? Yes   Additional Pertinent Hx Dysphagia, dysarthria, HTN, diastollic CHF, macular degeneration, aortic stenosis w/ AVR, basel cell CA, remote compressio fx T10 and L1   Diagnosis R cerebeallar multifocal infarct w/ R side weakness   Follows Commands WFL   General Comment   Comments Pt able to sit on toilet and independently perform personal hygiene and can independently doff/don pants. Subjective   Subjective Pt sitting in recliner in room, agrees to participate in therapy.    Hearing   Hearing X   Hearing Exceptions Bilateral hearing aid   Subjective   Pain Verbal: 0/10   Bed

## 2023-11-15 ENCOUNTER — TELEPHONE (OUTPATIENT)
Dept: PRIMARY CARE CLINIC | Age: 88
End: 2023-11-15

## 2023-11-15 NOTE — DISCHARGE SUMMARY
Neurology Discharge Summary     Patient Identification:  Pedro Holden is a 81 y.o. female. :  1935  Admit Date:  2023  Discharge date : 2023   Attending Provider: Riley Stauffer MD     Account Number: [de-identified]                                   Admission Diagnoses:   Acute ischemic stroke [facility]) [I63.9]    Discharge Diagnoses:  Principal Problem:    Acute ischemic stroke [facility])  Active Problems:    Essential hypertension    Mixed hyperlipidemia    Age-related osteoporosis without current pathological fracture    Coronary artery disease involving native coronary artery of native heart without angina pectoris    DDD (degenerative disc disease), lumbar    Chronic bilateral low back pain without sciatica    Macular degeneration of both eyes    Spinal stenosis of lumbar region with neurogenic claudication    S/P TAVR (transcatheter aortic valve replacement)    Urinary incontinence  Resolved Problems:    * No resolved hospital problems. *      Discharge Medications:    Current Discharge Medication List             Details   aspirin 81 MG chewable tablet Take 1 tablet by mouth daily  Qty: 30 tablet, Refills: 0      propylene glycol-glycerin (ARTIFICIAL TEARS) 1-0.3 % SOLN ophthalmic solution Place 1 drop into both eyes every 3 hours as needed for Dry Eyes  Qty: 1 each, Refills: 0      pantoprazole (PROTONIX) 40 MG tablet Take 1 tablet by mouth every morning (before breakfast)  Qty: 30 tablet, Refills: 0                Details   isosorbide mononitrate (IMDUR) 30 MG extended release tablet Take 1 tablet by mouth daily  Qty: 30 tablet, Refills: 0      !! gabapentin (NEURONTIN) 300 MG capsule Take 1 capsule by mouth at bedtime for 30 days. Max Daily Amount: 300 mg  Qty: 30 capsule, Refills: 0    Associated Diagnoses: Mid back pain; Chronic bilateral low back pain without sciatica      !! gabapentin (NEURONTIN) 100 MG capsule Take 1 capsule by mouth every morning for 30 days.  Max Daily Amount: 100
Occupational Therapy Discharge Summary         Date: 2023  Patient Name: Jonathan Abreu        MRN: [id number]    : 1935  (81 y.o.)  Gender: female      Diagnosis: R cerebeallar multifocal infarct w/ R side weakness  Restrictions/Precautions  Restrictions/Precautions: Up Ad Aliza  Required Braces or Orthoses?: No      Discharge Date: 23    UE Functioning:  WFLs    Home Management:  Functional Mobility  Functional - Mobility Device: 4-Wheeled Walker  Activity: Other  Assist Level: Independent  Functional Mobility Comments: reviewed tech with Rollator    Adaptive Equipment/DME Status:  Bathroom Equipment: 3-in-1 commode  Home Equipment: [address], Rollator  Family will arrange a shower seat, currently has a built in one that isn't sufficent    Pain Assessment:          Remaining Problems:  Decreased functional mobility ; Decreased ADL status; Decreased endurance;Decreased high-level IADLs    STGs:  Short Term Goals  Time Frame for Short Term Goals: 1 week  Short Term Goal 1: Supervision with toileting. Short Term Goal 2: Supervision with toilet transfers. Short Term Goal 3: Supervision with bathing hygiene. Short Term Goal 4: Supervision with LB dressing. Short Term Goal 5: Supervision with ambulatory homemakng tasks. Short Term Goal 6: Patient will complete 1-2 handed static standing tasks for 4 minutes with supervision. Met all STGs    LTGs:  Long Term Goals  Time Frame for Long Term Goals : 2 weeks  Long Term Goal 1: Mod I with toileting and toilet transfers. Long Term Goal 2: Mod I with bathing hygiene. Long Term Goal 3: Mod I with overall dressing. Long Term Goal 4: Mod I with ambulatory homemaking tasks. Long Term Goal 5: Patient verbalize DME Needs. Met all LTGs    Discharge Setting and Recommendations:  Home with family support. Rec Home Health Occupational Therapy to address safety and independence in the home environment.      Discharge Care Scores    Eating: CARE Score: 6  Oral
when sitting  CARE Score: 6  Discharge Goal: Independent    Car Transfer  Assistance Needed: Supervision or touching assistance  Comment: use of door handle  CARE Score: 4  Discharge Goal: Supervision or touching assistance    Walk 10 Feet  Assistance Needed: Independent  Physical Assistance Level: No physical assistance  CARE Score: 6  Discharge Goal: Independent    Walk 50 Feet with Two Turns  Assistance Needed: Independent  Physical Assistance Level: No physical assistance  CARE Score: 6  Discharge Goal: Independent    Walk 150 Feet  Assistance Needed: Independent  Physical Assistance Level: No physical assistance  CARE Score: 6  Discharge Goal: Independent    Walking 10 Feet on Uneven Surfaces  Reason if not Attempted: Not attempted due to medical condition or safety concerns  CARE Score: 88  Discharge Goal: Not Attempted    1 Step (Curb)  Assistance Needed: Supervision or touching assistance  Reason if not Attempted: Not attempted due to medical condition or safety concerns  CARE Score: 4  Discharge Goal: Independent    4 Steps  Assistance Needed: Supervision or touching assistance  Reason if not Attempted: Not attempted due to medical condition or safety concerns  CARE Score: 4  Discharge Goal: Independent    12 Steps  Reason if not Attempted: Not attempted due to medical condition or safety concerns  CARE Score: 88  Discharge Goal: Not Attempted    Wheel 50 Feet with Two Turns  Assistance Needed: Independent  CARE Score: 6  Discharge Goal: Independent    Wheel 150 Feet  Assistance Needed: Independent  Reason if not Attempted: Not attempted due to medical condition or safety concerns  CARE Score: 6  Discharge Goal: Not Attempted      LAST TREATMENT TIME  PT Individual Minutes  Time In: 1300  Time Out: 1400  Minutes: 61

## 2023-11-15 NOTE — TELEPHONE ENCOUNTER
Care Transitions Initial Follow Up Call    Outreach made within 2 business days of discharge: Yes    Patient: Pedro Doctor   Patient : 1935   MRN: [id number]    Reason for Admission: dysarthric with right-sided weakness  Discharge Date: 23    Discharge Diagnoses:  Principal Problem:    Acute ischemic stroke [facility])  Active Problems:    Essential hypertension    Mixed hyperlipidemia    Age-related osteoporosis without current pathological fracture    Coronary artery disease involving native coronary artery of native heart without angina pectoris    DDD (degenerative disc disease), lumbar    Chronic bilateral low back pain without sciatica    Macular degeneration of both eyes    Spinal stenosis of lumbar region with neurogenic claudication    S/P TAVR (transcatheter aortic valve replacement)    Urinary incontinence  Resolved Problems:    * No resolved hospital problems. *     Spoke with: [id number] [facility]    Discharge department/facility: Attempted to make contact with patient for an initial transitions of care follow up call post discharge without success.       Scheduled appointment with PCP within 7-14 days    Follow Up  Future Appointments   Date Time Provider [address] [place] [address]   2023 11:00 AM BRAYDON Harris [facility]   2023 10:45 AM BRAYDON Ace Cardio [facility]   2024  9:30 AM BRAYDON Harrisy PC [facility]   2024  1:30 PM INFUSION SCHEDULE, [facility] [place] [place]

## 2023-11-16 ENCOUNTER — TELEPHONE (OUTPATIENT)
Dept: INTERNAL MEDICINE CLINIC | Age: 88
End: 2023-11-16

## 2023-11-16 NOTE — TELEPHONE ENCOUNTER
[facility] nurse reporting pt was admitted to [address] [place] [address],[address] 11/15 and nursing would like to see pt 1 X week for 3 weeks , also pt stated she wants to be DNR  - is that ok with you  ?

## 2023-11-20 ENCOUNTER — TELEPHONE (OUTPATIENT)
Dept: NEUROLOGY | Age: 88
End: 2023-11-20

## 2023-11-20 ENCOUNTER — TELEPHONE (OUTPATIENT)
Dept: INTERNAL MEDICINE CLINIC | Age: 88
End: 2023-11-20

## 2023-11-20 ENCOUNTER — TELEPHONE (OUTPATIENT)
Dept: PRIMARY CARE CLINIC | Age: 88
End: 2023-11-20

## 2023-11-20 ENCOUNTER — OFFICE VISIT (OUTPATIENT)
Dept: PRIMARY CARE CLINIC | Age: 88
End: 2023-11-20

## 2023-11-20 VITALS
OXYGEN SATURATION: 98 % | DIASTOLIC BLOOD PRESSURE: 70 MMHG | WEIGHT: 151 LBS | HEIGHT: 62 IN | BODY MASS INDEX: 27.79 KG/M2 | HEART RATE: 61 BPM | SYSTOLIC BLOOD PRESSURE: 150 MMHG | TEMPERATURE: 41 F

## 2023-11-20 DIAGNOSIS — R91.1 PULMONARY NODULE: ICD-10-CM

## 2023-11-20 DIAGNOSIS — Z95.2 S/P TAVR (TRANSCATHETER AORTIC VALVE REPLACEMENT): ICD-10-CM

## 2023-11-20 DIAGNOSIS — I63.9 ACUTE ISCHEMIC STROKE (HCC): ICD-10-CM

## 2023-11-20 DIAGNOSIS — Z09 HOSPITAL DISCHARGE FOLLOW-UP: Primary | ICD-10-CM

## 2023-11-20 DIAGNOSIS — E04.1 THYROID NODULE: ICD-10-CM

## 2023-11-20 DIAGNOSIS — I10 ESSENTIAL HYPERTENSION: ICD-10-CM

## 2023-11-20 DIAGNOSIS — M81.0 AGE-RELATED OSTEOPOROSIS WITHOUT CURRENT PATHOLOGICAL FRACTURE: ICD-10-CM

## 2023-11-20 ASSESSMENT — ENCOUNTER SYMPTOMS
ABDOMINAL PAIN: 0
WHEEZING: 0
COUGH: 0
DIARRHEA: 0
SORE THROAT: 0
SHORTNESS OF BREATH: 0
CHEST TIGHTNESS: 0
NAUSEA: 0

## 2023-11-20 NOTE — TELEPHONE ENCOUNTER
Leah Oliver with Rae Ott's office called back asking about patents bp meds that she had called about today.  Per Dr Tamika Trevizo ok to re start her medication

## 2023-11-20 NOTE — TELEPHONE ENCOUNTER
I called Dr. Francis Cota office . His MA states that he said it's ok to restart the amlodipine and hctz .

## 2023-11-20 NOTE — TELEPHONE ENCOUNTER
Daughter called to schedule a  one month hospital followup. Wanting to see if pt. Could be scheduled 12/4 due to another appt at 10 . Please be advised that the best time to call her to accommodate their needs is Anytime. Thank you.

## 2023-11-20 NOTE — TELEPHONE ENCOUNTER
Please call Dr. Gretel Merino office and see if he wants her to remain off bp medications or if we could have parameters on when to add back amlodipine. BP meds (amlodipine and hctz) were discontinued in hospital following her recent stroke and were not resumed at discharge. BP is starting to climb, just needing parameters on when/if to start back amlodipine.

## 2023-11-20 NOTE — PROGRESS NOTES
Post-Discharge Transitional Care Follow Up      Jonathan Abreu   YOB: 1935    Date of Office Visit:  11/20/2023  Date of Hospital Admission: 11/7/23  Date of Hospital Discharge: 11/14/23  Readmission Risk Score (high >=14%. Medium >=10%):Readmission Risk Score: 13      Care management risk score Rising risk (score 2-5) and Complex Care (Scores >=6): No Risk Score On File     Non face to face  following discharge, date last encounter closed (first attempt may have been earlier): 11/15/2023     Call initiated 2 business days of discharge: Yes     Acute ischemic stroke ([address])  -Weakness, speech improved/improving.  -Continue home health for SN, PT, ST  -Continue increased atorvastatin dose to 80 mg daily. Continue ASA. -ZIO in place  -We will check on BP meds. Since she is over 2 weeks out from her stroke, we will check with neurology to see if/when to add back medication or if we can get parameters on BP. When adding back, I would start with amlodipine but we need to verify with neurology. Essential hypertension  -As above    S/P TAVR (transcatheter aortic valve replacement)  -Stable, clinically improved following repair.  -Keep upcoming follow-up with cardiology    Thyroid nodule  -Incidental finding on imaging in hospital.  We will obtain thyroid US.  -     US THYROID; Future    Pulmonary nodule  -Incidental finding on imaging in hospital.  Will obtain CT chest with and without contrast  -     CT CHEST W WO CONTRAST; Future    Age-related osteoporosis without current pathological fracture  -Prolia and calcium discontinued at discharge. Patient will discuss further at her follow-up with neurology    Medical Decision Making: high complexity  Return for as scheduled. Subjective:   HPI    Inpatient course: Discharge summary reviewed- see chart.     Interval history/Current status:     She was admitted to [facility]) 11/2 after developing acute slurred speech and right-sided weakness at

## 2023-11-20 NOTE — TELEPHONE ENCOUNTER
I called Dr. Ndiaye Code office and spoke with the phone nurse. She took my message and sent  it to Dr. Kendra Berrios. She will contact me with his response.

## 2023-11-20 NOTE — TELEPHONE ENCOUNTER
[facility] Evaluation complete. Patient is tolerating p.o. intake safely. She does present with dysarthric speech and would benefit from skilled speech therapy to train in oral motor exercise routine and compensatory strategy training.   Requesting 1x/week for 1 week and 1-2x/week for 1 week,     Please advise if approved

## 2023-11-20 NOTE — TELEPHONE ENCOUNTER
Atul John from [facility]'s office called to see if it is ok for pt to restart her BP med, Norvasc and also the HCTZ. Both were DC'd while pt was in hospital. Please advise. Atul John asks for a call back at [phone] and to ask for her please.

## 2023-11-21 NOTE — TELEPHONE ENCOUNTER
Called daughter back and patient has been scheduled for 12/22/23 @ 9. They voiced understanding and thanked me for the call back.

## 2023-11-22 ENCOUNTER — OFFICE VISIT (OUTPATIENT)
Dept: CARDIOLOGY CLINIC | Age: 88
End: 2023-11-22
Payer: MEDICARE

## 2023-11-22 VITALS
DIASTOLIC BLOOD PRESSURE: 64 MMHG | OXYGEN SATURATION: 97 % | WEIGHT: 150 LBS | HEIGHT: 63 IN | HEART RATE: 65 BPM | SYSTOLIC BLOOD PRESSURE: 124 MMHG | BODY MASS INDEX: 26.58 KG/M2

## 2023-11-22 DIAGNOSIS — I35.0 NONRHEUMATIC AORTIC VALVE STENOSIS: Primary | ICD-10-CM

## 2023-11-22 DIAGNOSIS — I10 ESSENTIAL HYPERTENSION: ICD-10-CM

## 2023-11-22 DIAGNOSIS — E78.2 MIXED HYPERLIPIDEMIA: ICD-10-CM

## 2023-11-22 DIAGNOSIS — Z95.2 S/P TAVR (TRANSCATHETER AORTIC VALVE REPLACEMENT): ICD-10-CM

## 2023-11-22 DIAGNOSIS — I63.9 ACUTE ISCHEMIC STROKE (HCC): ICD-10-CM

## 2023-11-22 PROCEDURE — 1123F ACP DISCUSS/DSCN MKR DOCD: CPT | Performed by: CLINICAL NURSE SPECIALIST

## 2023-11-22 PROCEDURE — 1090F PRES/ABSN URINE INCON ASSESS: CPT | Performed by: CLINICAL NURSE SPECIALIST

## 2023-11-22 PROCEDURE — 1036F TOBACCO NON-USER: CPT | Performed by: CLINICAL NURSE SPECIALIST

## 2023-11-22 PROCEDURE — G8484 FLU IMMUNIZE NO ADMIN: HCPCS | Performed by: CLINICAL NURSE SPECIALIST

## 2023-11-22 PROCEDURE — 99214 OFFICE O/P EST MOD 30 MIN: CPT | Performed by: CLINICAL NURSE SPECIALIST

## 2023-11-22 PROCEDURE — 1111F DSCHRG MED/CURRENT MED MERGE: CPT | Performed by: CLINICAL NURSE SPECIALIST

## 2023-11-22 PROCEDURE — G8417 CALC BMI ABV UP PARAM F/U: HCPCS | Performed by: CLINICAL NURSE SPECIALIST

## 2023-11-22 PROCEDURE — G8427 DOCREV CUR MEDS BY ELIG CLIN: HCPCS | Performed by: CLINICAL NURSE SPECIALIST

## 2023-11-22 NOTE — PATIENT INSTRUCTIONS
Maintain good blood pressure control-goal<130/80 at rest  Maintain good cholesterol control LDL goal<70 with arterial disease  If you are diabetic work to keep/obtain hemoglobin A1c< 7    Follow up in 3 mos   Call with any questions or concerns  Follow up with BRAYDON Roberts for non cardiac problems  Report any new problems  Cardiovascular Fitness-Exercise as tolerated. Strive for 30 minutes of exercise most days of the week. Continue the PT and fall precautions   Cardiac / Healthy Diet- Avoid processed high fat foods, maintain low sodium/salt   Continue current medications as directed  Continue plan of treatment  It is always recommended that you bring your medications bottles with you to each visit - this is for your safety!

## 2023-11-22 NOTE — PROGRESS NOTES
[facility]  [address] [address]  Phone: [phone]  Fax: [phone]    OFFICE VISIT:  11/22/2023    [facility] 1935    Reason For Visit:  Mendoza Ojeda is a 81 y.o. female who is here for Follow-up (Post CABG  and a stroke afterwards   no symptoms), Coronary Artery Disease, and Cardiac Valve Problem  History of hyperlipidemia, hypertension, DJD, aortic stenosis that progressively worsened to severe earlier this year. Recommended TAVR approach for aortic valve replacement. Patient was referred to [facility] Lopez for surgery  On 10/16/2023 patient had TAVR with 23 mm Gonzalez EDUARD valve  Patient saw primary care in follow-up. On 10-23 presented to the emergency room with stroke like symptoms including slurred speech, right-sided facial droop, abnormal gait and somnolence. MRI showed tiny multifocal acute infarcts in the right cerebellum without mass or hemorrhage. She was discharged to inpatient rehab and then discharged home on 11/14/2023. She returns today in follow-up accompanied with her daughter. She states she still has home health coming and doing physical therapy. She is no longer using walker using a cane. Denies any unilateral weakness. Still having a little bit of speech difficulty. Has follow-up with neurology. Was told did not take Prolia. She is currently wearing a Zio patch to assess for arrhythmia      Subjective  Mendoza Ojeda denies exertional chest pain, shortness of breath, orthopnea, paroxysmal nocturnal dyspnea, syncope, presyncope, arrhythmia, edema and fatigue. The patient denies numbness or weakness to suggest cerebrovascular accident or transient ischemic attack. BRAYDON Zurita is PCP and follows labs.   Narendra Mahan has the following history as recorded in [facility]:    Patient Active Problem List    Diagnosis Date Noted    Prediabetes 01/06/2023    Urinary incontinence 11/08/2023    Acute ischemic stroke ([address]) 11/07/2023

## 2023-11-26 ASSESSMENT — ENCOUNTER SYMPTOMS
EYES NEGATIVE: 1
RESPIRATORY NEGATIVE: 1

## 2023-12-01 ENCOUNTER — TELEPHONE (OUTPATIENT)
Dept: INTERNAL MEDICINE CLINIC | Age: 88
End: 2023-12-01

## 2023-12-01 NOTE — TELEPHONE ENCOUNTER
[facility] PT reporting, pt  was discharged from PT today. Per PT \"Pt. has met all goals and no longer requires skilled physical therapy. She is able to complete transfers from multiple surfaces in the home with good strength and control, ambulates in home without an AD with good step through pattern with good foot clearance. Minimal L lateral trunk flexion noted with pt. and daughter stating that she's been doing that since her L THR. Ambulates outside with cane with a 2 point gait pattern with good safety and was able to check her mail independently. Improved balance with a tinetti score of 26/28 and has not had any falls since homecare admission. She has been given and instructed in a HEP and is discharged at this time. \"    HIRALI

## 2023-12-07 RX ORDER — ATORVASTATIN CALCIUM 80 MG/1
80 TABLET, FILM COATED ORAL NIGHTLY
Qty: 30 TABLET | Refills: 0 | OUTPATIENT
Start: 2023-12-07

## 2023-12-07 NOTE — TELEPHONE ENCOUNTER
Requested Prescriptions     Pending Prescriptions Disp Refills    atorvastatin (LIPITOR) 80 MG tablet [Pharmacy Med Name: ATORVASTATIN 80 MG TABLET] 30 tablet 0     Sig: TAKE 1 TABLET BY MOUTH EVERY DAY AT NIGHT       Last Office Visit: Visit date not found  Next Office Visit: Visit date not found  Last Medication Refill:  11-13-23      [facility] pt

## 2023-12-12 DIAGNOSIS — K21.9 GASTROESOPHAGEAL REFLUX DISEASE WITHOUT ESOPHAGITIS: ICD-10-CM

## 2023-12-12 DIAGNOSIS — I25.10 CORONARY ARTERY DISEASE INVOLVING NATIVE CORONARY ARTERY OF NATIVE HEART WITHOUT ANGINA PECTORIS: Primary | ICD-10-CM

## 2023-12-12 DIAGNOSIS — I63.9 ACUTE ISCHEMIC STROKE (HCC): ICD-10-CM

## 2023-12-12 RX ORDER — ATORVASTATIN CALCIUM 80 MG/1
80 TABLET, FILM COATED ORAL NIGHTLY
Qty: 90 TABLET | Refills: 3 | Status: SHIPPED | OUTPATIENT
Start: 2023-12-12

## 2023-12-12 RX ORDER — ATORVASTATIN CALCIUM 80 MG/1
80 TABLET, FILM COATED ORAL NIGHTLY
Qty: 30 TABLET | Refills: 0 | OUTPATIENT
Start: 2023-12-12

## 2023-12-12 RX ORDER — PANTOPRAZOLE SODIUM 40 MG/1
40 TABLET, DELAYED RELEASE ORAL
Qty: 30 TABLET | Refills: 0 | OUTPATIENT
Start: 2023-12-12

## 2023-12-12 RX ORDER — PANTOPRAZOLE SODIUM 40 MG/1
40 TABLET, DELAYED RELEASE ORAL
Qty: 90 TABLET | Refills: 3 | Status: SHIPPED | OUTPATIENT
Start: 2023-12-12

## 2023-12-12 NOTE — TELEPHONE ENCOUNTER
Kristi Marie called to request a refill on her medication. Patient called needing refills. These medications were prescribed by Dr. Joan botello in the hospital and she is no longer under his care.   Last office visit : 11/20/2023   Next office visit : 1/8/2024     Requested Prescriptions     Pending Prescriptions Disp Refills    atorvastatin (LIPITOR) 80 MG tablet 30 tablet 0     Sig: Take 1 tablet by mouth nightly    pantoprazole (PROTONIX) 40 MG tablet 30 tablet 0     Sig: Take 1 tablet by mouth every morning (before breakfast)            Prem Rick LPN

## 2023-12-12 NOTE — TELEPHONE ENCOUNTER
Requested Prescriptions     Pending Prescriptions Disp Refills    atorvastatin (LIPITOR) 80 MG tablet [Pharmacy Med Name: ATORVASTATIN 80 MG TABLET] 30 tablet 0     Sig: TAKE 1 TABLET BY MOUTH EVERY DAY AT NIGHT    pantoprazole (PROTONIX) 40 MG tablet [Pharmacy Med Name: PANTOPRAZOLE SOD DR 40 MG TAB] 30 tablet 0     Sig: TAKE 1 TABLET BY MOUTH EVERY DAY BEFORE BREAKFAST       Last Office Visit: Visit date not found  Next Office Visit: Visit date not found  Last Medication Refill:  BOTH 11/13/23    [facility] pt

## 2023-12-15 NOTE — PROGRESS NOTES
Physician Progress Note      Milton Cannon  CSN #:                  [id number]  :                       1935  ADMIT DATE:       2023 6:22 PM  [facility] DATE:        2023 3:10 PM  RESPONDING  PROVIDER #:        Yamilet Cline MD          QUERY TEXT:    Pt admitted with a CVA and had undergone TAVR on 10/16.? If possible, please   document in progress notes and discharge summary the relationship if any   between the CVA and the surgery: The medical record reflects the following:  Risk Factors: CVA, TAVR 10/16  Clinical Indicators: presented with acute onset of slurred speech, right side   facial droop and right arm weakness; aortic valve replacement previous month;   MRI brain-tiny multifocal acute infarcts in the right cerebellum; IM pnotes   documented aortic stenosis s/p TAVR  Treatment: PT/OT/SLP, continue ASA and statin    Thank you,  Pablo Marvin, [address] [place] [address]  Options provided:  -- CVA is due to the procedure  -- CVA is not due to the procedure  -- Other - I will add my own diagnosis  -- Disagree - Not applicable / Not valid  -- Disagree - Clinically unable to determine / Unknown  -- Refer to Clinical Documentation Reviewer    PROVIDER RESPONSE TEXT:    Patient has had a CVA due to the procedure.     Query created by: Alana Reed on 12/15/2023 8:00 AM      Electronically signed by:  Yamilet Cline MD 12/15/2023 1:24 PM

## 2023-12-22 ENCOUNTER — TELEPHONE (OUTPATIENT)
Dept: PRIMARY CARE CLINIC | Age: 88
End: 2023-12-22

## 2023-12-22 NOTE — TELEPHONE ENCOUNTER
----- Message from BRAYDON Cabrera sent at 12/21/2023  3:37 PM CST -----  Thyroid US showed multiple nodules. I would like to refer her to ENT.

## 2024-01-04 ENCOUNTER — OFFICE VISIT (OUTPATIENT)
Dept: ENT CLINIC | Age: 89
End: 2024-01-04
Payer: MEDICARE

## 2024-01-04 VITALS
DIASTOLIC BLOOD PRESSURE: 68 MMHG | SYSTOLIC BLOOD PRESSURE: 140 MMHG | WEIGHT: 155 LBS | HEIGHT: 63 IN | BODY MASS INDEX: 27.46 KG/M2

## 2024-01-04 DIAGNOSIS — E78.2 MIXED HYPERLIPIDEMIA: ICD-10-CM

## 2024-01-04 DIAGNOSIS — S32.010A COMPRESSION FRACTURE OF L1 VERTEBRA, INITIAL ENCOUNTER (HCC): ICD-10-CM

## 2024-01-04 DIAGNOSIS — M81.0 AGE-RELATED OSTEOPOROSIS WITHOUT CURRENT PATHOLOGICAL FRACTURE: ICD-10-CM

## 2024-01-04 DIAGNOSIS — E04.2 NONTOXIC MULTINODULAR GOITER: Primary | ICD-10-CM

## 2024-01-04 DIAGNOSIS — S22.050A COMPRESSION FRACTURE OF T6 VERTEBRA, INITIAL ENCOUNTER (HCC): ICD-10-CM

## 2024-01-04 DIAGNOSIS — R73.03 PREDIABETES: ICD-10-CM

## 2024-01-04 LAB
25(OH)D3 SERPL-MCNC: 43.1 NG/ML
ALBUMIN SERPL-MCNC: 4.4 G/DL (ref 3.5–5.2)
ALP SERPL-CCNC: 66 U/L (ref 35–104)
ALT SERPL-CCNC: 16 U/L (ref 5–33)
ANION GAP SERPL CALCULATED.3IONS-SCNC: 13 MMOL/L (ref 7–19)
AST SERPL-CCNC: 22 U/L (ref 5–32)
BILIRUB SERPL-MCNC: 0.5 MG/DL (ref 0.2–1.2)
BUN SERPL-MCNC: 15 MG/DL (ref 8–23)
CALCIUM SERPL-MCNC: 10.2 MG/DL (ref 8.8–10.2)
CHLORIDE SERPL-SCNC: 99 MMOL/L (ref 98–111)
CHOLEST SERPL-MCNC: 132 MG/DL (ref 160–199)
CO2 SERPL-SCNC: 28 MMOL/L (ref 22–29)
CREAT SERPL-MCNC: 0.8 MG/DL (ref 0.5–0.9)
GLUCOSE SERPL-MCNC: 103 MG/DL (ref 74–109)
HBA1C MFR BLD: 5.9 % (ref 4–6)
HDLC SERPL-MCNC: 54 MG/DL (ref 65–121)
LDLC SERPL CALC-MCNC: 57 MG/DL
POTASSIUM SERPL-SCNC: 4 MMOL/L (ref 3.5–5)
PROT SERPL-MCNC: 7.3 G/DL (ref 6.6–8.7)
SODIUM SERPL-SCNC: 140 MMOL/L (ref 136–145)
TRIGL SERPL-MCNC: 103 MG/DL (ref 0–149)

## 2024-01-04 PROCEDURE — 1123F ACP DISCUSS/DSCN MKR DOCD: CPT | Performed by: NURSE PRACTITIONER

## 2024-01-04 PROCEDURE — 99203 OFFICE O/P NEW LOW 30 MIN: CPT | Performed by: NURSE PRACTITIONER

## 2024-01-04 ASSESSMENT — ENCOUNTER SYMPTOMS
RESPIRATORY NEGATIVE: 1
GASTROINTESTINAL NEGATIVE: 1
EYES NEGATIVE: 1
ALLERGIC/IMMUNOLOGIC NEGATIVE: 1

## 2024-01-04 NOTE — PROGRESS NOTES
2024    Alyssa Love (:  1935) is a 89 y.o. female, Established patient, here for evaluation of the following chief complaint(s):  New Patient (Thyroid nodules )      Vitals:    24 0931   BP: (!) 140/68   Weight: 70.3 kg (155 lb)   Height: 1.6 m (5' 3\")       Wt Readings from Last 3 Encounters:   24 70.3 kg (155 lb)   23 68 kg (150 lb)   23 68 kg (150 lb)       BP Readings from Last 3 Encounters:   24 (!) 140/68   23 116/66   23 124/64         SUBJECTIVE/OBJECTIVE:    Patient seen today for thyroid. She states that they were found incidentally on previous imaging done. Her PCP order an US of her thyroid. She denies trouble breathing or swallowing. She denies tenderness or pain.        Review of Systems   Constitutional: Negative.    HENT: Negative.     Eyes: Negative.    Respiratory: Negative.     Cardiovascular: Negative.    Gastrointestinal: Negative.    Endocrine: Negative.    Musculoskeletal: Negative.    Skin: Negative.    Allergic/Immunologic: Negative.    Neurological: Negative.    Hematological: Negative.    Psychiatric/Behavioral: Negative.          Physical Exam  Vitals reviewed.   Constitutional:       Appearance: Normal appearance. She is normal weight.   HENT:      Head: Normocephalic and atraumatic.      Right Ear: Tympanic membrane, ear canal and external ear normal.      Left Ear: Tympanic membrane, ear canal and external ear normal.      Nose: Nose normal.      Mouth/Throat:      Mouth: Mucous membranes are moist.      Pharynx: Oropharynx is clear.   Eyes:      Extraocular Movements: Extraocular movements intact.      Pupils: Pupils are equal, round, and reactive to light.   Cardiovascular:      Rate and Rhythm: Normal rate and regular rhythm.   Pulmonary:      Effort: Pulmonary effort is normal.      Breath sounds: Normal breath sounds.   Musculoskeletal:      Cervical back: Normal range of motion.   Skin:     General: Skin is warm and

## 2024-01-08 ENCOUNTER — OFFICE VISIT (OUTPATIENT)
Dept: PRIMARY CARE CLINIC | Age: 89
End: 2024-01-08
Payer: MEDICARE

## 2024-01-08 VITALS
BODY MASS INDEX: 27.46 KG/M2 | SYSTOLIC BLOOD PRESSURE: 122 MMHG | HEIGHT: 63 IN | OXYGEN SATURATION: 98 % | HEART RATE: 65 BPM | WEIGHT: 155 LBS | DIASTOLIC BLOOD PRESSURE: 62 MMHG | TEMPERATURE: 97.1 F

## 2024-01-08 DIAGNOSIS — M81.0 AGE-RELATED OSTEOPOROSIS WITHOUT CURRENT PATHOLOGICAL FRACTURE: ICD-10-CM

## 2024-01-08 DIAGNOSIS — I10 ESSENTIAL HYPERTENSION: ICD-10-CM

## 2024-01-08 DIAGNOSIS — R73.03 PREDIABETES: ICD-10-CM

## 2024-01-08 DIAGNOSIS — I25.10 CORONARY ARTERY DISEASE INVOLVING NATIVE CORONARY ARTERY OF NATIVE HEART WITHOUT ANGINA PECTORIS: ICD-10-CM

## 2024-01-08 DIAGNOSIS — H61.23 BILATERAL IMPACTED CERUMEN: ICD-10-CM

## 2024-01-08 DIAGNOSIS — E04.1 THYROID NODULE: ICD-10-CM

## 2024-01-08 DIAGNOSIS — Z00.00 MEDICARE ANNUAL WELLNESS VISIT, SUBSEQUENT: ICD-10-CM

## 2024-01-08 DIAGNOSIS — L72.0 EPIDERMOID CYST OF SKIN OF CHEEK: ICD-10-CM

## 2024-01-08 DIAGNOSIS — Z95.2 S/P TAVR (TRANSCATHETER AORTIC VALVE REPLACEMENT): ICD-10-CM

## 2024-01-08 DIAGNOSIS — Z86.73 HISTORY OF ISCHEMIC STROKE: ICD-10-CM

## 2024-01-08 DIAGNOSIS — R91.1 PULMONARY NODULE: ICD-10-CM

## 2024-01-08 PROCEDURE — 99214 OFFICE O/P EST MOD 30 MIN: CPT | Performed by: NURSE PRACTITIONER

## 2024-01-08 PROCEDURE — 1123F ACP DISCUSS/DSCN MKR DOCD: CPT | Performed by: NURSE PRACTITIONER

## 2024-01-08 PROCEDURE — G0439 PPPS, SUBSEQ VISIT: HCPCS | Performed by: NURSE PRACTITIONER

## 2024-01-08 ASSESSMENT — PATIENT HEALTH QUESTIONNAIRE - PHQ9
SUM OF ALL RESPONSES TO PHQ QUESTIONS 1-9: 0
2. FEELING DOWN, DEPRESSED OR HOPELESS: 0
SUM OF ALL RESPONSES TO PHQ QUESTIONS 1-9: 0
1. LITTLE INTEREST OR PLEASURE IN DOING THINGS: 0
SUM OF ALL RESPONSES TO PHQ QUESTIONS 1-9: 0
SUM OF ALL RESPONSES TO PHQ QUESTIONS 1-9: 0
SUM OF ALL RESPONSES TO PHQ9 QUESTIONS 1 & 2: 0

## 2024-01-08 ASSESSMENT — LIFESTYLE VARIABLES
HOW OFTEN DO YOU HAVE A DRINK CONTAINING ALCOHOL: NEVER
HOW MANY STANDARD DRINKS CONTAINING ALCOHOL DO YOU HAVE ON A TYPICAL DAY: PATIENT DOES NOT DRINK

## 2024-01-08 NOTE — PATIENT INSTRUCTIONS
and put on gloves. Start by flossing:  Gently work a piece of floss between each of the teeth toward the gums. A plastic flossing tool may make this easier, and they are available at most [facility].  Curve the floss around each tooth into a U-shape and gently slide it under the gum line.  Move the floss firmly up and down several times to scrape off the plaque.  After you've finished flossing, throw away the used floss and begin brushing:  Wet the brush and apply toothpaste.  Place the brush at a 45-degree angle where the teeth meet the gums. Press firmly, and move the brush in small circles over the surface of the teeth.  Be careful not to brush too hard. Vigorous brushing can make the gums pull away from the teeth and can scratch the tooth enamel.  Brush all surfaces of the teeth, on the tongue side and on the cheek side. Pay special attention to the front teeth and all surfaces of the back teeth.  Brush chewing surfaces with short back-and-forth strokes.  After you've finished, help the person rinse the remaining toothpaste from their mouth.  Where can you learn more?  Go to https://www.[employer].net/patientEd and enter F944 to learn more about \"Learning About Dental Care for Older Adults.\"  Current as of: August 6, 2023               Content Version: 13.9  © 3104-6776 [employer].   Care instructions adapted under license by [employer]. If you have questions about a medical condition or this instruction, always ask your healthcare professional. [employer] disclaims any warranty or liability for your use of this information.           Learning About Vision Tests  What are vision tests?     The four most common vision tests are visual acuity tests, refraction, visual field tests, and color vision tests.  Visual acuity (sharpness) tests  These tests are used:  To see if you need glasses or contact lenses.  To monitor an eye problem.  To check an eye injury.  Visual acuity tests are

## 2024-01-08 NOTE — PROGRESS NOTES
Place 1 drop into both eyes every 3 hours as needed for Dry Eyes Yes Marc Natarajan MD   Multiple Vitamins-Minerals (PRESERVISION AREDS 2) CAPS Take by mouth in the morning and at bedtime Yes ProviderAleshia MD   vitamin D (CHOLECALCIFEROL) 25 MCG (1000 UT) TABS tablet Take 1 tablet by mouth daily Yes Provider, MD Aleshia       CareTeam (Including outside providers/suppliers regularly involved in providing care):   Patient Care Team:  Betsy Ott APRN as PCP - General (Family Nurse Practitioner)  Betsy Ott APRN as PCP - Empaneled Provider  Bud Quinetro MD as Consulting Physician (Cardiology)  Laith Greenwood MD as Consulting Physician (Cardiothoracic Surgery)     Reviewed and updated this visit:  Tobacco  Allergies  Meds  Problems  Med Hx  Surg Hx  Soc Hx  Fam Hx             
in the chest.     Sweating.     Shortness of breath.     Pain, pressure, or a strange feeling in the back, neck, jaw, or upper belly or in one or both shoulders or arms.     Lightheadedness or sudden weakness.     A fast or irregular heartbeat.   After you call 911, the  may tell you to chew 1 adult-strength or 2 to 4 low-dose aspirin. Wait for an ambulance. Do not try to drive yourself.  Watch closely for changes in your health, and be sure to contact your doctor if you have any problems.  Where can you learn more?  Go to https://www.[employer].net/patientEd and enter F075 to learn more about \"A Healthy Heart: Care Instructions.\"  Current as of: June 25, 2023               Content Version: 13.9  © 1933-0933 [employer].   Care instructions adapted under license by [employer]. If you have questions about a medical condition or this instruction, always ask your healthcare professional. [employer] disclaims any warranty or liability for your use of this information.      Personalized Preventive Plan for Alyssa Love - 1/8/2024  Medicare offers a range of preventive health benefits. Some of the tests and screenings are paid in full while other may be subject to a deductible, co-insurance, and/or copay.    Some of these benefits include a comprehensive review of your medical history including lifestyle, illnesses that may run in your family, and various assessments and screenings as appropriate.    After reviewing your medical record and screening and assessments performed today your provider may have ordered immunizations, labs, imaging, and/or referrals for you.  A list of these orders (if applicable) as well as your Preventive Care list are included within your After Visit Summary for your review.    Other Preventive Recommendations:    A preventive eye exam performed by an eye specialist is recommended every 1-2 years to screen for glaucoma; cataracts, macular degeneration,

## 2024-01-10 ENCOUNTER — TELEPHONE (OUTPATIENT)
Dept: PRIMARY CARE CLINIC | Age: 89
End: 2024-01-10

## 2024-01-10 RX ORDER — AMLODIPINE BESYLATE 10 MG/1
10 TABLET ORAL DAILY
Qty: 90 TABLET | Refills: 3
Start: 2024-01-10

## 2024-01-10 ASSESSMENT — ENCOUNTER SYMPTOMS
WHEEZING: 0
COUGH: 0
SHORTNESS OF BREATH: 0
NAUSEA: 0
ABDOMINAL PAIN: 0
DIARRHEA: 0
SORE THROAT: 0
CHEST TIGHTNESS: 0

## 2024-01-10 NOTE — TELEPHONE ENCOUNTER
She had a zio monitor that was placed when discharged from the hospital in Nov.  She mailed back in early Dec but there are still no results.  Can we check on the status?

## 2024-01-10 NOTE — TELEPHONE ENCOUNTER
Requested Prescriptions     Pending Prescriptions Disp Refills    ASPIRIN LOW DOSE 81 MG chewable tablet [Pharmacy Med Name: ASPIRIN 81 MG CHEWABLE TABLET] 30 tablet 0     Sig: TAKE 1 TABLET BY MOUTH EVERY DAY       Last Office Visit: Visit date not found  Next Office Visit: Visit date not found  Last Medication Refill:11/14/2023 with 0 rf

## 2024-01-11 RX ORDER — ASPIRIN 81 MG
81 TABLET,CHEWABLE ORAL DAILY
Qty: 30 TABLET | Refills: 0 | OUTPATIENT
Start: 2024-01-11

## 2024-01-12 NOTE — TELEPHONE ENCOUNTER
Zio showed normal sinus rhythm, no atrial fibrillation or abnormal rhythm.  There was a brief run of SVT or tachycardia but nothing sustained.

## 2024-01-17 DIAGNOSIS — I10 ESSENTIAL HYPERTENSION: Primary | ICD-10-CM

## 2024-01-17 DIAGNOSIS — M54.50 CHRONIC BILATERAL LOW BACK PAIN WITHOUT SCIATICA: ICD-10-CM

## 2024-01-17 DIAGNOSIS — G89.29 CHRONIC BILATERAL LOW BACK PAIN WITHOUT SCIATICA: ICD-10-CM

## 2024-01-17 DIAGNOSIS — M54.9 MID BACK PAIN: ICD-10-CM

## 2024-01-17 RX ORDER — HYDROCHLOROTHIAZIDE 12.5 MG/1
12.5 TABLET ORAL DAILY
Qty: 90 TABLET | Refills: 3 | Status: SHIPPED | OUTPATIENT
Start: 2024-01-17 | End: 2024-01-17 | Stop reason: SDUPTHER

## 2024-01-17 RX ORDER — HYDROCHLOROTHIAZIDE 25 MG/1
25 TABLET ORAL DAILY
Qty: 90 TABLET | Refills: 3 | Status: SHIPPED | OUTPATIENT
Start: 2024-01-17

## 2024-01-17 NOTE — TELEPHONE ENCOUNTER
Alyssa HINOJOSA Ivan called to request a refill on her medication.      Last office visit : 1/8/2024   Next office visit : 4/8/2024     Requested Prescriptions     Pending Prescriptions Disp Refills    hydroCHLOROthiazide (HYDRODIURIL) 12.5 MG tablet 30 tablet 3     Sig: Take 1 tablet by mouth daily            Beck Tanner MA

## 2024-01-17 NOTE — TELEPHONE ENCOUNTER
I didn't see your message until I already sent the rx but it should be 25 mg.  I sent in a new refill for the 25 mg and a note to the pharmacy to cancel the 12.5 mg rx.

## 2024-01-26 ENCOUNTER — HOSPITAL ENCOUNTER (OUTPATIENT)
Dept: INFUSION THERAPY | Age: 89
Setting detail: INFUSION SERIES
Discharge: HOME | End: 2024-01-26
Payer: MEDICARE

## 2024-01-26 VITALS
TEMPERATURE: 98 F | SYSTOLIC BLOOD PRESSURE: 140 MMHG | DIASTOLIC BLOOD PRESSURE: 67 MMHG | HEART RATE: 70 BPM | OXYGEN SATURATION: 97 %

## 2024-01-26 DIAGNOSIS — M54.9 MID BACK PAIN: ICD-10-CM

## 2024-01-26 DIAGNOSIS — M81.0 AGE-RELATED OSTEOPOROSIS WITHOUT CURRENT PATHOLOGICAL FRACTURE: Primary | ICD-10-CM

## 2024-01-26 DIAGNOSIS — M54.50 CHRONIC BILATERAL LOW BACK PAIN WITHOUT SCIATICA: ICD-10-CM

## 2024-01-26 DIAGNOSIS — G89.29 CHRONIC BILATERAL LOW BACK PAIN WITHOUT SCIATICA: ICD-10-CM

## 2024-01-26 PROCEDURE — 6360000002 HC RX W HCPCS: Performed by: NURSE PRACTITIONER

## 2024-01-26 PROCEDURE — 96372 THER/PROPH/DIAG INJ SC/IM: CPT

## 2024-01-26 RX ORDER — DIPHENHYDRAMINE HYDROCHLORIDE 50 MG/ML
50 INJECTION INTRAMUSCULAR; INTRAVENOUS
OUTPATIENT
Start: 2024-07-04

## 2024-01-26 RX ORDER — ACETAMINOPHEN 325 MG/1
650 TABLET ORAL
OUTPATIENT
Start: 2024-07-04

## 2024-01-26 RX ORDER — ALBUTEROL SULFATE 90 UG/1
4 AEROSOL, METERED RESPIRATORY (INHALATION) PRN
OUTPATIENT
Start: 2024-07-04

## 2024-01-26 RX ORDER — SODIUM CHLORIDE 9 MG/ML
INJECTION, SOLUTION INTRAVENOUS CONTINUOUS
OUTPATIENT
Start: 2024-07-04

## 2024-01-26 RX ORDER — ONDANSETRON 2 MG/ML
8 INJECTION INTRAMUSCULAR; INTRAVENOUS
OUTPATIENT
Start: 2024-07-04

## 2024-01-26 RX ORDER — EPINEPHRINE 1 MG/ML
0.3 INJECTION, SOLUTION, CONCENTRATE INTRAVENOUS PRN
OUTPATIENT
Start: 2024-07-04

## 2024-01-26 RX ADMIN — DENOSUMAB 60 MG: 60 INJECTION SUBCUTANEOUS at 07:07

## 2024-01-26 NOTE — DISCHARGE INSTRUCTIONS
denosumab (Prolia)  Pronunciation:  lorenzo [place] maggy mab  Brand:  Prolia  What is the most important information I should know about Prolia?  This medication guide provides information about the Prolia brand of denosumab. Xgeva is another brand of denosumab used to prevent bone fractures and other skeletal conditions in people with tumors that have spread to the bone.  Prolia can cause many serious side effects. Call your doctor at once if you have a fever, chills, pain or burning when you urinate, severe stomach pain, cough, shortness of breath, skin problems, numbness or tingling, severe or unusual pain, or skin problems.  Do not use if you are pregnant. Use effective birth control while using Prolia, and for at least 5 months after you stop.  What is denosumab (Prolia)?  Denosumab is a monoclonal antibody. Monoclonal antibodies are made to target and destroy only certain cells in the body. This may help to protect healthy cells from damage.  The Prolia brand of denosumab is used to treat osteoporosis or bone loss in men and women who have a high risk of bone fracture. Prolia is sometimes used in people whose bone fracture is caused by certain medicines or cancer treatments.  This medication guide provides information about the Prolia brand of denosumab. Xgeva is another brand of denosumab used to prevent bone fractures and other skeletal conditions in people with tumors that have spread to the bone.  Denosumab may also be used for purposes not listed in this medication guide.  What should I discuss with my healthcare provider before receiving Prolia?  You should not receive Prolia if you are allergic to denosumab, or if you have:  low levels of calcium in your blood (hypocalcemia); or  if you are pregnant.  While you are using Prolia, you should not receive Xgeva, another brand of denosumab.  Tell your doctor if you have ever had:  kidney disease (or if you are on dialysis);  a weak immune system (caused by  mouth, or in your fingers and toes).  Serious infections may occur during treatment with Prolia. Call your doctor right away if you have signs of infection such as:  fever, chills, night sweats;  swelling, pain, tenderness, warmth, or redness anywhere on your body;  pain or burning when you urinate;  increased or urgent need to urinate;  severe stomach pain; or  cough, wheezing, feeling short of breath.  Common side effects may include:  bladder infection (painful or difficult urination);  lung infection (cough, shortness of breath);  headache;  back pain, muscle pain, joint pain;  increased blood pressure;  cold symptoms such as stuffy nose, sneezing, sore throat;  high cholesterol; or  pain in your arms or legs.  This is not a complete list of side effects and others may occur. Call your doctor for medical advice about side effects. You may report side effects to FDA at 6-409-FDA-1019.  What other drugs will affect Prolia?  Other drugs may affect Prolia, including prescription and over-the-counter medicines, vitamins, and herbal products. Tell your doctor about all your current medicines and any medicine you start or stop using.  Where can I get more information?  Your doctor or pharmacist can provide more information about denosumab (Prolia).  Remember, keep this and all other medicines out of the reach of children, never share your medicines with others, and use this medication only for the indication prescribed.   Every effort has been made to ensure that the information provided by [employer]. ('[employer]') is accurate, up-to-date, and complete, but no guarantee is made to that effect. Drug information contained herein may be time sensitive. [employer] information has been compiled for use by healthcare practitioners and consumers in the [place] and therefore [employer] does not warrant that uses outside of the [place] are appropriate, unless specifically indicated otherwise. [employer]'s drug information

## 2024-01-29 DIAGNOSIS — I10 ESSENTIAL HYPERTENSION: Primary | ICD-10-CM

## 2024-01-29 DIAGNOSIS — M54.50 CHRONIC BILATERAL LOW BACK PAIN WITHOUT SCIATICA: ICD-10-CM

## 2024-01-29 DIAGNOSIS — M54.9 MID BACK PAIN: ICD-10-CM

## 2024-01-29 DIAGNOSIS — G89.29 CHRONIC BILATERAL LOW BACK PAIN WITHOUT SCIATICA: ICD-10-CM

## 2024-01-29 RX ORDER — GABAPENTIN 300 MG/1
300 CAPSULE ORAL NIGHTLY
Qty: 30 CAPSULE | Refills: 2 | Status: SHIPPED | OUTPATIENT
Start: 2024-01-29 | End: 2024-04-28

## 2024-01-29 RX ORDER — GABAPENTIN 300 MG/1
CAPSULE ORAL
Qty: 30 CAPSULE | Refills: 2 | OUTPATIENT
Start: 2024-01-29

## 2024-01-29 RX ORDER — AMLODIPINE BESYLATE 10 MG/1
10 TABLET ORAL DAILY
Qty: 90 TABLET | Refills: 3 | Status: SHIPPED | OUTPATIENT
Start: 2024-01-29

## 2024-01-29 RX ORDER — GABAPENTIN 300 MG/1
CAPSULE ORAL
Qty: 30 CAPSULE | Refills: 0 | OUTPATIENT
Start: 2024-01-29 | End: 2024-02-28

## 2024-01-29 NOTE — TELEPHONE ENCOUNTER
Requested Prescriptions     Pending Prescriptions Disp Refills    gabapentin (NEURONTIN) 300 MG capsule [Pharmacy Med Name: GABAPENTIN 300 MG CAPSULE] 30 capsule 0     Sig: TAKE 1 CAPSULE BY MOUTH AT BEDTIME FOR 30 DAYS.       Last Office Visit:  12/22/23  Next Office Visit:  6/21/24  Last Medication Refill:  11/13/23  Matti up to date:  1/29/24    *RX updated to reflect   1/29/24  fill date*      Hospital pt

## 2024-01-29 NOTE — TELEPHONE ENCOUNTER
Alyssa Love called to request a refill on her medication.  Patient calls for refills    Last office visit : 1/8/2024   Next office visit : 4/8/2024     Last UDS:   Amphetamine Screen, Urine   Date Value Ref Range Status   08/25/2023 neg  Final     Barbiturate Screen, Urine   Date Value Ref Range Status   08/25/2023 neg  Final     Benzodiazepine Screen, Urine   Date Value Ref Range Status   08/25/2023 neg  Final     Buprenorphine Urine   Date Value Ref Range Status   08/25/2023 neg  Final     Cocaine Metabolite Screen, Urine   Date Value Ref Range Status   08/25/2023 neg  Final     Gabapentin Screen, Urine   Date Value Ref Range Status   08/25/2023 neg  Final     MDMA, Urine   Date Value Ref Range Status   08/25/2023 neg  Final     Methamphetamine, Urine   Date Value Ref Range Status   08/25/2023 neg  Final     Opiate Scrn, Ur   Date Value Ref Range Status   08/25/2023 neg  Final     Oxycodone Screen, Ur   Date Value Ref Range Status   08/25/2023 neg  Final     PCP Screen, Urine   Date Value Ref Range Status   08/25/2023 neg  Final     Propoxyphene Screen, Urine   Date Value Ref Range Status   08/25/2023 neg  Final     THC Screen, Urine   Date Value Ref Range Status   08/25/2023 neg  Final     Tricyclic Antidepressants, Urine   Date Value Ref Range Status   08/25/2023 neg  Final       Last Matti: 1/29/24  Medication Contract: 8/25/23   Last Fill: 1/6/24    Requested Prescriptions     Pending Prescriptions Disp Refills    gabapentin (NEURONTIN) 300 MG capsule 30 capsule 0     Sig: Take 1 capsule by mouth at bedtime for 30 days. Max Daily Amount: 300 mg    amLODIPine (NORVASC) 10 MG tablet 90 tablet 3     Sig: Take 1 tablet by mouth daily                               Please approve or refuse this medication.   Ayla Driver LPN

## 2024-02-07 DIAGNOSIS — G89.29 CHRONIC BILATERAL LOW BACK PAIN WITHOUT SCIATICA: ICD-10-CM

## 2024-02-07 DIAGNOSIS — M54.50 CHRONIC BILATERAL LOW BACK PAIN WITHOUT SCIATICA: ICD-10-CM

## 2024-02-07 RX ORDER — ISOSORBIDE MONONITRATE 30 MG/1
30 TABLET, EXTENDED RELEASE ORAL DAILY
Qty: 30 TABLET | Refills: 0 | OUTPATIENT
Start: 2024-02-07

## 2024-02-07 RX ORDER — ASPIRIN 81 MG
81 TABLET,CHEWABLE ORAL DAILY
Qty: 30 TABLET | Refills: 0 | OUTPATIENT
Start: 2024-02-07

## 2024-02-07 NOTE — TELEPHONE ENCOUNTER
Requested Prescriptions     Pending Prescriptions Disp Refills    ASPIRIN LOW DOSE 81 MG chewable tablet [Pharmacy Med Name: ASPIRIN 81 MG CHEWABLE TABLET] 30 tablet 0     Sig: TAKE 1 TABLET BY MOUTH EVERY DAY    isosorbide mononitrate (IMDUR) 30 MG extended release tablet [Pharmacy Med Name: ISOSORBIDE MONONIT ER 30 MG TB] 30 tablet 0     Sig: TAKE 1 TABLET BY MOUTH EVERY DAY       Last Office Visit: Visit date not found  Next Office Visit: Visit date not found  Last Medication Refill: 11/14/2023 with 0 RF

## 2024-02-08 DIAGNOSIS — I63.9 ACUTE ISCHEMIC STROKE (HCC): ICD-10-CM

## 2024-02-08 DIAGNOSIS — I10 ESSENTIAL HYPERTENSION: ICD-10-CM

## 2024-02-08 RX ORDER — GABAPENTIN 100 MG/1
100 CAPSULE ORAL 2 TIMES DAILY
Qty: 180 CAPSULE | OUTPATIENT
Start: 2024-02-08 | End: 2024-05-08

## 2024-02-08 RX ORDER — GABAPENTIN 100 MG/1
100 CAPSULE ORAL EVERY MORNING
Qty: 30 CAPSULE | Refills: 2 | Status: SHIPPED | OUTPATIENT
Start: 2024-02-08 | End: 2024-05-08

## 2024-02-27 RX ORDER — MINOXIDIL 2 %
SOLUTION, NON-ORAL TOPICAL
Qty: 30 ML | OUTPATIENT
Start: 2024-02-27

## 2024-02-27 RX ORDER — ISOSORBIDE MONONITRATE 30 MG/1
30 TABLET, EXTENDED RELEASE ORAL DAILY
Qty: 30 TABLET | Refills: 0 | OUTPATIENT
Start: 2024-02-27

## 2024-02-28 ENCOUNTER — OFFICE VISIT (OUTPATIENT)
Dept: PRIMARY CARE CLINIC | Age: 89
End: 2024-02-28
Payer: MEDICARE

## 2024-02-28 VITALS
SYSTOLIC BLOOD PRESSURE: 122 MMHG | BODY MASS INDEX: 27.11 KG/M2 | TEMPERATURE: 97.9 F | HEART RATE: 96 BPM | HEIGHT: 63 IN | DIASTOLIC BLOOD PRESSURE: 64 MMHG | WEIGHT: 153 LBS | OXYGEN SATURATION: 98 %

## 2024-02-28 DIAGNOSIS — U07.1 COVID-19 VIRUS INFECTION: Primary | ICD-10-CM

## 2024-02-28 DIAGNOSIS — R05.1 ACUTE COUGH: ICD-10-CM

## 2024-02-28 DIAGNOSIS — J06.9 VIRAL URI WITH COUGH: ICD-10-CM

## 2024-02-28 DIAGNOSIS — R68.83 CHILLS: ICD-10-CM

## 2024-02-28 DIAGNOSIS — B00.1 FEVER BLISTER: ICD-10-CM

## 2024-02-28 LAB
INFLUENZA A ANTIGEN, POC: NEGATIVE
INFLUENZA B ANTIGEN, POC: NEGATIVE
LOT EXPIRE DATE: ABNORMAL
LOT KIT NUMBER: ABNORMAL
SARS-COV-2, POC: DETECTED
VALID INTERNAL CONTROL: ABNORMAL
VENDOR AND KIT NAME POC: ABNORMAL

## 2024-02-28 PROCEDURE — 1123F ACP DISCUSS/DSCN MKR DOCD: CPT | Performed by: NURSE PRACTITIONER

## 2024-02-28 PROCEDURE — 99213 OFFICE O/P EST LOW 20 MIN: CPT | Performed by: NURSE PRACTITIONER

## 2024-02-28 RX ORDER — METHYLPREDNISOLONE 4 MG/1
TABLET ORAL
Qty: 1 KIT | Refills: 0 | Status: SHIPPED | OUTPATIENT
Start: 2024-02-28 | End: 2024-03-05

## 2024-02-28 RX ORDER — ACYCLOVIR 50 MG/G
OINTMENT TOPICAL
Qty: 5 G | Refills: 1 | Status: SHIPPED | OUTPATIENT
Start: 2024-02-28 | End: 2024-03-06

## 2024-02-28 RX ORDER — BENZONATATE 100 MG/1
100 CAPSULE ORAL 3 TIMES DAILY PRN
Qty: 30 CAPSULE | Refills: 0 | Status: SHIPPED | OUTPATIENT
Start: 2024-02-28

## 2024-02-28 ASSESSMENT — ENCOUNTER SYMPTOMS
ABDOMINAL PAIN: 0
SINUS PRESSURE: 1
NAUSEA: 0
SORE THROAT: 0
WHEEZING: 0
SHORTNESS OF BREATH: 0
COUGH: 1
DIARRHEA: 0
CHEST TIGHTNESS: 0

## 2024-02-28 NOTE — PROGRESS NOTES
has any questions regarding her treatment. Should her conditions worsen, she should return to office to be reassessed by BRAYDON Louise. she Should to go the closest Emergency Department for any emergency. They have verbalized understanding regarding  the above instructions.     Return for as scheduled.

## 2024-03-04 RX ORDER — ISOSORBIDE MONONITRATE 30 MG/1
30 TABLET, EXTENDED RELEASE ORAL DAILY
Qty: 30 TABLET | Refills: 0 | OUTPATIENT
Start: 2024-03-04

## 2024-03-04 NOTE — TELEPHONE ENCOUNTER
Requested Prescriptions     Pending Prescriptions Disp Refills    isosorbide mononitrate (IMDUR) 30 MG extended release tablet [Pharmacy Med Name: ISOSORBIDE MONONIT ER 30 MG TB] 30 tablet 0     Sig: TAKE 1 TABLET BY MOUTH EVERY DAY       Last Office Visit: Visit date not found  Next Office Visit: Visit date not found  Last Medication Refill: 11/14/2023 with 0 RF

## 2024-03-18 ENCOUNTER — OFFICE VISIT (OUTPATIENT)
Dept: CARDIOLOGY CLINIC | Age: 89
End: 2024-03-18
Payer: MEDICARE

## 2024-03-18 VITALS
SYSTOLIC BLOOD PRESSURE: 124 MMHG | DIASTOLIC BLOOD PRESSURE: 58 MMHG | HEIGHT: 63 IN | HEART RATE: 74 BPM | WEIGHT: 153 LBS | OXYGEN SATURATION: 97 % | BODY MASS INDEX: 27.11 KG/M2

## 2024-03-18 DIAGNOSIS — I10 ESSENTIAL HYPERTENSION: ICD-10-CM

## 2024-03-18 DIAGNOSIS — I25.10 CORONARY ARTERY DISEASE INVOLVING NATIVE CORONARY ARTERY OF NATIVE HEART WITHOUT ANGINA PECTORIS: ICD-10-CM

## 2024-03-18 DIAGNOSIS — E78.2 MIXED HYPERLIPIDEMIA: ICD-10-CM

## 2024-03-18 DIAGNOSIS — Z95.2 S/P TAVR (TRANSCATHETER AORTIC VALVE REPLACEMENT): ICD-10-CM

## 2024-03-18 DIAGNOSIS — I35.0 NONRHEUMATIC AORTIC VALVE STENOSIS: Primary | ICD-10-CM

## 2024-03-18 PROCEDURE — 99214 OFFICE O/P EST MOD 30 MIN: CPT | Performed by: CLINICAL NURSE SPECIALIST

## 2024-03-18 PROCEDURE — 1123F ACP DISCUSS/DSCN MKR DOCD: CPT | Performed by: CLINICAL NURSE SPECIALIST

## 2024-03-18 RX ORDER — ISOSORBIDE MONONITRATE 30 MG/1
30 TABLET, EXTENDED RELEASE ORAL DAILY
Qty: 90 TABLET | Refills: 3 | Status: SHIPPED | OUTPATIENT
Start: 2024-03-18

## 2024-03-18 NOTE — PROGRESS NOTES
[facility]  [address] [place] [address]  Phone: [phone]  Fax: [phone]    OFFICE VISIT:  3/18/2024    Alyssa Love - : 1935    Reason For Visit:  Alyssa is a 89 y.o. female who is here for Follow-up (No symptoms) and Cardiac Valve Problem  History of hyperlipidemia, hypertension, DJD, aortic stenosis that progressively worsened to severe earlier this year.  Recommended TAVR approach for aortic valve replacement.  Patient was referred to [facility] for surgery  On 10/16/2023 patient had TAVR with 23 mm Gonzalez EDUARD valve  Patient saw primary care in follow-up.  On 10-23 presented to the emergency room with stroke like symptoms including slurred speech, right-sided facial droop, abnormal gait and somnolence.  MRI showed tiny multifocal acute infarcts in the right cerebellum without mass or hemorrhage.  She was discharged to inpatient rehab and then discharged home on 2023.      Patient was seen in follow-up in November doing well.  2-week monitor showed normal sinus rhythm with no high degree AV block, pauses or arrhythmias.  Occasional ectopy.    She returns today for follow-up.  She is accompanied with her daughter.  She states overall she is doing well.      Subjective  Alyssa denies exertional chest pain, shortness of breath, orthopnea, paroxysmal nocturnal dyspnea, syncope, presyncope, arrhythmia, edema and fatigue.  The patient denies numbness or weakness to suggest cerebrovascular accident or transient ischemic attack.    Betsy Ott APRN is PCP and follows labs.  Alyssa Love has the following history as recorded in [facility]:    Patient Active Problem List    Diagnosis Date Noted    Prediabetes 2023    Urinary incontinence 2023    Acute ischemic stroke (HCC) 2023    Palliative care patient 2023    Cerebrovascular accident (CVA) (HCC) 2023    Thyroid nodule 2023    Pulmonary nodule 2023    S/P TAVR

## 2024-03-18 NOTE — PATIENT INSTRUCTIONS
Maintain good blood pressure control-goal<130/80 at rest  Maintain good cholesterol control LDL goal<70 with arterial disease  If you are diabetic work to keep/obtain hemoglobin A1c< 7    Follow up in 6 months with Dr. Quintero   ECHO prior   Call with any questions or concerns  Follow up with Betsy Ott APRN for non cardiac problems  Report any new problems  Cardiovascular Fitness-Exercise as tolerated.  Strive for 30 minutes of exercise most days of the week.    Continue the PT and fall precautions   Cardiac / Healthy Diet- Avoid processed high fat foods, maintain low sodium/salt   Continue current medications as directed  Continue plan of treatment  It is always recommended that you bring your medications bottles with you to each visit - this is for your safety!

## 2024-03-22 ENCOUNTER — OFFICE VISIT (OUTPATIENT)
Dept: PRIMARY CARE CLINIC | Age: 89
End: 2024-03-22
Payer: MEDICARE

## 2024-03-22 VITALS
BODY MASS INDEX: 27.29 KG/M2 | HEART RATE: 86 BPM | TEMPERATURE: 97.6 F | HEIGHT: 63 IN | OXYGEN SATURATION: 96 % | DIASTOLIC BLOOD PRESSURE: 64 MMHG | WEIGHT: 154 LBS | SYSTOLIC BLOOD PRESSURE: 130 MMHG

## 2024-03-22 DIAGNOSIS — R30.0 DYSURIA: Primary | ICD-10-CM

## 2024-03-22 DIAGNOSIS — N89.8 VAGINAL IRRITATION: ICD-10-CM

## 2024-03-22 DIAGNOSIS — R30.0 DYSURIA: ICD-10-CM

## 2024-03-22 LAB
BACTERIA URNS QL MICRO: ABNORMAL /HPF
BILIRUB UR QL STRIP: NEGATIVE
CLARITY UR: ABNORMAL
COLOR UR: YELLOW
CRYSTALS URNS MICRO: ABNORMAL /HPF
EPI CELLS #/AREA URNS AUTO: 5 /HPF (ref 0–5)
GLUCOSE UR STRIP.AUTO-MCNC: NEGATIVE MG/DL
HGB UR STRIP.AUTO-MCNC: ABNORMAL MG/L
HYALINE CASTS #/AREA URNS AUTO: 5 /HPF (ref 0–8)
KETONES UR STRIP.AUTO-MCNC: NEGATIVE MG/DL
LEUKOCYTE ESTERASE UR QL STRIP.AUTO: ABNORMAL
NITRITE UR QL STRIP.AUTO: POSITIVE
PH UR STRIP.AUTO: 7 [PH] (ref 5–8)
PROT UR STRIP.AUTO-MCNC: NEGATIVE MG/DL
RBC #/AREA URNS AUTO: 3 /HPF (ref 0–4)
SP GR UR STRIP.AUTO: 1.01 (ref 1–1.03)
UROBILINOGEN UR STRIP.AUTO-MCNC: 0.2 E.U./DL
WBC #/AREA URNS AUTO: 656 /HPF (ref 0–5)

## 2024-03-22 PROCEDURE — 1123F ACP DISCUSS/DSCN MKR DOCD: CPT | Performed by: NURSE PRACTITIONER

## 2024-03-22 PROCEDURE — 99213 OFFICE O/P EST LOW 20 MIN: CPT | Performed by: NURSE PRACTITIONER

## 2024-03-22 RX ORDER — CEFDINIR 300 MG/1
300 CAPSULE ORAL 2 TIMES DAILY
Qty: 14 CAPSULE | Refills: 0 | Status: SHIPPED | OUTPATIENT
Start: 2024-03-22 | End: 2024-03-29

## 2024-03-22 SDOH — ECONOMIC STABILITY: FOOD INSECURITY: WITHIN THE PAST 12 MONTHS, THE FOOD YOU BOUGHT JUST DIDN'T LAST AND YOU DIDN'T HAVE MONEY TO GET MORE.: NEVER TRUE

## 2024-03-22 SDOH — ECONOMIC STABILITY: FOOD INSECURITY: WITHIN THE PAST 12 MONTHS, YOU WORRIED THAT YOUR FOOD WOULD RUN OUT BEFORE YOU GOT MONEY TO BUY MORE.: NEVER TRUE

## 2024-03-22 SDOH — ECONOMIC STABILITY: INCOME INSECURITY: HOW HARD IS IT FOR YOU TO PAY FOR THE VERY BASICS LIKE FOOD, HOUSING, MEDICAL CARE, AND HEATING?: NOT HARD AT ALL

## 2024-03-22 ASSESSMENT — ENCOUNTER SYMPTOMS
SORE THROAT: 0
WHEEZING: 0
DIARRHEA: 0
NAUSEA: 0
SHORTNESS OF BREATH: 0
COUGH: 0
CHEST TIGHTNESS: 0

## 2024-03-22 NOTE — PROGRESS NOTES
Mood and Affect: Mood normal.         Behavior: Behavior normal.         Thought Content: Thought content normal.         ASSESSMENT/PLAN:  1. Dysuria  -UA, urine culture  -Treat empirically with cefdinir 300 mg bid x 7 days pending culture  - Culture, Urine; Future  - Urinalysis; Future  - cefdinir (OMNICEF) 300 MG capsule; Take 1 capsule by mouth 2 times daily for 7 days  Dispense: 14 capsule; Refill: 0    2. Vaginal irritation  -Refer to GYN for evaluation  - Sirena Sol APRN, OB/GYN, [place]         Return for as scheduled.    Alyssa was seen today for urinary tract infection.    Diagnoses and all orders for this visit:    Dysuria  -     Culture, Urine; Future  -     Urinalysis; Future  -     cefdinir (OMNICEF) 300 MG capsule; Take 1 capsule by mouth 2 times daily for 7 days    Vaginal irritation  -     Sirena Sol APRN, OB/GYN, [place]      There are no discontinued medications.  There are no Patient Instructions on file for this visit.    Patient voicesunderstanding and agrees to plans along with risks and benefits of plan.    Counseling:  Alyssa Love's case, medications and options were discussed in detail. Patient was instructed to call the office if she has any questions regarding her treatment. Should her conditions worsen, she should return to office to be reassessed by BRAYDON Louise. she Should to go the closest Emergency Department for any emergency. They have verbalized understanding regarding  the above instructions.     Return for as scheduled.

## 2024-03-24 LAB
BACTERIA UR CULT: ABNORMAL
BACTERIA UR CULT: ABNORMAL
ORGANISM: ABNORMAL

## 2024-03-28 ENCOUNTER — OFFICE VISIT (OUTPATIENT)
Dept: OBGYN CLINIC | Age: 89
End: 2024-03-28
Payer: MEDICARE

## 2024-03-28 VITALS
SYSTOLIC BLOOD PRESSURE: 134 MMHG | DIASTOLIC BLOOD PRESSURE: 68 MMHG | WEIGHT: 154 LBS | HEIGHT: 63 IN | HEART RATE: 83 BPM | BODY MASS INDEX: 27.29 KG/M2

## 2024-03-28 DIAGNOSIS — Z76.89 ENCOUNTER TO ESTABLISH CARE: Primary | ICD-10-CM

## 2024-03-28 DIAGNOSIS — N81.11 CYSTOCELE, MIDLINE: ICD-10-CM

## 2024-03-28 DIAGNOSIS — R10.2 PELVIC PRESSURE IN FEMALE: ICD-10-CM

## 2024-03-28 PROCEDURE — 1123F ACP DISCUSS/DSCN MKR DOCD: CPT

## 2024-03-28 PROCEDURE — 99203 OFFICE O/P NEW LOW 30 MIN: CPT

## 2024-03-28 NOTE — PROGRESS NOTES
[facility]  CNM Office Note    Alyssa Love is a 89 y.o. female who presents today for her medical conditions/ complaints as noted below.  Chief Complaint   Patient presents with    New Patient     HPI  Alyssa presents today to establish care. She reports she was in exercise class a few weeks ago and felt something \"pop\" while she was bending over. She has been feeling a lot of pelvic pressure since then. She deniesissues voiding and is having regular bowel movements. She had hysterectomy in 1980s for heavy bleeding. She denies vaginal itching or discharge. She does take a diuretic and wears pads at night for extra protection.     Problems/Complaints today:  1. Encounter to establish care  2. Pelvic pressure in female  3. Cystocele, midline       Patient Active Problem List   Diagnosis    Essential hypertension    Mixed hyperlipidemia    Age-related osteoporosis without current pathological fracture    Coronary artery disease involving native coronary artery of native heart without angina pectoris    DDD (degenerative disc disease), lumbar    Basal cell carcinoma (BCC) of scalp    Chronic bilateral low back pain without sciatica    Macular degeneration of both eyes    Primary osteoarthritis of right knee    Spinal stenosis of lumbar region with neurogenic claudication    Recurrent UTI    Aortic stenosis    Ventral hernia without obstruction or gangrene    Prediabetes    Dysuria    S/P TAVR (transcatheter aortic valve replacement)    Cerebrovascular accident (CVA) (HCC)    Thyroid nodule    Pulmonary nodule    Palliative care patient    Acute ischemic stroke (HCC)    Urinary incontinence       No LMP recorded. Patient has had a hysterectomy.  No obstetric history on file.    Past Medical History:   Diagnosis Date    Age-related osteoporosis without current pathological fracture     Basal cell carcinoma (BCC) of scalp     skin    Coronary artery disease involving native coronary artery of native

## 2024-03-28 NOTE — PROGRESS NOTES
Pt Is here for a new patient visit for vaginal irritation. This hasn't been going on for very long. It kind of burns she says.

## 2024-04-03 ENCOUNTER — HOSPITAL ENCOUNTER (OUTPATIENT)
Dept: CT IMAGING | Age: 89
Discharge: HOME | End: 2024-04-03
Payer: MEDICARE

## 2024-04-03 DIAGNOSIS — R91.1 PULMONARY NODULE: ICD-10-CM

## 2024-04-03 PROCEDURE — 71260 CT THORAX DX C+: CPT

## 2024-04-03 PROCEDURE — 6360000004 HC RX CONTRAST MEDICATION: Performed by: NURSE PRACTITIONER

## 2024-04-03 RX ADMIN — IOPAMIDOL 60 ML: 755 INJECTION, SOLUTION INTRAVENOUS at 14:14

## 2024-04-05 DIAGNOSIS — M81.0 AGE-RELATED OSTEOPOROSIS WITHOUT CURRENT PATHOLOGICAL FRACTURE: ICD-10-CM

## 2024-04-05 DIAGNOSIS — R73.03 PREDIABETES: ICD-10-CM

## 2024-04-05 DIAGNOSIS — I10 ESSENTIAL HYPERTENSION: ICD-10-CM

## 2024-04-05 LAB
25(OH)D3 SERPL-MCNC: 45 NG/ML
ALBUMIN SERPL-MCNC: 4.7 G/DL (ref 3.5–5.2)
ALP SERPL-CCNC: 76 U/L (ref 35–104)
ALT SERPL-CCNC: 16 U/L (ref 5–33)
ANION GAP SERPL CALCULATED.3IONS-SCNC: 12 MMOL/L (ref 7–19)
AST SERPL-CCNC: 21 U/L (ref 5–32)
BASOPHILS # BLD: 0.1 K/UL (ref 0–0.2)
BASOPHILS NFR BLD: 0.9 % (ref 0–1)
BILIRUB SERPL-MCNC: 0.6 MG/DL (ref 0.2–1.2)
BUN SERPL-MCNC: 10 MG/DL (ref 8–23)
CALCIUM SERPL-MCNC: 10.1 MG/DL (ref 8.8–10.2)
CHLORIDE SERPL-SCNC: 100 MMOL/L (ref 98–111)
CO2 SERPL-SCNC: 27 MMOL/L (ref 22–29)
CREAT SERPL-MCNC: 0.6 MG/DL (ref 0.5–0.9)
EOSINOPHIL # BLD: 0.1 K/UL (ref 0–0.6)
EOSINOPHIL NFR BLD: 1 % (ref 0–5)
ERYTHROCYTE [DISTWIDTH] IN BLOOD BY AUTOMATED COUNT: 15.3 % (ref 11.5–14.5)
GLUCOSE SERPL-MCNC: 100 MG/DL (ref 74–109)
HBA1C MFR BLD: 5.9 % (ref 4–6)
HCT VFR BLD AUTO: 41.4 % (ref 37–47)
HGB BLD-MCNC: 13.5 G/DL (ref 12–16)
IMM GRANULOCYTES # BLD: 0 K/UL
LYMPHOCYTES # BLD: 2.4 K/UL (ref 1.1–4.5)
LYMPHOCYTES NFR BLD: 31.8 % (ref 20–40)
MCH RBC QN AUTO: 28.1 PG (ref 27–31)
MCHC RBC AUTO-ENTMCNC: 32.6 G/DL (ref 33–37)
MCV RBC AUTO: 86.1 FL (ref 81–99)
MONOCYTES # BLD: 0.5 K/UL (ref 0–0.9)
MONOCYTES NFR BLD: 6.6 % (ref 0–10)
NEUTROPHILS # BLD: 4.6 K/UL (ref 1.5–7.5)
NEUTS SEG NFR BLD: 59.6 % (ref 50–65)
PLATELET # BLD AUTO: 284 K/UL (ref 130–400)
PMV BLD AUTO: 10.3 FL (ref 9.4–12.3)
POTASSIUM SERPL-SCNC: 3.7 MMOL/L (ref 3.5–5)
PROT SERPL-MCNC: 7.9 G/DL (ref 6.6–8.7)
RBC # BLD AUTO: 4.81 M/UL (ref 4.2–5.4)
SODIUM SERPL-SCNC: 139 MMOL/L (ref 136–145)
WBC # BLD AUTO: 7.7 K/UL (ref 4.8–10.8)

## 2024-04-08 ENCOUNTER — TELEPHONE (OUTPATIENT)
Dept: OBGYN CLINIC | Age: 89
End: 2024-04-08

## 2024-04-08 ENCOUNTER — OFFICE VISIT (OUTPATIENT)
Dept: PRIMARY CARE CLINIC | Age: 89
End: 2024-04-08
Payer: MEDICARE

## 2024-04-08 VITALS
OXYGEN SATURATION: 97 % | HEIGHT: 63 IN | SYSTOLIC BLOOD PRESSURE: 112 MMHG | WEIGHT: 154 LBS | BODY MASS INDEX: 27.29 KG/M2 | DIASTOLIC BLOOD PRESSURE: 62 MMHG | TEMPERATURE: 97.9 F | HEART RATE: 82 BPM

## 2024-04-08 DIAGNOSIS — M81.0 AGE-RELATED OSTEOPOROSIS WITHOUT CURRENT PATHOLOGICAL FRACTURE: ICD-10-CM

## 2024-04-08 DIAGNOSIS — I25.10 CORONARY ARTERY DISEASE INVOLVING NATIVE CORONARY ARTERY OF NATIVE HEART WITHOUT ANGINA PECTORIS: ICD-10-CM

## 2024-04-08 DIAGNOSIS — R91.1 PULMONARY NODULE: ICD-10-CM

## 2024-04-08 DIAGNOSIS — N81.10 FEMALE CYSTOCELE: ICD-10-CM

## 2024-04-08 DIAGNOSIS — Z95.2 S/P TAVR (TRANSCATHETER AORTIC VALVE REPLACEMENT): ICD-10-CM

## 2024-04-08 DIAGNOSIS — I10 ESSENTIAL HYPERTENSION: ICD-10-CM

## 2024-04-08 DIAGNOSIS — L72.0 EPIDERMOID CYST OF SKIN OF CHEEK: ICD-10-CM

## 2024-04-08 DIAGNOSIS — Z86.73 HISTORY OF ISCHEMIC STROKE: ICD-10-CM

## 2024-04-08 DIAGNOSIS — E04.1 THYROID NODULE: ICD-10-CM

## 2024-04-08 DIAGNOSIS — M54.50 CHRONIC BILATERAL LOW BACK PAIN WITHOUT SCIATICA: ICD-10-CM

## 2024-04-08 DIAGNOSIS — R73.03 PREDIABETES: ICD-10-CM

## 2024-04-08 DIAGNOSIS — G89.29 CHRONIC BILATERAL LOW BACK PAIN WITHOUT SCIATICA: ICD-10-CM

## 2024-04-08 PROCEDURE — 1123F ACP DISCUSS/DSCN MKR DOCD: CPT | Performed by: NURSE PRACTITIONER

## 2024-04-08 PROCEDURE — 99214 OFFICE O/P EST MOD 30 MIN: CPT | Performed by: NURSE PRACTITIONER

## 2024-04-08 ASSESSMENT — ENCOUNTER SYMPTOMS
CHEST TIGHTNESS: 0
NAUSEA: 0
WHEEZING: 0
ABDOMINAL PAIN: 0
DIARRHEA: 0
COUGH: 0
SHORTNESS OF BREATH: 0
SORE THROAT: 0

## 2024-04-08 NOTE — TELEPHONE ENCOUNTER
Patient called to speak with a nurse regarding bladder issues. Also wanting a prescription called in. Please advise.

## 2024-04-08 NOTE — TELEPHONE ENCOUNTER
Pt called in stating that she is having bladder issues. Sirena TIJERINA had discussed with her several options that she could do to help with this. Pt would like to move forward & get a pessary. Apt has been scheduled. Pt also mentioned that Sirena wanted to start her on estrogen. I let the pt know that Sirena TIJERINA is out of the office today. I will discuss with Sirena & get back with the pt. Pt verbalized understanding.

## 2024-04-08 NOTE — PROGRESS NOTES
options were discussed in detail. Patient was instructed to call the office if she has any questions regarding her treatment. Should her conditions worsen, she should return to office to be reassessed by BRAYDON Louise. she Should to go the closest Emergency Department for any emergency. They have verbalized understanding regarding  the above instructions.     Return in about 6 months (around 10/8/2024) for office visit extended, hypertension, prediabetes.

## 2024-04-23 ENCOUNTER — PROCEDURE VISIT (OUTPATIENT)
Dept: OBGYN CLINIC | Age: 89
End: 2024-04-23
Payer: MEDICARE

## 2024-04-23 VITALS
SYSTOLIC BLOOD PRESSURE: 147 MMHG | WEIGHT: 153.9 LBS | HEART RATE: 78 BPM | BODY MASS INDEX: 27.27 KG/M2 | DIASTOLIC BLOOD PRESSURE: 70 MMHG | HEIGHT: 63 IN

## 2024-04-23 DIAGNOSIS — Z46.89 ENCOUNTER FOR FITTING AND ADJUSTMENT OF PESSARY: Primary | ICD-10-CM

## 2024-04-23 DIAGNOSIS — R10.2 PELVIC PRESSURE IN FEMALE: ICD-10-CM

## 2024-04-23 DIAGNOSIS — N81.11 CYSTOCELE, MIDLINE: ICD-10-CM

## 2024-04-23 PROCEDURE — 57160 INSERT PESSARY/OTHER DEVICE: CPT

## 2024-04-23 NOTE — PROGRESS NOTES
[facility] OB/GYN  CNM Office Note    Alyssa Love is a 89 y.o. female who presents today for her medical conditions/ complaints as noted below.  Chief Complaint   Patient presents with    Procedure     HPI  Alyssa presents today for pessary fitting. She came on 3/28 for a new patient visit and reported that \"she was in exercise class a few weeks ago and felt something \"pop\" while she was bending over.\" At that time, a mild cystocele was noted. She states it has progressively gotten worse and she is feeling a lot of pressure, urinary frequency, but denies issues with bowels. She has history of hysterectomy in 1980s.     Problems/Complaints today:  1. Encounter for fitting and adjustment of pessary  -     [place] [facility] PESSARY/OTH INTRAVAGINAL SUPPORT NORIS  2. Pelvic pressure in female  3. Cystocele, midline       Patient Active Problem List   Diagnosis    Essential hypertension    Mixed hyperlipidemia    Age-related osteoporosis without current pathological fracture    Coronary artery disease involving native coronary artery of native heart without angina pectoris    DDD (degenerative disc disease), lumbar    Basal cell carcinoma (BCC) of scalp    Chronic bilateral low back pain without sciatica    Macular degeneration of both eyes    Primary osteoarthritis of right knee    Spinal stenosis of lumbar region with neurogenic claudication    Recurrent UTI    Aortic stenosis    Ventral hernia without obstruction or gangrene    Prediabetes    Dysuria    S/P TAVR (transcatheter aortic valve replacement)    Cerebrovascular accident (CVA) (HCC)    Thyroid nodule    Pulmonary nodule    Palliative care patient    History of ischemic stroke    Urinary incontinence       No LMP recorded. Patient has had a hysterectomy.  No obstetric history on file.    Past Medical History:   Diagnosis Date    Age-related osteoporosis without current pathological fracture     Basal cell carcinoma (BCC) of scalp     skin    Coronary

## 2024-04-28 DIAGNOSIS — M54.9 MID BACK PAIN: ICD-10-CM

## 2024-04-28 DIAGNOSIS — G89.29 CHRONIC BILATERAL LOW BACK PAIN WITHOUT SCIATICA: ICD-10-CM

## 2024-04-28 DIAGNOSIS — M54.50 CHRONIC BILATERAL LOW BACK PAIN WITHOUT SCIATICA: ICD-10-CM

## 2024-04-29 RX ORDER — GABAPENTIN 300 MG/1
300 CAPSULE ORAL NIGHTLY
Qty: 30 CAPSULE | Refills: 2 | Status: SHIPPED | OUTPATIENT
Start: 2024-04-29 | End: 2024-07-28

## 2024-04-30 ASSESSMENT — ENCOUNTER SYMPTOMS
GASTROINTESTINAL NEGATIVE: 1
DIARRHEA: 0
SHORTNESS OF BREATH: 0
NAUSEA: 0
RESPIRATORY NEGATIVE: 1
ABDOMINAL PAIN: 0
CHEST TIGHTNESS: 0
CONSTIPATION: 0
BACK PAIN: 0
RECTAL PAIN: 0

## 2024-05-03 DIAGNOSIS — I63.9 ACUTE ISCHEMIC STROKE (HCC): ICD-10-CM

## 2024-05-03 DIAGNOSIS — I10 ESSENTIAL HYPERTENSION: ICD-10-CM

## 2024-05-06 RX ORDER — GABAPENTIN 100 MG/1
100 CAPSULE ORAL EVERY MORNING
Qty: 30 CAPSULE | Refills: 2 | OUTPATIENT
Start: 2024-05-06

## 2024-05-09 DIAGNOSIS — I63.9 ACUTE ISCHEMIC STROKE (HCC): ICD-10-CM

## 2024-05-09 DIAGNOSIS — I10 ESSENTIAL HYPERTENSION: ICD-10-CM

## 2024-05-10 ENCOUNTER — TELEPHONE (OUTPATIENT)
Dept: PRIMARY CARE CLINIC | Age: 89
End: 2024-05-10

## 2024-05-10 DIAGNOSIS — G89.29 CHRONIC BILATERAL LOW BACK PAIN WITHOUT SCIATICA: ICD-10-CM

## 2024-05-10 DIAGNOSIS — M54.50 CHRONIC BILATERAL LOW BACK PAIN WITHOUT SCIATICA: ICD-10-CM

## 2024-05-10 DIAGNOSIS — M54.6 CHRONIC LEFT-SIDED THORACIC BACK PAIN: Primary | ICD-10-CM

## 2024-05-10 DIAGNOSIS — I63.9 ACUTE ISCHEMIC STROKE (HCC): ICD-10-CM

## 2024-05-10 DIAGNOSIS — G89.29 CHRONIC LEFT-SIDED THORACIC BACK PAIN: Primary | ICD-10-CM

## 2024-05-10 DIAGNOSIS — I10 ESSENTIAL HYPERTENSION: ICD-10-CM

## 2024-05-10 RX ORDER — GABAPENTIN 100 MG/1
100 CAPSULE ORAL EVERY MORNING
Qty: 30 CAPSULE | Refills: 2 | Status: SHIPPED | OUTPATIENT
Start: 2024-05-10 | End: 2024-08-08

## 2024-05-10 RX ORDER — GABAPENTIN 100 MG/1
100 CAPSULE ORAL EVERY MORNING
Qty: 30 CAPSULE | Refills: 2 | OUTPATIENT
Start: 2024-05-10

## 2024-05-10 NOTE — TELEPHONE ENCOUNTER
The patient called today about the Gabapentin 100 mg  to be sent to [facility]. She called yesterday to get her refrill.

## 2024-06-20 ENCOUNTER — OFFICE VISIT (OUTPATIENT)
Dept: PRIMARY CARE CLINIC | Age: 89
End: 2024-06-20
Payer: MEDICARE

## 2024-06-20 VITALS
OXYGEN SATURATION: 94 % | SYSTOLIC BLOOD PRESSURE: 116 MMHG | TEMPERATURE: 96.9 F | BODY MASS INDEX: 27.14 KG/M2 | HEART RATE: 76 BPM | WEIGHT: 153.2 LBS | DIASTOLIC BLOOD PRESSURE: 64 MMHG | HEIGHT: 63 IN

## 2024-06-20 DIAGNOSIS — R30.0 DYSURIA: Primary | ICD-10-CM

## 2024-06-20 DIAGNOSIS — N39.0 URINARY TRACT INFECTION WITHOUT HEMATURIA, SITE UNSPECIFIED: ICD-10-CM

## 2024-06-20 LAB
BACTERIA #/AREA URNS HPF: ABNORMAL /HPF
BILIRUB UR QL STRIP: NEGATIVE
CLARITY UR: ABNORMAL
COLOR UR: YELLOW
GLUCOSE UR STRIP.AUTO-MCNC: NEGATIVE MG/DL
HGB UR STRIP.AUTO-MCNC: ABNORMAL MG/L
KETONES UR STRIP.AUTO-MCNC: NEGATIVE MG/DL
LEUKOCYTE ESTERASE UR QL STRIP.AUTO: ABNORMAL
NITRITE UR QL STRIP.AUTO: NEGATIVE
PH UR STRIP.AUTO: 7.5 [PH] (ref 5–8)
PROT UR STRIP.AUTO-MCNC: 100 MG/DL
SP GR UR STRIP.AUTO: 1.01 (ref 1–1.03)
UROBILINOGEN UR STRIP.AUTO-MCNC: 0.2 E.U./DL
WBC #/AREA URNS HPF: ABNORMAL /HPF (ref 0–5)

## 2024-06-20 PROCEDURE — 81002 URINALYSIS NONAUTO W/O SCOPE: CPT | Performed by: NURSE PRACTITIONER

## 2024-06-20 PROCEDURE — 1123F ACP DISCUSS/DSCN MKR DOCD: CPT | Performed by: NURSE PRACTITIONER

## 2024-06-20 PROCEDURE — 99214 OFFICE O/P EST MOD 30 MIN: CPT | Performed by: NURSE PRACTITIONER

## 2024-06-20 RX ORDER — NITROFURANTOIN 25; 75 MG/1; MG/1
100 CAPSULE ORAL 2 TIMES DAILY
Qty: 10 CAPSULE | Refills: 0 | Status: SHIPPED | OUTPATIENT
Start: 2024-06-20 | End: 2024-06-25

## 2024-06-20 ASSESSMENT — ENCOUNTER SYMPTOMS
SORE THROAT: 0
SHORTNESS OF BREATH: 0
ABDOMINAL PAIN: 0
CHEST TIGHTNESS: 0
NAUSEA: 0
DIARRHEA: 0
VOMITING: 0
COLOR CHANGE: 0
COUGH: 0

## 2024-06-20 NOTE — PROGRESS NOTES
Ms.Helen MICAELA Love is a 89 y.o. female who presents today for  Chief Complaint   Patient presents with    Dysuria       HPI:  Patient here today with complaints of dysuria that has been present for the past 4-5 days. She denies any associated fever, frequency or flank pain, but admits suprapubic pressure.     Review of Systems   Constitutional:  Negative for activity change and fever.   HENT:  Negative for congestion, ear pain and sore throat.    Respiratory:  Negative for cough, chest tightness and shortness of breath.    Cardiovascular:  Negative for chest pain.   Gastrointestinal:  Negative for abdominal pain, diarrhea, nausea and vomiting.   Genitourinary:  Positive for dysuria. Negative for flank pain, frequency and urgency.   Musculoskeletal:  Negative for arthralgias and myalgias.   Skin:  Negative for color change.   Neurological:  Negative for dizziness, weakness and numbness.   Psychiatric/Behavioral:  Negative for agitation. The patient is not nervous/anxious.        Past Medical History:   Diagnosis Date    Age-related osteoporosis without current pathological fracture     Basal cell carcinoma (BCC) of scalp     skin    Coronary artery disease involving native coronary artery of native heart without angina pectoris     stents done in [place]; sees [facility] cardiology    COVID     2 weeks ago    DDD (degenerative disc disease), lumbar     Essential hypertension     Knee pain     Macular degeneration     Mixed hyperlipidemia     Stroke (HCC)     Ventral hernia        Current Outpatient Medications   Medication Sig Dispense Refill    nitrofurantoin, macrocrystal-monohydrate, (MACROBID) 100 MG capsule Take 1 capsule by mouth 2 times daily for 5 days 10 capsule 0    gabapentin (NEURONTIN) 100 MG capsule Take 1 capsule by mouth every morning for 90 days. Max Daily Amount: 100 mg 30 capsule 2    gabapentin (NEURONTIN) 300 MG capsule Take 1 capsule by mouth at bedtime for 90 days. Max Daily Amount: 300 mg 30 capsule 2

## 2024-06-21 ENCOUNTER — OFFICE VISIT (OUTPATIENT)
Dept: NEUROLOGY | Age: 89
End: 2024-06-21

## 2024-06-21 ENCOUNTER — TELEPHONE (OUTPATIENT)
Dept: OBGYN CLINIC | Age: 89
End: 2024-06-21

## 2024-06-21 VITALS
HEIGHT: 63 IN | BODY MASS INDEX: 27.11 KG/M2 | RESPIRATION RATE: 20 BRPM | WEIGHT: 153 LBS | OXYGEN SATURATION: 95 % | SYSTOLIC BLOOD PRESSURE: 118 MMHG | HEART RATE: 63 BPM | DIASTOLIC BLOOD PRESSURE: 64 MMHG

## 2024-06-21 DIAGNOSIS — Z86.73 HISTORY OF CVA IN ADULTHOOD: Primary | ICD-10-CM

## 2024-06-21 DIAGNOSIS — R47.1 DYSARTHRIA: ICD-10-CM

## 2024-06-21 NOTE — PROGRESS NOTES
nodule in the left upper lobe.  Heterogeneous thyroid gland.  2.4 cm left thyroid lobe nodule with a central calcification.        Calcified and noncalcified plaque in the carotid arteries.  However, no high-grade stenosis or occlusion.  The vertebral arteries are patent.  No significant stenosis or occlusion of the intracranial circulation.  No intracranial aneurysm.  Unexpected findings.  1.5 cm pleural-based nodule in the left upper lobe.  Recommend follow-up outpatient CT chest.  2.4 cm left thyroid lobe nodule.  Recommend follow-up outpatient thyroid ultrasound.  All CT scans are performed using dose optimization techniques as appropriate to the performed exam and include at least one of the following: Automated exposure control, adjustment of the mA and/or kV according to size, and the use of iterative reconstruction technique.  ______________________________________ Electronically signed by: MANISH LORENZO D.O. Date:     11/02/2023 Time:    18:58        CT HEAD WO CONTRAST     Result Date: 11/2/2023  EXAM: CT BRAIN WITHOUT CONTRAST    HISTORY:  Dysarthria    TECHNIQUE:  CT of the brain without intravenous contrast.  FINDINGS:  There is no acute hemorrhage midline shift or mass effect.  No hydrocephalus or abnormal extra-axial fluid collection.  Generalized involutional atrophy, moderate.  Chronic microvascular change of the white matter tracts, mild.  No acute large  vessel territorial infarct is seen.  The bony cranium appears normal.  The visualized paranasal sinuses are clear. Soft tissues without significant abnormality.       1.  No acute intracranial abnormality.  Chronic changes as described.  All CT scans are performed using dose optimization techniques as appropriate to the performed exam and include at least one of the following: Automated exposure control, adjustment of the mA and/or kV according to size, and the use of iterative reconstruction technique.  ______________________________________

## 2024-06-21 NOTE — TELEPHONE ENCOUNTER
Patient called and said david clark was supposed to order a different pessary for her but she never heard back from the office. Please return patient's call.

## 2024-06-22 LAB
BACTERIA UR CULT: ABNORMAL
BACTERIA UR CULT: ABNORMAL
ORGANISM: ABNORMAL

## 2024-06-27 ENCOUNTER — TELEPHONE (OUTPATIENT)
Dept: PRIMARY CARE CLINIC | Age: 89
End: 2024-06-27

## 2024-07-01 ENCOUNTER — TELEPHONE (OUTPATIENT)
Dept: OBGYN CLINIC | Age: 89
End: 2024-07-01

## 2024-07-01 NOTE — TELEPHONE ENCOUNTER
Spoke to pt today about pessary. I told her we do not have it yet and I would let her know when we do. Micki was wanting to check somewhere else since we were not able to get it. Jesenia JASMINE

## 2024-07-08 RX ORDER — ASPIRIN 81 MG
81 TABLET,CHEWABLE ORAL DAILY
Qty: 30 TABLET | Refills: 0 | OUTPATIENT
Start: 2024-07-08

## 2024-07-09 RX ORDER — SODIUM CHLORIDE 9 MG/ML
INJECTION, SOLUTION INTRAVENOUS CONTINUOUS
OUTPATIENT
Start: 2024-07-21

## 2024-07-09 RX ORDER — ONDANSETRON 2 MG/ML
8 INJECTION INTRAMUSCULAR; INTRAVENOUS
OUTPATIENT
Start: 2024-07-21

## 2024-07-09 RX ORDER — EPINEPHRINE 1 MG/ML
0.3 INJECTION, SOLUTION, CONCENTRATE INTRAVENOUS PRN
OUTPATIENT
Start: 2024-07-21

## 2024-07-09 RX ORDER — ALBUTEROL SULFATE 90 UG/1
4 AEROSOL, METERED RESPIRATORY (INHALATION) PRN
OUTPATIENT
Start: 2024-07-21

## 2024-07-09 RX ORDER — DIPHENHYDRAMINE HYDROCHLORIDE 50 MG/ML
50 INJECTION INTRAMUSCULAR; INTRAVENOUS
OUTPATIENT
Start: 2024-07-21

## 2024-07-09 RX ORDER — ACETAMINOPHEN 325 MG/1
650 TABLET ORAL
OUTPATIENT
Start: 2024-07-21

## 2024-07-26 ENCOUNTER — HOSPITAL ENCOUNTER (OUTPATIENT)
Dept: INFUSION THERAPY | Age: 89
Setting detail: INFUSION SERIES
Discharge: HOME | End: 2024-07-26
Payer: MEDICARE

## 2024-07-26 VITALS
HEART RATE: 67 BPM | DIASTOLIC BLOOD PRESSURE: 62 MMHG | TEMPERATURE: 97.6 F | SYSTOLIC BLOOD PRESSURE: 125 MMHG | RESPIRATION RATE: 18 BRPM | OXYGEN SATURATION: 96 %

## 2024-07-26 DIAGNOSIS — R73.03 PREDIABETES: ICD-10-CM

## 2024-07-26 DIAGNOSIS — I25.10 CORONARY ARTERY DISEASE INVOLVING NATIVE CORONARY ARTERY OF NATIVE HEART WITHOUT ANGINA PECTORIS: ICD-10-CM

## 2024-07-26 DIAGNOSIS — M81.0 AGE-RELATED OSTEOPOROSIS WITHOUT CURRENT PATHOLOGICAL FRACTURE: Primary | ICD-10-CM

## 2024-07-26 LAB
ALBUMIN SERPL-MCNC: 4.3 G/DL (ref 3.5–5.2)
ALP SERPL-CCNC: 73 U/L (ref 35–104)
ALT SERPL-CCNC: 15 U/L (ref 5–33)
ANION GAP SERPL CALCULATED.3IONS-SCNC: 14 MMOL/L (ref 7–19)
AST SERPL-CCNC: 19 U/L (ref 5–32)
BILIRUB SERPL-MCNC: 0.4 MG/DL (ref 0.2–1.2)
BUN SERPL-MCNC: 15 MG/DL (ref 8–23)
CALCIUM SERPL-MCNC: 9.4 MG/DL (ref 8.8–10.2)
CHLORIDE SERPL-SCNC: 97 MMOL/L (ref 98–111)
CO2 SERPL-SCNC: 27 MMOL/L (ref 22–29)
CREAT SERPL-MCNC: 0.7 MG/DL (ref 0.5–0.9)
GLUCOSE SERPL-MCNC: 90 MG/DL (ref 74–109)
POTASSIUM SERPL-SCNC: 3.4 MMOL/L (ref 3.5–5)
PROT SERPL-MCNC: 7.4 G/DL (ref 6.6–8.7)
SODIUM SERPL-SCNC: 138 MMOL/L (ref 136–145)

## 2024-07-26 PROCEDURE — 6360000002 HC RX W HCPCS: Performed by: NURSE PRACTITIONER

## 2024-07-26 PROCEDURE — 96372 THER/PROPH/DIAG INJ SC/IM: CPT

## 2024-07-26 RX ORDER — ALBUTEROL SULFATE 90 UG/1
4 AEROSOL, METERED RESPIRATORY (INHALATION) PRN
OUTPATIENT
Start: 2025-01-24

## 2024-07-26 RX ORDER — ONDANSETRON 2 MG/ML
8 INJECTION INTRAMUSCULAR; INTRAVENOUS
OUTPATIENT
Start: 2025-01-24

## 2024-07-26 RX ORDER — ACETAMINOPHEN 325 MG/1
650 TABLET ORAL
OUTPATIENT
Start: 2025-01-24

## 2024-07-26 RX ORDER — EPINEPHRINE 1 MG/ML
0.3 INJECTION, SOLUTION, CONCENTRATE INTRAVENOUS PRN
OUTPATIENT
Start: 2025-01-24

## 2024-07-26 RX ORDER — SODIUM CHLORIDE 9 MG/ML
INJECTION, SOLUTION INTRAVENOUS CONTINUOUS
OUTPATIENT
Start: 2025-01-24

## 2024-07-26 RX ORDER — DIPHENHYDRAMINE HYDROCHLORIDE 50 MG/ML
50 INJECTION INTRAMUSCULAR; INTRAVENOUS
OUTPATIENT
Start: 2025-01-24

## 2024-07-26 RX ADMIN — DENOSUMAB 60 MG: 60 INJECTION SUBCUTANEOUS at 14:58

## 2024-07-26 NOTE — DISCHARGE INSTRUCTIONS
denosumab (Prolia)  Pronunciation:  lorenzo [place] maggy mab  Brand:  Prolia  What is the most important information I should know about Prolia?  This medication guide provides information about the Prolia brand of denosumab. Xgeva is another brand of denosumab used to prevent bone fractures and other skeletal conditions in people with tumors that have spread to the bone.  Prolia can cause many serious side effects. Call your doctor at once if you have a fever, chills, pain or burning when you urinate, severe stomach pain, cough, shortness of breath, skin problems, numbness or tingling, severe or unusual pain, or skin problems.  Do not use if you are pregnant. Use effective birth control while using Prolia, and for at least 5 months after you stop.  What is denosumab (Prolia)?  Denosumab is a monoclonal antibody. Monoclonal antibodies are made to target and destroy only certain cells in the body. This may help to protect healthy cells from damage.  The Prolia brand of denosumab is used to treat osteoporosis or bone loss in men and women who have a high risk of bone fracture. Prolia is sometimes used in people whose bone fracture is caused by certain medicines or cancer treatments.  This medication guide provides information about the Prolia brand of denosumab. Xgeva is another brand of denosumab used to prevent bone fractures and other skeletal conditions in people with tumors that have spread to the bone.  Denosumab may also be used for purposes not listed in this medication guide.  What should I discuss with my healthcare provider before receiving Prolia?  You should not receive Prolia if you are allergic to denosumab, or if you have:  low levels of calcium in your blood (hypocalcemia); or  if you are pregnant.  While you are using Prolia, you should not receive Xgeva, another brand of denosumab.  Tell your doctor if you have ever had:  kidney disease (or if you are on dialysis);  a weak immune system (caused by

## 2024-07-26 NOTE — PROGRESS NOTES
Pt arrived to \[facility]\""T. Pt received 60mg Prolia. Tolerated well.    Electronically signed by Dia Keen RN on 7/26/2024 at 3:02 PM

## 2024-07-30 DIAGNOSIS — I25.10 CORONARY ARTERY DISEASE INVOLVING NATIVE CORONARY ARTERY OF NATIVE HEART WITHOUT ANGINA PECTORIS: Primary | ICD-10-CM

## 2024-07-31 DIAGNOSIS — M54.50 CHRONIC BILATERAL LOW BACK PAIN WITHOUT SCIATICA: ICD-10-CM

## 2024-07-31 DIAGNOSIS — G89.29 CHRONIC BILATERAL LOW BACK PAIN WITHOUT SCIATICA: ICD-10-CM

## 2024-07-31 DIAGNOSIS — M54.9 MID BACK PAIN: ICD-10-CM

## 2024-08-01 RX ORDER — GABAPENTIN 300 MG/1
300 CAPSULE ORAL NIGHTLY
Qty: 30 CAPSULE | Refills: 1 | Status: SHIPPED | OUTPATIENT
Start: 2024-08-01 | End: 2024-09-30

## 2024-08-01 NOTE — TELEPHONE ENCOUNTER
Alyssa Love called to request a refill on her medication.      Last office visit : 6/20/2024   Next office visit : 10/7/2024     Last UDS:   Benzodiazepine Screen, Urine   Date Value Ref Range Status   08/25/2023 neg  Final     Buprenorphine Urine   Date Value Ref Range Status   08/25/2023 neg  Final     Cocaine Metabolite Screen, Urine   Date Value Ref Range Status   08/25/2023 neg  Final     Gabapentin Screen, Urine   Date Value Ref Range Status   08/25/2023 neg  Final     MDMA, Urine   Date Value Ref Range Status   08/25/2023 neg  Final     Oxycodone Screen, Ur   Date Value Ref Range Status   08/25/2023 neg  Final     Propoxyphene Screen, Urine   Date Value Ref Range Status   08/25/2023 neg  Final     THC Screen, Urine   Date Value Ref Range Status   08/25/2023 neg  Final     Tricyclic Antidepressants, Urine   Date Value Ref Range Status   08/25/2023 neg  Final       Last Matti: 8/1/24  Medication Contract: 8/25/23   Last Fill: 5/10/24    Requested Prescriptions     Pending Prescriptions Disp Refills    gabapentin (NEURONTIN) 300 MG capsule [Pharmacy Med Name: GABAPENTIN 300 MG CAPSULE] 30 capsule      Sig: Take 1 capsule by mouth nightly.         Please approve or refuse this medication.   Yeimy Guzman

## 2024-08-04 DIAGNOSIS — G89.29 CHRONIC BILATERAL LOW BACK PAIN WITHOUT SCIATICA: ICD-10-CM

## 2024-08-04 DIAGNOSIS — M54.50 CHRONIC BILATERAL LOW BACK PAIN WITHOUT SCIATICA: ICD-10-CM

## 2024-08-08 DIAGNOSIS — M54.50 CHRONIC BILATERAL LOW BACK PAIN WITHOUT SCIATICA: ICD-10-CM

## 2024-08-08 DIAGNOSIS — G89.29 CHRONIC BILATERAL LOW BACK PAIN WITHOUT SCIATICA: ICD-10-CM

## 2024-08-08 RX ORDER — GABAPENTIN 100 MG/1
100 CAPSULE ORAL EVERY MORNING
Qty: 30 CAPSULE | Refills: 2 | OUTPATIENT
Start: 2024-08-08 | End: 2024-11-06

## 2024-08-08 NOTE — TELEPHONE ENCOUNTER
Alyssa Love called to request a refill on her medication. Patient called on 08- to have the medication sent in. She has not received it. She will be out on 08-.      Last office visit : 6/20/2024   Next office visit : 10/7/2024     Last UDS:   Benzodiazepine Screen, Urine   Date Value Ref Range Status   08/25/2023 neg  Final     Buprenorphine Urine   Date Value Ref Range Status   08/25/2023 neg  Final     Cocaine Metabolite Screen, Urine   Date Value Ref Range Status   08/25/2023 neg  Final     Gabapentin Screen, Urine   Date Value Ref Range Status   08/25/2023 neg  Final     Oxycodone Screen, Ur   Date Value Ref Range Status   08/25/2023 neg  Final     Propoxyphene Screen, Urine   Date Value Ref Range Status   08/25/2023 neg  Final     THC Screen, Urine   Date Value Ref Range Status   08/25/2023 neg  Final     Tricyclic Antidepressants, Urine   Date Value Ref Range Status   08/25/2023 neg  Final       Last Matti: 08-  Medication Contract: 08-   Last Fill: 05-    Requested Prescriptions     Pending Prescriptions Disp Refills    gabapentin (NEURONTIN) 100 MG capsule 30 capsule 2     Sig: Take 1 capsule by mouth every morning for 90 days. Max Daily Amount: 100 mg         Please approve or refuse this medication.   Beck Tanner MA

## 2024-08-09 RX ORDER — GABAPENTIN 100 MG/1
100 CAPSULE ORAL EVERY MORNING
Qty: 30 CAPSULE | Refills: 2 | Status: SHIPPED | OUTPATIENT
Start: 2024-08-09 | End: 2024-11-07

## 2024-09-03 ENCOUNTER — TELEPHONE (OUTPATIENT)
Dept: CARDIOLOGY CLINIC | Age: 89
End: 2024-09-03

## 2024-09-03 ENCOUNTER — HOSPITAL ENCOUNTER (OUTPATIENT)
Age: 89
Discharge: HOME | End: 2024-09-05
Payer: MEDICARE

## 2024-09-03 VITALS — DIASTOLIC BLOOD PRESSURE: 49 MMHG | SYSTOLIC BLOOD PRESSURE: 134 MMHG

## 2024-09-03 DIAGNOSIS — Z95.2 S/P TAVR (TRANSCATHETER AORTIC VALVE REPLACEMENT): ICD-10-CM

## 2024-09-03 DIAGNOSIS — I35.0 NONRHEUMATIC AORTIC VALVE STENOSIS: ICD-10-CM

## 2024-09-03 LAB
ECHO AO ASC DIAM: 3 CM
ECHO AO ROOT DIAM: 2.3 CM
ECHO AO SINUS VALSALVA DIAM: 2.5 CM
ECHO AO ST JNCT DIAM: 2.4 CM
ECHO AV AREA PEAK VELOCITY: 1.1 CM2
ECHO AV AREA VTI: 1.1 CM2
ECHO AV MEAN GRADIENT: 14 MMHG
ECHO AV MEAN GRADIENT: 14 MMHG
ECHO AV MEAN VELOCITY: 1.8 M/S
ECHO AV PEAK GRADIENT: 27 MMHG
ECHO AV PEAK VELOCITY: 2.6 M/S
ECHO AV VELOCITY RATIO: 0.35
ECHO AV VTI: 58 CM
ECHO EST RA PRESSURE: 3 MMHG
ECHO IVC PROX: 1.3 CM
ECHO LA AREA 2C: 17.6 CM2
ECHO LA AREA 4C: 17.7 CM2
ECHO LA DIAMETER: 3.9 CM
ECHO LA MAJOR AXIS: 5.8 CM
ECHO LA MINOR AXIS: 6 CM
ECHO LA TO AORTIC ROOT RATIO: 1.7
ECHO LA VOL BP: 44 ML (ref 22–52)
ECHO LA VOL MOD A2C: 43 ML (ref 22–52)
ECHO LA VOL MOD A4C: 43 ML (ref 22–52)
ECHO LV E' LATERAL VELOCITY: 9 CM/S
ECHO LV E' SEPTAL VELOCITY: 7 CM/S
ECHO LV EDV A2C: 91 ML
ECHO LV EDV A4C: 93 ML
ECHO LV EJECTION FRACTION A2C: 77 %
ECHO LV EJECTION FRACTION A4C: 71 %
ECHO LV EJECTION FRACTION BIPLANE: 73 % (ref 55–100)
ECHO LV ESV A2C: 21 ML
ECHO LV ESV A4C: 27 ML
ECHO LV FRACTIONAL SHORTENING: 35 % (ref 28–44)
ECHO LV INTERNAL DIMENSION DIASTOLIC: 4.6 CM (ref 3.9–5.3)
ECHO LV INTERNAL DIMENSION SYSTOLIC: 3 CM
ECHO LV IVSD: 1.3 CM (ref 0.6–0.9)
ECHO LV MASS 2D: 217.4 G (ref 67–162)
ECHO LV POSTERIOR WALL DIASTOLIC: 1.2 CM (ref 0.6–0.9)
ECHO LV RELATIVE WALL THICKNESS RATIO: 0.52
ECHO LVOT AREA: 3.1 CM2
ECHO LVOT AV VTI INDEX: 0.35
ECHO LVOT DIAM: 2 CM
ECHO LVOT MEAN GRADIENT: 2 MMHG
ECHO LVOT PEAK GRADIENT: 3 MMHG
ECHO LVOT PEAK VELOCITY: 0.9 M/S
ECHO LVOT SV: 63.1 ML
ECHO LVOT VTI: 20.1 CM
ECHO MV A VELOCITY: 0.59 M/S
ECHO MV E DECELERATION TIME (DT): 314 MS
ECHO MV E VELOCITY: 0.71 M/S
ECHO MV E/A RATIO: 1.2
ECHO MV E/E' LATERAL: 7.89
ECHO MV E/E' RATIO (AVERAGED): 9.02
ECHO MV E/E' SEPTAL: 10.14
ECHO RA AREA 4C: 11.1 CM2
ECHO RA VOLUME: 24 ML
ECHO RIGHT VENTRICULAR SYSTOLIC PRESSURE (RVSP): 22 MMHG
ECHO RV BASAL DIMENSION: 2.1 CM
ECHO RV INTERNAL DIMENSION: 2.3 CM
ECHO RV LONGITUDINAL DIMENSION: 5 CM
ECHO RV MID DIMENSION: 2.5 CM
ECHO RV TAPSE: 1.7 CM (ref 1.7–?)
ECHO TV REGURGITANT MAX VELOCITY: 2.19 M/S
ECHO TV REGURGITANT PEAK GRADIENT: 19 MMHG

## 2024-09-03 PROCEDURE — 6360000004 HC RX CONTRAST MEDICATION: Performed by: CLINICAL NURSE SPECIALIST

## 2024-09-03 PROCEDURE — 93306 TTE W/DOPPLER COMPLETE: CPT | Performed by: INTERNAL MEDICINE

## 2024-09-03 PROCEDURE — 2580000003 HC RX 258: Performed by: CLINICAL NURSE SPECIALIST

## 2024-09-03 PROCEDURE — C8929 TTE W OR WO FOL WCON,DOPPLER: HCPCS

## 2024-09-03 RX ADMIN — SODIUM CHLORIDE, PRESERVATIVE FREE 1.5 ML: 5 INJECTION INTRAVENOUS at 11:29

## 2024-09-03 NOTE — TELEPHONE ENCOUNTER
----- Message from BRAYDON Wade sent at 9/3/2024  2:31 PM CDT -----  Let her know her echo looks good.  Overall heart function is good.  Her aortic valve replacement looks good.  Can discuss in detail at follow-up with Dr. Gonzalez later this month

## 2024-09-09 DIAGNOSIS — I25.10 CORONARY ARTERY DISEASE INVOLVING NATIVE CORONARY ARTERY OF NATIVE HEART WITHOUT ANGINA PECTORIS: ICD-10-CM

## 2024-09-09 DIAGNOSIS — I63.9 ACUTE ISCHEMIC STROKE (HCC): ICD-10-CM

## 2024-09-09 DIAGNOSIS — E78.2 MIXED HYPERLIPIDEMIA: Primary | ICD-10-CM

## 2024-09-10 RX ORDER — ATORVASTATIN CALCIUM 80 MG/1
80 TABLET, FILM COATED ORAL NIGHTLY
Qty: 90 TABLET | Refills: 3 | Status: SHIPPED | OUTPATIENT
Start: 2024-09-10

## 2024-09-23 ENCOUNTER — OFFICE VISIT (OUTPATIENT)
Dept: CARDIOLOGY CLINIC | Age: 89
End: 2024-09-23
Payer: MEDICARE

## 2024-09-23 VITALS
DIASTOLIC BLOOD PRESSURE: 58 MMHG | HEART RATE: 59 BPM | WEIGHT: 150 LBS | BODY MASS INDEX: 26.57 KG/M2 | SYSTOLIC BLOOD PRESSURE: 122 MMHG

## 2024-09-23 DIAGNOSIS — I35.0 NONRHEUMATIC AORTIC VALVE STENOSIS: Primary | ICD-10-CM

## 2024-09-23 PROCEDURE — 1123F ACP DISCUSS/DSCN MKR DOCD: CPT | Performed by: INTERNAL MEDICINE

## 2024-09-23 PROCEDURE — 93000 ELECTROCARDIOGRAM COMPLETE: CPT | Performed by: INTERNAL MEDICINE

## 2024-09-23 PROCEDURE — 99214 OFFICE O/P EST MOD 30 MIN: CPT | Performed by: INTERNAL MEDICINE

## 2024-09-23 ASSESSMENT — ENCOUNTER SYMPTOMS
EYE DISCHARGE: 0
BLOOD IN STOOL: 0
EYE PAIN: 0
SORE THROAT: 0
ABDOMINAL DISTENTION: 0
VOMITING: 0
COUGH: 0
ABDOMINAL PAIN: 0
NAUSEA: 0
APNEA: 0
WHEEZING: 0
CONSTIPATION: 0
EYE REDNESS: 0
DIARRHEA: 0
SHORTNESS OF BREATH: 0
CHEST TIGHTNESS: 0
FACIAL SWELLING: 0

## 2024-09-30 DIAGNOSIS — I25.10 CORONARY ARTERY DISEASE INVOLVING NATIVE CORONARY ARTERY OF NATIVE HEART WITHOUT ANGINA PECTORIS: ICD-10-CM

## 2024-09-30 DIAGNOSIS — M81.0 AGE-RELATED OSTEOPOROSIS WITHOUT CURRENT PATHOLOGICAL FRACTURE: ICD-10-CM

## 2024-09-30 DIAGNOSIS — E04.1 THYROID NODULE: ICD-10-CM

## 2024-09-30 DIAGNOSIS — I10 ESSENTIAL HYPERTENSION: ICD-10-CM

## 2024-09-30 DIAGNOSIS — R73.03 PREDIABETES: ICD-10-CM

## 2024-09-30 LAB
25(OH)D3 SERPL-MCNC: 52 NG/ML
ALBUMIN SERPL-MCNC: 4.4 G/DL (ref 3.5–5.2)
ALP SERPL-CCNC: 83 U/L (ref 35–104)
ALT SERPL-CCNC: 20 U/L (ref 5–33)
ANION GAP SERPL CALCULATED.3IONS-SCNC: 10 MMOL/L (ref 7–19)
AST SERPL-CCNC: 23 U/L (ref 5–32)
BASOPHILS # BLD: 0.1 K/UL (ref 0–0.2)
BASOPHILS NFR BLD: 0.7 % (ref 0–1)
BILIRUB SERPL-MCNC: 0.5 MG/DL (ref 0.2–1.2)
BUN SERPL-MCNC: 13 MG/DL (ref 8–23)
CALCIUM SERPL-MCNC: 9.5 MG/DL (ref 8.8–10.2)
CHLORIDE SERPL-SCNC: 100 MMOL/L (ref 98–111)
CHOLEST SERPL-MCNC: 137 MG/DL (ref 0–199)
CO2 SERPL-SCNC: 28 MMOL/L (ref 22–29)
CREAT SERPL-MCNC: 0.7 MG/DL (ref 0.5–0.9)
EOSINOPHIL # BLD: 0.2 K/UL (ref 0–0.6)
EOSINOPHIL NFR BLD: 2.2 % (ref 0–5)
ERYTHROCYTE [DISTWIDTH] IN BLOOD BY AUTOMATED COUNT: 14.7 % (ref 11.5–14.5)
GLUCOSE SERPL-MCNC: 103 MG/DL (ref 70–99)
HBA1C MFR BLD: 5.8 % (ref 4–5.6)
HCT VFR BLD AUTO: 39.9 % (ref 37–47)
HDLC SERPL-MCNC: 50 MG/DL (ref 40–60)
HGB BLD-MCNC: 12.7 G/DL (ref 12–16)
IMM GRANULOCYTES # BLD: 0 K/UL
LDLC SERPL CALC-MCNC: 58 MG/DL
LYMPHOCYTES # BLD: 2.8 K/UL (ref 1.1–4.5)
LYMPHOCYTES NFR BLD: 34 % (ref 20–40)
MCH RBC QN AUTO: 27.5 PG (ref 27–31)
MCHC RBC AUTO-ENTMCNC: 31.8 G/DL (ref 33–37)
MCV RBC AUTO: 86.4 FL (ref 81–99)
MONOCYTES # BLD: 0.6 K/UL (ref 0–0.9)
MONOCYTES NFR BLD: 6.7 % (ref 0–10)
NEUTROPHILS # BLD: 4.7 K/UL (ref 1.5–7.5)
NEUTS SEG NFR BLD: 56.2 % (ref 50–65)
PLATELET # BLD AUTO: 257 K/UL (ref 130–400)
PMV BLD AUTO: 10.2 FL (ref 9.4–12.3)
POTASSIUM SERPL-SCNC: 4.1 MMOL/L (ref 3.5–5)
PROT SERPL-MCNC: 7.4 G/DL (ref 6.4–8.3)
RBC # BLD AUTO: 4.62 M/UL (ref 4.2–5.4)
SODIUM SERPL-SCNC: 138 MMOL/L (ref 136–145)
T4 FREE SERPL-MCNC: 1.1 NG/DL (ref 0.93–1.7)
TRIGL SERPL-MCNC: 144 MG/DL (ref 0–149)
TSH SERPL DL<=0.005 MIU/L-ACNC: 3.84 UIU/ML (ref 0.27–4.2)
WBC # BLD AUTO: 8.3 K/UL (ref 4.8–10.8)

## 2024-10-01 DIAGNOSIS — G89.29 CHRONIC BILATERAL LOW BACK PAIN WITHOUT SCIATICA: ICD-10-CM

## 2024-10-01 DIAGNOSIS — M54.9 MID BACK PAIN: ICD-10-CM

## 2024-10-01 DIAGNOSIS — M54.50 CHRONIC BILATERAL LOW BACK PAIN WITHOUT SCIATICA: ICD-10-CM

## 2024-10-02 RX ORDER — GABAPENTIN 300 MG/1
CAPSULE ORAL
Qty: 30 CAPSULE | Refills: 0 | Status: SHIPPED | OUTPATIENT
Start: 2024-10-02 | End: 2024-11-01

## 2024-10-02 NOTE — TELEPHONE ENCOUNTER
Alyssa Love called to request a refill on her medication.      Last office visit : 6/20/2024   Next office visit : 10/7/2024     Last UDS:   Benzodiazepine Screen, Urine   Date Value Ref Range Status   08/25/2023 neg  Final     Buprenorphine Urine   Date Value Ref Range Status   08/25/2023 neg  Final     Cocaine Metabolite Screen, Urine   Date Value Ref Range Status   08/25/2023 neg  Final     Gabapentin Screen, Urine   Date Value Ref Range Status   08/25/2023 neg  Final     Oxycodone Screen, Ur   Date Value Ref Range Status   08/25/2023 neg  Final     Propoxyphene Screen, Urine   Date Value Ref Range Status   08/25/2023 neg  Final     THC Screen, Urine   Date Value Ref Range Status   08/25/2023 neg  Final     Tricyclic Antidepressants, Urine   Date Value Ref Range Status   08/25/2023 neg  Final       Last Matti: 8/1/24  Medication Contract: NEED   Last Fill: 8/1/24    Requested Prescriptions     Pending Prescriptions Disp Refills    gabapentin (NEURONTIN) 300 MG capsule [Pharmacy Med Name: GABAPENTIN 300 MG CAPSULE] 30 capsule 1     Sig: TAKE 1 CAPSULE BY MOUTH NIGHTLY. MAX DAILY AMOUNT: 300 MG         Please approve or refuse this medication.   Virginia Colon MA

## 2024-10-07 ENCOUNTER — OFFICE VISIT (OUTPATIENT)
Dept: PRIMARY CARE CLINIC | Age: 89
End: 2024-10-07
Payer: MEDICARE

## 2024-10-07 VITALS
DIASTOLIC BLOOD PRESSURE: 68 MMHG | BODY MASS INDEX: 26.4 KG/M2 | HEIGHT: 63 IN | HEART RATE: 65 BPM | TEMPERATURE: 98 F | OXYGEN SATURATION: 98 % | WEIGHT: 149 LBS | SYSTOLIC BLOOD PRESSURE: 114 MMHG

## 2024-10-07 DIAGNOSIS — Z23 NEED FOR PNEUMOCOCCAL VACCINATION: ICD-10-CM

## 2024-10-07 DIAGNOSIS — I25.10 CORONARY ARTERY DISEASE INVOLVING NATIVE CORONARY ARTERY OF NATIVE HEART WITHOUT ANGINA PECTORIS: ICD-10-CM

## 2024-10-07 DIAGNOSIS — M81.0 AGE-RELATED OSTEOPOROSIS WITHOUT CURRENT PATHOLOGICAL FRACTURE: ICD-10-CM

## 2024-10-07 DIAGNOSIS — G89.29 CHRONIC BILATERAL LOW BACK PAIN WITHOUT SCIATICA: ICD-10-CM

## 2024-10-07 DIAGNOSIS — Z51.81 MEDICATION MONITORING ENCOUNTER: ICD-10-CM

## 2024-10-07 DIAGNOSIS — Z23 NEED FOR INFLUENZA VACCINATION: ICD-10-CM

## 2024-10-07 DIAGNOSIS — E78.2 MIXED HYPERLIPIDEMIA: ICD-10-CM

## 2024-10-07 DIAGNOSIS — H35.30 MACULAR DEGENERATION OF BOTH EYES, UNSPECIFIED TYPE: ICD-10-CM

## 2024-10-07 DIAGNOSIS — R73.03 PREDIABETES: ICD-10-CM

## 2024-10-07 DIAGNOSIS — Z95.2 S/P TAVR (TRANSCATHETER AORTIC VALVE REPLACEMENT): ICD-10-CM

## 2024-10-07 DIAGNOSIS — M54.9 MID BACK PAIN: ICD-10-CM

## 2024-10-07 DIAGNOSIS — M54.50 CHRONIC BILATERAL LOW BACK PAIN WITHOUT SCIATICA: ICD-10-CM

## 2024-10-07 DIAGNOSIS — R91.1 PULMONARY NODULE: ICD-10-CM

## 2024-10-07 DIAGNOSIS — I10 ESSENTIAL HYPERTENSION: ICD-10-CM

## 2024-10-07 PROBLEM — Z51.5 PALLIATIVE CARE PATIENT: Status: RESOLVED | Noted: 2023-11-03 | Resolved: 2024-10-07

## 2024-10-07 LAB
ALCOHOL URINE: NORMAL
AMPHETAMINE SCREEN URINE: NORMAL
BARBITURATE SCREEN URINE: NORMAL
BENZODIAZEPINE SCREEN, URINE: NORMAL
BUPRENORPHINE URINE: NORMAL
COCAINE METABOLITE SCREEN URINE: NORMAL
FENTANYL SCREEN, URINE: NORMAL
GABAPENTIN SCREEN, URINE: NORMAL
MDMA, URINE: NORMAL
METHADONE SCREEN, URINE: NORMAL
METHAMPHETAMINE, URINE: NORMAL
OPIATE SCREEN URINE: NORMAL
OXYCODONE SCREEN URINE: NORMAL
PHENCYCLIDINE SCREEN URINE: NORMAL
PROPOXYPHENE SCREEN, URINE: NORMAL
SYNTHETIC CANNABINOIDS(K2) SCREEN, URINE: NORMAL
THC SCREEN, URINE: NORMAL
TRAMADOL SCREEN URINE: NORMAL
TRICYCLIC ANTIDEPRESSANTS, UR: NORMAL

## 2024-10-07 PROCEDURE — G2211 COMPLEX E/M VISIT ADD ON: HCPCS | Performed by: NURSE PRACTITIONER

## 2024-10-07 PROCEDURE — 1123F ACP DISCUSS/DSCN MKR DOCD: CPT | Performed by: NURSE PRACTITIONER

## 2024-10-07 PROCEDURE — 90677 PCV20 VACCINE IM: CPT | Performed by: NURSE PRACTITIONER

## 2024-10-07 PROCEDURE — G0009 ADMIN PNEUMOCOCCAL VACCINE: HCPCS | Performed by: NURSE PRACTITIONER

## 2024-10-07 PROCEDURE — 99214 OFFICE O/P EST MOD 30 MIN: CPT | Performed by: NURSE PRACTITIONER

## 2024-10-07 PROCEDURE — G0008 ADMIN INFLUENZA VIRUS VAC: HCPCS | Performed by: NURSE PRACTITIONER

## 2024-10-07 PROCEDURE — 80305 DRUG TEST PRSMV DIR OPT OBS: CPT | Performed by: NURSE PRACTITIONER

## 2024-10-07 PROCEDURE — 90653 IIV ADJUVANT VACCINE IM: CPT | Performed by: NURSE PRACTITIONER

## 2024-10-07 RX ORDER — GABAPENTIN 300 MG/1
300 CAPSULE ORAL NIGHTLY
Qty: 90 CAPSULE | Refills: 1 | Status: SHIPPED | OUTPATIENT
Start: 2024-10-07 | End: 2025-04-05

## 2024-10-07 RX ORDER — AMLODIPINE BESYLATE 10 MG/1
10 TABLET ORAL DAILY
Qty: 90 TABLET | Refills: 3 | Status: SHIPPED | OUTPATIENT
Start: 2024-10-07

## 2024-10-07 RX ORDER — HYDROCHLOROTHIAZIDE 25 MG/1
25 TABLET ORAL DAILY
Qty: 90 TABLET | Refills: 3 | Status: SHIPPED | OUTPATIENT
Start: 2024-10-07

## 2024-10-07 RX ORDER — GABAPENTIN 100 MG/1
100 CAPSULE ORAL EVERY MORNING
Qty: 90 CAPSULE | Refills: 1 | Status: SHIPPED | OUTPATIENT
Start: 2024-10-07 | End: 2025-04-05

## 2024-10-07 ASSESSMENT — ENCOUNTER SYMPTOMS
CHEST TIGHTNESS: 0
SORE THROAT: 0
NAUSEA: 0
WHEEZING: 0
BACK PAIN: 1
DIARRHEA: 0
ABDOMINAL PAIN: 0
SHORTNESS OF BREATH: 0
COUGH: 0

## 2024-10-07 NOTE — PROGRESS NOTES
nightly for 180 days. In addition to 100 mg every morning Max Daily Amount: 300 mg  Dispense: 90 capsule; Refill: 1    9. Age-related osteoporosis without current pathological fracture  -Stable, continue Prolia every 6 months  -DEXA recall 2025, consider Evenity with any worsening  - Vitamin D 25 Hydroxy; Future    10. Macular degeneration of both eyes, unspecified type  -Stable, continue management per ophthalmology, injections    11. Need for influenza vaccination    - Influenza, FLUAD Trivalent, (age 65 y+), IM, Preservative Free, 0.5mL    12. Medication monitoring encounter    - POCT Rapid Drug Screen    13. Need for pneumococcal vaccination  -Immunization record is not included in her medical records in media.  She states it has been over 5 years since her last pneumonia vaccine.  -Will give Prevnar 20 today  -Pneumovax 23 in 1 year  - Pneumococcal, PCV20, PREVNAR 20, (age 6w+), IM, PF         Return in about 6 months (around 4/7/2025) for medicare annual wellness.    Alyssa was seen today for follow-up.    Diagnoses and all orders for this visit:    Coronary artery disease involving native coronary artery of native heart without angina pectoris    S/P TAVR (transcatheter aortic valve replacement)    Mixed hyperlipidemia  -     Comprehensive Metabolic Panel; Future  -     Lipid Panel; Future  -     T4, Free; Future  -     TSH; Future    Essential hypertension  -     hydroCHLOROthiazide (HYDRODIURIL) 25 MG tablet; Take 1 tablet by mouth daily  -     amLODIPine (NORVASC) 10 MG tablet; Take 1 tablet by mouth daily  -     CBC with Auto Differential; Future    Prediabetes  -     Hemoglobin A1C; Future    Pulmonary nodule    Chronic bilateral low back pain without sciatica  -     gabapentin (NEURONTIN) 100 MG capsule; Take 1 capsule by mouth every morning for 180 days. Max Daily Amount: 100 mg  -     gabapentin (NEURONTIN) 300 MG capsule; Take 1 capsule by mouth nightly for 180 days. In addition to 100 mg every

## 2024-11-01 DIAGNOSIS — E04.2 NONTOXIC MULTINODULAR GOITER: Primary | ICD-10-CM

## 2024-11-01 DIAGNOSIS — M54.9 MID BACK PAIN: ICD-10-CM

## 2024-11-01 DIAGNOSIS — G89.29 CHRONIC BILATERAL LOW BACK PAIN WITHOUT SCIATICA: ICD-10-CM

## 2024-11-01 DIAGNOSIS — M54.50 CHRONIC BILATERAL LOW BACK PAIN WITHOUT SCIATICA: ICD-10-CM

## 2024-11-01 RX ORDER — GABAPENTIN 300 MG/1
CAPSULE ORAL
Qty: 90 CAPSULE | Refills: 1 | Status: SHIPPED | OUTPATIENT
Start: 2024-11-01 | End: 2025-04-30

## 2024-11-01 NOTE — TELEPHONE ENCOUNTER
Alyssa Love called to request a refill on her medication.      Last office visit : 10/7/2024   Next office visit : 4/8/2025     Last UDS:   Benzodiazepine Screen, Urine   Date Value Ref Range Status   10/07/2024 neg  Final     Buprenorphine Urine   Date Value Ref Range Status   10/07/2024 neg  Final     Cocaine Metabolite Screen, Urine   Date Value Ref Range Status   10/07/2024 neg  Final     Gabapentin Screen, Urine   Date Value Ref Range Status   08/25/2023 neg  Final     Oxycodone Screen, Ur   Date Value Ref Range Status   10/07/2024 neg  Final     Propoxyphene Screen, Urine   Date Value Ref Range Status   10/07/2024 neg  Final     THC Screen, Urine   Date Value Ref Range Status   10/07/2024 neg  Final     Tricyclic Antidepressants, Urine   Date Value Ref Range Status   10/07/2024 neg  Final       Last Matti: 11/1/24  Medication Contract: 10/7/24   Last Fill: 10/7/24      Requested Prescriptions     Pending Prescriptions Disp Refills    gabapentin (NEURONTIN) 300 MG capsule [Pharmacy Med Name: GABAPENTIN 300 MG CAPSULE] 30 capsule 0     Sig: TAKE 1 CAPSULE BY MOUTH NIGHTLY. MAX DAILY AMOUNT: 300 MG         Please approve or refuse this medication.   Yeimy Guzman

## 2024-11-05 ENCOUNTER — HOSPITAL ENCOUNTER (OUTPATIENT)
Dept: CT IMAGING | Age: 89
Discharge: HOME | End: 2024-11-05
Payer: MEDICARE

## 2024-11-05 DIAGNOSIS — R91.1 PULMONARY NODULE: ICD-10-CM

## 2024-11-05 LAB
CREAT SERPL-MCNC: 0.7 MG/DL (ref 0.3–1.3)
PERFORMED ON: NORMAL

## 2024-11-05 PROCEDURE — 82565 ASSAY OF CREATININE: CPT

## 2024-11-05 PROCEDURE — 71260 CT THORAX DX C+: CPT

## 2024-11-05 PROCEDURE — 6360000004 HC RX CONTRAST MEDICATION: Performed by: NURSE PRACTITIONER

## 2024-11-05 RX ORDER — MINOXIDIL 2 %
SOLUTION, NON-ORAL TOPICAL
Qty: 30 ML | OUTPATIENT
Start: 2024-11-05

## 2024-11-05 RX ORDER — IOPAMIDOL 755 MG/ML
70 INJECTION, SOLUTION INTRAVASCULAR
Status: COMPLETED | OUTPATIENT
Start: 2024-11-05 | End: 2024-11-05

## 2024-11-05 RX ADMIN — IOPAMIDOL 70 ML: 755 INJECTION, SOLUTION INTRAVENOUS at 14:49

## 2024-11-05 NOTE — TELEPHONE ENCOUNTER
Requested Prescriptions     Pending Prescriptions Disp Refills    [employer] ARTIFICIAL TEARS 1-0.3 % SOLN ophthalmic solution [Pharmacy Med Name: [employer] ARTIFICIAL TEARS DROPS] 30 mL      Sig: PLACE 1 DROP INTO BOTH EYES EVERY 3 HOURS AS NEEDED FOR DRY EYES       Last Office Visit: Visit date not found  Next Office Visit: Visit date not found  Last Medication Refill: 11/13/2023 with 0 RF

## 2024-12-02 DIAGNOSIS — R91.8 LUNG NODULES: Primary | ICD-10-CM

## 2024-12-09 DIAGNOSIS — Z86.73 HISTORY OF CVA IN ADULTHOOD: Primary | ICD-10-CM

## 2024-12-09 NOTE — TELEPHONE ENCOUNTER
Requested Prescriptions     Pending Prescriptions Disp Refills    isosorbide mononitrate (IMDUR) 30 MG extended release tablet [Pharmacy Med Name: ISOSORBIDE MONONIT ER 30 MG TB] 30 tablet      Sig: TAKE 1 TABLET BY MOUTH EVERY DAY       Last Office Visit: Visit date not found  Next Office Visit: Visit date not found  Last Medication Refill:3/18/2024

## 2024-12-10 RX ORDER — ISOSORBIDE MONONITRATE 30 MG/1
30 TABLET, EXTENDED RELEASE ORAL DAILY
Qty: 30 TABLET | Refills: 3 | Status: SHIPPED | OUTPATIENT
Start: 2024-12-10

## 2024-12-16 ENCOUNTER — HOSPITAL ENCOUNTER (OUTPATIENT)
Dept: ULTRASOUND IMAGING | Age: 89
Discharge: HOME | End: 2024-12-16
Payer: MEDICARE

## 2024-12-16 DIAGNOSIS — E04.2 NONTOXIC MULTINODULAR GOITER: ICD-10-CM

## 2024-12-16 PROCEDURE — 76536 US EXAM OF HEAD AND NECK: CPT

## 2024-12-29 ENCOUNTER — OFFICE VISIT (OUTPATIENT)
Age: 89
End: 2024-12-29
Payer: MEDICARE

## 2024-12-29 VITALS
DIASTOLIC BLOOD PRESSURE: 66 MMHG | TEMPERATURE: 97.3 F | WEIGHT: 152 LBS | BODY MASS INDEX: 26.93 KG/M2 | OXYGEN SATURATION: 96 % | RESPIRATION RATE: 20 BRPM | SYSTOLIC BLOOD PRESSURE: 138 MMHG | HEART RATE: 67 BPM

## 2024-12-29 DIAGNOSIS — N30.01 ACUTE CYSTITIS WITH HEMATURIA: Primary | ICD-10-CM

## 2024-12-29 DIAGNOSIS — R03.0 ELEVATED BLOOD PRESSURE READING: ICD-10-CM

## 2024-12-29 DIAGNOSIS — R30.0 DYSURIA: ICD-10-CM

## 2024-12-29 LAB
APPEARANCE FLUID: ABNORMAL
BILIRUBIN, POC: ABNORMAL
BLOOD URINE, POC: ABNORMAL
CLARITY, POC: ABNORMAL
COLOR, POC: YELLOW
GLUCOSE URINE, POC: ABNORMAL MG/DL
KETONES, POC: ABNORMAL MG/DL
LEUKOCYTE EST, POC: ABNORMAL
NITRITE, POC: ABNORMAL
PH, POC: 7.5
PROTEIN, POC: ABNORMAL MG/DL
SPECIFIC GRAVITY, POC: 1.02
UROBILINOGEN, POC: ABNORMAL MG/DL

## 2024-12-29 PROCEDURE — 1123F ACP DISCUSS/DSCN MKR DOCD: CPT

## 2024-12-29 PROCEDURE — 81002 URINALYSIS NONAUTO W/O SCOPE: CPT

## 2024-12-29 PROCEDURE — 99213 OFFICE O/P EST LOW 20 MIN: CPT

## 2024-12-29 PROCEDURE — 1159F MED LIST DOCD IN RCRD: CPT

## 2024-12-29 RX ORDER — SULFAMETHOXAZOLE AND TRIMETHOPRIM 400; 80 MG/1; MG/1
1 TABLET ORAL 2 TIMES DAILY
Qty: 10 TABLET | Refills: 0 | Status: SHIPPED | OUTPATIENT
Start: 2024-12-29 | End: 2025-01-03

## 2024-12-31 LAB
BACTERIA UR CULT: ABNORMAL
BACTERIA UR CULT: ABNORMAL
ORGANISM: ABNORMAL

## 2025-01-18 DIAGNOSIS — Z86.73 HISTORY OF CVA IN ADULTHOOD: ICD-10-CM

## 2025-01-20 ENCOUNTER — OFFICE VISIT (OUTPATIENT)
Dept: ENT CLINIC | Age: 89
End: 2025-01-20
Payer: MEDICARE

## 2025-01-20 VITALS
WEIGHT: 153 LBS | HEIGHT: 63 IN | SYSTOLIC BLOOD PRESSURE: 100 MMHG | DIASTOLIC BLOOD PRESSURE: 60 MMHG | BODY MASS INDEX: 27.11 KG/M2

## 2025-01-20 DIAGNOSIS — E04.2 NONTOXIC MULTINODULAR GOITER: Primary | ICD-10-CM

## 2025-01-20 PROCEDURE — 99213 OFFICE O/P EST LOW 20 MIN: CPT | Performed by: NURSE PRACTITIONER

## 2025-01-20 PROCEDURE — 1159F MED LIST DOCD IN RCRD: CPT | Performed by: NURSE PRACTITIONER

## 2025-01-20 PROCEDURE — 1160F RVW MEDS BY RX/DR IN RCRD: CPT | Performed by: NURSE PRACTITIONER

## 2025-01-20 PROCEDURE — 1123F ACP DISCUSS/DSCN MKR DOCD: CPT | Performed by: NURSE PRACTITIONER

## 2025-01-20 RX ORDER — ISOSORBIDE MONONITRATE 30 MG/1
30 TABLET, EXTENDED RELEASE ORAL DAILY
Qty: 90 TABLET | Refills: 3 | Status: SHIPPED | OUTPATIENT
Start: 2025-01-20

## 2025-01-20 ASSESSMENT — ENCOUNTER SYMPTOMS
RESPIRATORY NEGATIVE: 1
EYES NEGATIVE: 1
GASTROINTESTINAL NEGATIVE: 1
ALLERGIC/IMMUNOLOGIC NEGATIVE: 1

## 2025-01-20 NOTE — PROGRESS NOTES
2025    Alyssa Love (:  1935) is a 89 y.o. female, Established patient, here for evaluation of the following chief complaint(s):  Follow-up (Thyroid )      Vitals:    25 1329   BP: 100/60   Weight: 69.4 kg (153 lb)   Height: 1.6 m (5' 3\")       Wt Readings from Last 3 Encounters:   25 69.4 kg (153 lb)   24 68.9 kg (152 lb)   10/07/24 67.6 kg (149 lb)       BP Readings from Last 3 Encounters:   25 100/60   24 138/66   10/07/24 114/68         SUBJECTIVE/OBJECTIVE:    Patient seen today for follow up of her thyroid. She had a thyroid ultrasound on 24 that showed multiple nodules that are stable. She denies pain in the area, difficulty breathing, or difficulty swallowing.        Review of Systems   Constitutional: Negative.    HENT: Negative.     Eyes: Negative.    Respiratory: Negative.     Cardiovascular: Negative.    Gastrointestinal: Negative.    Endocrine: Negative.    Musculoskeletal: Negative.    Skin: Negative.    Allergic/Immunologic: Negative.    Neurological: Negative.    Hematological: Negative.    Psychiatric/Behavioral: Negative.          Physical Exam  Vitals reviewed.   Constitutional:       Appearance: Normal appearance. She is normal weight.   HENT:      Head: Normocephalic and atraumatic.      Right Ear: Tympanic membrane, ear canal and external ear normal.      Left Ear: Tympanic membrane, ear canal and external ear normal.      Nose: Nose normal.      Mouth/Throat:      Mouth: Mucous membranes are moist.      Dentition: Has dentures.      Pharynx: Oropharynx is clear.   Eyes:      Extraocular Movements: Extraocular movements intact.      Pupils: Pupils are equal, round, and reactive to light.   Pulmonary:      Effort: Pulmonary effort is normal.   Musculoskeletal:      Cervical back: Normal range of motion.   Skin:     General: Skin is warm and dry.   Neurological:      General: No focal deficit present.      Mental Status: She is alert and

## 2025-01-27 ENCOUNTER — HOSPITAL ENCOUNTER (OUTPATIENT)
Dept: INFUSION THERAPY | Age: 89
Setting detail: INFUSION SERIES
Discharge: HOME | End: 2025-01-27
Payer: MEDICARE

## 2025-01-27 VITALS
RESPIRATION RATE: 18 BRPM | SYSTOLIC BLOOD PRESSURE: 129 MMHG | TEMPERATURE: 97.6 F | HEART RATE: 63 BPM | OXYGEN SATURATION: 95 % | DIASTOLIC BLOOD PRESSURE: 52 MMHG

## 2025-01-27 DIAGNOSIS — M81.0 AGE-RELATED OSTEOPOROSIS WITHOUT CURRENT PATHOLOGICAL FRACTURE: Primary | ICD-10-CM

## 2025-01-27 LAB — CALCIUM SERPL-MCNC: 9.6 MG/DL (ref 8.8–10.2)

## 2025-01-27 PROCEDURE — 96372 THER/PROPH/DIAG INJ SC/IM: CPT

## 2025-01-27 PROCEDURE — 6360000002 HC RX W HCPCS: Performed by: NURSE PRACTITIONER

## 2025-01-27 PROCEDURE — 82310 ASSAY OF CALCIUM: CPT

## 2025-01-27 RX ORDER — ACETAMINOPHEN 325 MG/1
650 TABLET ORAL
OUTPATIENT
Start: 2025-01-27

## 2025-01-27 RX ORDER — EPINEPHRINE 1 MG/ML
0.3 INJECTION, SOLUTION, CONCENTRATE INTRAVENOUS PRN
OUTPATIENT
Start: 2025-01-27

## 2025-01-27 RX ORDER — SODIUM CHLORIDE 9 MG/ML
INJECTION, SOLUTION INTRAVENOUS CONTINUOUS
OUTPATIENT
Start: 2025-01-27

## 2025-01-27 RX ORDER — HYDROCORTISONE SODIUM SUCCINATE 100 MG/2ML
100 INJECTION INTRAMUSCULAR; INTRAVENOUS
OUTPATIENT
Start: 2025-01-27

## 2025-01-27 RX ORDER — ALBUTEROL SULFATE 90 UG/1
4 INHALANT RESPIRATORY (INHALATION) PRN
OUTPATIENT
Start: 2025-01-27

## 2025-01-27 RX ORDER — DIPHENHYDRAMINE HYDROCHLORIDE 50 MG/ML
50 INJECTION INTRAMUSCULAR; INTRAVENOUS
OUTPATIENT
Start: 2025-01-27

## 2025-01-27 RX ORDER — ONDANSETRON 2 MG/ML
8 INJECTION INTRAMUSCULAR; INTRAVENOUS
OUTPATIENT
Start: 2025-01-27

## 2025-01-27 RX ADMIN — DENOSUMAB 60 MG: 60 INJECTION SUBCUTANEOUS at 09:52

## 2025-01-27 NOTE — PROGRESS NOTES
Pt arrived to [facility]. Calcium level drawn. Prolia 60mg administered. Pt tolerated well.     Electronically signed by Deisy Barrett RN on 1/27/2025 at 9:56 AM

## 2025-02-13 ENCOUNTER — TELEPHONE (OUTPATIENT)
Dept: CARDIOLOGY CLINIC | Age: 89
End: 2025-02-13

## 2025-03-11 ENCOUNTER — OFFICE VISIT (OUTPATIENT)
Dept: PRIMARY CARE CLINIC | Age: 89
End: 2025-03-11
Payer: MEDICARE

## 2025-03-11 VITALS
WEIGHT: 154.4 LBS | BODY MASS INDEX: 27.36 KG/M2 | DIASTOLIC BLOOD PRESSURE: 70 MMHG | TEMPERATURE: 97.8 F | HEART RATE: 69 BPM | HEIGHT: 63 IN | OXYGEN SATURATION: 97 % | SYSTOLIC BLOOD PRESSURE: 122 MMHG

## 2025-03-11 DIAGNOSIS — J06.9 UPPER RESPIRATORY INFECTION WITH COUGH AND CONGESTION: Primary | ICD-10-CM

## 2025-03-11 LAB
INFLUENZA A ANTIGEN, POC: NEGATIVE
INFLUENZA B ANTIGEN, POC: NEGATIVE
LOT EXPIRE DATE: NORMAL
LOT KIT NUMBER: NORMAL
SARS-COV-2, POC: NORMAL
VALID INTERNAL CONTROL: NORMAL
VENDOR AND KIT NAME POC: NORMAL

## 2025-03-11 PROCEDURE — 99213 OFFICE O/P EST LOW 20 MIN: CPT | Performed by: FAMILY MEDICINE

## 2025-03-11 PROCEDURE — 1123F ACP DISCUSS/DSCN MKR DOCD: CPT | Performed by: FAMILY MEDICINE

## 2025-03-11 PROCEDURE — 87428 SARSCOV & INF VIR A&B AG IA: CPT | Performed by: FAMILY MEDICINE

## 2025-03-11 RX ORDER — BENZONATATE 100 MG/1
100 CAPSULE ORAL 2 TIMES DAILY PRN
Qty: 20 CAPSULE | Refills: 0 | Status: SHIPPED | OUTPATIENT
Start: 2025-03-11 | End: 2025-03-21

## 2025-03-11 NOTE — PROGRESS NOTES
[facility]  [address]  [address]  [place] [address]  Dept: [phone]  Dept Fax: [phone]  Loc: [phone]      Subjective:     Chief Complaint   Patient presents with    Cough    Congestion       HPI:  Alyssa Love is a 89 y.o. female presents today with above symptoms x 3 days. No fever or chills. No chest congestion.  No known ill contacts at home  In-house Flu and Covid test today negative    ROS:   Review of Systems  As per HPI    PMHx:  Past Medical History:   Diagnosis Date    Age-related osteoporosis without current pathological fracture     Basal cell carcinoma (BCC) of scalp     skin    Closed fracture of left orbital floor (HCC)     Closed fracture of left orbital floor (HCC) 03/2023    no surgical intervention recommended    Coronary artery disease involving native coronary artery of native heart without angina pectoris     stents done in [place]; sees [facility] cardiology    COVID     2 weeks ago    DDD (degenerative disc disease), lumbar     Essential hypertension     Knee pain     Macular degeneration     Mixed hyperlipidemia     Stroke (HCC)     Ventral hernia      Patient Active Problem List   Diagnosis    Essential hypertension    Mixed hyperlipidemia    Age-related osteoporosis without current pathological fracture    Coronary artery disease involving native coronary artery of native heart without angina pectoris    DDD (degenerative disc disease), lumbar    Basal cell carcinoma (BCC) of scalp    Chronic bilateral low back pain without sciatica    Macular degeneration of both eyes    Primary osteoarthritis of right knee    Spinal stenosis of lumbar region with neurogenic claudication    Recurrent UTI    Aortic stenosis    Ventral hernia without obstruction or gangrene    Prediabetes    Dysuria    S/P TAVR (transcatheter aortic valve replacement)    Cerebrovascular accident (CVA) (HCC)    Thyroid nodule    Pulmonary nodule    History of

## 2025-03-27 DIAGNOSIS — G89.29 CHRONIC BILATERAL LOW BACK PAIN WITHOUT SCIATICA: ICD-10-CM

## 2025-03-27 DIAGNOSIS — M54.50 CHRONIC BILATERAL LOW BACK PAIN WITHOUT SCIATICA: ICD-10-CM

## 2025-03-27 DIAGNOSIS — M54.9 MID BACK PAIN: ICD-10-CM

## 2025-03-28 RX ORDER — GABAPENTIN 300 MG/1
300 CAPSULE ORAL NIGHTLY
Qty: 90 CAPSULE | Refills: 1 | Status: SHIPPED | OUTPATIENT
Start: 2025-03-28 | End: 2025-09-24

## 2025-03-28 NOTE — TELEPHONE ENCOUNTER
Alyssa Love called to request a refill on her medication.      Last office visit : 3/11/2025   Next office visit : 4/8/2025     Last UDS:   Benzodiazepine Screen, Urine   Date Value Ref Range Status   10/07/2024 neg  Final     Buprenorphine Urine   Date Value Ref Range Status   10/07/2024 neg  Final     Cocaine Metabolite Screen, Urine   Date Value Ref Range Status   10/07/2024 neg  Final     Gabapentin Screen, Urine   Date Value Ref Range Status   08/25/2023 neg  Final     Oxycodone Screen, Ur   Date Value Ref Range Status   10/07/2024 neg  Final     Propoxyphene Screen, Urine   Date Value Ref Range Status   10/07/2024 neg  Final     THC Screen, Urine   Date Value Ref Range Status   10/07/2024 neg  Final     Tricyclic Antidepressants, Urine   Date Value Ref Range Status   10/07/2024 neg  Final       Last Matti: 3/28/25  Medication Contract: 10/8/24   Last Fill: 11/1/24    Requested Prescriptions     Pending Prescriptions Disp Refills    gabapentin (NEURONTIN) 300 MG capsule [Pharmacy Med Name: GABAPENTIN 300 MG CAPSULE] 90 capsule 1     Sig: Take 1 capsule by mouth nightly.         Please approve or refuse this medication.   Yeimy Guzman

## 2025-04-03 DIAGNOSIS — R73.03 PREDIABETES: ICD-10-CM

## 2025-04-03 DIAGNOSIS — M81.0 AGE-RELATED OSTEOPOROSIS WITHOUT CURRENT PATHOLOGICAL FRACTURE: ICD-10-CM

## 2025-04-03 DIAGNOSIS — E78.2 MIXED HYPERLIPIDEMIA: ICD-10-CM

## 2025-04-03 DIAGNOSIS — I10 ESSENTIAL HYPERTENSION: ICD-10-CM

## 2025-04-03 LAB
25(OH)D3 SERPL-MCNC: 51.6 NG/ML
ALBUMIN SERPL-MCNC: 4.4 G/DL (ref 3.5–5.2)
ALP SERPL-CCNC: 65 U/L (ref 35–104)
ALT SERPL-CCNC: 17 U/L (ref 10–35)
ANION GAP SERPL CALCULATED.3IONS-SCNC: 11 MMOL/L (ref 8–16)
AST SERPL-CCNC: 22 U/L (ref 10–35)
BASOPHILS # BLD: 0.1 K/UL (ref 0–0.2)
BASOPHILS NFR BLD: 0.8 % (ref 0–1)
BILIRUB SERPL-MCNC: 0.4 MG/DL (ref 0.2–1.2)
BUN SERPL-MCNC: 14 MG/DL (ref 8–23)
CALCIUM SERPL-MCNC: 9.9 MG/DL (ref 8.8–10.2)
CHLORIDE SERPL-SCNC: 96 MMOL/L (ref 98–107)
CHOLEST SERPL-MCNC: 131 MG/DL (ref 0–199)
CO2 SERPL-SCNC: 28 MMOL/L (ref 22–29)
CREAT SERPL-MCNC: 0.7 MG/DL (ref 0.5–0.9)
EOSINOPHIL # BLD: 0.1 K/UL (ref 0–0.6)
EOSINOPHIL NFR BLD: 1.9 % (ref 0–5)
ERYTHROCYTE [DISTWIDTH] IN BLOOD BY AUTOMATED COUNT: 14.4 % (ref 11.5–14.5)
GLUCOSE SERPL-MCNC: 96 MG/DL (ref 70–99)
HBA1C MFR BLD: 6 % (ref 4–5.6)
HCT VFR BLD AUTO: 39.4 % (ref 37–47)
HDLC SERPL-MCNC: 50 MG/DL (ref 40–60)
HGB BLD-MCNC: 13.1 G/DL (ref 12–16)
IMM GRANULOCYTES # BLD: 0 K/UL
LDLC SERPL CALC-MCNC: 54 MG/DL
LYMPHOCYTES # BLD: 2.4 K/UL (ref 1.1–4.5)
LYMPHOCYTES NFR BLD: 33 % (ref 20–40)
MCH RBC QN AUTO: 28.3 PG (ref 27–31)
MCHC RBC AUTO-ENTMCNC: 33.2 G/DL (ref 33–37)
MCV RBC AUTO: 85.1 FL (ref 81–99)
MONOCYTES # BLD: 0.5 K/UL (ref 0–0.9)
MONOCYTES NFR BLD: 6.9 % (ref 0–10)
NEUTROPHILS # BLD: 4.1 K/UL (ref 1.5–7.5)
NEUTS SEG NFR BLD: 57.3 % (ref 50–65)
PLATELET # BLD AUTO: 247 K/UL (ref 130–400)
PMV BLD AUTO: 10.2 FL (ref 9.4–12.3)
POTASSIUM SERPL-SCNC: 3.5 MMOL/L (ref 3.5–5.1)
PROT SERPL-MCNC: 7.2 G/DL (ref 6.4–8.3)
RBC # BLD AUTO: 4.63 M/UL (ref 4.2–5.4)
SODIUM SERPL-SCNC: 135 MMOL/L (ref 136–145)
T4 FREE SERPL-MCNC: 1.11 NG/DL (ref 0.93–1.7)
TRIGL SERPL-MCNC: 137 MG/DL (ref 0–149)
TSH SERPL DL<=0.005 MIU/L-ACNC: 3.17 UIU/ML (ref 0.27–4.2)
WBC # BLD AUTO: 7.2 K/UL (ref 4.8–10.8)

## 2025-04-08 ENCOUNTER — RESULTS FOLLOW-UP (OUTPATIENT)
Dept: PRIMARY CARE CLINIC | Age: 89
End: 2025-04-08

## 2025-04-08 ENCOUNTER — OFFICE VISIT (OUTPATIENT)
Dept: PRIMARY CARE CLINIC | Age: 89
End: 2025-04-08
Payer: MEDICARE

## 2025-04-08 VITALS
SYSTOLIC BLOOD PRESSURE: 120 MMHG | HEIGHT: 63 IN | OXYGEN SATURATION: 97 % | HEART RATE: 65 BPM | DIASTOLIC BLOOD PRESSURE: 74 MMHG | TEMPERATURE: 97.8 F | WEIGHT: 154 LBS | BODY MASS INDEX: 27.29 KG/M2

## 2025-04-08 DIAGNOSIS — J30.2 SEASONAL ALLERGIC RHINITIS, UNSPECIFIED TRIGGER: ICD-10-CM

## 2025-04-08 DIAGNOSIS — I25.10 CORONARY ARTERY DISEASE INVOLVING NATIVE CORONARY ARTERY OF NATIVE HEART WITHOUT ANGINA PECTORIS: ICD-10-CM

## 2025-04-08 DIAGNOSIS — M54.50 CHRONIC BILATERAL LOW BACK PAIN WITHOUT SCIATICA: ICD-10-CM

## 2025-04-08 DIAGNOSIS — E78.2 MIXED HYPERLIPIDEMIA: ICD-10-CM

## 2025-04-08 DIAGNOSIS — M81.0 AGE-RELATED OSTEOPOROSIS WITHOUT CURRENT PATHOLOGICAL FRACTURE: ICD-10-CM

## 2025-04-08 DIAGNOSIS — E87.1 HYPONATREMIA: Primary | ICD-10-CM

## 2025-04-08 DIAGNOSIS — G89.29 CHRONIC BILATERAL LOW BACK PAIN WITHOUT SCIATICA: ICD-10-CM

## 2025-04-08 DIAGNOSIS — H61.21 IMPACTED CERUMEN, RIGHT EAR: ICD-10-CM

## 2025-04-08 DIAGNOSIS — R91.1 PULMONARY NODULE: ICD-10-CM

## 2025-04-08 DIAGNOSIS — I10 ESSENTIAL HYPERTENSION: ICD-10-CM

## 2025-04-08 DIAGNOSIS — Z00.00 MEDICARE ANNUAL WELLNESS VISIT, SUBSEQUENT: ICD-10-CM

## 2025-04-08 DIAGNOSIS — H35.30 MACULAR DEGENERATION OF BOTH EYES, UNSPECIFIED TYPE: ICD-10-CM

## 2025-04-08 DIAGNOSIS — R73.03 PREDIABETES: ICD-10-CM

## 2025-04-08 PROCEDURE — 1123F ACP DISCUSS/DSCN MKR DOCD: CPT | Performed by: NURSE PRACTITIONER

## 2025-04-08 PROCEDURE — 99214 OFFICE O/P EST MOD 30 MIN: CPT | Performed by: NURSE PRACTITIONER

## 2025-04-08 PROCEDURE — 69209 REMOVE IMPACTED EAR WAX UNI: CPT | Performed by: NURSE PRACTITIONER

## 2025-04-08 PROCEDURE — 1159F MED LIST DOCD IN RCRD: CPT | Performed by: NURSE PRACTITIONER

## 2025-04-08 PROCEDURE — 1160F RVW MEDS BY RX/DR IN RCRD: CPT | Performed by: NURSE PRACTITIONER

## 2025-04-08 PROCEDURE — G0439 PPPS, SUBSEQ VISIT: HCPCS | Performed by: NURSE PRACTITIONER

## 2025-04-08 RX ORDER — LORATADINE 10 MG/1
10 TABLET ORAL DAILY
Qty: 90 TABLET | Refills: 3 | Status: SHIPPED | OUTPATIENT
Start: 2025-04-08

## 2025-04-08 RX ORDER — GABAPENTIN 100 MG/1
CAPSULE ORAL
Qty: 90 CAPSULE | Refills: 1 | Status: SHIPPED | OUTPATIENT
Start: 2025-04-08 | End: 2025-10-08

## 2025-04-08 SDOH — ECONOMIC STABILITY: FOOD INSECURITY: WITHIN THE PAST 12 MONTHS, YOU WORRIED THAT YOUR FOOD WOULD RUN OUT BEFORE YOU GOT MONEY TO BUY MORE.: NEVER TRUE

## 2025-04-08 SDOH — ECONOMIC STABILITY: FOOD INSECURITY: WITHIN THE PAST 12 MONTHS, THE FOOD YOU BOUGHT JUST DIDN'T LAST AND YOU DIDN'T HAVE MONEY TO GET MORE.: NEVER TRUE

## 2025-04-08 ASSESSMENT — ENCOUNTER SYMPTOMS
WHEEZING: 0
RHINORRHEA: 1
NAUSEA: 0
DIARRHEA: 0
SORE THROAT: 0
CHEST TIGHTNESS: 0
SHORTNESS OF BREATH: 0
ABDOMINAL PAIN: 0
COUGH: 1

## 2025-04-08 ASSESSMENT — LIFESTYLE VARIABLES
HOW MANY STANDARD DRINKS CONTAINING ALCOHOL DO YOU HAVE ON A TYPICAL DAY: PATIENT DOES NOT DRINK
HOW OFTEN DO YOU HAVE A DRINK CONTAINING ALCOHOL: NEVER

## 2025-04-08 ASSESSMENT — PATIENT HEALTH QUESTIONNAIRE - PHQ9
SUM OF ALL RESPONSES TO PHQ QUESTIONS 1-9: 0
2. FEELING DOWN, DEPRESSED OR HOPELESS: NOT AT ALL
SUM OF ALL RESPONSES TO PHQ QUESTIONS 1-9: 0
1. LITTLE INTEREST OR PLEASURE IN DOING THINGS: NOT AT ALL

## 2025-04-08 NOTE — PATIENT INSTRUCTIONS

## 2025-04-08 NOTE — TELEPHONE ENCOUNTER
Alyssa Love called to request a refill on her medication.      Last office visit : 4/8/2025   Next office visit : Visit date not found     Last UDS:   Benzodiazepine Screen, Urine   Date Value Ref Range Status   10/07/2024 neg  Final     Buprenorphine Urine   Date Value Ref Range Status   10/07/2024 neg  Final     Cocaine Metabolite Screen, Urine   Date Value Ref Range Status   10/07/2024 neg  Final     Gabapentin Screen, Urine   Date Value Ref Range Status   08/25/2023 neg  Final     Oxycodone Screen, Ur   Date Value Ref Range Status   10/07/2024 neg  Final     Propoxyphene Screen, Urine   Date Value Ref Range Status   10/07/2024 neg  Final     THC Screen, Urine   Date Value Ref Range Status   10/07/2024 neg  Final     Tricyclic Antidepressants, Urine   Date Value Ref Range Status   10/07/2024 neg  Final       Last Matti: 3/28/25  Medication Contract: 10/8/24   Last Fill: 10/7/24    Requested Prescriptions     Pending Prescriptions Disp Refills    gabapentin (NEURONTIN) 100 MG capsule [Pharmacy Med Name: GABAPENTIN 100 MG CAPSULE] 90 capsule      Sig: TAKE 1 CAPSULE BY MOUTH EVERY MORNING. MAX DAILY AMOUNT: 100 MG         Please approve or refuse this medication.   Yeimy Guzman

## 2025-04-08 NOTE — PROGRESS NOTES
Medicare Annual Wellness Visit    Alyssa Love is here for Medicare AWV    Assessment & Plan   Medicare annual wellness visit, subsequent  Essential hypertension  Mixed hyperlipidemia  Prediabetes  Coronary artery disease involving native coronary artery of native heart without angina pectoris  Pulmonary nodule  Chronic bilateral low back pain without sciatica  Age-related osteoporosis without current pathological fracture  -     DEXA BONE DENSITY 2 SITES; Future  Macular degeneration of both eyes, unspecified type       No follow-ups on file.     Subjective       Patient's complete Health Risk Assessment and screening values have been reviewed and are found in Flowsheets. The following problems were reviewed today and where indicated follow up appointments were made and/or referrals ordered.    Positive Risk Factor Screenings with Interventions:             General HRA Questions:            Dentist Screen:  Have you seen the dentist within the past year?: (!) No    Intervention:  Not applicable - dentures     Vision Screen:  Do you have difficulty driving, watching TV, or doing any of your daily activities because of your eyesight?: (!) Yes  Have you had an eye exam within the past year?: Yes  Interventions:   Patient encouraged to make appointment with their eye specialist    Safety:  Do you have working smoke detectors?: (!) No  Interventions:  Safety measures reviewed    ADL's:   Patient reports needing help with:  Select all that apply: (!) Housekeeping, Transportation  Interventions:  Has assistance from family                  Objective   Vitals:    04/08/25 0936   BP: 120/74   Pulse: 65   Temp: 97.8 °F (36.6 °C)   SpO2: 97%   Weight: 69.9 kg (154 lb)   Height: 1.6 m (5' 3\")      Body mass index is 27.28 kg/m².                No Known Allergies  Prior to Visit Medications    Medication Sig Taking? Authorizing Provider   gabapentin (NEURONTIN) 300 MG capsule Take 1 capsule by mouth nightly for 180 days. Max 
doctor if you have any problems.  Where can you learn more?  Go to https://www.[employer].net/patientEd and enter F075 to learn more about \"A Healthy Heart: Care Instructions.\"  Current as of: July 31, 2024  Content Version: 14.4  © 3817-5902 [employer].   Care instructions adapted under license by [employer]. If you have questions about a medical condition or this instruction, always ask your healthcare professional. [employer], disclaims any warranty or liability for your use of this information.    Personalized Preventive Plan for Alyssa Love - 4/8/2025  Medicare offers a range of preventive health benefits. Some of the tests and screenings are paid in full while other may be subject to a deductible, co-insurance, and/or copay.  Some of these benefits include a comprehensive review of your medical history including lifestyle, illnesses that may run in your family, and various assessments and screenings as appropriate.  After reviewing your medical record and screening and assessments performed today your provider may have ordered immunizations, labs, imaging, and/or referrals for you.  A list of these orders (if applicable) as well as your Preventive Care list are included within your After Visit Summary for your review.         .follow    Patient voicesunderstanding and agrees to plans along with risks and benefits of plan.    Counseling:  Alyssa Love's case, medications and options were discussed in detail. Patient was instructed to call the office if she has any questions regarding her treatment. Should her conditions worsen, she should return to office to be reassessed by BRAYDON Louise. she Should to go the closest Emergency Department for any emergency. They have verbalized understanding regarding  the above instructions.     Return in about 6 months (around 10/8/2025) for office visit extended, hypertension, prediabetes.    
- - -

## 2025-04-15 ENCOUNTER — OFFICE VISIT (OUTPATIENT)
Dept: PULMONOLOGY | Age: 89
End: 2025-04-15
Payer: MEDICARE

## 2025-04-15 VITALS
TEMPERATURE: 97.5 F | OXYGEN SATURATION: 96 % | HEIGHT: 63 IN | DIASTOLIC BLOOD PRESSURE: 64 MMHG | HEART RATE: 67 BPM | SYSTOLIC BLOOD PRESSURE: 146 MMHG | BODY MASS INDEX: 27.11 KG/M2 | WEIGHT: 153 LBS | RESPIRATION RATE: 20 BRPM

## 2025-04-15 DIAGNOSIS — Z95.2 S/P TAVR (TRANSCATHETER AORTIC VALVE REPLACEMENT): ICD-10-CM

## 2025-04-15 DIAGNOSIS — R91.8 LUNG NODULES: Primary | ICD-10-CM

## 2025-04-15 DIAGNOSIS — Z87.891 HISTORY OF SMOKING: ICD-10-CM

## 2025-04-15 DIAGNOSIS — I25.10 CORONARY ARTERY DISEASE INVOLVING NATIVE CORONARY ARTERY OF NATIVE HEART WITHOUT ANGINA PECTORIS: ICD-10-CM

## 2025-04-15 PROCEDURE — 99204 OFFICE O/P NEW MOD 45 MIN: CPT | Performed by: INTERNAL MEDICINE

## 2025-04-15 PROCEDURE — 1159F MED LIST DOCD IN RCRD: CPT | Performed by: INTERNAL MEDICINE

## 2025-04-15 PROCEDURE — 1123F ACP DISCUSS/DSCN MKR DOCD: CPT | Performed by: INTERNAL MEDICINE

## 2025-04-15 RX ORDER — TELMISARTAN AND AMLODIPINE 10; 40 MG/1; MG/1
TABLET ORAL
COMMUNITY
Start: 2024-03-28

## 2025-04-15 ASSESSMENT — ENCOUNTER SYMPTOMS
ANAL BLEEDING: 0
WHEEZING: 0
ABDOMINAL DISTENTION: 0
BACK PAIN: 0
CHEST TIGHTNESS: 0
SHORTNESS OF BREATH: 0
COUGH: 1
APNEA: 0
RHINORRHEA: 0
ABDOMINAL PAIN: 0

## 2025-04-15 NOTE — PROGRESS NOTES
Pulmonary and Sleep Medicine    Alyssa Love (:  1935) is a 89 y.o. female,Established patient, here for evaluation of the following chief complaint(s):  New Patient (NP- Lung nodule/Chest CT- 4/3/2024/Chest Ct-2024)      Referring physician:  Betsy Ott, Apralbina  [address] [place] [address] [address]  [place] [address]     ASSESSMENT/PLAN:  1. Lung nodules  -     CT CHEST WO CONTRAST; Future  2. History of smoking. Quit smoking 15 years ago.  3. S/P TAVR (transcatheter aortic valve replacement)  4. Coronary artery disease involving native coronary artery of native heart without angina pectoris        CT of the chest reviewed by myself.  The patient does have subcentimeter nodules most likely are benign.  Will repeat CT of the chest to evaluate stability.  If no change will repeat CT again in 6 months to assure stability.  If no change in 6 months there will be no indication for ongoing follow-up since that would establish 2-year stability and benign nature of the nodules.       Jocelin Serrano MD, [facility], [facility]    Return in about 4 weeks (around 2025).    SUBJECTIVE/OBJECTIVE:        Patient is here for evaluation of lung nodule.  She had a CT of the chest done in  that showed multiple bilateral subcentimeter nodules.  The patient had a follow-up CT of the chest in April that showed some of the nodule actually decreasing in size.  She had a follow-up CT of the chest in 2024 and at that time the nodules were stable.  The largest nodule is about 0.6 cm in size.  The patient quit smoking about 15 years ago.  Denies any other complaints at this time.          Continue the following medications as reported by the patient:    Prior to Visit Medications    Medication Sig Taking? Authorizing Provider   telmisartan-amLODIPine (TWYNSTA) 40-10 MG TABS tablet by NOT APPLICABLE route Pt is unsure which amlodipine she is taking. Yes Provider, MD Aleshia   loratadine (CLARITIN) 10

## 2025-05-01 ENCOUNTER — HOSPITAL ENCOUNTER (OUTPATIENT)
Dept: CT IMAGING | Age: 89
Discharge: HOME | End: 2025-05-01
Payer: MEDICARE

## 2025-05-01 DIAGNOSIS — R91.8 LUNG NODULES: ICD-10-CM

## 2025-05-01 PROCEDURE — 71250 CT THORAX DX C-: CPT

## 2025-05-08 ENCOUNTER — OFFICE VISIT (OUTPATIENT)
Dept: PULMONOLOGY | Age: 89
End: 2025-05-08
Payer: MEDICARE

## 2025-05-08 VITALS
BODY MASS INDEX: 27.71 KG/M2 | HEIGHT: 63 IN | DIASTOLIC BLOOD PRESSURE: 67 MMHG | OXYGEN SATURATION: 93 % | SYSTOLIC BLOOD PRESSURE: 114 MMHG | HEART RATE: 75 BPM | TEMPERATURE: 97.2 F | WEIGHT: 156.4 LBS | RESPIRATION RATE: 20 BRPM

## 2025-05-08 DIAGNOSIS — R91.8 LUNG NODULES: Primary | ICD-10-CM

## 2025-05-08 DIAGNOSIS — I25.10 CORONARY ARTERY DISEASE INVOLVING NATIVE CORONARY ARTERY OF NATIVE HEART WITHOUT ANGINA PECTORIS: ICD-10-CM

## 2025-05-08 DIAGNOSIS — Z95.2 S/P TAVR (TRANSCATHETER AORTIC VALVE REPLACEMENT): ICD-10-CM

## 2025-05-08 DIAGNOSIS — Z87.891 HISTORY OF SMOKING: ICD-10-CM

## 2025-05-08 PROCEDURE — 99214 OFFICE O/P EST MOD 30 MIN: CPT | Performed by: INTERNAL MEDICINE

## 2025-05-08 PROCEDURE — 1159F MED LIST DOCD IN RCRD: CPT | Performed by: INTERNAL MEDICINE

## 2025-05-08 PROCEDURE — 1123F ACP DISCUSS/DSCN MKR DOCD: CPT | Performed by: INTERNAL MEDICINE

## 2025-05-08 ASSESSMENT — ENCOUNTER SYMPTOMS
RHINORRHEA: 0
ABDOMINAL PAIN: 0
COUGH: 0
SHORTNESS OF BREATH: 0
BACK PAIN: 0
ANAL BLEEDING: 0
CHEST TIGHTNESS: 0
WHEEZING: 0
APNEA: 0
ABDOMINAL DISTENTION: 0

## 2025-05-08 NOTE — PROGRESS NOTES
tablet TAKE 1 TABLET BY MOUTH EVERY DAY Yes Yareli Brown APRN - NP   hydroCHLOROthiazide (HYDRODIURIL) 25 MG tablet Take 1 tablet by mouth daily Yes Betsy Ott APRN   amLODIPine (NORVASC) 10 MG tablet Take 1 tablet by mouth daily Yes Betsy Ott APRN   atorvastatin (LIPITOR) 80 MG tablet TAKE 1 TABLET BY MOUTH EVERY DAY AT NIGHT Yes Betsy Ott APRN   aspirin 81 MG chewable tablet Take 1 tablet by mouth daily Yes Marc Natarajan MD   propylene glycol-glycerin (ARTIFICIAL TEARS) 1-0.3 % SOLN ophthalmic solution Place 1 drop into both eyes every 3 hours as needed for Dry Eyes Yes Marc Natarajan MD   Multiple Vitamins-Minerals (PRESERVISION AREDS 2) CAPS Take by mouth in the morning and at bedtime Yes ProviderAleshia MD   vitamin D (CHOLECALCIFEROL) 25 MCG (1000 UT) TABS tablet Take 1 tablet by mouth daily Yes Provider, MD Aleshia        Review of Systems   Constitutional:  Negative for activity change, appetite change, chills, diaphoresis and fatigue.   HENT:  Negative for congestion, dental problem, drooling, ear discharge, postnasal drip and rhinorrhea.    Eyes:  Negative for visual disturbance.   Respiratory:  Negative for apnea, cough, chest tightness, shortness of breath and wheezing.    Gastrointestinal:  Negative for abdominal distention, abdominal pain and anal bleeding.   Endocrine: Negative for cold intolerance, heat intolerance and polydipsia.   Genitourinary:  Negative for difficulty urinating, dysuria, enuresis and flank pain.   Musculoskeletal:  Negative for arthralgias, back pain and gait problem.   Allergic/Immunologic: Negative for environmental allergies.   Neurological:  Negative for dizziness, facial asymmetry, light-headedness and headaches.       Vitals:    05/08/25 1258   BP: 114/67   Pulse: 75   Resp: 20   Temp: 97.2 °F (36.2 °C)   SpO2: 93%   Weight: 70.9 kg (156 lb 6.4 oz)   Height: 1.6 m (5' 3\")      BMI Readings from Last 1 Encounters:

## 2025-05-12 DIAGNOSIS — G89.29 CHRONIC BILATERAL LOW BACK PAIN WITHOUT SCIATICA: ICD-10-CM

## 2025-05-12 DIAGNOSIS — M54.50 CHRONIC BILATERAL LOW BACK PAIN WITHOUT SCIATICA: ICD-10-CM

## 2025-05-12 RX ORDER — GABAPENTIN 100 MG/1
CAPSULE ORAL
Qty: 90 CAPSULE | Refills: 1 | Status: SHIPPED | OUTPATIENT
Start: 2025-05-12 | End: 2025-11-12

## 2025-05-12 NOTE — TELEPHONE ENCOUNTER
Alyssa MICAELA Love called to request a refill on her medication.      Last office visit : 4/8/2025   Next office visit : 10/7/2025     Requested Prescriptions     Pending Prescriptions Disp Refills    gabapentin (NEURONTIN) 100 MG capsule 90 capsule 1     Sig: In addition to 300 mg capsule jose robertoly            Yeimy Guzman

## 2025-05-29 ENCOUNTER — OFFICE VISIT (OUTPATIENT)
Dept: CARDIOLOGY CLINIC | Age: 89
End: 2025-05-29
Payer: MEDICARE

## 2025-05-29 VITALS
BODY MASS INDEX: 27.29 KG/M2 | DIASTOLIC BLOOD PRESSURE: 60 MMHG | SYSTOLIC BLOOD PRESSURE: 100 MMHG | HEART RATE: 72 BPM | HEIGHT: 63 IN | WEIGHT: 154 LBS | OXYGEN SATURATION: 97 %

## 2025-05-29 DIAGNOSIS — Z95.2 HISTORY OF TRANSCATHETER AORTIC VALVE REPLACEMENT (TAVR): Primary | ICD-10-CM

## 2025-05-29 DIAGNOSIS — I10 ESSENTIAL HYPERTENSION: ICD-10-CM

## 2025-05-29 PROCEDURE — 1159F MED LIST DOCD IN RCRD: CPT | Performed by: INTERNAL MEDICINE

## 2025-05-29 PROCEDURE — 1123F ACP DISCUSS/DSCN MKR DOCD: CPT | Performed by: INTERNAL MEDICINE

## 2025-05-29 PROCEDURE — 99214 OFFICE O/P EST MOD 30 MIN: CPT | Performed by: INTERNAL MEDICINE

## 2025-05-29 PROCEDURE — 1160F RVW MEDS BY RX/DR IN RCRD: CPT | Performed by: INTERNAL MEDICINE

## 2025-05-29 RX ORDER — AMLODIPINE BESYLATE 5 MG/1
5 TABLET ORAL DAILY
Qty: 90 TABLET | Refills: 3 | Status: SHIPPED | OUTPATIENT
Start: 2025-05-29 | End: 2025-08-27

## 2025-05-29 NOTE — PROGRESS NOTES
Cardiology Office Visit Note  [address] [facility] [address] [place] [address]  Phone: [phone]  Fax: [phone]                            Date:  5/29/2025  Patient: Alyssa Love  Age:  89 y.o., 1935    Referral: No ref. provider found    REASON FOR VISIT:  Follow-up visit      PROBLEM LIST:  Patient Active Problem List    Diagnosis Date Noted    Prediabetes 01/06/2023     Priority: Medium    Recurrent UTI 12/27/2021     Priority: Low    Aortic stenosis 12/27/2021     Priority: Low    Ventral hernia without obstruction or gangrene 12/27/2021     Priority: Low    Spinal stenosis of lumbar region with neurogenic claudication 11/30/2021     Priority: Low    Primary osteoarthritis of right knee 10/14/2021     Priority: Low    Chronic bilateral low back pain without sciatica 09/24/2021     Priority: Low    Macular degeneration of both eyes 09/24/2021     Priority: Low    Essential hypertension      Priority: Low    Mixed hyperlipidemia      Priority: Low    Age-related osteoporosis without current pathological fracture      Priority: Low    Coronary artery disease involving native coronary artery of native heart without angina pectoris      Priority: Low    DDD (degenerative disc disease), lumbar      Priority: Low    Basal cell carcinoma (BCC) of scalp      Priority: Low    History of smoking 04/15/2025    Urinary incontinence 11/08/2023    History of ischemic stroke 11/07/2023    Cerebrovascular accident (CVA) (HCC) 11/02/2023    Thyroid nodule 11/02/2023    Lung nodules 11/02/2023    S/P TAVR (transcatheter aortic valve replacement) 10/31/2023    Dysuria 07/31/2023       ASSESSMENT PLAN:  Assessment & Plan  Chronic severe aortic stenosis:  - Status post TAVR in 10/2023  - Last echocardiogram in 09/2024 showed normal functioning transcatheter bioprosthetic valve, EF 70%, mean gradient 14 mmHg  - Annual echocardiograms recommended  - Echocardiogram scheduled for 09/2025    Chronic coronary artery

## 2025-06-20 ENCOUNTER — OFFICE VISIT (OUTPATIENT)
Dept: NEUROLOGY | Age: 89
End: 2025-06-20
Payer: MEDICARE

## 2025-06-20 VITALS
OXYGEN SATURATION: 96 % | HEIGHT: 63 IN | BODY MASS INDEX: 27.29 KG/M2 | HEART RATE: 67 BPM | SYSTOLIC BLOOD PRESSURE: 147 MMHG | WEIGHT: 154 LBS | DIASTOLIC BLOOD PRESSURE: 72 MMHG

## 2025-06-20 DIAGNOSIS — Z86.73 HISTORY OF CVA IN ADULTHOOD: Primary | ICD-10-CM

## 2025-06-20 DIAGNOSIS — R20.0 BILATERAL HAND NUMBNESS: ICD-10-CM

## 2025-06-20 PROCEDURE — 1160F RVW MEDS BY RX/DR IN RCRD: CPT | Performed by: NURSE PRACTITIONER

## 2025-06-20 PROCEDURE — 99214 OFFICE O/P EST MOD 30 MIN: CPT | Performed by: NURSE PRACTITIONER

## 2025-06-20 PROCEDURE — 1123F ACP DISCUSS/DSCN MKR DOCD: CPT | Performed by: NURSE PRACTITIONER

## 2025-06-20 PROCEDURE — 1159F MED LIST DOCD IN RCRD: CPT | Performed by: NURSE PRACTITIONER

## 2025-06-20 NOTE — PROGRESS NOTES
REVIEW OF SYSTEMS    Constitutional: []Fever []Sweats []Chills [] Recent Injury [x] Denies all unless marked  HEENT:[]Headache  [] Head Injury [] Hearing Loss  [] Sore Throat  [] Ear Ache [x] Denies all unless marked  Spine:  [] Neck pain  [] Back pain  [] Sciaticia  [x] Denies all unless marked  Cardiovascular:[]Heart Disease []Palpitations [] Chest Pain   [x] Denies all unless marked  Pulmonary: []Shortness of Breath []Cough   [x] Denies all unless marked  Psychiatric/Behavioral:[] Depression [] Anxiety [x] Denies all unless marked  Gastrointestinal: []Nausea  []Vomiting  []Abdominal Pain  []Constipation  []Diarrhea  [x] Denies all unless marked  Genitourinary:   [] Frequency  [] Urgency  [] Dysuria [] Incontinence  [x] Denies all unless marked  Extremities: []Pain  []Swelling  [x] Denies all unless marked  Musculoskeletal: [] Myalgias  [] Joint Pain  [] Arthritis [] Muscle Cramps [] Muscle Twitches  [x] Denies all unless marked  Sleep: []Insomnia[]Snoring []Restless Legs  []Sleep Apnea  []Daytime Sleepiness  [x] Denies all unless marked  Skin:[] Rash [] Color Change [x] Denies all unless marked   Neurological:[]Visual Disturbance [] Memory Loss []Loss of Balance []Slurred Speech []Weakness []Seizures  [] Dizziness [x] Denies all unless marked      
wearing life alert.  5. Return in about 1 year (around 6/20/2026) for Follow up, sooner with worsening symptoms.    BRAYDON Hooks    I spent 33 minutes caring for Alyssa Love on this date of service. This time includes time spent by me in the following activities: preparing for the visit, reviewing tests, performing a medically appropriate examination and/or evaluation , counseling and educating the patient/family/caregiver, ordering medications, tests, or procedures, documenting information in the medical record and care coordination.    This dictation was generated by voice recognition computer software.  Although all attempts were made to edit the dictation for accuracy, there may be errors in the transcription that are not intended.

## 2025-06-26 ENCOUNTER — OFFICE VISIT (OUTPATIENT)
Age: 89
End: 2025-06-26

## 2025-06-26 VITALS
DIASTOLIC BLOOD PRESSURE: 60 MMHG | SYSTOLIC BLOOD PRESSURE: 122 MMHG | RESPIRATION RATE: 20 BRPM | HEART RATE: 73 BPM | WEIGHT: 152.6 LBS | OXYGEN SATURATION: 96 % | BODY MASS INDEX: 27.04 KG/M2 | HEIGHT: 63 IN | TEMPERATURE: 97.2 F

## 2025-06-26 DIAGNOSIS — Z86.73 HISTORY OF CVA IN ADULTHOOD: ICD-10-CM

## 2025-06-26 DIAGNOSIS — H65.191 OTHER NON-RECURRENT ACUTE NONSUPPURATIVE OTITIS MEDIA OF RIGHT EAR: Primary | ICD-10-CM

## 2025-06-26 RX ORDER — ISOSORBIDE MONONITRATE 30 MG/1
30 TABLET, EXTENDED RELEASE ORAL DAILY
Qty: 90 TABLET | Refills: 1 | OUTPATIENT
Start: 2025-06-26

## 2025-06-26 ASSESSMENT — ENCOUNTER SYMPTOMS
DIARRHEA: 0
VOMITING: 0
SINUS PAIN: 0
NAUSEA: 0
ABDOMINAL DISTENTION: 0
VOICE CHANGE: 1
TROUBLE SWALLOWING: 0
CHEST TIGHTNESS: 0
EYE PAIN: 0
EYE ITCHING: 0
ABDOMINAL PAIN: 0
WHEEZING: 0
CONSTIPATION: 0
SINUS PRESSURE: 0
RHINORRHEA: 0
EYE DISCHARGE: 0
APNEA: 0
COUGH: 0
COLOR CHANGE: 0
SORE THROAT: 1
SHORTNESS OF BREATH: 0

## 2025-06-26 NOTE — PROGRESS NOTES
Allergic/Immunologic: Negative for immunocompromised state.   Neurological:  Negative for dizziness, syncope, speech difficulty, weakness, light-headedness, numbness and headaches.   Hematological:  Does not bruise/bleed easily.   Psychiatric/Behavioral:  Negative for agitation, confusion and decreased concentration.        Objective    Physical Exam  Vitals reviewed.   Constitutional:       General: She is not in acute distress.     Appearance: Normal appearance. She is not ill-appearing or toxic-appearing.   HENT:      Head: Normocephalic.      Right Ear: Ear canal and external ear normal. Tympanic membrane is injected and erythematous.      Left Ear: Tympanic membrane, ear canal and external ear normal.      Nose: Nose normal.      Mouth/Throat:      Lips: Pink.      Mouth: Mucous membranes are moist.      Pharynx: Oropharynx is clear. No pharyngeal swelling, oropharyngeal exudate, posterior oropharyngeal erythema or postnasal drip.   Eyes:      General: Lids are normal.      Extraocular Movements: Extraocular movements intact.      Conjunctiva/sclera: Conjunctivae normal.      Pupils: Pupils are equal, round, and reactive to light.   Neck:      Trachea: Trachea normal.   Cardiovascular:      Rate and Rhythm: Normal rate and regular rhythm.      Pulses: Normal pulses.      Heart sounds: Normal heart sounds, S1 normal and S2 normal. No murmur heard.  Pulmonary:      Effort: Pulmonary effort is normal. No accessory muscle usage or respiratory distress.      Breath sounds: Normal breath sounds and air entry. No stridor. No decreased breath sounds, wheezing or rhonchi.   Musculoskeletal:         General: Normal range of motion.      Cervical back: Full passive range of motion without pain, normal range of motion and neck supple. No rigidity.      Right lower leg: No edema.      Left lower leg: No edema.   Lymphadenopathy:      Cervical: No cervical adenopathy.   Skin:     General: Skin is warm and dry.

## 2025-06-26 NOTE — PATIENT INSTRUCTIONS
Antibiotic sent to pharmacy for ear infection. Take full course even if symptoms start to improve.   May take Tylenol/Ibuprofen as needed for pain and/or fever, unless otherwise instructed by primary care provider or specialist.  Follow up with primary care provider if symptoms do not improve.

## 2025-07-09 ENCOUNTER — OFFICE VISIT (OUTPATIENT)
Dept: OBGYN CLINIC | Age: 89
End: 2025-07-09
Payer: MEDICARE

## 2025-07-09 VITALS
HEIGHT: 63 IN | WEIGHT: 149 LBS | SYSTOLIC BLOOD PRESSURE: 144 MMHG | DIASTOLIC BLOOD PRESSURE: 76 MMHG | BODY MASS INDEX: 26.4 KG/M2 | HEART RATE: 66 BPM

## 2025-07-09 DIAGNOSIS — N81.6 RECTOCELE: ICD-10-CM

## 2025-07-09 DIAGNOSIS — R10.2 PELVIC PRESSURE IN FEMALE: ICD-10-CM

## 2025-07-09 DIAGNOSIS — N81.10 BADEN-WALKER GRADE 4 CYSTOCELE: Primary | ICD-10-CM

## 2025-07-09 PROCEDURE — 1160F RVW MEDS BY RX/DR IN RCRD: CPT

## 2025-07-09 PROCEDURE — 1159F MED LIST DOCD IN RCRD: CPT

## 2025-07-09 PROCEDURE — 99213 OFFICE O/P EST LOW 20 MIN: CPT

## 2025-07-09 PROCEDURE — 1123F ACP DISCUSS/DSCN MKR DOCD: CPT

## 2025-07-09 ASSESSMENT — ENCOUNTER SYMPTOMS
CONSTIPATION: 0
RECTAL PAIN: 0
SHORTNESS OF BREATH: 0
ABDOMINAL PAIN: 0
BACK PAIN: 0
CHEST TIGHTNESS: 0
NAUSEA: 0
GASTROINTESTINAL NEGATIVE: 1
RESPIRATORY NEGATIVE: 1
DIARRHEA: 0

## 2025-07-09 NOTE — PROGRESS NOTES
Pt presents today for a follow up on cystocele. States it has gotten worse. States when she sits and gets up to use the restroom it comes out and doesn't stop. Would like to discuss surgical options. States she was supposed to try something else but she never heard back from us. States she got 1 call and they told her she could order a specific pessary but she never heard anything after that.

## 2025-07-09 NOTE — PROGRESS NOTES
[facility]  CNM Office Note    Alyssa Love is a 89 y.o. female who presents today for her medical conditions/ complaints as noted below.  Chief Complaint   Patient presents with    Other     Pessary fitting         HPI  History of Present Illness  The patient presents for evaluation of bladder issues.    She reports a worsening condition with her bladder, which she describes as feeling like sitting on a ball. She has not experienced any recent urinary tract infections (UTIs) and does not suffer from bowel issues or straining. She is scheduled to travel in 09/2025 and is concerned about potential complications during her trip. She has tried various pessaries without success.     Supplemental Information  She had her aorta replaced in [place].       Problems/Complaints today:  1. [place] grade 4 cystocele  2. Pelvic pressure in female  3. Rectocele       Patient Active Problem List   Diagnosis    Essential hypertension    Mixed hyperlipidemia    Age-related osteoporosis without current pathological fracture    Coronary artery disease involving native coronary artery of native heart without angina pectoris    DDD (degenerative disc disease), lumbar    Basal cell carcinoma (BCC) of scalp    Chronic bilateral low back pain without sciatica    Macular degeneration of both eyes    Primary osteoarthritis of right knee    Spinal stenosis of lumbar region with neurogenic claudication    Recurrent UTI    Aortic stenosis    Ventral hernia without obstruction or gangrene    Prediabetes    Dysuria    S/P TAVR (transcatheter aortic valve replacement)    Cerebrovascular accident (CVA) (HCC)    Thyroid nodule    Lung nodules    History of ischemic stroke    Urinary incontinence    History of smoking       No LMP recorded. Patient has had a hysterectomy.  No obstetric history on file.    Past Medical History:   Diagnosis Date    Age-related osteoporosis without current pathological fracture     Basal cell

## 2025-07-10 DIAGNOSIS — N81.11 CYSTOCELE, MIDLINE: Primary | ICD-10-CM

## 2025-07-15 ENCOUNTER — TELEPHONE (OUTPATIENT)
Dept: OBGYN CLINIC | Age: 89
End: 2025-07-15

## 2025-07-15 NOTE — TELEPHONE ENCOUNTER
Alyssa called to see if we had contacted the dr in [place]. I looked in pt chart and seen where provider had sent referral. I called MA to make sure that was indeed what pt was referring to and she said yes and told me to instruct the pt that they just sent off last week if she hadn't heard from them by end of week to reach out to them. I provided pt w/ contact # of [phone].

## 2025-07-21 ENCOUNTER — APPOINTMENT (OUTPATIENT)
Dept: ULTRASOUND IMAGING | Age: 89
DRG: 853 | End: 2025-07-21
Payer: MEDICARE

## 2025-07-21 ENCOUNTER — ANESTHESIA EVENT (OUTPATIENT)
Dept: OPERATING ROOM | Age: 89
DRG: 853 | End: 2025-07-21
Payer: MEDICARE

## 2025-07-21 ENCOUNTER — APPOINTMENT (OUTPATIENT)
Dept: CT IMAGING | Age: 89
DRG: 853 | End: 2025-07-21
Payer: MEDICARE

## 2025-07-21 ENCOUNTER — APPOINTMENT (OUTPATIENT)
Dept: GENERAL RADIOLOGY | Age: 89
DRG: 853 | End: 2025-07-21
Payer: MEDICARE

## 2025-07-21 ENCOUNTER — ANESTHESIA (OUTPATIENT)
Dept: OPERATING ROOM | Age: 89
DRG: 853 | End: 2025-07-21
Payer: MEDICARE

## 2025-07-21 ENCOUNTER — HOSPITAL ENCOUNTER (INPATIENT)
Age: 89
LOS: 4 days | Discharge: HOME | DRG: 853 | End: 2025-07-25
Attending: STUDENT IN AN ORGANIZED HEALTH CARE EDUCATION/TRAINING PROGRAM | Admitting: STUDENT IN AN ORGANIZED HEALTH CARE EDUCATION/TRAINING PROGRAM
Payer: MEDICARE

## 2025-07-21 DIAGNOSIS — K80.00 CALCULUS OF GALLBLADDER WITH ACUTE CHOLECYSTITIS: ICD-10-CM

## 2025-07-21 DIAGNOSIS — A41.9 SEPSIS, DUE TO UNSPECIFIED ORGANISM, UNSPECIFIED WHETHER ACUTE ORGAN DYSFUNCTION PRESENT (HCC): Primary | ICD-10-CM

## 2025-07-21 PROBLEM — K81.9 CHOLECYSTITIS: Status: ACTIVE | Noted: 2025-07-21

## 2025-07-21 LAB
ALBUMIN SERPL-MCNC: 4.2 G/DL (ref 3.5–5.2)
ALP SERPL-CCNC: 130 U/L (ref 35–104)
ALT SERPL-CCNC: 228 U/L (ref 10–35)
ANION GAP SERPL CALCULATED.3IONS-SCNC: 17 MMOL/L (ref 8–16)
AST SERPL-CCNC: 398 U/L (ref 10–35)
B PARAP IS1001 DNA NPH QL NAA+NON-PROBE: NOT DETECTED
B PERT.PT PRMT NPH QL NAA+NON-PROBE: NOT DETECTED
BASOPHILS # BLD: 0 K/UL (ref 0–0.2)
BASOPHILS NFR BLD: 0.3 % (ref 0–1)
BILIRUB SERPL-MCNC: 1.3 MG/DL (ref 0.2–1.2)
BILIRUB UR QL STRIP: NEGATIVE
BUN SERPL-MCNC: 12 MG/DL (ref 8–23)
C PNEUM DNA NPH QL NAA+NON-PROBE: NOT DETECTED
CALCIUM SERPL-MCNC: 9.7 MG/DL (ref 8.8–10.2)
CHLORIDE SERPL-SCNC: 94 MMOL/L (ref 98–107)
CLARITY UR: CLEAR
CO2 SERPL-SCNC: 24 MMOL/L (ref 22–29)
COLOR UR: YELLOW
CREAT SERPL-MCNC: 0.7 MG/DL (ref 0.5–0.9)
EOSINOPHIL # BLD: 0.1 K/UL (ref 0–0.6)
EOSINOPHIL NFR BLD: 0.9 % (ref 0–5)
ERYTHROCYTE [DISTWIDTH] IN BLOOD BY AUTOMATED COUNT: 14 % (ref 11.5–14.5)
FLUAV RNA NPH QL NAA+NON-PROBE: NOT DETECTED
FLUBV RNA NPH QL NAA+NON-PROBE: NOT DETECTED
GLUCOSE SERPL-MCNC: 170 MG/DL (ref 70–99)
GLUCOSE UR STRIP.AUTO-MCNC: NEGATIVE MG/DL
HADV DNA NPH QL NAA+NON-PROBE: NOT DETECTED
HCOV 229E RNA NPH QL NAA+NON-PROBE: NOT DETECTED
HCOV HKU1 RNA NPH QL NAA+NON-PROBE: NOT DETECTED
HCOV NL63 RNA NPH QL NAA+NON-PROBE: NOT DETECTED
HCOV OC43 RNA NPH QL NAA+NON-PROBE: NOT DETECTED
HCT VFR BLD AUTO: 41.3 % (ref 37–47)
HGB BLD-MCNC: 14 G/DL (ref 12–16)
HGB UR STRIP.AUTO-MCNC: NEGATIVE MG/L
HMPV RNA NPH QL NAA+NON-PROBE: NOT DETECTED
HPIV1 RNA NPH QL NAA+NON-PROBE: NOT DETECTED
HPIV2 RNA NPH QL NAA+NON-PROBE: NOT DETECTED
HPIV3 RNA NPH QL NAA+NON-PROBE: NOT DETECTED
HPIV4 RNA NPH QL NAA+NON-PROBE: NOT DETECTED
IMM GRANULOCYTES # BLD: 0 K/UL
KETONES UR STRIP.AUTO-MCNC: NEGATIVE MG/DL
LACTATE BLDV-SCNC: 3 MMOL/L (ref 0.5–1.9)
LACTATE BLDV-SCNC: 3.5 MG/DL (ref 0.5–1.9)
LACTATE BLDV-SCNC: 4.3 MMOL/L (ref 0.5–1.9)
LEUKOCYTE ESTERASE UR QL STRIP.AUTO: NEGATIVE
LIPASE SERPL-CCNC: 165 U/L (ref 13–60)
LYMPHOCYTES # BLD: 0.9 K/UL (ref 1.1–4.5)
LYMPHOCYTES NFR BLD: 8.9 % (ref 20–40)
M PNEUMO DNA NPH QL NAA+NON-PROBE: NOT DETECTED
MAGNESIUM SERPL-MCNC: 1.9 MG/DL (ref 1.6–2.4)
MCH RBC QN AUTO: 28.6 PG (ref 27–31)
MCHC RBC AUTO-ENTMCNC: 33.9 G/DL (ref 33–37)
MCV RBC AUTO: 84.5 FL (ref 81–99)
MONOCYTES # BLD: 0.1 K/UL (ref 0–0.9)
MONOCYTES NFR BLD: 1.1 % (ref 0–10)
NEUTROPHILS # BLD: 9.2 K/UL (ref 1.5–7.5)
NEUTS SEG NFR BLD: 88.5 % (ref 50–65)
NITRITE UR QL STRIP.AUTO: NEGATIVE
PH UR STRIP.AUTO: 7.5 [PH] (ref 5–8)
PLATELET # BLD AUTO: 217 K/UL (ref 130–400)
PMV BLD AUTO: 9.4 FL (ref 9.4–12.3)
POTASSIUM SERPL-SCNC: 3.5 MMOL/L (ref 3.5–5.1)
PROCALCITONIN: 0.16 NG/ML (ref 0–0.09)
PROT SERPL-MCNC: 7.4 G/DL (ref 6.4–8.3)
PROT UR STRIP.AUTO-MCNC: NEGATIVE MG/DL
RBC # BLD AUTO: 4.89 M/UL (ref 4.2–5.4)
RSV RNA NPH QL NAA+NON-PROBE: NOT DETECTED
RV+EV RNA NPH QL NAA+NON-PROBE: NOT DETECTED
SARS-COV-2 RNA NPH QL NAA+NON-PROBE: NOT DETECTED
SODIUM SERPL-SCNC: 135 MMOL/L (ref 136–145)
SP GR UR STRIP.AUTO: 1.01 (ref 1–1.03)
UROBILINOGEN UR STRIP.AUTO-MCNC: 1 E.U./DL
WBC # BLD AUTO: 10.3 K/UL (ref 4.8–10.8)

## 2025-07-21 PROCEDURE — 0FT44ZZ RESECTION OF GALLBLADDER, PERCUTANEOUS ENDOSCOPIC APPROACH: ICD-10-PCS | Performed by: SURGERY

## 2025-07-21 PROCEDURE — 2500000003 HC RX 250 WO HCPCS: Performed by: SURGERY

## 2025-07-21 PROCEDURE — S2900 ROBOTIC SURGICAL SYSTEM: HCPCS | Performed by: SURGERY

## 2025-07-21 PROCEDURE — 84145 PROCALCITONIN (PCT): CPT

## 2025-07-21 PROCEDURE — 6360000002 HC RX W HCPCS: Performed by: STUDENT IN AN ORGANIZED HEALTH CARE EDUCATION/TRAINING PROGRAM

## 2025-07-21 PROCEDURE — 83605 ASSAY OF LACTIC ACID: CPT

## 2025-07-21 PROCEDURE — 8E0W4CZ ROBOTIC ASSISTED PROCEDURE OF TRUNK REGION, PERCUTANEOUS ENDOSCOPIC APPROACH: ICD-10-PCS | Performed by: SURGERY

## 2025-07-21 PROCEDURE — 3700000001 HC ADD 15 MINUTES (ANESTHESIA): Performed by: SURGERY

## 2025-07-21 PROCEDURE — 7100000000 HC PACU RECOVERY - FIRST 15 MIN: Performed by: SURGERY

## 2025-07-21 PROCEDURE — 87150 DNA/RNA AMPLIFIED PROBE: CPT

## 2025-07-21 PROCEDURE — 2700000000 HC OXYGEN THERAPY PER DAY

## 2025-07-21 PROCEDURE — 96375 TX/PRO/DX INJ NEW DRUG ADDON: CPT

## 2025-07-21 PROCEDURE — 3700000000 HC ANESTHESIA ATTENDED CARE: Performed by: SURGERY

## 2025-07-21 PROCEDURE — 94760 N-INVAS EAR/PLS OXIMETRY 1: CPT

## 2025-07-21 PROCEDURE — 85025 COMPLETE CBC W/AUTO DIFF WBC: CPT

## 2025-07-21 PROCEDURE — 80053 COMPREHEN METABOLIC PANEL: CPT

## 2025-07-21 PROCEDURE — 2500000003 HC RX 250 WO HCPCS: Performed by: STUDENT IN AN ORGANIZED HEALTH CARE EDUCATION/TRAINING PROGRAM

## 2025-07-21 PROCEDURE — 87040 BLOOD CULTURE FOR BACTERIA: CPT

## 2025-07-21 PROCEDURE — 88304 TISSUE EXAM BY PATHOLOGIST: CPT

## 2025-07-21 PROCEDURE — 99285 EMERGENCY DEPT VISIT HI MDM: CPT

## 2025-07-21 PROCEDURE — 71045 X-RAY EXAM CHEST 1 VIEW: CPT

## 2025-07-21 PROCEDURE — 83735 ASSAY OF MAGNESIUM: CPT

## 2025-07-21 PROCEDURE — 3600000009 HC SURGERY ROBOT BASE: Performed by: SURGERY

## 2025-07-21 PROCEDURE — 2580000003 HC RX 258: Performed by: NURSE PRACTITIONER

## 2025-07-21 PROCEDURE — 36415 COLL VENOUS BLD VENIPUNCTURE: CPT

## 2025-07-21 PROCEDURE — 2580000003 HC RX 258: Performed by: STUDENT IN AN ORGANIZED HEALTH CARE EDUCATION/TRAINING PROGRAM

## 2025-07-21 PROCEDURE — 6360000002 HC RX W HCPCS: Performed by: SURGERY

## 2025-07-21 PROCEDURE — 83690 ASSAY OF LIPASE: CPT

## 2025-07-21 PROCEDURE — 2720000010 HC SURG SUPPLY STERILE: Performed by: SURGERY

## 2025-07-21 PROCEDURE — 0202U NFCT DS 22 TRGT SARS-COV-2: CPT

## 2025-07-21 PROCEDURE — 74177 CT ABD & PELVIS W/CONTRAST: CPT

## 2025-07-21 PROCEDURE — 6360000004 HC RX CONTRAST MEDICATION: Performed by: STUDENT IN AN ORGANIZED HEALTH CARE EDUCATION/TRAINING PROGRAM

## 2025-07-21 PROCEDURE — 2580000003 HC RX 258: Performed by: SURGERY

## 2025-07-21 PROCEDURE — 2709999900 HC NON-CHARGEABLE SUPPLY: Performed by: SURGERY

## 2025-07-21 PROCEDURE — 7100000001 HC PACU RECOVERY - ADDTL 15 MIN: Performed by: SURGERY

## 2025-07-21 PROCEDURE — 76705 ECHO EXAM OF ABDOMEN: CPT

## 2025-07-21 PROCEDURE — 6370000000 HC RX 637 (ALT 250 FOR IP): Performed by: STUDENT IN AN ORGANIZED HEALTH CARE EDUCATION/TRAINING PROGRAM

## 2025-07-21 PROCEDURE — 81003 URINALYSIS AUTO W/O SCOPE: CPT

## 2025-07-21 PROCEDURE — 1200000000 HC SEMI PRIVATE

## 2025-07-21 PROCEDURE — 87186 SC STD MICRODIL/AGAR DIL: CPT

## 2025-07-21 PROCEDURE — 3600000019 HC SURGERY ROBOT ADDTL 15MIN: Performed by: SURGERY

## 2025-07-21 PROCEDURE — 2500000003 HC RX 250 WO HCPCS: Performed by: NURSE ANESTHETIST, CERTIFIED REGISTERED

## 2025-07-21 PROCEDURE — 6360000002 HC RX W HCPCS: Performed by: NURSE ANESTHETIST, CERTIFIED REGISTERED

## 2025-07-21 PROCEDURE — 96365 THER/PROPH/DIAG IV INF INIT: CPT

## 2025-07-21 PROCEDURE — 47562 LAPAROSCOPIC CHOLECYSTECTOMY: CPT | Performed by: SURGERY

## 2025-07-21 PROCEDURE — 87077 CULTURE AEROBIC IDENTIFY: CPT

## 2025-07-21 PROCEDURE — 6370000000 HC RX 637 (ALT 250 FOR IP): Performed by: SURGERY

## 2025-07-21 PROCEDURE — 88304 TISSUE EXAM BY PATHOLOGIST: CPT | Performed by: PATHOLOGY

## 2025-07-21 PROCEDURE — 99223 1ST HOSP IP/OBS HIGH 75: CPT | Performed by: SURGERY

## 2025-07-21 PROCEDURE — 6360000002 HC RX W HCPCS: Performed by: NURSE PRACTITIONER

## 2025-07-21 RX ORDER — FENTANYL CITRATE 50 UG/ML
INJECTION, SOLUTION INTRAMUSCULAR; INTRAVENOUS
Status: DISCONTINUED | OUTPATIENT
Start: 2025-07-21 | End: 2025-07-21 | Stop reason: SDUPTHER

## 2025-07-21 RX ORDER — SODIUM CHLORIDE, SODIUM LACTATE, POTASSIUM CHLORIDE, CALCIUM CHLORIDE 600; 310; 30; 20 MG/100ML; MG/100ML; MG/100ML; MG/100ML
INJECTION, SOLUTION INTRAVENOUS CONTINUOUS
Status: ACTIVE | OUTPATIENT
Start: 2025-07-21 | End: 2025-07-23

## 2025-07-21 RX ORDER — AMLODIPINE BESYLATE 5 MG/1
5 TABLET ORAL DAILY
Status: DISCONTINUED | OUTPATIENT
Start: 2025-07-21 | End: 2025-07-25 | Stop reason: HOSPADM

## 2025-07-21 RX ORDER — HYDROMORPHONE HYDROCHLORIDE 1 MG/ML
0.25 INJECTION, SOLUTION INTRAMUSCULAR; INTRAVENOUS; SUBCUTANEOUS EVERY 5 MIN PRN
Status: DISCONTINUED | OUTPATIENT
Start: 2025-07-21 | End: 2025-07-21 | Stop reason: HOSPADM

## 2025-07-21 RX ORDER — ACETAMINOPHEN 650 MG/1
650 SUPPOSITORY RECTAL EVERY 6 HOURS PRN
Status: DISCONTINUED | OUTPATIENT
Start: 2025-07-21 | End: 2025-07-25 | Stop reason: HOSPADM

## 2025-07-21 RX ORDER — SODIUM CHLORIDE 0.9 % (FLUSH) 0.9 %
5-40 SYRINGE (ML) INJECTION PRN
Status: DISCONTINUED | OUTPATIENT
Start: 2025-07-21 | End: 2025-07-21 | Stop reason: SDUPTHER

## 2025-07-21 RX ORDER — SODIUM CHLORIDE 0.9 % (FLUSH) 0.9 %
5-40 SYRINGE (ML) INJECTION EVERY 12 HOURS SCHEDULED
Status: DISCONTINUED | OUTPATIENT
Start: 2025-07-21 | End: 2025-07-21 | Stop reason: SDUPTHER

## 2025-07-21 RX ORDER — SODIUM CHLORIDE 0.9 % (FLUSH) 0.9 %
5-40 SYRINGE (ML) INJECTION PRN
Status: DISCONTINUED | OUTPATIENT
Start: 2025-07-21 | End: 2025-07-21 | Stop reason: HOSPADM

## 2025-07-21 RX ORDER — ACETAMINOPHEN 325 MG/1
650 TABLET ORAL ONCE
Status: COMPLETED | OUTPATIENT
Start: 2025-07-21 | End: 2025-07-21

## 2025-07-21 RX ORDER — SODIUM CHLORIDE 9 MG/ML
INJECTION, SOLUTION INTRAVENOUS PRN
Status: DISCONTINUED | OUTPATIENT
Start: 2025-07-21 | End: 2025-07-25 | Stop reason: HOSPADM

## 2025-07-21 RX ORDER — POLYVINYL ALCOHOL 14 MG/ML
1 SOLUTION/ DROPS OPHTHALMIC
Status: DISCONTINUED | OUTPATIENT
Start: 2025-07-21 | End: 2025-07-25 | Stop reason: HOSPADM

## 2025-07-21 RX ORDER — SODIUM CHLORIDE 0.9 % (FLUSH) 0.9 %
5-40 SYRINGE (ML) INJECTION EVERY 12 HOURS SCHEDULED
Status: DISCONTINUED | OUTPATIENT
Start: 2025-07-21 | End: 2025-07-25 | Stop reason: HOSPADM

## 2025-07-21 RX ORDER — GABAPENTIN 300 MG/1
300 CAPSULE ORAL NIGHTLY
Status: DISCONTINUED | OUTPATIENT
Start: 2025-07-21 | End: 2025-07-25 | Stop reason: HOSPADM

## 2025-07-21 RX ORDER — GABAPENTIN 100 MG/1
100 CAPSULE ORAL EVERY MORNING
Status: DISCONTINUED | OUTPATIENT
Start: 2025-07-21 | End: 2025-07-25 | Stop reason: HOSPADM

## 2025-07-21 RX ORDER — METRONIDAZOLE 500 MG/100ML
500 INJECTION, SOLUTION INTRAVENOUS ONCE
Status: COMPLETED | OUTPATIENT
Start: 2025-07-21 | End: 2025-07-21

## 2025-07-21 RX ORDER — ACETAMINOPHEN 325 MG/1
650 TABLET ORAL EVERY 6 HOURS PRN
Status: DISCONTINUED | OUTPATIENT
Start: 2025-07-21 | End: 2025-07-25 | Stop reason: HOSPADM

## 2025-07-21 RX ORDER — IPRATROPIUM BROMIDE AND ALBUTEROL SULFATE 2.5; .5 MG/3ML; MG/3ML
1 SOLUTION RESPIRATORY (INHALATION)
Status: DISCONTINUED | OUTPATIENT
Start: 2025-07-21 | End: 2025-07-21 | Stop reason: HOSPADM

## 2025-07-21 RX ORDER — DIPHENHYDRAMINE HYDROCHLORIDE 50 MG/ML
12.5 INJECTION, SOLUTION INTRAMUSCULAR; INTRAVENOUS
Status: DISCONTINUED | OUTPATIENT
Start: 2025-07-21 | End: 2025-07-21 | Stop reason: HOSPADM

## 2025-07-21 RX ORDER — KETOROLAC TROMETHAMINE 30 MG/ML
INJECTION, SOLUTION INTRAMUSCULAR; INTRAVENOUS
Status: DISCONTINUED | OUTPATIENT
Start: 2025-07-21 | End: 2025-07-21 | Stop reason: SDUPTHER

## 2025-07-21 RX ORDER — INDOCYANINE GREEN AND WATER 25 MG
KIT INJECTION PRN
Status: DISCONTINUED | OUTPATIENT
Start: 2025-07-21 | End: 2025-07-21 | Stop reason: ALTCHOICE

## 2025-07-21 RX ORDER — IBUPROFEN 200 MG
200 TABLET ORAL EVERY 6 HOURS PRN
Status: DISCONTINUED | OUTPATIENT
Start: 2025-07-21 | End: 2025-07-21

## 2025-07-21 RX ORDER — METRONIDAZOLE 500 MG/100ML
500 INJECTION, SOLUTION INTRAVENOUS EVERY 8 HOURS
Status: DISCONTINUED | OUTPATIENT
Start: 2025-07-21 | End: 2025-07-21

## 2025-07-21 RX ORDER — ONDANSETRON 2 MG/ML
INJECTION INTRAMUSCULAR; INTRAVENOUS
Status: DISCONTINUED | OUTPATIENT
Start: 2025-07-21 | End: 2025-07-21 | Stop reason: SDUPTHER

## 2025-07-21 RX ORDER — 0.9 % SODIUM CHLORIDE 0.9 %
1000 INTRAVENOUS SOLUTION INTRAVENOUS ONCE
Status: COMPLETED | OUTPATIENT
Start: 2025-07-21 | End: 2025-07-21

## 2025-07-21 RX ORDER — ASPIRIN 81 MG/1
81 TABLET, CHEWABLE ORAL DAILY
Status: DISCONTINUED | OUTPATIENT
Start: 2025-07-21 | End: 2025-07-22

## 2025-07-21 RX ORDER — DEXAMETHASONE SODIUM PHOSPHATE 10 MG/ML
INJECTION, SOLUTION INTRAMUSCULAR; INTRAVENOUS
Status: DISCONTINUED | OUTPATIENT
Start: 2025-07-21 | End: 2025-07-21 | Stop reason: SDUPTHER

## 2025-07-21 RX ORDER — PROCHLORPERAZINE EDISYLATE 5 MG/ML
5 INJECTION INTRAMUSCULAR; INTRAVENOUS
Status: DISCONTINUED | OUTPATIENT
Start: 2025-07-21 | End: 2025-07-21 | Stop reason: HOSPADM

## 2025-07-21 RX ORDER — OXYCODONE AND ACETAMINOPHEN 5; 325 MG/1; MG/1
1 TABLET ORAL EVERY 4 HOURS PRN
Status: DISCONTINUED | OUTPATIENT
Start: 2025-07-21 | End: 2025-07-25 | Stop reason: HOSPADM

## 2025-07-21 RX ORDER — SODIUM CHLORIDE, SODIUM LACTATE, POTASSIUM CHLORIDE, AND CALCIUM CHLORIDE .6; .31; .03; .02 G/100ML; G/100ML; G/100ML; G/100ML
30 INJECTION, SOLUTION INTRAVENOUS ONCE
Status: COMPLETED | OUTPATIENT
Start: 2025-07-21 | End: 2025-07-21

## 2025-07-21 RX ORDER — BUPIVACAINE HCL/EPINEPHRINE 0.25-.0005
VIAL (ML) INJECTION PRN
Status: DISCONTINUED | OUTPATIENT
Start: 2025-07-21 | End: 2025-07-21 | Stop reason: ALTCHOICE

## 2025-07-21 RX ORDER — ISOSORBIDE MONONITRATE 30 MG/1
30 TABLET, EXTENDED RELEASE ORAL DAILY
Status: DISCONTINUED | OUTPATIENT
Start: 2025-07-21 | End: 2025-07-25 | Stop reason: HOSPADM

## 2025-07-21 RX ORDER — KETOROLAC TROMETHAMINE 30 MG/ML
15 INJECTION, SOLUTION INTRAMUSCULAR; INTRAVENOUS EVERY 6 HOURS PRN
Status: DISCONTINUED | OUTPATIENT
Start: 2025-07-21 | End: 2025-07-25 | Stop reason: HOSPADM

## 2025-07-21 RX ORDER — HYDROCHLOROTHIAZIDE 25 MG/1
25 TABLET ORAL DAILY
Status: DISCONTINUED | OUTPATIENT
Start: 2025-07-21 | End: 2025-07-25 | Stop reason: HOSPADM

## 2025-07-21 RX ORDER — HYDROMORPHONE HYDROCHLORIDE 1 MG/ML
0.5 INJECTION, SOLUTION INTRAMUSCULAR; INTRAVENOUS; SUBCUTANEOUS EVERY 5 MIN PRN
Status: DISCONTINUED | OUTPATIENT
Start: 2025-07-21 | End: 2025-07-21 | Stop reason: HOSPADM

## 2025-07-21 RX ORDER — 0.9 % SODIUM CHLORIDE 0.9 %
500 INTRAVENOUS SOLUTION INTRAVENOUS ONCE
Status: DISCONTINUED | OUTPATIENT
Start: 2025-07-21 | End: 2025-07-21

## 2025-07-21 RX ORDER — INDOCYANINE GREEN AND WATER 25 MG
KIT INJECTION
Status: DISCONTINUED | OUTPATIENT
Start: 2025-07-21 | End: 2025-07-21 | Stop reason: SDUPTHER

## 2025-07-21 RX ORDER — LIDOCAINE HYDROCHLORIDE 10 MG/ML
INJECTION, SOLUTION EPIDURAL; INFILTRATION; INTRACAUDAL; PERINEURAL
Status: DISCONTINUED | OUTPATIENT
Start: 2025-07-21 | End: 2025-07-21 | Stop reason: SDUPTHER

## 2025-07-21 RX ORDER — SODIUM CHLORIDE 9 MG/ML
INJECTION, SOLUTION INTRAVENOUS PRN
Status: DISCONTINUED | OUTPATIENT
Start: 2025-07-21 | End: 2025-07-21 | Stop reason: HOSPADM

## 2025-07-21 RX ORDER — ATORVASTATIN CALCIUM 80 MG/1
80 TABLET, FILM COATED ORAL NIGHTLY
Status: DISCONTINUED | OUTPATIENT
Start: 2025-07-21 | End: 2025-07-25 | Stop reason: HOSPADM

## 2025-07-21 RX ORDER — LABETALOL HYDROCHLORIDE 5 MG/ML
10 INJECTION, SOLUTION INTRAVENOUS
Status: DISCONTINUED | OUTPATIENT
Start: 2025-07-21 | End: 2025-07-21 | Stop reason: HOSPADM

## 2025-07-21 RX ORDER — ONDANSETRON 2 MG/ML
4 INJECTION INTRAMUSCULAR; INTRAVENOUS
Status: DISCONTINUED | OUTPATIENT
Start: 2025-07-21 | End: 2025-07-25 | Stop reason: HOSPADM

## 2025-07-21 RX ORDER — IOPAMIDOL 755 MG/ML
80 INJECTION, SOLUTION INTRAVASCULAR
Status: COMPLETED | OUTPATIENT
Start: 2025-07-21 | End: 2025-07-21

## 2025-07-21 RX ORDER — PROPOFOL 10 MG/ML
INJECTION, EMULSION INTRAVENOUS
Status: DISCONTINUED | OUTPATIENT
Start: 2025-07-21 | End: 2025-07-21 | Stop reason: SDUPTHER

## 2025-07-21 RX ORDER — SODIUM CHLORIDE 9 MG/ML
INJECTION, SOLUTION INTRAVENOUS PRN
Status: DISCONTINUED | OUTPATIENT
Start: 2025-07-21 | End: 2025-07-21 | Stop reason: SDUPTHER

## 2025-07-21 RX ORDER — VITAMIN B COMPLEX
1000 TABLET ORAL DAILY
Status: DISCONTINUED | OUTPATIENT
Start: 2025-07-21 | End: 2025-07-25 | Stop reason: HOSPADM

## 2025-07-21 RX ORDER — SODIUM CHLORIDE 0.9 % (FLUSH) 0.9 %
5-40 SYRINGE (ML) INJECTION PRN
Status: DISCONTINUED | OUTPATIENT
Start: 2025-07-21 | End: 2025-07-25 | Stop reason: HOSPADM

## 2025-07-21 RX ORDER — ROCURONIUM BROMIDE 10 MG/ML
INJECTION, SOLUTION INTRAVENOUS
Status: DISCONTINUED | OUTPATIENT
Start: 2025-07-21 | End: 2025-07-21 | Stop reason: SDUPTHER

## 2025-07-21 RX ORDER — HYDRALAZINE HYDROCHLORIDE 20 MG/ML
10 INJECTION INTRAMUSCULAR; INTRAVENOUS
Status: DISCONTINUED | OUTPATIENT
Start: 2025-07-21 | End: 2025-07-21 | Stop reason: HOSPADM

## 2025-07-21 RX ORDER — SODIUM CHLORIDE 0.9 % (FLUSH) 0.9 %
5-40 SYRINGE (ML) INJECTION EVERY 12 HOURS SCHEDULED
Status: DISCONTINUED | OUTPATIENT
Start: 2025-07-21 | End: 2025-07-21 | Stop reason: HOSPADM

## 2025-07-21 RX ORDER — NITROGLYCERIN 0.4 MG/1
0.4 TABLET SUBLINGUAL PRN
COMMUNITY

## 2025-07-21 RX ADMIN — OXYCODONE HYDROCHLORIDE AND ACETAMINOPHEN 1 TABLET: 5; 325 TABLET ORAL at 21:31

## 2025-07-21 RX ADMIN — FENTANYL CITRATE 25 MCG: 0.05 INJECTION, SOLUTION INTRAMUSCULAR; INTRAVENOUS at 14:19

## 2025-07-21 RX ADMIN — KETOROLAC TROMETHAMINE 30 MG: 30 INJECTION, SOLUTION INTRAMUSCULAR; INTRAVENOUS at 14:19

## 2025-07-21 RX ADMIN — PIPERACILLIN AND TAZOBACTAM 3375 MG: 3; .375 INJECTION, POWDER, LYOPHILIZED, FOR SOLUTION INTRAVENOUS at 16:25

## 2025-07-21 RX ADMIN — LIDOCAINE HYDROCHLORIDE 50 MG: 10 INJECTION, SOLUTION EPIDURAL; INFILTRATION; INTRACAUDAL; PERINEURAL at 13:40

## 2025-07-21 RX ADMIN — FENTANYL CITRATE 25 MCG: 0.05 INJECTION, SOLUTION INTRAMUSCULAR; INTRAVENOUS at 13:40

## 2025-07-21 RX ADMIN — FENTANYL CITRATE 25 MCG: 0.05 INJECTION, SOLUTION INTRAMUSCULAR; INTRAVENOUS at 14:03

## 2025-07-21 RX ADMIN — OXYCODONE HYDROCHLORIDE AND ACETAMINOPHEN 1 TABLET: 5; 325 TABLET ORAL at 17:30

## 2025-07-21 RX ADMIN — SODIUM CHLORIDE, PRESERVATIVE FREE 10 ML: 5 INJECTION INTRAVENOUS at 21:35

## 2025-07-21 RX ADMIN — WATER 1000 MG: 1 INJECTION INTRAMUSCULAR; INTRAVENOUS; SUBCUTANEOUS at 04:36

## 2025-07-21 RX ADMIN — PIPERACILLIN AND TAZOBACTAM 4500 MG: 4; .5 INJECTION, POWDER, LYOPHILIZED, FOR SOLUTION INTRAVENOUS at 10:59

## 2025-07-21 RX ADMIN — INDOCYANINE GREEN AND WATER 12.5 MG: KIT at 13:45

## 2025-07-21 RX ADMIN — DEXAMETHASONE SODIUM PHOSPHATE 10 MG: 10 INJECTION, SOLUTION INTRAMUSCULAR; INTRAVENOUS at 14:13

## 2025-07-21 RX ADMIN — ROCURONIUM BROMIDE 50 MG: 10 INJECTION, SOLUTION INTRAVENOUS at 13:40

## 2025-07-21 RX ADMIN — FENTANYL CITRATE 25 MCG: 0.05 INJECTION, SOLUTION INTRAMUSCULAR; INTRAVENOUS at 13:55

## 2025-07-21 RX ADMIN — METRONIDAZOLE 500 MG: 500 INJECTION, SOLUTION INTRAVENOUS at 05:46

## 2025-07-21 RX ADMIN — ACETAMINOPHEN 650 MG: 325 TABLET ORAL at 06:00

## 2025-07-21 RX ADMIN — GABAPENTIN 300 MG: 300 CAPSULE ORAL at 21:34

## 2025-07-21 RX ADMIN — SODIUM CHLORIDE, SODIUM LACTATE, POTASSIUM CHLORIDE, AND CALCIUM CHLORIDE: .6; .31; .03; .02 INJECTION, SOLUTION INTRAVENOUS at 10:57

## 2025-07-21 RX ADMIN — ONDANSETRON 4 MG: 2 INJECTION INTRAMUSCULAR; INTRAVENOUS at 14:13

## 2025-07-21 RX ADMIN — IOPAMIDOL 80 ML: 755 INJECTION, SOLUTION INTRAVENOUS at 05:27

## 2025-07-21 RX ADMIN — SUGAMMADEX 200 MG: 100 INJECTION, SOLUTION INTRAVENOUS at 15:00

## 2025-07-21 RX ADMIN — SODIUM CHLORIDE, SODIUM LACTATE, POTASSIUM CHLORIDE, AND CALCIUM CHLORIDE 2040 ML: .6; .31; .03; .02 INJECTION, SOLUTION INTRAVENOUS at 04:35

## 2025-07-21 RX ADMIN — SODIUM CHLORIDE 1000 ML: 0.9 INJECTION, SOLUTION INTRAVENOUS at 09:10

## 2025-07-21 RX ADMIN — PROPOFOL 100 MG: 10 INJECTION, EMULSION INTRAVENOUS at 13:40

## 2025-07-21 ASSESSMENT — PAIN DESCRIPTION - LOCATION
LOCATION: ABDOMEN
LOCATION: ABDOMEN

## 2025-07-21 ASSESSMENT — PAIN DESCRIPTION - ORIENTATION
ORIENTATION: LOWER
ORIENTATION: LOWER

## 2025-07-21 ASSESSMENT — PAIN SCALES - GENERAL
PAINLEVEL_OUTOF10: 3
PAINLEVEL_OUTOF10: 5
PAINLEVEL_OUTOF10: 5

## 2025-07-21 ASSESSMENT — PAIN - FUNCTIONAL ASSESSMENT: PAIN_FUNCTIONAL_ASSESSMENT: ACTIVITIES ARE NOT PREVENTED

## 2025-07-21 ASSESSMENT — LIFESTYLE VARIABLES: SMOKING_STATUS: 0

## 2025-07-21 ASSESSMENT — ENCOUNTER SYMPTOMS
SHORTNESS OF BREATH: 0
ABDOMINAL PAIN: 1
VOMITING: 1
VOMITING: 0
NAUSEA: 1

## 2025-07-21 ASSESSMENT — PAIN DESCRIPTION - DESCRIPTORS
DESCRIPTORS: ACHING
DESCRIPTORS: BURNING;SHARP

## 2025-07-21 NOTE — OP NOTE
[facility] General Surgery    Patient ID: Alyssa Love  89 y.o.  female  YOB: 1935      Brief Operative Report    Pre-operative Diagnosis: Acute cholecystitis    Post-operative Diagnosis: Acute cholecystitis  Cirrhosis of the liver    Procedure: Robotic assisted cholecystectomy    Surgeon:  Kevon Brooks MD    Anesthesia: General    Findings: Findings:  Infection Present At Time Of Surgery (PATOS) (choose all levels that have infection present):  No infection present  Other Findings: Acutely distended inflamed gallbladder  Significant hepatomegaly associated with cirrhosis    Estimated blood loss: 75 ml    Specimens: Gallbladder    Complications:  none    Condition:  stable        See dictated operative report for full det    
was secondary to a supraumbilical mesh.  We then measured 9 cm laterally to the left side of the abdomen and injected local anesthetic made an incision and inserted another 8 mm robotic port.  Then we measured 9 cm to the right side of the abdomen in the midclavicular line and injected local anesthetic and made another incision.  We then placed another 8 mm robotic port.  We then measured 9 cm the right lateral of this port and slightly inferior and injected local anesthetic.  We then made another incision and placed another 8 mm robotic port.  The patient was then placed in reverse Trendelenburg 10 degrees and left side down 10 degrees.    The robot was docked from the patient's right side.  And an arm #1 was the Caudier; and arm #2 was the forced bipolar; and arm #3 was the camera and arm #4 was the electrocautery hook, clip applier, scissors.    Prior to visualizing the gallbladder we needed to take down the adhesions of the omentum to the anterior abdominal wall.  This was done with traction countertraction and hook electrocautery.  Once these adhesions were down we then identified significant hepatomegaly with associated cirrhosis of the liver.  There was also a very distended thickened gallbladder.    We then identified a very distended gallbladder in the right upper quadrant.  In order to grasp the gallbladder a hole was made in the gallbladder and we suctioned out bile.  This was grasped with a cuadier and lifted ventrally and to the left.  The hepatomegaly made it slightly difficult to perform cholecystectomy but it was adequately performed.  We then began dissecting at the neck of the gallbladder.  This was done with hook electrocautery traction countertraction.  We then identified the cystic duct.  We performed firefly imaging to confirm the location of the cystic duct and the common bile duct.  Unfortunately the cystic duct did not show up on firefly imaging.  We also able to identify the node of [place]

## 2025-07-21 NOTE — CONSULTS
[facility] General SurgeryConsult Note    Patient ID: Alyssa Love  89 y.o.  female  YOB: 1935    Admitting Diagnosis: Cholecystitis [K81.9]  Sepsis, due to unspecified organism, unspecified whether acute organ dysfunction present ([facility]) [A41.9]    Chief Complaint:  Chief Complaint   Patient presents with    Nausea     Pt started having nausea last night.       HPI:    Ms. Love is a 89 y.o. female who presents today with abdominal pain.  She states that last night she began having a sharp stabbing colicky right upper quadrant pain.  It was associated with nausea and vomiting.  She has never had anything like this happen before.  She states that now the pain and the nausea have resolved.  No fevers or chills.  No chest pain or shortness of breath.    She has had a previous umbilical hernia repair.       I have reviewed the patient's chart including past visits, office notes, hospital reports, procedures, tests, labs, medications, and other reports.     Past Medical History:   Diagnosis Date    Age-related osteoporosis without current pathological fracture     Basal cell carcinoma (BCC) of scalp     skin    Closed fracture of left orbital floor (HCC)     Closed fracture of left orbital floor ([facility]) 03/2023    no surgical intervention recommended    Coronary artery disease involving native coronary artery of native heart without angina pectoris     stents done in [place]; sees [facility] cardiology    COVID     2 weeks ago    DDD (degenerative disc disease), lumbar     Essential hypertension     Knee pain     Macular degeneration     Mixed hyperlipidemia     Stroke ([facility])     Ventral hernia      Past Surgical History:   Procedure Laterality Date    AORTIC VALVE REPAIR  09/16/2023    CARDIAC CATHETERIZATION      stents 2019, 2020    CORONARY ARTERY BYPASS GRAFT      DIAGNOSTIC CARDIAC CATH LAB PROCEDURE  07/24/2023    Diffuse coronary disease, patent stents, severe AS-no intervention    EYE SURGERY      h/o

## 2025-07-21 NOTE — ANESTHESIA PRE PROCEDURE
Component Value Date/Time    WBC 10.3 07/21/2025 04:30 AM    RBC 4.89 07/21/2025 04:30 AM    HGB 14.0 07/21/2025 04:30 AM    HCT 41.3 07/21/2025 04:30 AM    MCV 84.5 07/21/2025 04:30 AM    RDW 14.0 07/21/2025 04:30 AM     07/21/2025 04:30 AM       CMP:   Lab Results   Component Value Date/Time     07/21/2025 04:30 AM    K 3.5 07/21/2025 04:30 AM    K 4.2 11/13/2023 04:24 AM    CL 94 07/21/2025 04:30 AM    CO2 24 07/21/2025 04:30 AM    BUN 12 07/21/2025 04:30 AM    CREATININE 0.7 07/21/2025 04:30 AM    GFRAA >59 07/21/2022 08:31 AM    LABGLOM 82 07/21/2025 04:30 AM    LABGLOM 86 04/05/2024 07:50 AM    GLUCOSE 170 07/21/2025 04:30 AM    CALCIUM 9.7 07/21/2025 04:30 AM    BILITOT 1.3 07/21/2025 04:30 AM    ALKPHOS 130 07/21/2025 04:30 AM     07/21/2025 04:30 AM     07/21/2025 04:30 AM       POC Tests: No results for input(s): \"POCGLU\", \"POCNA\", \"POCK\", \"POCCL\", \"POCBUN\", \"POCHEMO\", \"POCHCT\" in the last 72 hours.    Coags:   Lab Results   Component Value Date/Time    PROTIME 13.8 11/03/2023 12:50 AM    INR 1.09 11/03/2023 12:50 AM    APTT 31.1 09/30/2021 11:15 AM       HCG (If Applicable): No results found for: \"PREGTESTUR\", \"PREGSERUM\", \"HCG\", \"HCGQUANT\"     ABGs: No results found for: \"PHART\", \"PO2ART\", \"DGG3LJE\", \"KEN4TLL\", \"BEART\", \"M6EHNWUY\"     Type & Screen (If Applicable):  No results found for: \"LABABO\"    Drug/Infectious Status (If Applicable):  No results found for: \"HIV\", \"HEPCAB\"    COVID-19 Screening (If Applicable):   Lab Results   Component Value Date/Time    COVID19 Not Detected 07/21/2025 04:23 AM           Anesthesia Evaluation  Patient summary reviewed   no history of anesthetic complications:   Airway: Mallampati: II  TM distance: >3 FB   Neck ROM: full  Mouth opening: > = 3 FB   Dental:    (+) upper dentures, lower dentures and edentulous      Pulmonary:normal exam  breath sounds clear to auscultation      (-) asthma, recent URI, sleep apnea and not a current

## 2025-07-21 NOTE — CARE COORDINATION
Case Management Assessment  Initial Evaluation    Date/Time of Evaluation: 7/21/2025 8:10 AM  Assessment Completed by: Lesia Villalta    If patient is discharged prior to next notation, then this note serves as note for discharge by case management.    Patient Name: Alyssa Love                   YOB: 1935  Diagnosis: Cholecystitis [K81.9]                   Date / Time: 7/21/2025  4:10 AM    Patient Admission Status: Inpatient   Readmission Risk (Low < 19, Mod (19-27), High > 27): Readmission Risk Score: 15.1    Current PCP: Betsy Ott APRN  PCP verified by CM? Yes    Chart Reviewed: Yes      History Provided by: Patient  Patient Orientation: Alert and Oriented    Patient Cognition: Alert    Hospitalization in the last 30 days (Readmission):  No    If yes, Readmission Assessment in CM Navigator will be completed.    Advance Directives:      Code Status: Full Code   Patient's Primary Decision Maker is: Legal Next of Kin    Primary Decision Maker: Dayanara Liang - Child - [phone]    Primary Decision Maker: Sarai Galindo - Child - [phone]    Primary Decision Maker: Hortencia Jara - Child - [phone]    Discharge Planning:    Patient lives with: Alone Type of Home: Apartment  Primary Care Giver: Self  Patient Support Systems include: Children, Family Members, Friends/Neighbors   Current Financial resources: Medicare  Current community resources: None  Current services prior to admission: None            Current DME:              Type of Home Care services:  None    ADLS  Prior functional level: Independent in ADLs/IADLs  Current functional level: Independent in ADLs/IADLs    PT AM-PAC:   /24  OT AM-PAC:   /24    Family can provide assistance at DC: Yes  Would you like Case Management to discuss the discharge plan with any other family members/significant others, and if so, who? Yes (daughters)  Plans to Return to Present Housing: Yes  Potential Assistance needed at discharge: N/A

## 2025-07-21 NOTE — ANESTHESIA POSTPROCEDURE EVALUATION
Department of Anesthesiology  Postprocedure Note    Patient: Alyssa Love  MRN: [id number]  YOB: 1935  Date of evaluation: 7/21/2025    Procedure Summary       Date: 07/21/25 Room / Location: [address]    Anesthesia Start: 1333 Anesthesia Stop: 1510    Procedure: Robotic cholecystectomy Diagnosis:       Calculus of gallbladder with acute cholecystitis      (Calculus of gallbladder with acute cholecystitis [K80.00])    Surgeons: Kevon Brooks MD Responsible Provider: Roman Durham APRN - CRNA    Anesthesia Type: general ASA Status: 3            Anesthesia Type: No value filed.    Gay Phase I: Gay Score: 10    Gay Phase II:      Anesthesia Post Evaluation    Patient location during evaluation: PACU  Patient participation: complete - patient participated  Level of consciousness: sleepy but conscious  Pain score: 0  Airway patency: patent  Nausea & Vomiting: no nausea and no vomiting  Cardiovascular status: hemodynamically stable  Respiratory status: acceptable, spontaneous ventilation and room air  Hydration status: euvolemic  Pain management: adequate    No notable events documented.

## 2025-07-21 NOTE — ED PROVIDER NOTES
CATHETERIZATION      stents 2019, 2020    CHOLECYSTECTOMY, LAPAROSCOPIC N/A 7/21/2025    CHOLECYSTECTOMY LAPAROSCOPIC ROBOTIC performed by Kevon Brooks MD at [facility] OR    CORONARY ARTERY BYPASS GRAFT      DIAGNOSTIC CARDIAC CATH LAB PROCEDURE  07/24/2023    Diffuse coronary disease, patent stents, severe AS-no intervention    EYE SURGERY      h/o eye bleed    FOOT SURGERY      deep calluses    HERNIA REPAIR N/A 08/12/2022    ROBOT ASSISTED LAPAROSCOPIC INCARCERATED VENTRAL HERNIA  REPAIR  WITH MESH performed by Debby Savage DO at [facility] OR    HYSTERECTOMY (CERVIX STATUS UNKNOWN)      total-abdominal    TONSILLECTOMY      TOTAL HIP ARTHROPLASTY Left 2019    TOTAL KNEE ARTHROPLASTY Right 10/14/2021    RIGHT KNEE TOTAL ARTHROPLASTY performed by Xavier Persaud MD at [facility] OR    VARICOSE VEIN SURGERY         Current Discharge Medication List        CONTINUE these medications which have NOT CHANGED    Details   nitroGLYCERIN (NITROSTAT) 0.4 MG SL tablet Place 1 tablet under the tongue as needed for Chest pain up to max of 3 total doses. If no relief after 1 dose, call 911.      amLODIPine (NORVASC) 5 MG tablet Take 1 tablet by mouth daily  Qty: 90 tablet, Refills: 3    Associated Diagnoses: Essential hypertension      !! gabapentin (NEURONTIN) 100 MG capsule Take 1 capsule by mouth every morning In addition to 300 mg capsule nightly  Qty: 90 capsule, Refills: 1    Comments: This request is for a new prescription for a controlled substance as required by Federal/State law. DX Code Needed  .  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 gabapentin (NEURONTIN) 300 MG capsule Take 1 capsule by mouth nightly for 180 days. Max Daily Amount: 300 mg  Qty: 90 capsule, Refills: 1    Comments: Not to exceed 3 additional

## 2025-07-21 NOTE — H&P
[facility]      Hospitalist - History & Physical      PCP: Betsy Ott APRN    Date of Admission: 7/21/2025    Date of Service: 7/21/2025    Chief Complaint:  Abdominal pain/N/V    History Of Present Illness:   The patient is a 89 y.o. female with a PMH of CAD, HLD, CVA, and HTN who presented to [facility] ED on 7/21 complaining of acute onset of RUQ abdominal pain with 3 episodes of nausea and vomiting. She states that her abdominal pain improved after vomiting. She subsequently began to have chills and rigors and come to the ED for further evaluation. She denies any previous RUQ discomfort. She denies suspicious foods. She has not had diarrhea, melena or hematochezia. She states that she has been in her usual state of health. ED work-up revealed-, K3.5, CL 94.  BUN 12 and creatinine 0.7.  Initial lactic acid 4.3.  Significant transaminitis-alk phos 130, , , T. Bili-1.3.  Lipase 165.  She is without leukocytosis with a WBC count of 10.3, H&H 14.0/41.3 with a platelet count of 217.  RPP negative for viral illness.  UA negative for infection.  She was found to be febrile upon arrival with a temperature of 101.5 and HR-119. CT of the abdomen and pelvis revealed-Gallbladder wall thickening and/or pericholecystic edema. Cholelithiasis. Ultrasound of the gallbladder could be considered for further evaluation. Cholecystitis is not excluded. Lobular contour of the liver likely due to chronic liver disease. No urinary or bowel obstruction and normal appendix. She was given IVF's, including sepsis bolus, antibiotics and RUQ US ordered and pending. The patient will be admitted to hospital medicine for sepsis due to acute cholecystitis with cholelithiasis with a general surgery consult.    Past Medical History:        Diagnosis Date    Age-related osteoporosis without current pathological fracture     Basal cell carcinoma (BCC) of scalp     skin    Closed fracture of left orbital floor (HCC)

## 2025-07-22 PROBLEM — K74.60 CIRRHOSIS OF LIVER WITHOUT ASCITES (HCC): Chronic | Status: ACTIVE | Noted: 2025-07-22

## 2025-07-22 LAB
A BAUMANNII DNA BLD POS QL NAA+NON-PROBE: NOT DETECTED
ALBUMIN SERPL-MCNC: 3.6 G/DL (ref 3.5–5.2)
ALP SERPL-CCNC: 127 U/L (ref 35–104)
ALT SERPL-CCNC: 391 U/L (ref 10–35)
ANION GAP SERPL CALCULATED.3IONS-SCNC: 13 MMOL/L (ref 8–16)
AST SERPL-CCNC: 380 U/L (ref 10–35)
BASOPHILS # BLD: 0 K/UL (ref 0–0.2)
BASOPHILS NFR BLD: 0.1 % (ref 0–1)
BILIRUB DIRECT SERPL-MCNC: 1.7 MG/DL (ref 0–0.3)
BILIRUB INDIRECT SERPL-MCNC: 0.5 MG/DL (ref 0–1)
BILIRUB SERPL-MCNC: 2.2 MG/DL (ref 0.2–1.2)
BLACTX-M ISLT/SPM QL: NOT DETECTED
BLAIMP ISLT/SPM QL: NOT DETECTED
BLAKPC ISLT/SPM QL: NOT DETECTED
BLAVIM ISLT/SPM QL: NOT DETECTED
BUN SERPL-MCNC: 10 MG/DL (ref 8–23)
C ALBICANS DNA BLD POS QL NAA+NON-PROBE: NOT DETECTED
C AURIS DNA BLD POS QL NAA+PROBE: NOT DETECTED
C GLABRATA DNA BLD POS QL NAA+NON-PROBE: NOT DETECTED
C KRUSEI DNA BLD POS QL NAA+NON-PROBE: NOT DETECTED
C PARAP DNA BLD POS QL NAA+NON-PROBE: NOT DETECTED
C TROPICLS DNA BLD POS QL NAA+NON-PROBE: NOT DETECTED
CALCIUM SERPL-MCNC: 8.7 MG/DL (ref 8.8–10.2)
CARBAPENEM RESISTANCE NDM GENE BY PCR: NOT DETECTED
CARBAPENEM RESISTANCE OXA-48 GENE BY PCR: NOT DETECTED
CHLORIDE SERPL-SCNC: 97 MMOL/L (ref 98–107)
CO2 SERPL-SCNC: 24 MMOL/L (ref 22–29)
COLISTIN RES MCR-1 ISLT/SPM QL: NOT DETECTED
CREAT SERPL-MCNC: 0.7 MG/DL (ref 0.5–0.9)
CRYPTOCOCCUS NEOFORMANS/GATTII BY PCR: NOT DETECTED
E CLOAC COMP DNA BLD POS NAA+NON-PROBE: DETECTED
E COLI DNA BLD POS QL NAA+NON-PROBE: NOT DETECTED
E FAECALIS DNA BLD POS QL NAA+PROBE: NOT DETECTED
E FAECIUM DNA BLD POS QL NAA+PROBE: NOT DETECTED
ENTEROBACT DNA BLD POS QL NAA+NON-PROBE: DETECTED
ENTEROCOC DNA BLD POS QL NAA+NON-PROBE: NOT DETECTED
EOSINOPHIL # BLD: 0 K/UL (ref 0–0.6)
EOSINOPHIL NFR BLD: 0 % (ref 0–5)
ERYTHROCYTE [DISTWIDTH] IN BLOOD BY AUTOMATED COUNT: 14.6 % (ref 11.5–14.5)
GLUCOSE SERPL-MCNC: 152 MG/DL (ref 70–99)
GN BLD CULTURE PNL BLD POS NAA+PROBE: NOT DETECTED
GP B STREP DNA BLD POS QL NAA+NON-PROBE: NOT DETECTED
HCT VFR BLD AUTO: 36.7 % (ref 37–47)
HGB BLD-MCNC: 12.2 G/DL (ref 12–16)
IMM GRANULOCYTES # BLD: 0.1 K/UL
K OXYTOCA DNA BLD POS QL NAA+NON-PROBE: NOT DETECTED
K PNEUMON DNA SPEC QL NAA+PROBE: NOT DETECTED
K. AEROGENES DNA SPEC QL NAA+PROBE: NOT DETECTED
L MONOCYTOG DNA BLD POS QL NAA+NON-PROBE: NOT DETECTED
LYMPHOCYTES # BLD: 0.5 K/UL (ref 1.1–4.5)
LYMPHOCYTES NFR BLD: 3.4 % (ref 20–40)
MCH RBC QN AUTO: 28.7 PG (ref 27–31)
MCHC RBC AUTO-ENTMCNC: 33.2 G/DL (ref 33–37)
MCV RBC AUTO: 86.4 FL (ref 81–99)
MONOCYTES # BLD: 0.4 K/UL (ref 0–0.9)
MONOCYTES NFR BLD: 2.9 % (ref 0–10)
N MEN DNA BLD POS QL NAA+NON-PROBE: NOT DETECTED
NEUTROPHILS # BLD: 14.2 K/UL (ref 1.5–7.5)
NEUTS SEG NFR BLD: 93 % (ref 50–65)
P AERUGINOSA DNA BLD POS NAA+NON-PROBE: NOT DETECTED
PLATELET # BLD AUTO: 146 K/UL (ref 130–400)
PMV BLD AUTO: 10.2 FL (ref 9.4–12.3)
POTASSIUM SERPL-SCNC: 3.7 MMOL/L (ref 3.5–5)
PROT SERPL-MCNC: 6.5 G/DL (ref 6.4–8.3)
PROTEUS SP DNA BLD POS QL NAA+NON-PROBE: NOT DETECTED
RBC # BLD AUTO: 4.25 M/UL (ref 4.2–5.4)
S AUREUS DNA BLD POS QL NAA+NON-PROBE: NOT DETECTED
S AUREUS+CONS DNA BLD POS NAA+NON-PROBE: NOT DETECTED
S EPIDERMIDIS DNA BLD POS QL NAA+PROBE: NOT DETECTED
S LUGDUNENSIS DNA BLD POS QL NAA+PROBE: NOT DETECTED
S MALTOPH DNA BLD POS QL NAA+PROBE: NOT DETECTED
S MARCESCENS DNA BLD POS NAA+NON-PROBE: NOT DETECTED
S PNEUM DNA BLD POS QL NAA+NON-PROBE: NOT DETECTED
S PYO DNA BLD POS QL NAA+NON-PROBE: NOT DETECTED
SALMONELLA DNA BLD POS QL NAA+PROBE: NOT DETECTED
SODIUM SERPL-SCNC: 134 MMOL/L (ref 136–145)
STREPTOCOCCUS DNA BLD POS NAA+NON-PROBE: NOT DETECTED
WBC # BLD AUTO: 15.3 K/UL (ref 4.8–10.8)

## 2025-07-22 PROCEDURE — 94760 N-INVAS EAR/PLS OXIMETRY 1: CPT

## 2025-07-22 PROCEDURE — 6370000000 HC RX 637 (ALT 250 FOR IP): Performed by: SURGERY

## 2025-07-22 PROCEDURE — 85025 COMPLETE CBC W/AUTO DIFF WBC: CPT

## 2025-07-22 PROCEDURE — 6360000002 HC RX W HCPCS

## 2025-07-22 PROCEDURE — 36415 COLL VENOUS BLD VENIPUNCTURE: CPT

## 2025-07-22 PROCEDURE — 99024 POSTOP FOLLOW-UP VISIT: CPT | Performed by: SURGERY

## 2025-07-22 PROCEDURE — 80053 COMPREHEN METABOLIC PANEL: CPT

## 2025-07-22 PROCEDURE — 2500000003 HC RX 250 WO HCPCS: Performed by: SURGERY

## 2025-07-22 PROCEDURE — 2580000003 HC RX 258: Performed by: SURGERY

## 2025-07-22 PROCEDURE — 6360000002 HC RX W HCPCS: Performed by: SURGERY

## 2025-07-22 PROCEDURE — 82248 BILIRUBIN DIRECT: CPT

## 2025-07-22 PROCEDURE — 1200000000 HC SEMI PRIVATE

## 2025-07-22 PROCEDURE — 2580000003 HC RX 258

## 2025-07-22 RX ADMIN — Medication 1000 UNITS: at 07:35

## 2025-07-22 RX ADMIN — GABAPENTIN 300 MG: 300 CAPSULE ORAL at 22:02

## 2025-07-22 RX ADMIN — GABAPENTIN 100 MG: 100 CAPSULE ORAL at 07:35

## 2025-07-22 RX ADMIN — PIPERACILLIN AND TAZOBACTAM 3375 MG: 3; .375 INJECTION, POWDER, LYOPHILIZED, FOR SOLUTION INTRAVENOUS at 00:21

## 2025-07-22 RX ADMIN — ISOSORBIDE MONONITRATE 30 MG: 30 TABLET, EXTENDED RELEASE ORAL at 07:35

## 2025-07-22 RX ADMIN — VANCOMYCIN HYDROCHLORIDE 1750 MG: 10 INJECTION, POWDER, LYOPHILIZED, FOR SOLUTION INTRAVENOUS at 20:31

## 2025-07-22 RX ADMIN — ACETAMINOPHEN 650 MG: 325 TABLET ORAL at 07:35

## 2025-07-22 RX ADMIN — SODIUM CHLORIDE, PRESERVATIVE FREE 10 ML: 5 INJECTION INTRAVENOUS at 07:47

## 2025-07-22 RX ADMIN — PIPERACILLIN AND TAZOBACTAM 3375 MG: 3; .375 INJECTION, POWDER, LYOPHILIZED, FOR SOLUTION INTRAVENOUS at 15:56

## 2025-07-22 RX ADMIN — SODIUM CHLORIDE, PRESERVATIVE FREE 10 ML: 5 INJECTION INTRAVENOUS at 22:07

## 2025-07-22 RX ADMIN — PIPERACILLIN AND TAZOBACTAM 3375 MG: 3; .375 INJECTION, POWDER, LYOPHILIZED, FOR SOLUTION INTRAVENOUS at 07:41

## 2025-07-22 ASSESSMENT — PAIN SCALES - GENERAL
PAINLEVEL_OUTOF10: 5
PAINLEVEL_OUTOF10: 0

## 2025-07-22 ASSESSMENT — PAIN DESCRIPTION - DESCRIPTORS: DESCRIPTORS: SORE

## 2025-07-22 ASSESSMENT — PAIN - FUNCTIONAL ASSESSMENT: PAIN_FUNCTIONAL_ASSESSMENT: ACTIVITIES ARE NOT PREVENTED

## 2025-07-22 ASSESSMENT — PAIN DESCRIPTION - LOCATION: LOCATION: ABDOMEN

## 2025-07-22 ASSESSMENT — PAIN DESCRIPTION - ORIENTATION: ORIENTATION: MID;UPPER

## 2025-07-22 NOTE — PLAN OF CARE
Problem: Chronic Conditions and Co-morbidities  Goal: Patient's chronic conditions and co-morbidity symptoms are monitored and maintained or improved  Outcome: Progressing  Flowsheets (Taken 7/22/2025 0735)  Care Plan - Patient's Chronic Conditions and Co-Morbidity Symptoms are Monitored and Maintained or Improved:   Monitor and assess patient's chronic conditions and comorbid symptoms for stability, deterioration, or improvement   Collaborate with multidisciplinary team to address chronic and comorbid conditions and prevent exacerbation or deterioration     Problem: Discharge Planning  Goal: Discharge to home or other facility with appropriate resources  Outcome: Progressing  Flowsheets (Taken 7/22/2025 0735)  Discharge to home or other facility with appropriate resources:   Identify barriers to discharge with patient and caregiver   Identify discharge learning needs (meds, wound care, etc)     Problem: Infection - Adult  Goal: Absence of infection at discharge  Outcome: Progressing  Flowsheets (Taken 7/22/2025 0735)  Absence of infection at discharge:   Assess and monitor for signs and symptoms of infection   Monitor lab/diagnostic results   Monitor all insertion sites i.e., indwelling lines, tubes and drains     Problem: Safety - Adult  Goal: Free from fall injury  Outcome: Progressing  Flowsheets (Taken 7/22/2025 0735)  Free From Fall Injury: Instruct family/caregiver on patient safety     Problem: ABCDS Injury Assessment  Goal: Absence of physical injury  Outcome: Progressing  Flowsheets (Taken 7/22/2025 0735)  Absence of Physical Injury: Implement safety measures based on patient assessment     Problem: Gastrointestinal - Adult  Goal: Minimal or absence of nausea and vomiting  Outcome: Progressing  Flowsheets (Taken 7/22/2025 0735)  Minimal or absence of nausea and vomiting: Administer IV fluids as ordered to ensure adequate hydration  Goal: Maintains or returns to baseline bowel function  Outcome:

## 2025-07-23 ENCOUNTER — TELEPHONE (OUTPATIENT)
Age: 89
End: 2025-07-23
Payer: MEDICARE

## 2025-07-23 LAB
ALBUMIN SERPL-MCNC: 3.3 G/DL (ref 3.5–5.2)
ALP SERPL-CCNC: 113 U/L (ref 35–104)
ALT SERPL-CCNC: 255 U/L (ref 10–35)
ANION GAP SERPL CALCULATED.3IONS-SCNC: 10 MMOL/L (ref 8–16)
AST SERPL-CCNC: 175 U/L (ref 10–35)
BILIRUB SERPL-MCNC: 0.8 MG/DL (ref 0.2–1.2)
BUN SERPL-MCNC: 16 MG/DL (ref 8–23)
CALCIUM SERPL-MCNC: 8.8 MG/DL (ref 8.8–10.2)
CHLORIDE SERPL-SCNC: 100 MMOL/L (ref 98–107)
CO2 SERPL-SCNC: 24 MMOL/L (ref 22–29)
CREAT SERPL-MCNC: 0.7 MG/DL (ref 0.5–0.9)
GLUCOSE SERPL-MCNC: 114 MG/DL (ref 70–99)
MAGNESIUM SERPL-MCNC: 2.2 MG/DL (ref 1.6–2.4)
POTASSIUM SERPL-SCNC: 3.5 MMOL/L (ref 3.5–5)
PROT SERPL-MCNC: 6.2 G/DL (ref 6.4–8.3)
SODIUM SERPL-SCNC: 134 MMOL/L (ref 136–145)

## 2025-07-23 PROCEDURE — 80053 COMPREHEN METABOLIC PANEL: CPT

## 2025-07-23 PROCEDURE — 6370000000 HC RX 637 (ALT 250 FOR IP): Performed by: SURGERY

## 2025-07-23 PROCEDURE — 87040 BLOOD CULTURE FOR BACTERIA: CPT

## 2025-07-23 PROCEDURE — 6360000002 HC RX W HCPCS

## 2025-07-23 PROCEDURE — 1200000000 HC SEMI PRIVATE

## 2025-07-23 PROCEDURE — 83735 ASSAY OF MAGNESIUM: CPT

## 2025-07-23 PROCEDURE — 2500000003 HC RX 250 WO HCPCS: Performed by: SURGERY

## 2025-07-23 PROCEDURE — 2580000003 HC RX 258

## 2025-07-23 PROCEDURE — 6370000000 HC RX 637 (ALT 250 FOR IP)

## 2025-07-23 PROCEDURE — 2580000003 HC RX 258: Performed by: SURGERY

## 2025-07-23 PROCEDURE — 6360000002 HC RX W HCPCS: Performed by: SURGERY

## 2025-07-23 PROCEDURE — 36415 COLL VENOUS BLD VENIPUNCTURE: CPT

## 2025-07-23 PROCEDURE — 94760 N-INVAS EAR/PLS OXIMETRY 1: CPT

## 2025-07-23 PROCEDURE — 99024 POSTOP FOLLOW-UP VISIT: CPT | Performed by: SURGERY

## 2025-07-23 RX ADMIN — Medication 1000 UNITS: at 07:58

## 2025-07-23 RX ADMIN — PIPERACILLIN AND TAZOBACTAM 3375 MG: 3; .375 INJECTION, POWDER, LYOPHILIZED, FOR SOLUTION INTRAVENOUS at 07:58

## 2025-07-23 RX ADMIN — GABAPENTIN 100 MG: 100 CAPSULE ORAL at 07:58

## 2025-07-23 RX ADMIN — PIPERACILLIN AND TAZOBACTAM 3375 MG: 3; .375 INJECTION, POWDER, LYOPHILIZED, FOR SOLUTION INTRAVENOUS at 16:08

## 2025-07-23 RX ADMIN — AMLODIPINE BESYLATE 5 MG: 5 TABLET ORAL at 07:58

## 2025-07-23 RX ADMIN — ISOSORBIDE MONONITRATE 30 MG: 30 TABLET, EXTENDED RELEASE ORAL at 07:58

## 2025-07-23 RX ADMIN — SODIUM CHLORIDE, PRESERVATIVE FREE 10 ML: 5 INJECTION INTRAVENOUS at 07:59

## 2025-07-23 RX ADMIN — ACETAMINOPHEN 650 MG: 325 TABLET ORAL at 23:49

## 2025-07-23 RX ADMIN — PIPERACILLIN AND TAZOBACTAM 3375 MG: 3; .375 INJECTION, POWDER, LYOPHILIZED, FOR SOLUTION INTRAVENOUS at 00:29

## 2025-07-23 RX ADMIN — SODIUM CHLORIDE, PRESERVATIVE FREE 10 ML: 5 INJECTION INTRAVENOUS at 21:00

## 2025-07-23 RX ADMIN — PIPERACILLIN AND TAZOBACTAM 3375 MG: 3; .375 INJECTION, POWDER, LYOPHILIZED, FOR SOLUTION INTRAVENOUS at 23:54

## 2025-07-23 RX ADMIN — GABAPENTIN 300 MG: 300 CAPSULE ORAL at 23:49

## 2025-07-23 RX ADMIN — VANCOMYCIN HYDROCHLORIDE 1000 MG: 1 INJECTION, POWDER, LYOPHILIZED, FOR SOLUTION INTRAVENOUS at 14:16

## 2025-07-23 ASSESSMENT — PAIN DESCRIPTION - ORIENTATION: ORIENTATION: LOWER

## 2025-07-23 ASSESSMENT — PAIN DESCRIPTION - LOCATION: LOCATION: BACK

## 2025-07-23 ASSESSMENT — PAIN DESCRIPTION - DESCRIPTORS: DESCRIPTORS: ACHING

## 2025-07-23 ASSESSMENT — PAIN SCALES - GENERAL: PAINLEVEL_OUTOF10: 2

## 2025-07-23 NOTE — PLAN OF CARE
Problem: Chronic Conditions and Co-morbidities  Goal: Patient's chronic conditions and co-morbidity symptoms are monitored and maintained or improved  Outcome: Progressing     Problem: Discharge Planning  Goal: Discharge to home or other facility with appropriate resources  Outcome: Progressing     Problem: Infection - Adult  Goal: Absence of infection at discharge  Outcome: Progressing     Problem: Safety - Adult  Goal: Free from fall injury  Outcome: Progressing     Problem: ABCDS Injury Assessment  Goal: Absence of physical injury  Outcome: Progressing     Problem: Gastrointestinal - Adult  Goal: Minimal or absence of nausea and vomiting  Outcome: Progressing  Goal: Maintains or returns to baseline bowel function  Outcome: Progressing     Problem: Skin/Tissue Integrity - Adult  Goal: Skin integrity remains intact  Outcome: Progressing  Goal: Incisions, wounds, or drain sites healing without S/S of infection  Outcome: Progressing     Problem: Pain  Goal: Verbalizes/displays adequate comfort level or baseline comfort level  Outcome: Progressing

## 2025-07-23 NOTE — SIGNIFICANT EVENT
Blood culture with Enterobacter cloacae. Pt on Zosyn, and Vanc was started earlier today  Repeat blood cultures in the morning.   Orders in .

## 2025-07-23 NOTE — TELEPHONE ENCOUNTER
Message sen to CBL    New Consult     Tamiko Landry 1935     Yessica @ [facility] [phone] called consult. Dr Martinez is consulting for  Enterobacter cloacae complex Bacteremia. Patient is in room 510.

## 2025-07-23 NOTE — DISCHARGE INSTRUCTIONS
[facility]  General Surgery/Colorectal Clinic  [phone]    POST CHOLECYSTECTOMY INSTRUCTIONS      1. Resume regular diet    2. Increase activity daily, consistent with comfort.    3. Motrin/Ibuprofen 800 mg three time for pain not to exceed 7 days. Do not drive if taking narcotic pain medicine    4. Strenuous activity or lifting anything over 15 lbs. should be avoided for up to 2 weeks.    5. Swelling and bruising in the region of the operation can be expected.    6. Black and blue marks at site is also common.    7. Outer dressings may be removed in 48 hours if there is no glue applied to the incison. Showering is allowed 24 hrs after  surgery. No swimming or bathing for 2 weeks    8. Milk of Magnesia or Miralax can be used if constipation becomes a problem (If no bowel movement in 48 hrs)    9. Ice pack to operative site, on 20 minutes, off 20 minutes, for 24 hours.    ACTIVITY: You will need to rest for the remainder of the surgery day   DO NOT drive or operate machinery for 24 hours after anesthesia   DO NOT sign legal documents for 24 hours after your surgery   You can resume normal activities within 24 - 48 hours..    TEMPERATURE: An elevated temperature above 101.5 associated with chills should  be reported TO YOUR DOCTOR.    INCISION: report any signs of infection around the incision, redness, swelling, drainage.      You may have some abdominal or shoulder pain. This is from the carbon dioxide gas used during the surgery to  inflate your abdomen so your doctor can see through the scopes. Laying flat will help decrease the pain.

## 2025-07-24 ENCOUNTER — OUTSIDE FACILITY SERVICE (OUTPATIENT)
Age: 89
End: 2025-07-24
Payer: MEDICARE

## 2025-07-24 LAB
ALBUMIN SERPL-MCNC: 3.7 G/DL (ref 3.5–5.2)
ALP SERPL-CCNC: 182 U/L (ref 35–104)
ALT SERPL-CCNC: 184 U/L (ref 10–35)
ANION GAP SERPL CALCULATED.3IONS-SCNC: 12 MMOL/L (ref 8–16)
AST SERPL-CCNC: 88 U/L (ref 10–35)
BACTERIA BLD CULT ORG #2: NORMAL
BACTERIA BLD CULT: NORMAL
BASOPHILS # BLD: 0 K/UL (ref 0–0.2)
BASOPHILS NFR BLD: 0.4 % (ref 0–1)
BILIRUB SERPL-MCNC: 0.8 MG/DL (ref 0.2–1.2)
BUN SERPL-MCNC: 13 MG/DL (ref 8–23)
CALCIUM SERPL-MCNC: 9.4 MG/DL (ref 8.8–10.2)
CHLORIDE SERPL-SCNC: 100 MMOL/L (ref 98–107)
CO2 SERPL-SCNC: 24 MMOL/L (ref 22–29)
CREAT SERPL-MCNC: 0.7 MG/DL (ref 0.5–0.9)
EOSINOPHIL # BLD: 0.4 K/UL (ref 0–0.6)
EOSINOPHIL NFR BLD: 3.6 % (ref 0–5)
ERYTHROCYTE [DISTWIDTH] IN BLOOD BY AUTOMATED COUNT: 14.7 % (ref 11.5–14.5)
GLUCOSE SERPL-MCNC: 108 MG/DL (ref 70–99)
HCT VFR BLD AUTO: 37.8 % (ref 37–47)
HGB BLD-MCNC: 12.4 G/DL (ref 12–16)
IMM GRANULOCYTES # BLD: 0 K/UL
LACTATE BLDV-SCNC: 1.3 MMOL/L (ref 0.5–1.9)
LYMPHOCYTES # BLD: 2 K/UL (ref 1.1–4.5)
LYMPHOCYTES NFR BLD: 19.7 % (ref 20–40)
MCH RBC QN AUTO: 28.5 PG (ref 27–31)
MCHC RBC AUTO-ENTMCNC: 32.8 G/DL (ref 33–37)
MCV RBC AUTO: 86.9 FL (ref 81–99)
MONOCYTES # BLD: 0.4 K/UL (ref 0–0.9)
MONOCYTES NFR BLD: 4.1 % (ref 0–10)
NEUTROPHILS # BLD: 7.4 K/UL (ref 1.5–7.5)
NEUTS SEG NFR BLD: 71.8 % (ref 50–65)
PLATELET # BLD AUTO: 198 K/UL (ref 130–400)
PMV BLD AUTO: 10.4 FL (ref 9.4–12.3)
POTASSIUM SERPL-SCNC: 3.7 MMOL/L (ref 3.5–5)
PROT SERPL-MCNC: 7 G/DL (ref 6.4–8.3)
RBC # BLD AUTO: 4.35 M/UL (ref 4.2–5.4)
SODIUM SERPL-SCNC: 136 MMOL/L (ref 136–145)
VANCOMYCIN TROUGH SERPL-MCNC: 8.7 UG/ML (ref 10–20)
WBC # BLD AUTO: 10.3 K/UL (ref 4.8–10.8)

## 2025-07-24 PROCEDURE — 2500000003 HC RX 250 WO HCPCS: Performed by: SURGERY

## 2025-07-24 PROCEDURE — 36415 COLL VENOUS BLD VENIPUNCTURE: CPT

## 2025-07-24 PROCEDURE — 80053 COMPREHEN METABOLIC PANEL: CPT

## 2025-07-24 PROCEDURE — 2580000003 HC RX 258

## 2025-07-24 PROCEDURE — 80202 ASSAY OF VANCOMYCIN: CPT

## 2025-07-24 PROCEDURE — 2580000003 HC RX 258: Performed by: SURGERY

## 2025-07-24 PROCEDURE — 99222 1ST HOSP IP/OBS MODERATE 55: CPT | Performed by: INTERNAL MEDICINE

## 2025-07-24 PROCEDURE — 85025 COMPLETE CBC W/AUTO DIFF WBC: CPT

## 2025-07-24 PROCEDURE — 1200000000 HC SEMI PRIVATE

## 2025-07-24 PROCEDURE — 83605 ASSAY OF LACTIC ACID: CPT

## 2025-07-24 PROCEDURE — 94760 N-INVAS EAR/PLS OXIMETRY 1: CPT

## 2025-07-24 PROCEDURE — 6360000002 HC RX W HCPCS: Performed by: SURGERY

## 2025-07-24 PROCEDURE — 6370000000 HC RX 637 (ALT 250 FOR IP): Performed by: SURGERY

## 2025-07-24 PROCEDURE — 6360000002 HC RX W HCPCS

## 2025-07-24 PROCEDURE — 6370000000 HC RX 637 (ALT 250 FOR IP)

## 2025-07-24 RX ORDER — SENNA AND DOCUSATE SODIUM 50; 8.6 MG/1; MG/1
1 TABLET, FILM COATED ORAL DAILY
Status: DISCONTINUED | OUTPATIENT
Start: 2025-07-24 | End: 2025-07-25 | Stop reason: HOSPADM

## 2025-07-24 RX ORDER — POLYETHYLENE GLYCOL 3350 17 G/17G
17 POWDER, FOR SOLUTION ORAL DAILY
Status: DISCONTINUED | OUTPATIENT
Start: 2025-07-24 | End: 2025-07-25 | Stop reason: HOSPADM

## 2025-07-24 RX ADMIN — PIPERACILLIN AND TAZOBACTAM 3375 MG: 3; .375 INJECTION, POWDER, LYOPHILIZED, FOR SOLUTION INTRAVENOUS at 16:26

## 2025-07-24 RX ADMIN — SODIUM CHLORIDE, PRESERVATIVE FREE 10 ML: 5 INJECTION INTRAVENOUS at 21:00

## 2025-07-24 RX ADMIN — GABAPENTIN 300 MG: 300 CAPSULE ORAL at 20:32

## 2025-07-24 RX ADMIN — GABAPENTIN 100 MG: 100 CAPSULE ORAL at 07:44

## 2025-07-24 RX ADMIN — Medication 1000 UNITS: at 07:44

## 2025-07-24 RX ADMIN — AMLODIPINE BESYLATE 5 MG: 5 TABLET ORAL at 07:44

## 2025-07-24 RX ADMIN — DOCUSATE SODIUM 50MG AND SENNOSIDES 8.6MG 1 TABLET: 8.6; 5 TABLET, FILM COATED ORAL at 14:32

## 2025-07-24 RX ADMIN — PIPERACILLIN AND TAZOBACTAM 3375 MG: 3; .375 INJECTION, POWDER, LYOPHILIZED, FOR SOLUTION INTRAVENOUS at 07:45

## 2025-07-24 RX ADMIN — ACETAMINOPHEN 650 MG: 325 TABLET ORAL at 20:31

## 2025-07-24 RX ADMIN — VANCOMYCIN HYDROCHLORIDE 1000 MG: 1 INJECTION, POWDER, LYOPHILIZED, FOR SOLUTION INTRAVENOUS at 10:01

## 2025-07-24 RX ADMIN — ISOSORBIDE MONONITRATE 30 MG: 30 TABLET, EXTENDED RELEASE ORAL at 07:44

## 2025-07-24 ASSESSMENT — PAIN DESCRIPTION - LOCATION: LOCATION: HIP

## 2025-07-24 ASSESSMENT — PAIN SCALES - GENERAL: PAINLEVEL_OUTOF10: 3

## 2025-07-24 ASSESSMENT — PAIN DESCRIPTION - DESCRIPTORS: DESCRIPTORS: ACHING

## 2025-07-24 ASSESSMENT — PAIN DESCRIPTION - ORIENTATION: ORIENTATION: RIGHT;LEFT

## 2025-07-24 NOTE — CONSULTS
INFECTIOUS DISEASES CONSULT NOTE    Patient:  Alyssa Love 89 y.o. female  ROOM # [unfilled]  YOB: 1935  MRN: [id number]  CSN:  [id number]  Admit date: 7/21/2025   Admitting Physician: Favian Galeana MD  Primary Care Physician: Betsy Ott APRN  REFERRING PROVIDER: No ref. provider found    Reason for Consultation: \"Enterobacter cloacae complex bacteremia\"    Chief Complaint: Recommendations for antibiotic management of enterococcal bloodstream infection.    History of Present Illness: Very pleasant 89-year-old woman.  She indicates Sunday evening she had eaten dinner.  She had some other snacks.  She had felt fine.  She indicates she went to bed.  She woke up with some abdominal distention and discomfort.  She had an episode of nausea and vomiting.  She then developed severe chills.  She presented to the hospital.  She indicates she had had a similar episode of chills in the past with urinary tract infection.  She was found to have gallstones and acute cholecystitis.  She underwent robotic assisted cholecystectomy.  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  She has been receiving antibiotic treatment with vancomycin along with piperacillin/tazobactam.  Blood cultures have grown Enterobacter.  She indicates she feels better.  She indicates she is eating.  She is passing stool and flatus.  She indicates plans are in place for probable discharge home today.  She indicates she follows with Betsy Ott for primary care.  She indicates no antibiotic allergies.  Talked with her about fluoroquinolone treatment along with the specific risks of fluoroquinolone therapy including tendon soreness, tendon weakening, and rarely tendon rupture such as Achilles tendon tear or rotator cuff tear-she indicates she is accepting of the risks of fluoroquinolone therapy.  Infectious disease asked to evaluate and offer recommendations.    Current

## 2025-07-25 ENCOUNTER — OUTSIDE FACILITY SERVICE (OUTPATIENT)
Age: 89
End: 2025-07-25

## 2025-07-25 VITALS
HEIGHT: 63 IN | BODY MASS INDEX: 29.3 KG/M2 | OXYGEN SATURATION: 93 % | RESPIRATION RATE: 20 BRPM | DIASTOLIC BLOOD PRESSURE: 87 MMHG | WEIGHT: 165.34 LBS | SYSTOLIC BLOOD PRESSURE: 127 MMHG | TEMPERATURE: 97.5 F | HEART RATE: 62 BPM

## 2025-07-25 LAB
ALBUMIN SERPL-MCNC: 3.3 G/DL (ref 3.5–5.2)
ALP SERPL-CCNC: 193 U/L (ref 35–104)
ALT SERPL-CCNC: 137 U/L (ref 10–35)
ANION GAP SERPL CALCULATED.3IONS-SCNC: 11 MMOL/L (ref 8–16)
AST SERPL-CCNC: 60 U/L (ref 10–35)
BACTERIA BLD CULT: ABNORMAL
BACTERIA BLD CULT: ABNORMAL
BASOPHILS # BLD: 0 K/UL (ref 0–0.2)
BASOPHILS NFR BLD: 0.4 % (ref 0–1)
BILIRUB SERPL-MCNC: 0.7 MG/DL (ref 0.2–1.2)
BUN SERPL-MCNC: 13 MG/DL (ref 8–23)
CALCIUM SERPL-MCNC: 9 MG/DL (ref 8.8–10.2)
CHLORIDE SERPL-SCNC: 103 MMOL/L (ref 98–107)
CO2 SERPL-SCNC: 23 MMOL/L (ref 22–29)
CREAT SERPL-MCNC: 0.6 MG/DL (ref 0.5–0.9)
EOSINOPHIL # BLD: 0.4 K/UL (ref 0–0.6)
EOSINOPHIL NFR BLD: 5.2 % (ref 0–5)
ERYTHROCYTE [DISTWIDTH] IN BLOOD BY AUTOMATED COUNT: 14.6 % (ref 11.5–14.5)
GLUCOSE SERPL-MCNC: 90 MG/DL (ref 70–99)
HCT VFR BLD AUTO: 33.8 % (ref 37–47)
HGB BLD-MCNC: 11.1 G/DL (ref 12–16)
IMM GRANULOCYTES # BLD: 0 K/UL
LYMPHOCYTES # BLD: 2.1 K/UL (ref 1.1–4.5)
LYMPHOCYTES NFR BLD: 29.9 % (ref 20–40)
MCH RBC QN AUTO: 28.3 PG (ref 27–31)
MCHC RBC AUTO-ENTMCNC: 32.8 G/DL (ref 33–37)
MCV RBC AUTO: 86.2 FL (ref 81–99)
MONOCYTES # BLD: 0.5 K/UL (ref 0–0.9)
MONOCYTES NFR BLD: 6.5 % (ref 0–10)
NEUTROPHILS # BLD: 4.1 K/UL (ref 1.5–7.5)
NEUTS SEG NFR BLD: 57.7 % (ref 50–65)
ORGANISM: ABNORMAL
PLATELET # BLD AUTO: 208 K/UL (ref 130–400)
PMV BLD AUTO: 10.6 FL (ref 9.4–12.3)
POTASSIUM SERPL-SCNC: 3.8 MMOL/L (ref 3.5–5)
PROT SERPL-MCNC: 6.3 G/DL (ref 6.4–8.3)
RBC # BLD AUTO: 3.92 M/UL (ref 4.2–5.4)
SODIUM SERPL-SCNC: 137 MMOL/L (ref 136–145)
WBC # BLD AUTO: 7.1 K/UL (ref 4.8–10.8)

## 2025-07-25 PROCEDURE — 99232 SBSQ HOSP IP/OBS MODERATE 35: CPT | Performed by: INTERNAL MEDICINE

## 2025-07-25 PROCEDURE — 6370000000 HC RX 637 (ALT 250 FOR IP): Performed by: INTERNAL MEDICINE

## 2025-07-25 PROCEDURE — 6370000000 HC RX 637 (ALT 250 FOR IP)

## 2025-07-25 PROCEDURE — 6360000002 HC RX W HCPCS: Performed by: SURGERY

## 2025-07-25 PROCEDURE — 80053 COMPREHEN METABOLIC PANEL: CPT

## 2025-07-25 PROCEDURE — 2500000003 HC RX 250 WO HCPCS: Performed by: SURGERY

## 2025-07-25 PROCEDURE — 85025 COMPLETE CBC W/AUTO DIFF WBC: CPT

## 2025-07-25 PROCEDURE — 2580000003 HC RX 258: Performed by: SURGERY

## 2025-07-25 PROCEDURE — OUTSIDEPOS PR OUTSIDE POS PLACEHOLDER: Performed by: INTERNAL MEDICINE

## 2025-07-25 PROCEDURE — 6370000000 HC RX 637 (ALT 250 FOR IP): Performed by: SURGERY

## 2025-07-25 PROCEDURE — 36415 COLL VENOUS BLD VENIPUNCTURE: CPT

## 2025-07-25 RX ORDER — POLYETHYLENE GLYCOL 3350 17 G/17G
17 POWDER, FOR SOLUTION ORAL DAILY
Qty: 527 G | Refills: 0 | Status: SHIPPED | OUTPATIENT
Start: 2025-07-25 | End: 2025-08-24

## 2025-07-25 RX ORDER — SULFAMETHOXAZOLE AND TRIMETHOPRIM 800; 160 MG/1; MG/1
1 TABLET ORAL EVERY 12 HOURS SCHEDULED
Qty: 10 TABLET | Refills: 0 | Status: SHIPPED | OUTPATIENT
Start: 2025-07-25 | End: 2025-07-30

## 2025-07-25 RX ORDER — SULFAMETHOXAZOLE AND TRIMETHOPRIM 800; 160 MG/1; MG/1
1 TABLET ORAL EVERY 12 HOURS SCHEDULED
Status: DISCONTINUED | OUTPATIENT
Start: 2025-07-25 | End: 2025-07-25 | Stop reason: HOSPADM

## 2025-07-25 RX ADMIN — SULFAMETHOXAZOLE AND TRIMETHOPRIM 1 TABLET: 800; 160 TABLET ORAL at 08:42

## 2025-07-25 RX ADMIN — HYDROCHLOROTHIAZIDE 25 MG: 25 TABLET ORAL at 08:42

## 2025-07-25 RX ADMIN — ISOSORBIDE MONONITRATE 30 MG: 30 TABLET, EXTENDED RELEASE ORAL at 08:41

## 2025-07-25 RX ADMIN — Medication 1000 UNITS: at 08:41

## 2025-07-25 RX ADMIN — AMLODIPINE BESYLATE 5 MG: 5 TABLET ORAL at 08:41

## 2025-07-25 RX ADMIN — GABAPENTIN 100 MG: 100 CAPSULE ORAL at 08:42

## 2025-07-25 RX ADMIN — PIPERACILLIN AND TAZOBACTAM 3375 MG: 3; .375 INJECTION, POWDER, LYOPHILIZED, FOR SOLUTION INTRAVENOUS at 01:12

## 2025-07-25 RX ADMIN — SODIUM CHLORIDE, PRESERVATIVE FREE 10 ML: 5 INJECTION INTRAVENOUS at 08:43

## 2025-07-25 NOTE — DISCHARGE INSTR - DIET

## 2025-07-25 NOTE — CARE COORDINATION
07/25/25 0914   IMM Letter   IMM Letter given to Patient/Family/Significant other/Guardian/POA/by: given to patient by braxton tan   IMM Letter date given: 07/25/25   IMM Letter time given: 0910     Second IMM given to patient and explained with patient verbalizing understanding.  All questions and concerns addressed     Signed letter placed in pt soft chart  Patient declined waiting 4 hr period prior to discharge.  Electronically signed by Braxton Brooke on 7/25/2025 at 9:15 AM

## 2025-07-25 NOTE — PROGRESS NOTES
[facility]    [facility] Note    Patient:  Alyssa Love  YOB: 1935  Date of Service: 7/23/2025  MRN: [id number]   Acct: [id number]   Primary Care Physician: Betsy Ott APRN  Advance Directive: Full Code  Admit Date: 7/21/2025       Hospital Day: 2    Portions of this note have been copied forward, however, updated to reflect the most current clinical status of this patient.     CHIEF COMPLAINT: abd pain    SUBJECTIVE: Reports significant improvement in her abd pain, no n/v, tolerating oral intake    CUMULATIVE HOSPITAL COURSE:     The patient is a 89 y.o. female with a PMH of CAD, HLD, CVA, and HTN who presented to [facility] ED on 7/21 complaining of acute onset of RUQ abdominal pain with 3 episodes of nausea and vomiting. She states that her abdominal pain improved after vomiting. She subsequently began to have chills and rigors and come to the ED for further evaluation. She denies any previous RUQ discomfort. She denies suspicious foods. She has not had diarrhea, melena or hematochezia. She states that she has been in her usual state of health. ED work-up revealed-, K3.5, CL 94.  BUN 12 and creatinine 0.7.  Initial lactic acid 4.3.  Significant transaminitis-alk phos 130, , , T. Bili-1.3.  Lipase 165.  She is without leukocytosis with a WBC count of 10.3, H&H 14.0/41.3 with a platelet count of 217.  RPP negative for viral illness.  UA negative for infection.  She was found to be febrile upon arrival with a temperature of 101.5 and HR-119. CT of the abdomen and pelvis revealed-Gallbladder wall thickening and/or pericholecystic edema. Cholelithiasis. Ultrasound of the gallbladder could be considered for further evaluation. Cholecystitis is not excluded. Lobular contour of the liver likely due to chronic liver disease. No urinary or bowel obstruction and normal appendix. She was given IVF's, including sepsis bolus and antibiotics. Admitted to hospital medicine for 
  Pharmacy Adjustment per [facility] protocol    Alyssa Love is a 89 y.o. female. Pharmacy has adjusted medications per [facility] protocol.    Recent Labs     07/21/25  0430   BUN 12       Recent Labs     07/21/25  0430   CREATININE 0.7       Estimated Creatinine Clearance: 50 mL/min (based on SCr of 0.7 mg/dL).    Height:   Ht Readings from Last 1 Encounters:   07/09/25 1.6 m (5' 3\")     Weight:  Wt Readings from Last 1 Encounters:   07/21/25 68 kg (150 lb)    BMI:  BMI Readings from Last 1 Encounters:   07/21/25 26.57 kg/m²         Plan: Adjust the following medications based on [facility] protocol:           Increase Ceftriaxone to 2 g IV q24h.    Electronically signed by Amy Hayward RPh, BCPS, 7/21/2025,7:05 AM    
  [facility]    [facility]    Patient:  Alyssa Love  YOB: 1935  Date of Service: 7/24/2025  MRN: [id number]   Acct: [id number]   Primary Care Physician: Betsy Ott APRN  Advance Directive: Full Code  Admit Date: 7/21/2025       Hospital Day: 3    Portions of this note have been copied forward, however, updated to reflect the most current clinical status of this patient.     CHIEF COMPLAINT: abd pain    SUBJECTIVE: No complaints today    CUMULATIVE HOSPITAL COURSE:     The patient is a 89 y.o. female with a PMH of CAD, HLD, CVA, and HTN who presented to [facility] ED on 7/21 complaining of acute onset of RUQ abdominal pain with 3 episodes of nausea and vomiting. She states that her abdominal pain improved after vomiting. She subsequently began to have chills and rigors and come to the ED for further evaluation. She denies any previous RUQ discomfort. She denies suspicious foods. She has not had diarrhea, melena or hematochezia. She states that she has been in her usual state of health. ED work-up revealed-, K3.5, CL 94.  BUN 12 and creatinine 0.7.  Initial lactic acid 4.3.  Significant transaminitis-alk phos 130, , , T. Bili-1.3.  Lipase 165.  She is without leukocytosis with a WBC count of 10.3, H&H 14.0/41.3 with a platelet count of 217.  RPP negative for viral illness.  UA negative for infection.  She was found to be febrile upon arrival with a temperature of 101.5 and HR-119. CT of the abdomen and pelvis revealed-Gallbladder wall thickening and/or pericholecystic edema. Cholelithiasis. Ultrasound of the gallbladder could be considered for further evaluation. Cholecystitis is not excluded. Lobular contour of the liver likely due to chronic liver disease. No urinary or bowel obstruction and normal appendix. She was given IVF's, including sepsis bolus and antibiotics. Admitted to hospital medicine for sepsis due to acute cholecystitis with cholelithiasis with a 
4 Eyes Skin Assessment     NAME:  Alyssa Love  YOB: 1935  MEDICAL RECORD NUMBER:  [id number]    The patient is being assessed for  Admission    I agree that at least one RN has performed a thorough Head to Toe Skin Assessment on the patient. ALL assessment sites listed below have been assessed.      Areas assessed by both nurses:    Head, Face, Ears, Shoulders, Back, Chest, Arms, Elbows, Hands, Sacrum. Buttock, Coccyx, Ischium, and Legs. Feet and Heels        Does the Patient have a Wound? No noted wound(s)       Enrique Prevention initiated by RN: No  Wound Care Orders initiated by RN: No    For hospital-acquired stage 1 & 2 and ALL Stage 3,4, Unstageable, DTI, NWPT, and Complex wounds: place order “IP Wound Care/Ostomy Nurse Eval and Treat” by RN under : No    New Ostomies, if present place, Ostomy referral order under : No     Nurse 1 eSignature: Electronically signed by Bandar Kruger RN on 7/21/25 at 10:05 AM CDT    **SHARE this note so that the co-signing nurse can place an eSignature**    Nurse 2 eSignature: Electronically signed by Erica Sampson RN on 7/21/25 at 11:12 AM CDT  
[facility] General Surgery Progress Note      SUBJECTIVE:  Ms. Love is a 89 y.o. female is seen and examined at bedside.     S/P: Robotic assisted cholecystectomy  POD: 1    She is having some incisional pain.  No nausea or vomiting.  No fevers or chills.  Tolerating liquids.    I have reviewed the patient's chart including past visits, office notes, hospital reports, procedures, tests, labs, medications, and other reports.     OBJECTIVE:  BP (!) 149/54   Pulse 66   Temp 97.2 °F (36.2 °C) (Temporal)   Resp 16   Ht 1.6 m (5' 3\")   Wt 75 kg (165 lb 4.8 oz)   SpO2 94%   BMI 29.28 kg/m²   In: 1783.6   Out: 1705 [Urine:1700]       General: Well-developed well-nourished no apparent distress    Cardiac: Regular rate and rhythm, no murmur    Lungs: Decreased to auscultation bilaterally, no use of accessory muscles    Abdomen: Soft incisional tenderness nondistended normal bowel sounds no hernia                   incision: Clean dry and intact    Extremities: No clubbing cyanosis or edema    Neuro: Alert and oriented ×3, normal gait      Labs:  CBC:   Recent Labs     07/21/25  0430 07/22/25  0259   WBC 10.3 15.3*   HGB 14.0 12.2    146     CMP:   Lab Results   Component Value Date     (L) 07/22/2025    K 3.7 07/22/2025    CL 97 (L) 07/22/2025    CO2 24 07/22/2025    BUN 10 07/22/2025    CREATININE 0.7 07/22/2025    GLUCOSE 152 (H) 07/22/2025    CALCIUM 8.7 (L) 07/22/2025    BILITOT 2.2 (H) 07/22/2025    ALKPHOS 127 (H) 07/22/2025     (H) 07/22/2025     (H) 07/22/2025    LABGLOM 82 07/22/2025    GFRAA >59 07/21/2022        ASSESSMENT:  Principal Problem:    Cholecystitis  Active Problems:    Essential hypertension    History of ischemic stroke    Calculus of gallbladder with acute cholecystitis    Sepsis (HCC)    Cirrhosis of liver without ascites (HCC)  Resolved Problems:    * No resolved hospital problems. *       PLAN:  -Continue diet as tolerated  -Elevation of  LFTs is most likely related 
Infectious Diseases Progress Note    Patient:  Alyssa Love  YOB: 1935  MRN: [id number]   Admit date: 7/21/2025   Admitting Physician: Favian Galeana MD  Primary Care Physician: Betsy Ott APRN    Chief Complaint: Seen today in follow-up of Enterobacter cloacae bloodstream infection.    Interval History: She is doing very well.  She feels better.  She does not have significant abdominal pain.  She is tolerating oral intake.  She is passing stool and flatus.  She has no cardiopulmonary symptoms.  Reviewed antibiotic susceptibility-results outlined below.  She feels ready for discharge home.  Chart review she sees Radha Ott for primary care.    In/Out    Intake/Output Summary (Last 24 hours) at 7/25/2025 0618  Last data filed at 7/24/2025 1816  Gross per 24 hour   Intake 240 ml   Output --   Net 240 ml     Allergies: No Active Allergies  Current Meds: polyethylene glycol (GLYCOLAX) packet 17 g, Daily  sennosides-docusate sodium (SENOKOT-S) 8.6-50 MG tablet 1 tablet, Daily  ondansetron (ZOFRAN) injection 4 mg, Once PRN  sodium chloride flush 0.9 % injection 5-40 mL, 2 times per day  sodium chloride flush 0.9 % injection 5-40 mL, PRN  0.9 % sodium chloride infusion, PRN  acetaminophen (TYLENOL) tablet 650 mg, Q6H PRN   Or  acetaminophen (TYLENOL) suppository 650 mg, Q6H PRN  [Held by provider] atorvastatin (LIPITOR) tablet 80 mg, Nightly  gabapentin (NEURONTIN) capsule 300 mg, Nightly  gabapentin (NEURONTIN) capsule 100 mg, QAM  hydroCHLOROthiazide (HYDRODIURIL) tablet 25 mg, Daily  isosorbide mononitrate (IMDUR) extended release tablet 30 mg, Daily  Vitamin D (CHOLECALCIFEROL) tablet 1,000 Units, Daily  amLODIPine (NORVASC) tablet 5 mg, Daily  piperacillin-tazobactam (ZOSYN) 3,375 mg in sodium chloride 0.9 % 50 mL IVPB ([id number]), Q8H  polyvinyl alcohol (LIQUIFILM TEARS) 1.4 % ophthalmic solution 1 drop, Q3H PRN  oxyCODONE-acetaminophen (PERCOCET) 5-325 MG per tablet 1 tablet, Q4H 
[facility]   Pharmacy Pharmacokinetic Monitoring Service - Vancomycin    Consulting Provider: Tiffany Garner APRN - CNP    Indication: Bloodstream Infection   Target Concentration: Goal AUC/MARCELINO 400-600 mg*hr/L  Day of Therapy: 3  Additional Antimicrobials: Piperacillin-tazobactam (ZOSYN)     Pertinent Laboratory Values:   Wt Readings from Last 1 Encounters:   07/22/25 75 kg (165 lb 4.8 oz)     Temp Readings from Last 1 Encounters:   07/24/25 98.6 °F (37 °C)     Estimated Creatinine Clearance: 53 mL/min (based on SCr of 0.7 mg/dL).  Recent Labs     07/22/25  0259 07/23/25  0434   CREATININE 0.7 0.7   BUN 10 16   WBC 15.3*  --      Procalcitonin: 07/21/25 = 0.16    Pertinent Cultures:  Culture Date Source Results   07/21/25 Blood x2 G(-) rods,  Enterobacter cloacae complex (1 of 2)   07/21/25 Blood, molecular Enterobacter cloacae   07/21/25 Respiratory Panel Nothing detected   07/23/25 Blood x 2 No growth to date   MRSA Nasal Swab: N/A. Non-respiratory infection.    Recent vancomycin administrations                     vancomycin (VANCOCIN) 1,000 mg in sodium chloride 0.9 % 250 mL IVPB ([id number]) (mg) 1,000 mg New Bag 07/23/25 1416    vancomycin (VANCOCIN) 1,750 mg in sodium chloride 0.9 % 500 mL IVPB (mg) 1,750 mg New Bag 07/22/25 2031                    Assessment:  Date/Time Current Dose Concentration Timing of Concentration (h) AUC   07/24/25 1 gm q 18 hours 8.7 17 h 0 m 360 / 373   Note: Serum concentrations collected for AUC dosing may appear elevated if collected in close proximity to the dose administered, this is not necessarily an indication of toxicity    Regimen: 1000 mg IV every 18 hours.  Start time: 04:01 on 07/25/2025  Exposure target: AUC24 (range) 400-600 mg/L.hr   PCT16-34: 360 mg/L.hr  AUC24,ss: 373 mg/L.hr  Probability of AUC24 > 400: 35 %  Ctrough,ss: 10.5 mg/L  Probability of Ctrough,ss > 20: %    Plan:  Current dosing regimen is sub-therapeutic  Increase dose to Vancomycin 
[facility]   Pharmacy Pharmacokinetic Monitoring Service - Vancomycin     Alyssa Love is a 89 y.o. female starting on vancomycin therapy for bloodstream infection. Pharmacy consulted by Tiffany BRYSON for monitoring and adjustment.    Target Concentration: Goal AUC/MARCELINO 400-600 mg*hr/L    Additional Antimicrobials: Zosyn    Pertinent Laboratory Values:   Wt Readings from Last 1 Encounters:   07/22/25 75 kg (165 lb 4.8 oz)     Temp Readings from Last 1 Encounters:   07/22/25 97.7 °F (36.5 °C) (Temporal)     Estimated Creatinine Clearance: 53 mL/min (based on SCr of 0.7 mg/dL).  Recent Labs     07/21/25  0430 07/22/25  0259   CREATININE 0.7 0.7   BUN 12 10   WBC 10.3 15.3*     Procalcitonin: 0.16 (07/21/25)    Pertinent Cultures:  Culture Date Source Results   07/21/25 Blood x 2   Gram stain Anaerobic bottle:  Gram positive rods  Culture in progress      MRSA Nasal Swab: N/A. Non-respiratory infection.    Plan:  Dosing recommendations based on Bayesian software  Start vancomycin 1750 mg IV x 1 dose followed by 1000 mg IV Q 18 hours.  Anticipated AUC of 487 and trough concentration of 15.2 at steady state  Renal labs as indicated   Vancomycin concentration ordered for 07/24/25 @ 0700   Pharmacy will continue to monitor patient and adjust therapy as indicated    Thank you for the consult,  Devon Vega RPH  7/22/2025 7:13 PM   
[facility] General Surgery Progress Note      SUBJECTIVE:  Ms. Love is a 89 y.o. female is seen and examined at bedside.     S/P: Cholecystectomy  POD: 2    Continues to progress well.  Has bowel movement.  Tolerating diet.  No abdominal pain.  Is ambulating.    I have reviewed the patient's chart including past visits, office notes, hospital reports, procedures, tests, labs, medications, and other reports.     OBJECTIVE:  BP (!) 162/75   Pulse 68   Temp 97.5 °F (36.4 °C) (Temporal)   Resp 15   Ht 1.6 m (5' 3\")   Wt 75 kg (165 lb 4.8 oz)   SpO2 96%   BMI 29.28 kg/m²   In: 2671.7   Out: 500 [Urine:500]       General: Well-developed well-nourished no apparent distress    Cardiac: Regular rate and rhythm, no murmur    Lungs: Decreased to auscultation bilaterally, no use of accessory muscles    Abdomen: Soft no incisional tenderness nondistended normal bowel sounds no hernia                   incision: Clean dry and intact    Extremities: No clubbing cyanosis or edema    Neuro: Alert and oriented ×3, normal gait      Labs:  CBC:   Recent Labs     07/21/25  0430 07/22/25  0259   WBC 10.3 15.3*   HGB 14.0 12.2    146     CMP:   Lab Results   Component Value Date     (L) 07/23/2025    K 3.5 07/23/2025     07/23/2025    CO2 24 07/23/2025    BUN 16 07/23/2025    CREATININE 0.7 07/23/2025    GLUCOSE 114 (H) 07/23/2025    CALCIUM 8.8 07/23/2025    BILITOT 0.8 07/23/2025    ALKPHOS 113 (H) 07/23/2025     (H) 07/23/2025     (H) 07/23/2025    LABGLOM 82 07/23/2025    GFRAA >59 07/21/2022           ASSESSMENT:  Principal Problem:    Cholecystitis  Active Problems:    Essential hypertension    History of ischemic stroke    Calculus of gallbladder with acute cholecystitis    Sepsis (HCC)    Cirrhosis of liver without ascites (HCC)  Resolved Problems:    * No resolved hospital problems. *       PLAN:  From a surgical standpoint patient is progressing well.    Okay to discharge home with 
underlying liver disease.    Query created by: Judith Bonner on 7/24/2025 12:08 PM      Electronically signed by:  Kevon Brooks MD 7/24/2025 1:49 PM          
DIET; Regular     Labs and Other Data:     Recent Labs     07/21/25  0430 07/22/25  0259   WBC 10.3 15.3*   HGB 14.0 12.2    146     Recent Labs     07/21/25  0430 07/22/25  0259   * 134*   K 3.5 3.7   CL 94* 97*   CO2 24 24   BUN 12 10   CREATININE 0.7 0.7   GLUCOSE 170* 152*     Recent Labs     07/21/25  0430 07/22/25  0259   * 380*   * 391*   BILITOT 1.3* 2.2*   ALKPHOS 130* 127*       Troponin T: No results for input(s): \"TROPONINI\" in the last 72 hours.    Pro-BNP: No results for input(s): \"BNP\" in the last 72 hours.    INR: No results for input(s): \"INR\" in the last 72 hours.    UA:  Recent Labs     07/21/25  0514   COLORU YELLOW   PHUR 7.5   CLARITYU Clear   LEUKOCYTESUR Negative   UROBILINOGEN 1.0   BILIRUBINUR Negative   BLOODU Negative   GLUCOSEU Negative       A1C: No results for input(s): \"LABA1C\" in the last 72 hours.    ABG:No results for input(s): \"PHART\", \"DPE7RLN\", \"PO2ART\", \"JSK4QHW\", \"BEART\", \"HGBAE\", \"A3EQHOBU\", \"CARBOXHGBART\" in the last 72 hours.    Radiology:   US GALLBLADDER RUQ  Result Date: 7/21/2025  EXAM: ULTRASOUND OF THE RIGHT UPPER QUADRANT (LIMITED ABDOMEN)  HISTORY: Concern for acute cholecystitis  TECHNIQUE: Sonography of the right upper quadrant was performed. Color Doppler imaging of the portal vein was performed. Images were obtained and stored in a permanent archive.  COMPARISON: Same day CT abdomen pelvis  FINDINGS:  Pancreas: Normal sonographic appearance of the head and body. Tail is partially obscured.  Liver: Normal echogenicity. Lobular hepatic contour.. No lesions. - Main portal vein: Normal hepatopetal flow.  Biliary: Cholelithiasis. Gallbladder wall thickening measuring up to 7 mm.. Negative sonographic Jara's sign. -Common bile duct measures 5 mm.  Right Kidney: No mass, calculus, or hydronephrosis. Right kidney measures 10.7 x 4.7 x 5.6 cm.  Aorta:  Visualized portion without aneurysmal dilatation.  Other: No ascites.        Cholelithiasis

## 2025-07-25 NOTE — DISCHARGE SUMMARY
[facility]    Discharge Summary      Alyssa Love  :  1935  MRN:  [id number]    Admit date:  2025  Discharge date:    2025    Discharging Physician:  Dr. Galeana    Advance Directive: Full Code    Consults: ID and general surgery    Primary Care Physician:  Betsy Ott, BRAYDON    Discharge Diagnoses:  Principal Problem:    Cholecystitis  Active Problems:    Essential hypertension    History of ischemic stroke    Calculus of gallbladder with acute cholecystitis    Sepsis (HCC)    Cirrhosis of liver without ascites (HCC)  Resolved Problems:    * No resolved hospital problems. *      Portions of this note have been copied forward, however, changed to reflect the most current clinical status of this patient.    Hospital Course:    The patient is a 89 y.o. female with a PMH of CAD, HLD, CVA, and HTN who presented to [facility] ED on  complaining of acute onset of RUQ abdominal pain with 3 episodes of nausea and vomiting. She states that her abdominal pain improved after vomiting. She subsequently began to have chills and rigors and come to the ED for further evaluation. She denies any previous RUQ discomfort. She denies suspicious foods. She has not had diarrhea, melena or hematochezia. She states that she has been in her usual state of health. ED work-up revealed-, K3.5, CL 94.  BUN 12 and creatinine 0.7.  Initial lactic acid 4.3.  Significant transaminitis-alk phos 130, , , T. Bili-1.3.  Lipase 165.  She is without leukocytosis with a WBC count of 10.3, H&H 14.0/41.3 with a platelet count of 217.  RPP negative for viral illness.  UA negative for infection.  She was found to be febrile upon arrival with a temperature of 101.5 and HR-119. CT of the abdomen and pelvis revealed-Gallbladder wall thickening and/or pericholecystic edema. Cholelithiasis. Ultrasound of the gallbladder could be considered for further evaluation. Cholecystitis is not excluded. Lobular contour of

## 2025-07-25 NOTE — PLAN OF CARE
Problem: Chronic Conditions and Co-morbidities  Goal: Patient's chronic conditions and co-morbidity symptoms are monitored and maintained or improved  Outcome: Adequate for Discharge     Problem: Discharge Planning  Goal: Discharge to home or other facility with appropriate resources  Outcome: Adequate for Discharge     Problem: Infection - Adult  Goal: Absence of infection at discharge  Outcome: Adequate for Discharge     Problem: Safety - Adult  Goal: Free from fall injury  Outcome: Adequate for Discharge     Problem: ABCDS Injury Assessment  Goal: Absence of physical injury  Outcome: Adequate for Discharge     Problem: Gastrointestinal - Adult  Goal: Minimal or absence of nausea and vomiting  Outcome: Adequate for Discharge  Goal: Maintains or returns to baseline bowel function  Outcome: Adequate for Discharge     Problem: Skin/Tissue Integrity - Adult  Goal: Skin integrity remains intact  Outcome: Adequate for Discharge  Goal: Incisions, wounds, or drain sites healing without S/S of infection  Outcome: Adequate for Discharge     Problem: Pain  Goal: Verbalizes/displays adequate comfort level or baseline comfort level  Outcome: Adequate for Discharge

## 2025-07-26 LAB — BACTERIA BLD CULT ORG #2: NORMAL

## 2025-07-28 ENCOUNTER — TELEPHONE (OUTPATIENT)
Dept: PRIMARY CARE CLINIC | Age: 89
End: 2025-07-28

## 2025-07-28 LAB
BACTERIA BLD CULT ORG #2: NORMAL
BACTERIA BLD CULT: NORMAL

## 2025-07-28 NOTE — TELEPHONE ENCOUNTER
[facility] Transitions Initial Follow Up Call    Outreach made within 2 business days of discharge: Yes    Patient: Alyssa Love Patient : 1935   MRN: [id number]  Reason for Admission: There are no discharge diagnoses documented for the most recent discharge.  Discharge Date: 25       Spoke with: Alyssa    Discharge department/facility: [facility] Interactive Patient Contact:  Was patient able to fill all prescriptions: Yes  Was patient instructed to bring all medications to the follow-up visit: Yes  Is patient taking all medications as directed in the discharge summary? Yes  Does patient understand their discharge instructions: Yes  Does patient have questions or concerns that need addressed prior to 7-14 day follow up office visit: no    Scheduled appointment with PCP within 7-14 days    Follow Up  Future Appointments   Date Time Provider Department Center   2025  3:00 PM Betsy Ott APRN [facility] DEP   2025 10:30 AM Kevon Brooks MD N [facility] Gen SG [id number]   2025  9:00 AM [facility] ECHO 2 [facility] Rad/Card   10/7/2025  9:30 AM Betsy Ott APRN [facility] ECC DEP   2025  2:30 PM Bud Quintero MD N [facility] Cardio [id number]   2026  9:30 AM Sayra Keene APRN [facility] NEUROLOG Neurology -       Stephanie Maier MA

## 2025-07-29 ENCOUNTER — OFFICE VISIT (OUTPATIENT)
Dept: PRIMARY CARE CLINIC | Age: 89
End: 2025-07-29

## 2025-07-29 VITALS
SYSTOLIC BLOOD PRESSURE: 104 MMHG | DIASTOLIC BLOOD PRESSURE: 62 MMHG | HEART RATE: 98 BPM | TEMPERATURE: 98 F | OXYGEN SATURATION: 96 % | WEIGHT: 150 LBS | HEIGHT: 63 IN | BODY MASS INDEX: 26.58 KG/M2

## 2025-07-29 DIAGNOSIS — M81.0 AGE-RELATED OSTEOPOROSIS WITHOUT CURRENT PATHOLOGICAL FRACTURE: Primary | ICD-10-CM

## 2025-07-29 DIAGNOSIS — K74.60 CIRRHOSIS OF LIVER WITHOUT ASCITES, UNSPECIFIED HEPATIC CIRRHOSIS TYPE (HCC): ICD-10-CM

## 2025-07-29 DIAGNOSIS — K81.9 CHOLECYSTITIS: ICD-10-CM

## 2025-07-29 DIAGNOSIS — Z09 HOSPITAL DISCHARGE FOLLOW-UP: Primary | ICD-10-CM

## 2025-07-29 DIAGNOSIS — M54.50 CHRONIC BILATERAL LOW BACK PAIN WITHOUT SCIATICA: ICD-10-CM

## 2025-07-29 DIAGNOSIS — G89.29 CHRONIC BILATERAL LOW BACK PAIN WITHOUT SCIATICA: ICD-10-CM

## 2025-07-29 DIAGNOSIS — M54.9 MID BACK PAIN: ICD-10-CM

## 2025-07-29 DIAGNOSIS — R74.8 ELEVATED LIVER ENZYMES: ICD-10-CM

## 2025-07-29 DIAGNOSIS — A41.9 SEPSIS WITHOUT ACUTE ORGAN DYSFUNCTION, DUE TO UNSPECIFIED ORGANISM (HCC): ICD-10-CM

## 2025-07-29 LAB
ALBUMIN SERPL-MCNC: 3.8 G/DL (ref 3.5–5.2)
ALP SERPL-CCNC: 144 U/L (ref 35–104)
ALT SERPL-CCNC: 47 U/L (ref 10–35)
ANION GAP SERPL CALCULATED.3IONS-SCNC: 11 MMOL/L (ref 8–16)
AST SERPL-CCNC: 27 U/L (ref 10–35)
BILIRUB SERPL-MCNC: 0.3 MG/DL (ref 0.2–1.2)
BUN SERPL-MCNC: 14 MG/DL (ref 8–23)
CALCIUM SERPL-MCNC: 9.6 MG/DL (ref 8.8–10.2)
CHLORIDE SERPL-SCNC: 94 MMOL/L (ref 98–107)
CO2 SERPL-SCNC: 24 MMOL/L (ref 22–29)
CREAT SERPL-MCNC: 0.9 MG/DL (ref 0.5–0.9)
GLUCOSE SERPL-MCNC: 82 MG/DL (ref 70–99)
POTASSIUM SERPL-SCNC: 4.2 MMOL/L (ref 3.5–5.1)
PROT SERPL-MCNC: 7 G/DL (ref 6.4–8.3)
SODIUM SERPL-SCNC: 129 MMOL/L (ref 136–145)

## 2025-07-29 RX ORDER — SODIUM CHLORIDE 9 MG/ML
INJECTION, SOLUTION INTRAVENOUS CONTINUOUS
OUTPATIENT
Start: 2025-07-29

## 2025-07-29 RX ORDER — ACETAMINOPHEN 325 MG/1
650 TABLET ORAL
OUTPATIENT
Start: 2025-07-29

## 2025-07-29 RX ORDER — EPINEPHRINE 1 MG/ML
0.3 INJECTION, SOLUTION, CONCENTRATE INTRAVENOUS PRN
OUTPATIENT
Start: 2025-07-29

## 2025-07-29 RX ORDER — DIPHENHYDRAMINE HYDROCHLORIDE 50 MG/ML
50 INJECTION, SOLUTION INTRAMUSCULAR; INTRAVENOUS
Status: CANCELLED | OUTPATIENT
Start: 2025-07-29

## 2025-07-29 RX ORDER — HYDROCORTISONE SODIUM SUCCINATE 100 MG/2ML
100 INJECTION INTRAMUSCULAR; INTRAVENOUS
OUTPATIENT
Start: 2025-07-29

## 2025-07-29 RX ORDER — HYDROCORTISONE SODIUM SUCCINATE 100 MG/2ML
100 INJECTION INTRAMUSCULAR; INTRAVENOUS
Status: CANCELLED | OUTPATIENT
Start: 2025-07-29

## 2025-07-29 RX ORDER — ALBUTEROL SULFATE 90 UG/1
4 INHALANT RESPIRATORY (INHALATION) PRN
Status: CANCELLED | OUTPATIENT
Start: 2025-07-29

## 2025-07-29 RX ORDER — EPINEPHRINE 1 MG/ML
0.3 INJECTION, SOLUTION, CONCENTRATE INTRAVENOUS PRN
Status: CANCELLED | OUTPATIENT
Start: 2025-07-29

## 2025-07-29 RX ORDER — ALBUTEROL SULFATE 90 UG/1
4 INHALANT RESPIRATORY (INHALATION) PRN
OUTPATIENT
Start: 2025-07-29

## 2025-07-29 RX ORDER — DIPHENHYDRAMINE HYDROCHLORIDE 50 MG/ML
50 INJECTION, SOLUTION INTRAMUSCULAR; INTRAVENOUS
OUTPATIENT
Start: 2025-07-29

## 2025-07-29 RX ORDER — ONDANSETRON 2 MG/ML
8 INJECTION INTRAMUSCULAR; INTRAVENOUS
OUTPATIENT
Start: 2025-07-29

## 2025-07-29 RX ORDER — ACETAMINOPHEN 325 MG/1
650 TABLET ORAL
Status: CANCELLED | OUTPATIENT
Start: 2025-07-29

## 2025-07-29 RX ORDER — ONDANSETRON 2 MG/ML
8 INJECTION INTRAMUSCULAR; INTRAVENOUS
Status: CANCELLED | OUTPATIENT
Start: 2025-07-29

## 2025-07-29 RX ORDER — GABAPENTIN 300 MG/1
300 CAPSULE ORAL NIGHTLY
Qty: 90 CAPSULE | Refills: 0 | Status: SHIPPED | OUTPATIENT
Start: 2025-07-29 | End: 2025-10-27

## 2025-07-29 RX ORDER — SODIUM CHLORIDE 9 MG/ML
INJECTION, SOLUTION INTRAVENOUS CONTINUOUS
Status: CANCELLED | OUTPATIENT
Start: 2025-07-29

## 2025-07-29 NOTE — PROGRESS NOTES
Post-Discharge Transitional Care Follow Up      Alyssa Love   YOB: 1935    Date of Office Visit:  7/29/2025  Date of Hospital Admission: 7/21/25  Date of Hospital Discharge: 7/25/25  Readmission Risk Score (high >=14%. Medium >=10%):Readmission Risk Score: 12.1      Care management risk score Rising risk (score 2-5) and Complex Care (Scores >=6): No Risk Score On File     Non face to face  following discharge, date last encounter closed (first attempt may have been earlier): 07/28/2025     Call initiated 2 business days of discharge: Yes     Cholecystitis  Sepsis without acute organ dysfunction, due to unspecified organism (HCC)  Cirrhosis of liver without ascites, unspecified hepatic cirrhosis type (HCC)  -     Eulalio Alex MD, Gastroenterology, [place]  Elevated liver enzymes  -     Comprehensive Metabolic Panel; Future  -     Eulalio Alex MD, Gastroenterology, [place]  Chronic bilateral low back pain without sciatica  -     gabapentin (NEURONTIN) 300 MG capsule; Take 1 capsule by mouth nightly for 90 days. Max Daily Amount: 300 mg, Disp-90 capsule, R-0Normal  Mid back pain  -     gabapentin (NEURONTIN) 300 MG capsule; Take 1 capsule by mouth nightly for 90 days. Max Daily Amount: 300 mg, Disp-90 capsule, R-0Normal      Medical Decision Making: high complexity  Return for as scheduled.         Assessment & Plan  1-2.  Cholecystitis, sepsis  -Resolved s/p cholecystectomy and antibiotic therapy.  -She will continue/complete Bactrim DS as prescribed.  -Continue Tylenol as needed for postop pain which has been controlled.  -Continue follow-up with general surgery.    3-4. Cirrhosis of the liver, elevated liver enzyme  - Liver enzymes have been normal since 2023, with no indications of liver disease.  - Findings of possible cirrhosis on CT in hospital.  - Referral to [facility] will be made for further evaluation  - Check CMP today to follow-up on elevated liver enzymes which have been

## 2025-07-30 ASSESSMENT — ENCOUNTER SYMPTOMS
ABDOMINAL PAIN: 1
DIARRHEA: 0
WHEEZING: 0
CHEST TIGHTNESS: 0
COUGH: 0
NAUSEA: 0
SORE THROAT: 0
SHORTNESS OF BREATH: 0

## 2025-08-04 ENCOUNTER — OFFICE VISIT (OUTPATIENT)
Dept: SURGERY | Age: 89
End: 2025-08-04

## 2025-08-04 VITALS — HEART RATE: 75 BPM | WEIGHT: 150 LBS | BODY MASS INDEX: 26.58 KG/M2 | OXYGEN SATURATION: 97 % | HEIGHT: 63 IN

## 2025-08-04 DIAGNOSIS — Z09 POSTOP CHECK: Primary | ICD-10-CM

## 2025-08-04 DIAGNOSIS — Z48.89 ENCOUNTER FOR POSTOPERATIVE CARE: ICD-10-CM

## 2025-08-04 PROCEDURE — 99024 POSTOP FOLLOW-UP VISIT: CPT | Performed by: SURGERY

## 2025-08-06 ENCOUNTER — TELEPHONE (OUTPATIENT)
Dept: PRIMARY CARE CLINIC | Age: 89
End: 2025-08-06

## 2025-08-07 ENCOUNTER — OFFICE VISIT (OUTPATIENT)
Dept: GASTROENTEROLOGY | Age: 89
End: 2025-08-07
Payer: MEDICARE

## 2025-08-07 ENCOUNTER — HOSPITAL ENCOUNTER (OUTPATIENT)
Dept: INFUSION THERAPY | Age: 89
Setting detail: INFUSION SERIES
Discharge: HOME | End: 2025-08-07
Payer: MEDICARE

## 2025-08-07 VITALS
HEIGHT: 63 IN | BODY MASS INDEX: 27.11 KG/M2 | OXYGEN SATURATION: 98 % | HEART RATE: 64 BPM | SYSTOLIC BLOOD PRESSURE: 130 MMHG | WEIGHT: 153 LBS | DIASTOLIC BLOOD PRESSURE: 76 MMHG

## 2025-08-07 VITALS
DIASTOLIC BLOOD PRESSURE: 62 MMHG | RESPIRATION RATE: 18 BRPM | HEART RATE: 72 BPM | OXYGEN SATURATION: 98 % | SYSTOLIC BLOOD PRESSURE: 138 MMHG

## 2025-08-07 DIAGNOSIS — E87.1 HYPONATREMIA: ICD-10-CM

## 2025-08-07 DIAGNOSIS — R74.8 ELEVATED LIVER ENZYMES: Primary | ICD-10-CM

## 2025-08-07 DIAGNOSIS — M81.0 AGE-RELATED OSTEOPOROSIS WITHOUT CURRENT PATHOLOGICAL FRACTURE: Primary | ICD-10-CM

## 2025-08-07 LAB
ANION GAP SERPL CALCULATED.3IONS-SCNC: 11 MMOL/L (ref 8–16)
BUN SERPL-MCNC: 11 MG/DL (ref 8–23)
CALCIUM SERPL-MCNC: 9.7 MG/DL (ref 8.8–10.2)
CHLORIDE SERPL-SCNC: 100 MMOL/L (ref 98–107)
CO2 SERPL-SCNC: 26 MMOL/L (ref 22–29)
CREAT SERPL-MCNC: 0.8 MG/DL (ref 0.5–0.9)
GLUCOSE SERPL-MCNC: 89 MG/DL (ref 70–99)
POTASSIUM SERPL-SCNC: 5 MMOL/L (ref 3.5–5.1)
SODIUM SERPL-SCNC: 137 MMOL/L (ref 136–145)

## 2025-08-07 PROCEDURE — 1123F ACP DISCUSS/DSCN MKR DOCD: CPT | Performed by: NURSE PRACTITIONER

## 2025-08-07 PROCEDURE — 96372 THER/PROPH/DIAG INJ SC/IM: CPT

## 2025-08-07 PROCEDURE — 6360000002 HC RX W HCPCS: Performed by: NURSE PRACTITIONER

## 2025-08-07 PROCEDURE — 99215 OFFICE O/P EST HI 40 MIN: CPT | Performed by: NURSE PRACTITIONER

## 2025-08-07 PROCEDURE — 1159F MED LIST DOCD IN RCRD: CPT | Performed by: NURSE PRACTITIONER

## 2025-08-07 RX ORDER — SODIUM CHLORIDE 9 MG/ML
INJECTION, SOLUTION INTRAVENOUS CONTINUOUS
Status: CANCELLED | OUTPATIENT
Start: 2026-01-25

## 2025-08-07 RX ORDER — HYDROCORTISONE SODIUM SUCCINATE 100 MG/2ML
100 INJECTION INTRAMUSCULAR; INTRAVENOUS
Status: CANCELLED | OUTPATIENT
Start: 2026-01-25

## 2025-08-07 RX ORDER — EPINEPHRINE 1 MG/ML
0.3 INJECTION, SOLUTION, CONCENTRATE INTRAVENOUS PRN
Status: CANCELLED | OUTPATIENT
Start: 2026-01-25

## 2025-08-07 RX ORDER — ACETAMINOPHEN 325 MG/1
650 TABLET ORAL
Status: CANCELLED | OUTPATIENT
Start: 2026-01-25

## 2025-08-07 RX ORDER — DIPHENHYDRAMINE HYDROCHLORIDE 50 MG/ML
50 INJECTION, SOLUTION INTRAMUSCULAR; INTRAVENOUS
Status: CANCELLED | OUTPATIENT
Start: 2026-01-25

## 2025-08-07 RX ORDER — ONDANSETRON 2 MG/ML
8 INJECTION INTRAMUSCULAR; INTRAVENOUS
Status: CANCELLED | OUTPATIENT
Start: 2026-01-25

## 2025-08-07 RX ORDER — ALBUTEROL SULFATE 90 UG/1
4 INHALANT RESPIRATORY (INHALATION) PRN
Status: CANCELLED | OUTPATIENT
Start: 2026-01-25

## 2025-08-07 RX ADMIN — DENOSUMAB 60 MG: 60 INJECTION SUBCUTANEOUS at 13:03

## 2025-08-07 ASSESSMENT — ENCOUNTER SYMPTOMS
VOMITING: 0
COUGH: 0
CONSTIPATION: 0
NAUSEA: 0
CHOKING: 0
RECTAL PAIN: 0
ANAL BLEEDING: 0
SHORTNESS OF BREATH: 0
ABDOMINAL PAIN: 0
BLOOD IN STOOL: 0
DIARRHEA: 0
ABDOMINAL DISTENTION: 0
TROUBLE SWALLOWING: 0

## (undated) DEVICE — SUTURE VICRYL + SZ 0 L27IN ABSRB VLT L26MM UR-6 5/8 CIR VCP603H

## (undated) DEVICE — BANDAGE COMPR W3INXL15FT BGE E SGL LAYERED CLP CLSR

## (undated) DEVICE — GOWN, ORBIS, XXLARGE, STERILE: Brand: MEDLINE

## (undated) DEVICE — SEAL

## (undated) DEVICE — ARM DRAPE

## (undated) DEVICE — TISSUE RETRIEVAL SYSTEM: Brand: INZII RETRIEVAL SYSTEM

## (undated) DEVICE — Z DISCONTINUED USE 2272117 DRAPE SURG 3 QTR N INVASIVE 2 LAYR DISP

## (undated) DEVICE — SOLUTION ANTIFOG VIS SYS CLEARIFY LAPSCP

## (undated) DEVICE — SOLUTION IRRIG 1000ML 09% SOD CHL USP PIC PLAS CONTAINER

## (undated) DEVICE — SYRINGE 20ML LL S/C 50

## (undated) DEVICE — SOLUTION IV IRRIG WATER 1000ML POUR BRL 2F7114

## (undated) DEVICE — DRESSING TRNSPAR W5XL4.5IN FLM SHT SEMIPERMEABLE WIND

## (undated) DEVICE — LARYNGOSCOPE VID MILLER 2 MTL BLADE M HNDL CURAPLEX

## (undated) DEVICE — SYRINGE MED 5ML STD CLR PLAS LUERLOCK TIP N CTRL DISP

## (undated) DEVICE — COVER,MAYO STAND,STERILE: Brand: MEDLINE

## (undated) DEVICE — SUTURE ABSRB BRAID COAT UD CP NO 2 27IN VCRL J195H

## (undated) DEVICE — GLOVE SURG SZ 85 CRM LTX FREE POLYISOPRENE POLYMER BEAD ANTI

## (undated) DEVICE — SUTURE MCRYL SZ 4-0 L18IN ABSRB UD L19MM PS-2 3/8 CIR PRIM Y496G

## (undated) DEVICE — COVER LT HNDL BLU PLAS

## (undated) DEVICE — DUAL CUT SAGITTAL BLADE

## (undated) DEVICE — CEMENT MIXING SYSTEM WITH FEMORAL BREAKWAY NOZZLE: Brand: REVOLUTION

## (undated) DEVICE — SUTURE VCRL SZ 2-0 L36IN ABSRB UD L36MM CT-1 1/2 CIR J945H

## (undated) DEVICE — GAUZE,SPONGE,4"X4",8PLY,STRL,LF,10/TRAY: Brand: MEDLINE

## (undated) DEVICE — LIQUIBAND RAPID ADHESIVE 36/CS 0.8ML: Brand: MEDLINE

## (undated) DEVICE — TUBE ET 7MM NSL ORAL BASIC CUF INTMED MURPHY EYE RADPQ MRK

## (undated) DEVICE — GLOVE SURG SZ 75 CRM LTX FREE POLYISOPRENE POLYMER BEAD ANTI

## (undated) DEVICE — SUCTION IRRIGATOR: Brand: ENDOWRIST

## (undated) DEVICE — STRATAFIX SPRL PDS + 2-0 23CM CT-2

## (undated) DEVICE — ADHESIVE SKIN CLSR 0.7ML TOP DERMBND ADV

## (undated) DEVICE — CHLORAPREP 26ML ORANGE

## (undated) DEVICE — AIRSEAL 12 MM ACCESS PORT AND PALM GRIP OBTURATOR WITH BLADELESS OPTICAL TIP, 120 MM LENGTH: Brand: AIRSEAL

## (undated) DEVICE — 3M™ IOBAN™ 2 ANTIMICROBIAL INCISE DRAPE 6650EZ: Brand: IOBAN™ 2

## (undated) DEVICE — CURAVIEW LED LARYNGOSCOPE BLADE & HANDLE,DISPOSABLE,MAC 3.5: Brand: CURAPLEX

## (undated) DEVICE — BLADELESS OBTURATOR: Brand: WECK VISTA

## (undated) DEVICE — SURGICAL PROCEDURE PACK KNEE TOT DBD CDS LOURDES HOSP LF

## (undated) DEVICE — TIP COVER ACCESSORY

## (undated) DEVICE — GLOVE SURG SZ 7 CRM LTX FREE POLYISOPRENE POLYMER BEAD ANTI

## (undated) DEVICE — SOLUTION IRRIG 1000ML STRL H2O USP PLAS POUR BTL

## (undated) DEVICE — LAPAROTOMY DRAPE WITH POUCHES: Brand: CONVERTORS

## (undated) DEVICE — AIRSEAL 8 MM CANNULA CAP AND OBTURATOR WITH BLADELESS OPTICAL TIP COMPATIBLE WITH INTUITIVE DA VINCI XI AND DA VINCI X 8 MM INSTRUMENT CANNULA, STANDARD LENGTH: Brand: AIRSEAL

## (undated) DEVICE — TRI-LUMEN FILTERED TUBE SET WITH ACTIVATED CHARCOAL FILTER: Brand: AIRSEAL

## (undated) DEVICE — GOWN,PREVENTION PLUS,XL,ST,24/CS: Brand: MEDLINE

## (undated) DEVICE — CANNULA SEAL

## (undated) DEVICE — SUTURE STRATAFIX SYMMETRIC PDS + SZ 0 L9IN ABSRB VIO L36MM SXPP1A425

## (undated) DEVICE — GLOVE SURG SZ 8 L12IN FNGR THK79MIL GRN LTX FREE

## (undated) DEVICE — GLOVE SURG SZ 85 L12IN FNGR THK79MIL GRN LTX FREE

## (undated) DEVICE — Device

## (undated) DEVICE — GENERAL LAP CDS

## (undated) DEVICE — DRESSING FOAM W4XL12IN SIL RECT ADH WTRPRF FLM BK W/ BORD

## (undated) DEVICE — SYSTEM SKIN CLSR 22CM DERMBND PRINEO

## (undated) DEVICE — SUTURE MONOCRYL SZ 4-0 L18IN ABSRB UD L19MM PS-2 3/8 CIR PRIM Y496G

## (undated) DEVICE — INSUFFLATION NEEDLE TO ESTABLISH PNEUMOPERITONEUM.: Brand: INSUFFLATION NEEDLE

## (undated) DEVICE — TROCAR: Brand: KII FIOS FIRST ENTRY

## (undated) DEVICE — CURAVIEW LED LARYNSCP BLDE

## (undated) DEVICE — SUTURE VCRL SZ 0 L27IN ABSRB VLT L36MM UR-5 5/8 CIR J376H

## (undated) DEVICE — RECIPROCATING BLADE DOUBLE SIDE (74.0 X 0.77MM)

## (undated) DEVICE — AIRSEAL BIFURCATED FILTERED TUBESET WITH ACTIVATED CHARCOAL FILTER: Brand: AIRSEAL

## (undated) DEVICE — GLOVE SURG SZ 85 L12IN FNGR THK94MIL TRNSLUC YEL LTX

## (undated) DEVICE — SOLUTION IRRIG 3000ML 0.9% SOD CHL USP UROMATIC PLAS CONT

## (undated) DEVICE — SPONGE LAP W18XL18IN WHT COT 4 PLY FLD STRUNG RADPQ DISP ST

## (undated) DEVICE — SOLUTION IV IRRIG POUR BRL 0.9% SODIUM CHL 2F7124

## (undated) DEVICE — COLUMN DRAPE

## (undated) DEVICE — SUTURE VCRL SZ 3-0 L27IN ABSRB UD L19MM PS-2 3/8 CIR PRIM J427H